# Patient Record
Sex: FEMALE | Race: BLACK OR AFRICAN AMERICAN | NOT HISPANIC OR LATINO | Employment: UNEMPLOYED | URBAN - METROPOLITAN AREA
[De-identification: names, ages, dates, MRNs, and addresses within clinical notes are randomized per-mention and may not be internally consistent; named-entity substitution may affect disease eponyms.]

---

## 2017-09-24 ENCOUNTER — HOSPITAL ENCOUNTER (INPATIENT)
Facility: HOSPITAL | Age: 57
LOS: 7 days | Discharge: HOME | DRG: 871 | End: 2017-10-02
Attending: EMERGENCY MEDICINE | Admitting: FAMILY MEDICINE
Payer: MEDICAID

## 2017-09-24 DIAGNOSIS — R07.9 CHEST PAIN: ICD-10-CM

## 2017-09-24 DIAGNOSIS — J18.9 PNEUMONIA OF BOTH LUNGS DUE TO INFECTIOUS ORGANISM, UNSPECIFIED PART OF LUNG: Primary | ICD-10-CM

## 2017-09-24 DIAGNOSIS — R09.02 HYPOXIA: ICD-10-CM

## 2017-09-24 DIAGNOSIS — A41.9 SEPSIS: ICD-10-CM

## 2017-09-24 DIAGNOSIS — R00.0 TACHYCARDIA: ICD-10-CM

## 2017-09-24 LAB
ALBUMIN SERPL BCP-MCNC: 3.2 G/DL
ALLENS TEST: ABNORMAL
ALP SERPL-CCNC: 134 U/L
ALT SERPL W/O P-5'-P-CCNC: 8 U/L
ANION GAP SERPL CALC-SCNC: 16 MMOL/L
APTT BLDCRRT: 38.7 SEC
AST SERPL-CCNC: 11 U/L
BASOPHILS # BLD AUTO: 0.03 K/UL
BASOPHILS NFR BLD: 0.2 %
BILIRUB SERPL-MCNC: 1.1 MG/DL
BNP SERPL-MCNC: 24 PG/ML
BUN SERPL-MCNC: 11 MG/DL
CALCIUM SERPL-MCNC: 10.1 MG/DL
CHLORIDE SERPL-SCNC: 96 MMOL/L
CO2 SERPL-SCNC: 25 MMOL/L
CREAT SERPL-MCNC: 1 MG/DL
DELSYS: ABNORMAL
DIFFERENTIAL METHOD: ABNORMAL
EOSINOPHIL # BLD AUTO: 0 K/UL
EOSINOPHIL NFR BLD: 0.1 %
ERYTHROCYTE [DISTWIDTH] IN BLOOD BY AUTOMATED COUNT: 14.5 %
EST. GFR  (AFRICAN AMERICAN): >60 ML/MIN/1.73 M^2
EST. GFR  (NON AFRICAN AMERICAN): >60 ML/MIN/1.73 M^2
FIO2: 21
GLUCOSE SERPL-MCNC: 154 MG/DL
HCO3 UR-SCNC: 23.8 MMOL/L (ref 24–28)
HCT VFR BLD AUTO: 39.2 %
HGB BLD-MCNC: 12.6 G/DL
INR PPP: 1.1
LACTATE SERPL-SCNC: 1.5 MMOL/L
LIPASE SERPL-CCNC: 35 U/L
LYMPHOCYTES # BLD AUTO: 2.5 K/UL
LYMPHOCYTES NFR BLD: 14.2 %
MAGNESIUM SERPL-MCNC: 1.9 MG/DL
MCH RBC QN AUTO: 27.6 PG
MCHC RBC AUTO-ENTMCNC: 32.1 G/DL
MCV RBC AUTO: 86 FL
MODE: ABNORMAL
MONOCYTES # BLD AUTO: 1.1 K/UL
MONOCYTES NFR BLD: 6.1 %
NEUTROPHILS # BLD AUTO: 14.1 K/UL
NEUTROPHILS NFR BLD: 79.4 %
PCO2 BLDA: 36.1 MMHG (ref 35–45)
PH SMN: 7.43 [PH] (ref 7.35–7.45)
PHOSPHATE SERPL-MCNC: 2.2 MG/DL
PLATELET # BLD AUTO: 465 K/UL
PMV BLD AUTO: 9.9 FL
PO2 BLDA: 51 MMHG (ref 80–100)
POC BE: -1 MMOL/L
POC SATURATED O2: 87 % (ref 95–100)
POTASSIUM SERPL-SCNC: 4 MMOL/L
PROT SERPL-MCNC: 8.2 G/DL
PROTHROMBIN TIME: 11.4 SEC
RBC # BLD AUTO: 4.56 M/UL
SAMPLE: ABNORMAL
SITE: ABNORMAL
SODIUM SERPL-SCNC: 137 MMOL/L
TROPONIN I SERPL DL<=0.01 NG/ML-MCNC: 0.01 NG/ML
TSH SERPL DL<=0.005 MIU/L-ACNC: 1.14 UIU/ML
WBC # BLD AUTO: 17.76 K/UL

## 2017-09-24 PROCEDURE — 83605 ASSAY OF LACTIC ACID: CPT

## 2017-09-24 PROCEDURE — 93005 ELECTROCARDIOGRAM TRACING: CPT

## 2017-09-24 PROCEDURE — 25000003 PHARM REV CODE 250: Performed by: EMERGENCY MEDICINE

## 2017-09-24 PROCEDURE — 87040 BLOOD CULTURE FOR BACTERIA: CPT

## 2017-09-24 PROCEDURE — 84484 ASSAY OF TROPONIN QUANT: CPT

## 2017-09-24 PROCEDURE — 85730 THROMBOPLASTIN TIME PARTIAL: CPT

## 2017-09-24 PROCEDURE — 82803 BLOOD GASES ANY COMBINATION: CPT

## 2017-09-24 PROCEDURE — 85025 COMPLETE CBC W/AUTO DIFF WBC: CPT

## 2017-09-24 PROCEDURE — 83735 ASSAY OF MAGNESIUM: CPT

## 2017-09-24 PROCEDURE — 93010 ELECTROCARDIOGRAM REPORT: CPT | Mod: ,,, | Performed by: INTERNAL MEDICINE

## 2017-09-24 PROCEDURE — 85610 PROTHROMBIN TIME: CPT

## 2017-09-24 PROCEDURE — 94640 AIRWAY INHALATION TREATMENT: CPT

## 2017-09-24 PROCEDURE — 84100 ASSAY OF PHOSPHORUS: CPT

## 2017-09-24 PROCEDURE — 25000242 PHARM REV CODE 250 ALT 637 W/ HCPCS: Performed by: EMERGENCY MEDICINE

## 2017-09-24 PROCEDURE — 83690 ASSAY OF LIPASE: CPT

## 2017-09-24 PROCEDURE — 96361 HYDRATE IV INFUSION ADD-ON: CPT

## 2017-09-24 PROCEDURE — 99285 EMERGENCY DEPT VISIT HI MDM: CPT | Mod: 25

## 2017-09-24 PROCEDURE — 36600 WITHDRAWAL OF ARTERIAL BLOOD: CPT

## 2017-09-24 PROCEDURE — 82533 TOTAL CORTISOL: CPT

## 2017-09-24 PROCEDURE — 84443 ASSAY THYROID STIM HORMONE: CPT

## 2017-09-24 PROCEDURE — 80053 COMPREHEN METABOLIC PANEL: CPT

## 2017-09-24 PROCEDURE — 83880 ASSAY OF NATRIURETIC PEPTIDE: CPT

## 2017-09-24 PROCEDURE — 84145 PROCALCITONIN (PCT): CPT

## 2017-09-24 PROCEDURE — 21400001 HC TELEMETRY ROOM

## 2017-09-24 PROCEDURE — 99900035 HC TECH TIME PER 15 MIN (STAT)

## 2017-09-24 PROCEDURE — 96374 THER/PROPH/DIAG INJ IV PUSH: CPT

## 2017-09-24 RX ORDER — IPRATROPIUM BROMIDE AND ALBUTEROL SULFATE 2.5; .5 MG/3ML; MG/3ML
3 SOLUTION RESPIRATORY (INHALATION)
Status: COMPLETED | OUTPATIENT
Start: 2017-09-24 | End: 2017-09-24

## 2017-09-24 RX ORDER — MORPHINE SULFATE 4 MG/ML
4 INJECTION, SOLUTION INTRAMUSCULAR; INTRAVENOUS
Status: DISCONTINUED | OUTPATIENT
Start: 2017-09-24 | End: 2017-09-25

## 2017-09-24 RX ORDER — ACETAMINOPHEN 500 MG
1000 TABLET ORAL
Status: COMPLETED | OUTPATIENT
Start: 2017-09-24 | End: 2017-09-24

## 2017-09-24 RX ADMIN — SODIUM CHLORIDE 2040 ML: 0.9 INJECTION, SOLUTION INTRAVENOUS at 10:09

## 2017-09-24 RX ADMIN — ACETAMINOPHEN 1000 MG: 500 TABLET ORAL at 10:09

## 2017-09-24 RX ADMIN — IPRATROPIUM BROMIDE AND ALBUTEROL SULFATE 3 ML: .5; 3 SOLUTION RESPIRATORY (INHALATION) at 10:09

## 2017-09-25 PROBLEM — I10 HTN (HYPERTENSION): Status: ACTIVE | Noted: 2017-09-25

## 2017-09-25 PROBLEM — E83.39 HYPOPHOSPHATEMIA: Status: ACTIVE | Noted: 2017-09-25

## 2017-09-25 PROBLEM — R07.9 CHEST PAIN: Status: ACTIVE | Noted: 2017-09-25

## 2017-09-25 PROBLEM — R09.02 HYPOXIA: Status: ACTIVE | Noted: 2017-09-25

## 2017-09-25 PROBLEM — J18.9 PNEUMONIA OF BOTH LUNGS DUE TO INFECTIOUS ORGANISM: Status: ACTIVE | Noted: 2017-09-25

## 2017-09-25 PROBLEM — R00.0 TACHYCARDIA: Status: ACTIVE | Noted: 2017-09-25

## 2017-09-25 PROBLEM — Z72.0 TOBACCO ABUSE: Status: ACTIVE | Noted: 2017-09-25

## 2017-09-25 PROBLEM — A41.9 SEPSIS: Status: ACTIVE | Noted: 2017-09-25

## 2017-09-25 LAB
ANION GAP SERPL CALC-SCNC: 11 MMOL/L
BASOPHILS # BLD AUTO: 0.02 K/UL
BASOPHILS NFR BLD: 0.1 %
BILIRUB UR QL STRIP: NEGATIVE
BUN SERPL-MCNC: 9 MG/DL
CALCIUM SERPL-MCNC: 8.2 MG/DL
CHLORIDE SERPL-SCNC: 105 MMOL/L
CLARITY UR: CLEAR
CO2 SERPL-SCNC: 22 MMOL/L
COLOR UR: YELLOW
CORTIS SERPL-MCNC: 30 UG/DL
CREAT SERPL-MCNC: 0.8 MG/DL
DIFFERENTIAL METHOD: ABNORMAL
EOSINOPHIL # BLD AUTO: 0 K/UL
EOSINOPHIL NFR BLD: 0 %
ERYTHROCYTE [DISTWIDTH] IN BLOOD BY AUTOMATED COUNT: 14.4 %
EST. GFR  (AFRICAN AMERICAN): >60 ML/MIN/1.73 M^2
EST. GFR  (NON AFRICAN AMERICAN): >60 ML/MIN/1.73 M^2
GLUCOSE SERPL-MCNC: 129 MG/DL
GLUCOSE UR QL STRIP: NEGATIVE
HCT VFR BLD AUTO: 31.3 %
HGB BLD-MCNC: 10.1 G/DL
HGB UR QL STRIP: NEGATIVE
KETONES UR QL STRIP: NEGATIVE
LEUKOCYTE ESTERASE UR QL STRIP: NEGATIVE
LYMPHOCYTES # BLD AUTO: 3.3 K/UL
LYMPHOCYTES NFR BLD: 20.7 %
MAGNESIUM SERPL-MCNC: 1.6 MG/DL
MCH RBC QN AUTO: 27.8 PG
MCHC RBC AUTO-ENTMCNC: 32.3 G/DL
MCV RBC AUTO: 86 FL
MONOCYTES # BLD AUTO: 1.1 K/UL
MONOCYTES NFR BLD: 7.1 %
NEUTROPHILS # BLD AUTO: 11.5 K/UL
NEUTROPHILS NFR BLD: 72.1 %
NITRITE UR QL STRIP: NEGATIVE
PH UR STRIP: 6 [PH] (ref 5–8)
PHOSPHATE SERPL-MCNC: 2.6 MG/DL
PLATELET # BLD AUTO: 313 K/UL
PMV BLD AUTO: 9.2 FL
POTASSIUM SERPL-SCNC: 3.7 MMOL/L
PROCALCITONIN SERPL IA-MCNC: 0.55 NG/ML
PROT UR QL STRIP: NEGATIVE
RBC # BLD AUTO: 3.63 M/UL
SODIUM SERPL-SCNC: 138 MMOL/L
SP GR UR STRIP: <=1.005 (ref 1–1.03)
TROPONIN I SERPL DL<=0.01 NG/ML-MCNC: <0.006 NG/ML
TROPONIN I SERPL DL<=0.01 NG/ML-MCNC: <0.006 NG/ML
URN SPEC COLLECT METH UR: ABNORMAL
UROBILINOGEN UR STRIP-ACNC: ABNORMAL EU/DL
WBC # BLD AUTO: 15.97 K/UL

## 2017-09-25 PROCEDURE — 25000003 PHARM REV CODE 250: Performed by: NURSE PRACTITIONER

## 2017-09-25 PROCEDURE — 36415 COLL VENOUS BLD VENIPUNCTURE: CPT

## 2017-09-25 PROCEDURE — 93005 ELECTROCARDIOGRAM TRACING: CPT

## 2017-09-25 PROCEDURE — 81003 URINALYSIS AUTO W/O SCOPE: CPT

## 2017-09-25 PROCEDURE — 25000242 PHARM REV CODE 250 ALT 637 W/ HCPCS: Performed by: NURSE PRACTITIONER

## 2017-09-25 PROCEDURE — 93010 ELECTROCARDIOGRAM REPORT: CPT | Mod: ,,, | Performed by: INTERNAL MEDICINE

## 2017-09-25 PROCEDURE — 94640 AIRWAY INHALATION TREATMENT: CPT

## 2017-09-25 PROCEDURE — 21400001 HC TELEMETRY ROOM

## 2017-09-25 PROCEDURE — 84100 ASSAY OF PHOSPHORUS: CPT

## 2017-09-25 PROCEDURE — 63600175 PHARM REV CODE 636 W HCPCS: Performed by: NURSE PRACTITIONER

## 2017-09-25 PROCEDURE — 63600175 PHARM REV CODE 636 W HCPCS: Performed by: EMERGENCY MEDICINE

## 2017-09-25 PROCEDURE — 85025 COMPLETE CBC W/AUTO DIFF WBC: CPT

## 2017-09-25 PROCEDURE — 83735 ASSAY OF MAGNESIUM: CPT

## 2017-09-25 PROCEDURE — 27000221 HC OXYGEN, UP TO 24 HOURS

## 2017-09-25 PROCEDURE — 87086 URINE CULTURE/COLONY COUNT: CPT

## 2017-09-25 PROCEDURE — 25000003 PHARM REV CODE 250: Performed by: EMERGENCY MEDICINE

## 2017-09-25 PROCEDURE — 80048 BASIC METABOLIC PNL TOTAL CA: CPT

## 2017-09-25 PROCEDURE — 25500020 PHARM REV CODE 255: Performed by: EMERGENCY MEDICINE

## 2017-09-25 PROCEDURE — 84484 ASSAY OF TROPONIN QUANT: CPT

## 2017-09-25 RX ORDER — MONTELUKAST SODIUM 10 MG/1
10 TABLET ORAL NIGHTLY
Status: DISCONTINUED | OUTPATIENT
Start: 2017-09-25 | End: 2017-10-02 | Stop reason: HOSPADM

## 2017-09-25 RX ORDER — IPRATROPIUM BROMIDE 0.5 MG/2.5ML
0.5 SOLUTION RESPIRATORY (INHALATION) EVERY 8 HOURS
Status: DISCONTINUED | OUTPATIENT
Start: 2017-09-25 | End: 2017-09-26

## 2017-09-25 RX ORDER — MORPHINE SULFATE 4 MG/ML
2 INJECTION, SOLUTION INTRAMUSCULAR; INTRAVENOUS
Status: COMPLETED | OUTPATIENT
Start: 2017-09-25 | End: 2017-09-25

## 2017-09-25 RX ORDER — POLYETHYLENE GLYCOL 3350 17 G/17G
17 POWDER, FOR SOLUTION ORAL DAILY PRN
Status: DISCONTINUED | OUTPATIENT
Start: 2017-09-25 | End: 2017-10-02 | Stop reason: HOSPADM

## 2017-09-25 RX ORDER — MONTELUKAST SODIUM 10 MG/1
10 TABLET ORAL NIGHTLY
COMMUNITY

## 2017-09-25 RX ORDER — METOPROLOL SUCCINATE 50 MG/1
50 TABLET, EXTENDED RELEASE ORAL DAILY
Status: ON HOLD | COMMUNITY
End: 2022-03-03 | Stop reason: HOSPADM

## 2017-09-25 RX ORDER — GUAIFENESIN 600 MG/1
600 TABLET, EXTENDED RELEASE ORAL 2 TIMES DAILY
Status: DISCONTINUED | OUTPATIENT
Start: 2017-09-25 | End: 2017-09-30

## 2017-09-25 RX ORDER — ACETAMINOPHEN 325 MG/1
650 TABLET ORAL EVERY 6 HOURS PRN
Status: DISCONTINUED | OUTPATIENT
Start: 2017-09-25 | End: 2017-09-25

## 2017-09-25 RX ORDER — ENOXAPARIN SODIUM 100 MG/ML
40 INJECTION SUBCUTANEOUS EVERY 24 HOURS
Status: DISCONTINUED | OUTPATIENT
Start: 2017-09-25 | End: 2017-09-25

## 2017-09-25 RX ORDER — LEVALBUTEROL INHALATION SOLUTION 0.63 MG/3ML
0.63 SOLUTION RESPIRATORY (INHALATION) EVERY 8 HOURS
Status: DISCONTINUED | OUTPATIENT
Start: 2017-09-25 | End: 2017-09-30

## 2017-09-25 RX ORDER — MORPHINE SULFATE 2 MG/ML
2 INJECTION, SOLUTION INTRAMUSCULAR; INTRAVENOUS
Status: DISCONTINUED | OUTPATIENT
Start: 2017-09-25 | End: 2017-09-25

## 2017-09-25 RX ORDER — MOXIFLOXACIN HYDROCHLORIDE 400 MG/1
400 TABLET ORAL DAILY
Status: DISCONTINUED | OUTPATIENT
Start: 2017-09-25 | End: 2017-09-26

## 2017-09-25 RX ORDER — ONDANSETRON 2 MG/ML
4 INJECTION INTRAMUSCULAR; INTRAVENOUS EVERY 8 HOURS PRN
Status: DISCONTINUED | OUTPATIENT
Start: 2017-09-25 | End: 2017-10-02 | Stop reason: HOSPADM

## 2017-09-25 RX ORDER — MORPHINE SULFATE 2 MG/ML
2 INJECTION, SOLUTION INTRAMUSCULAR; INTRAVENOUS ONCE
Status: COMPLETED | OUTPATIENT
Start: 2017-09-25 | End: 2017-09-25

## 2017-09-25 RX ORDER — METOPROLOL TARTRATE 1 MG/ML
5 INJECTION, SOLUTION INTRAVENOUS ONCE
Status: COMPLETED | OUTPATIENT
Start: 2017-09-25 | End: 2017-09-25

## 2017-09-25 RX ORDER — PANTOPRAZOLE SODIUM 40 MG/1
40 TABLET, DELAYED RELEASE ORAL DAILY
Status: DISCONTINUED | OUTPATIENT
Start: 2017-09-25 | End: 2017-10-02 | Stop reason: HOSPADM

## 2017-09-25 RX ORDER — ALBUTEROL SULFATE 0.63 MG/3ML
0.63 SOLUTION RESPIRATORY (INHALATION) EVERY 6 HOURS PRN
Status: ON HOLD | COMMUNITY
End: 2017-10-02 | Stop reason: HOSPADM

## 2017-09-25 RX ORDER — DILTIAZEM HYDROCHLORIDE 90 MG/1
90 TABLET, FILM COATED ORAL 4 TIMES DAILY
COMMUNITY
End: 2022-02-28

## 2017-09-25 RX ORDER — ACETAMINOPHEN 325 MG/1
650 TABLET ORAL EVERY 6 HOURS PRN
Status: DISCONTINUED | OUTPATIENT
Start: 2017-09-25 | End: 2017-09-26

## 2017-09-25 RX ORDER — BUDESONIDE AND FORMOTEROL FUMARATE DIHYDRATE 160; 4.5 UG/1; UG/1
2 AEROSOL RESPIRATORY (INHALATION) EVERY 12 HOURS
COMMUNITY

## 2017-09-25 RX ORDER — METFORMIN HYDROCHLORIDE 500 MG/1
500 TABLET ORAL 2 TIMES DAILY WITH MEALS
COMMUNITY
End: 2022-02-28

## 2017-09-25 RX ORDER — KETOROLAC TROMETHAMINE 30 MG/ML
15 INJECTION, SOLUTION INTRAMUSCULAR; INTRAVENOUS EVERY 6 HOURS PRN
Status: ACTIVE | OUTPATIENT
Start: 2017-09-25 | End: 2017-09-28

## 2017-09-25 RX ORDER — ACETAMINOPHEN 325 MG/1
650 TABLET ORAL ONCE
Status: COMPLETED | OUTPATIENT
Start: 2017-09-25 | End: 2017-09-25

## 2017-09-25 RX ORDER — SODIUM CHLORIDE 9 MG/ML
INJECTION, SOLUTION INTRAVENOUS CONTINUOUS
Status: DISCONTINUED | OUTPATIENT
Start: 2017-09-25 | End: 2017-09-26

## 2017-09-25 RX ORDER — IBUPROFEN 600 MG/1
600 TABLET ORAL ONCE
Status: COMPLETED | OUTPATIENT
Start: 2017-09-25 | End: 2017-09-25

## 2017-09-25 RX ORDER — SODIUM CHLORIDE 9 MG/ML
1000 INJECTION, SOLUTION INTRAVENOUS
Status: COMPLETED | OUTPATIENT
Start: 2017-09-25 | End: 2017-09-25

## 2017-09-25 RX ADMIN — METOPROLOL TARTRATE 5 MG: 5 INJECTION INTRAVENOUS at 09:09

## 2017-09-25 RX ADMIN — MOXIFLOXACIN HYDROCHLORIDE 400 MG: 400 TABLET, FILM COATED ORAL at 02:09

## 2017-09-25 RX ADMIN — IOHEXOL 100 ML: 350 INJECTION, SOLUTION INTRAVENOUS at 12:09

## 2017-09-25 RX ADMIN — SODIUM CHLORIDE: 0.9 INJECTION, SOLUTION INTRAVENOUS at 03:09

## 2017-09-25 RX ADMIN — MORPHINE SULFATE 2 MG: 2 INJECTION, SOLUTION INTRAMUSCULAR; INTRAVENOUS at 09:09

## 2017-09-25 RX ADMIN — PANTOPRAZOLE SODIUM 40 MG: 40 TABLET, DELAYED RELEASE ORAL at 08:09

## 2017-09-25 RX ADMIN — MONTELUKAST SODIUM 10 MG: 10 TABLET, COATED ORAL at 03:09

## 2017-09-25 RX ADMIN — LEVALBUTEROL 0.63 MG: 0.63 SOLUTION RESPIRATORY (INHALATION) at 03:09

## 2017-09-25 RX ADMIN — MONTELUKAST SODIUM 10 MG: 10 TABLET, COATED ORAL at 08:09

## 2017-09-25 RX ADMIN — ONDANSETRON 4 MG: 2 INJECTION INTRAMUSCULAR; INTRAVENOUS at 09:09

## 2017-09-25 RX ADMIN — ACETAMINOPHEN 650 MG: 325 TABLET ORAL at 08:09

## 2017-09-25 RX ADMIN — GUAIFENESIN 600 MG: 600 TABLET, EXTENDED RELEASE ORAL at 08:09

## 2017-09-25 RX ADMIN — ACETAMINOPHEN 650 MG: 325 TABLET ORAL at 02:09

## 2017-09-25 RX ADMIN — IPRATROPIUM BROMIDE 0.5 MG: 0.5 SOLUTION RESPIRATORY (INHALATION) at 11:09

## 2017-09-25 RX ADMIN — LEVALBUTEROL 0.63 MG: 0.63 SOLUTION RESPIRATORY (INHALATION) at 07:09

## 2017-09-25 RX ADMIN — KETOROLAC TROMETHAMINE: 30 INJECTION, SOLUTION INTRAMUSCULAR at 06:09

## 2017-09-25 RX ADMIN — SODIUM PHOSPHATE, MONOBASIC, MONOHYDRATE 20.01 MMOL: 276; 142 INJECTION, SOLUTION INTRAVENOUS at 04:09

## 2017-09-25 RX ADMIN — MORPHINE SULFATE 2 MG: 4 INJECTION, SOLUTION INTRAMUSCULAR; INTRAVENOUS at 02:09

## 2017-09-25 RX ADMIN — IPRATROPIUM BROMIDE 0.5 MG: 0.5 SOLUTION RESPIRATORY (INHALATION) at 07:09

## 2017-09-25 RX ADMIN — SODIUM CHLORIDE 1000 ML: 0.9 INJECTION, SOLUTION INTRAVENOUS at 02:09

## 2017-09-25 RX ADMIN — GUAIFENESIN 600 MG: 600 TABLET, EXTENDED RELEASE ORAL at 09:09

## 2017-09-25 RX ADMIN — IPRATROPIUM BROMIDE 0.5 MG: 0.5 SOLUTION RESPIRATORY (INHALATION) at 03:09

## 2017-09-25 RX ADMIN — IBUPROFEN 600 MG: 600 TABLET, FILM COATED ORAL at 02:09

## 2017-09-25 RX ADMIN — RIVAROXABAN 20 MG: 20 TABLET, FILM COATED ORAL at 05:09

## 2017-09-25 NOTE — HOSPITAL COURSE
Patient was placed on OBS for sepsis d/t bilat lower lobe pneumonia. She was started on IV abx, breathing treatments, and mucinex. Patient improving slowly - moved to Inpatient since will need extended IV abx.     9/26;  Some better;  Has faint posterior wheezing today;Continued cough; upper back pain on left, with the cough  On for repeat CXR today; also incentive spirometry added; also tylenol with codeine for coughtand pain complaint.    9/27:  Looks and feels better;  Still with back pain though less;  Had restful night overnight; working with incentive device once hourly while awake.    9/28:  Still with cough and bilateral chest/flank discomfort;  Tender to palpation moreso on the left;  Likely muculoskeletal; no fever or chills reported; last CXR was 9/26 9/29:  Looks and feels some better; still with cough, but less; repeat cxr unchanged.  Will continue present regimen;  Adding spiriva to regiemn    9/30:  Some better; still with complaints of weakness; mira wet sounding cough; chest pain less; CXR no appreciable change; patient afebrile with stable vital signs.she's tolerating her diet;  Has been out of bed to the toilet.    10/1:  Looks and feels some better;;continues with wet sounding cough;  Chest xray shows clearing on the right with persistent infiltrate left upper lung.  Faint wheezing on posterior exam with clearing after cough and frappage.      10/2:  Looks and feels some better; still complains of weakness; up to the bathroom without difficulty;less cough and back pain reported;  CXR showing signs of clearing  Will evaluate for home oxygen today; also placing on oral antibiotic with augmentin.    10/2 Patient was changed over to oral antibiotics;  She was treated for pneumonia and remained hypoxic due to chronic  COPD, and evaluation by exercise study was done and she was shown to desat to   to 87% on room air; thus She will be discharged home on nasal canula O-2 at 2l/min and will obtain a  follow up CXR upon completion of antibiotic therapy    She ruled in for iron deficiency as well as vitamin D deficiency and will continue on suplementation;  A home nebulizer is being provided for her in addition to renewed prescriptions for her nebulizer solution ;  Her chest x-ray was monitored and was showing signs of improvement by discharge;  A Glucometer is being provided her and she will monitor her blood sugars in routine fashion.She was seen and examined at discharge and was felt stable for home.  She will follow with her PCP and is advised to undergo repeat CXR to document complete resolution.

## 2017-09-25 NOTE — ASSESSMENT & PLAN NOTE
Obs  Xray and Chest CT reviewed, see above.  -Avelox   -WBCs in ER 17.76  -Lactic 1.5  -Resp therapy consult with sputum culture   -Blood Cultures pending  -Breathing Treatments q8h, Xopenex with Atrovent  -Hold ICS for now   -Mucinex 600mg q12  -CBC in AM  -Monitor

## 2017-09-25 NOTE — ASSESSMENT & PLAN NOTE
--WBC 17K, Pulse 136, RR 22, evelin source: bilat pneumonia  --PO Avelox and mucinex  --sputum culture pending  -Blood Cultures pending  --duonebs   --supplemental oxygen to maintain sats

## 2017-09-25 NOTE — ASSESSMENT & PLAN NOTE
-Improving with O2  -Repeat ABGs if need  -Patient someday smoker and asthmatic, Possibly with undiagnosed COPD and now with PNE acutely worsening  counseled on smoking cessation.   -OP follow up with pulmonary

## 2017-09-25 NOTE — ASSESSMENT & PLAN NOTE
Hold BP meds at this time, due to presentation with hypotension  monitor and start once improving

## 2017-09-25 NOTE — PROGRESS NOTES
[facility] Hospital Medicine  Progress Note    Patient Name: Genie Balderas  MRN: [id number]  Patient Class: IP- Inpatient   Admission Date: 9/24/2017  Length of Stay: 0 days  Attending Physician: Pinky Stanton MD  Primary Care Provider: Ryan Sims MD        Subjective:     Principal Problem:Sepsis    HPI:  Genie Balderas is a 57 y.o. female patient who presented to the Emergency Department for complaint of Cough that started 2 days ago. The cough is non productive. Associated symptoms include Sharp Like CP to Left chest radiates to back, that stated today due to coughing, and, fatigue, fever, n/v/d. Symptoms are intermittent and moderate in severity. OTC meds and nebs not helping at home.  No mitigating or exacerbating factors reported.  Patient denies any SOB, leg swelling, palpitations, HA, lightheadedness, dizziness, numbness, weakness, and all other sxs at this time. No further complaints or concerns at this time.        Hospital Course:  Patient was placed on OBS for sepsis d/t bilat lower lobe pneumonia. She was started on IV abx, breathing treatments, and mucinex. Patient improving slowly - moved to Inpatient since will need extended IV abx.     Interval History: patient receiving IV abx, IV steroids, breathing tx, and mucinex - slowly improving. Continue current therapy    Review of Systems   Constitutional: Positive for activity change and fatigue. Negative for appetite change, chills and fever.   HENT: Negative for congestion, dental problem, ear pain, hearing loss, mouth sores, sinus pressure, sore throat, tinnitus and trouble swallowing.    Eyes: Negative for pain, discharge, redness and visual disturbance.   Respiratory: Positive for cough, shortness of breath and wheezing. Negative for apnea, choking and chest tightness.    Cardiovascular: Negative for chest pain, palpitations and leg swelling.   Gastrointestinal: Negative for abdominal distention, abdominal pain, anal bleeding,  blood in stool, constipation, diarrhea, nausea, rectal pain and vomiting.   Endocrine: Negative for cold intolerance, heat intolerance, polydipsia and polyuria.   Genitourinary: Negative for difficulty urinating, dysuria, flank pain, frequency, hematuria and urgency.   Musculoskeletal: Negative for arthralgias, back pain, gait problem, joint swelling, myalgias, neck pain and neck stiffness.   Skin: Negative for color change, rash and wound.   Allergic/Immunologic: Negative for food allergies and immunocompromised state.   Neurological: Negative for dizziness, tremors, seizures, syncope, speech difficulty, light-headedness and headaches.   Hematological: Negative for adenopathy. Does not bruise/bleed easily.   Psychiatric/Behavioral: Negative for agitation, confusion and sleep disturbance. The patient is not nervous/anxious.    All other systems reviewed and are negative.    Objective:     Vital Signs (Most Recent):  Temp: 97.7 °F (36.5 °C) (09/25/17 0721)  Pulse: 87 (09/25/17 1100)  Resp: 18 (09/25/17 0730)  BP: (!) 105/55 (09/25/17 0721)  SpO2: 98 % (09/25/17 0730) Vital Signs (24h Range):  Temp:  [97.7 °F (36.5 °C)-101.7 °F (38.7 °C)] 97.7 °F (36.5 °C)  Pulse:  [] 87  Resp:  [16-25] 18  SpO2:  [89 %-100 %] 98 %  BP: ()/(55-81) 105/55     Weight: 68 kg (150 lb)  Body mass index is 25.75 kg/m².    Intake/Output Summary (Last 24 hours) at 09/25/17 1141  Last data filed at 09/25/17 1000   Gross per 24 hour   Intake              130 ml   Output                0 ml   Net              130 ml      Physical Exam   Constitutional: She is oriented to person, place, and time. She appears well-developed. She appears ill. No distress.   HENT:   Head: Normocephalic and atraumatic.   Nose: Nose normal.   Eyes: Conjunctivae and EOM are normal. Pupils are equal, round, and reactive to light. No scleral icterus.   Neck: Normal range of motion. Neck supple. No tracheal deviation present.   Cardiovascular: Regular rhythm,  normal heart sounds and intact distal pulses.  Exam reveals no gallop and no friction rub.    No murmur heard.  Tachycardia     Pulmonary/Chest: Effort normal. No stridor. No respiratory distress. She has wheezes ( occasional throughotu). She has no rales.   rhonchi bilateral lower lobes   Abdominal: Soft. Bowel sounds are normal. She exhibits no distension. There is no tenderness. There is no guarding.   Obese     Genitourinary:   Genitourinary Comments: deferred   Musculoskeletal: Normal range of motion. She exhibits no edema or deformity.   Neurological: She is alert and oriented to person, place, and time. No cranial nerve deficit.   Skin: Skin is warm and dry. Capillary refill takes less than 2 seconds. No rash noted. She is not diaphoretic.   Psychiatric: She has a normal mood and affect. Her behavior is normal. Judgment and thought content normal.   Nursing note and vitals reviewed.      Significant Labs:   BMP:   Recent Labs  Lab 09/25/17  0412   *      K 3.7      CO2 22*   BUN 9   CREATININE 0.8   CALCIUM 8.2*   MG 1.6     CBC:   Recent Labs  Lab 09/24/17  2224 09/25/17  0412   WBC 17.76* 15.97*   HGB 12.6 10.1*   HCT 39.2 31.3*   * 313     All pertinent labs within the past 24 hours have been reviewed.    Significant Imaging: I have reviewed all pertinent imaging results/findings within the past 24 hours.  Imaging Results          CT Abdomen Pelvis  Without Contrast (Final result)  Result time 09/25/17 07:34:22    Final result by Deuce Olvera MD (09/25/17 07:34:22)                 Impression:       Bilateral parenchymal infiltrates are seen within the lower lobes.    Mild prominence of the right renal collecting system however no obstructing stone is seen. This can be seen in a setting of recently passed stone.     Left inguinal adenopathy measuring up to 1.4 cm.    Subcentimeter hypodensity is seen within the mid body of the pancreas which is indeterminate. Consider interval  followup    All CT scans at this facility use dose modulation, iterative reconstruction and/or weight based dosing when appropriate to reduce radiation dose to as low as reasonably achievable.      Electronically signed by: DEUCE SHERMAN MD  Date:     09/25/17  Time:    07:34              Narrative:    Indication: Abdominal pain.    Routine noncontrast CT images of the abdomen and pelvis were obtained.    Findings:    Bilateral parenchymal infiltrates are seen within the lower lobes.  Heart size is normal.  No pericardial or pleural effusion.      The liver is normal in size and attenuation on this noncontrast exam.  Status post cholecystectomy The  stomach, spleen,  adrenal glands are normal.  Subcentimeter hypodensity is seen within the mid body of the pancreas which is indeterminate. Consider interval followup. Aorta demonstrates moderate atherosclerotic disease.      The kidneys are normal in size shape and position without radiopaque calculus or signs of hydronephrosis. Mild prominence of the right renal collecting system however no obstructing stone is seen. This can be seen in a setting of recently passed stone. The bladder is incompletely distended.     The visualized loops of small bowel are unremarkable.The appendix is visualized in the right lower quadrant.  There is no inflammation. Colon demonstrates diverticulosis without evidence of diverticulitis. Mild constipation    Left inguinal adenopathy..      Bones appear intact with moderate degenerative changes                             CTA Chest Non-Coronary (PE Study) (Final result)     Abnormal  Result time 09/25/17 07:39:18    Final result by Deuce Sherman MD (09/25/17 07:39:18)                 Impression:         Bilateral pulmonary infiltrates are seen some with a micronodular pattern which can be seen in infectious (typical or atypical) and or inflammatory process. Followup to resolution is recommended.    Mediastinal and bilateral hilar  adenopathy which can be seen in the setting of infectious/reactive process versus neoplastic versus lymphoproliferative disorder.    No evidence of PE.    Fatty liver.    All CT scans at this facility use dose modulation, iterative reconstruction, and/or weight base dosing when appropriate to reduce radiation dose to as low as reasonably achievable.      Electronically signed by: AURA SHERMAN MD  Date:     09/25/17  Time:    07:39              Narrative:    Clinical data: Chest pain.    Axial CT images through the chest after administration of contrast was performed according to PE study protocol using 100 cc Omni 350.    Findings:    The pulmonary arterial system is well opacified with no evidence of filling defect to suggest pulmonary embolus or thrombus.    Structures of the base of the neck are normal.    The heart and pericardium are unremarkable.  Trace pericardial effusion.  Mediastinal structures including great vessels, trachea, esophagus are intact.    Mediastinal and bilateral hilar adenopathy which may be reactive.      Bilateral pulmonary infiltrates are seen some with a micronodular pattern which can be seen in infectious or inflammatory process. Mild emphysematous changes are seen within the lungs bilaterally.    Surrounding chest wall structures, visualized abdominal organs, bones are unremarkable. Cholecystectomy and mild fatty infiltration of liver suspected.                             X-Ray Chest AP Portable (Final result)  Result time 09/24/17 22:22:56    Final result by Celia Billings MD (09/24/17 22:22:56)                 Impression:         Negative portable chest x-ray.      Electronically signed by: CELIA BILLINGS MD  Date:     09/24/17  Time:    22:22              Narrative:    Portable chest x-ray. 09/24/17 22:13:49    Clinical indication:  sepsis    Heart size is normal. The lung fields are clear. No acute cardiopulmonary infiltrate.                                Assessment/Plan:       * Sepsis due to bilateral lower lobe pneumonia     --WBC 17K, Pulse 136, RR 22, evelin source: bilat pneumonia  --PO Avelox and mucinex  --sputum culture pending  -Blood Cultures pending  --duonebs   --supplemental oxygen to maintain sats             Chest pain    --likely secondary to pneumonia  --CT showed No central pulmonary embolus, Bilateral pulmonary infiltrates and nodules   -EKG reviewed  --Serial Troponins negative          Hypoxia    -Improving with O2  -Repeat ABGs if need  -Patient someday smoker and asthmatic, Possibly with undiagnosed COPD and now with PNE acutely worsening  counseled on smoking cessation.   -OP follow up with pulmonary         Hypophosphatemia    --monitor and replete as indicated          HTN (hypertension)    Hold BP meds at this time, due to presentation with hypotension  monitor and start once improving           Tachycardia    -Likely in response to infection, monitor   -IVF            Tobacco abuse    --counseled on smoking cessation  --refused nicotine patch            VTE Risk Mitigation         Ordered     rivaroxaban tablet 20 mg  With dinner     Route:  Oral        09/25/17 0240     Medium Risk of VTE  Once      09/25/17 0208     Place sequential compression device  Until discontinued      09/25/17 0208              CASSIDY Cano-C  Department of Hospital Medicine   [facility]

## 2017-09-25 NOTE — SUBJECTIVE & OBJECTIVE
Interval History: patient receiving IV abx, IV steroids, breathing tx, and mucinex - slowly improving. Continue current therapy    Review of Systems   Constitutional: Positive for activity change and fatigue. Negative for appetite change, chills and fever.   HENT: Negative for congestion, dental problem, ear pain, hearing loss, mouth sores, sinus pressure, sore throat, tinnitus and trouble swallowing.    Eyes: Negative for pain, discharge, redness and visual disturbance.   Respiratory: Positive for cough, shortness of breath and wheezing. Negative for apnea, choking and chest tightness.    Cardiovascular: Negative for chest pain, palpitations and leg swelling.   Gastrointestinal: Negative for abdominal distention, abdominal pain, anal bleeding, blood in stool, constipation, diarrhea, nausea, rectal pain and vomiting.   Endocrine: Negative for cold intolerance, heat intolerance, polydipsia and polyuria.   Genitourinary: Negative for difficulty urinating, dysuria, flank pain, frequency, hematuria and urgency.   Musculoskeletal: Negative for arthralgias, back pain, gait problem, joint swelling, myalgias, neck pain and neck stiffness.   Skin: Negative for color change, rash and wound.   Allergic/Immunologic: Negative for food allergies and immunocompromised state.   Neurological: Negative for dizziness, tremors, seizures, syncope, speech difficulty, light-headedness and headaches.   Hematological: Negative for adenopathy. Does not bruise/bleed easily.   Psychiatric/Behavioral: Negative for agitation, confusion and sleep disturbance. The patient is not nervous/anxious.    All other systems reviewed and are negative.    Objective:     Vital Signs (Most Recent):  Temp: 97.7 °F (36.5 °C) (09/25/17 0721)  Pulse: 87 (09/25/17 1100)  Resp: 18 (09/25/17 0730)  BP: (!) 105/55 (09/25/17 0721)  SpO2: 98 % (09/25/17 0730) Vital Signs (24h Range):  Temp:  [97.7 °F (36.5 °C)-101.7 °F (38.7 °C)] 97.7 °F (36.5 °C)  Pulse:  []  87  Resp:  [16-25] 18  SpO2:  [89 %-100 %] 98 %  BP: ()/(55-81) 105/55     Weight: 68 kg (150 lb)  Body mass index is 25.75 kg/m².    Intake/Output Summary (Last 24 hours) at 09/25/17 1141  Last data filed at 09/25/17 1000   Gross per 24 hour   Intake              130 ml   Output                0 ml   Net              130 ml      Physical Exam   Constitutional: She is oriented to person, place, and time. She appears well-developed. She appears ill. No distress.   HENT:   Head: Normocephalic and atraumatic.   Nose: Nose normal.   Eyes: Conjunctivae and EOM are normal. Pupils are equal, round, and reactive to light. No scleral icterus.   Neck: Normal range of motion. Neck supple. No tracheal deviation present.   Cardiovascular: Regular rhythm, normal heart sounds and intact distal pulses.  Exam reveals no gallop and no friction rub.    No murmur heard.  Tachycardia     Pulmonary/Chest: Effort normal. No stridor. No respiratory distress. She has wheezes ( occasional throughotu). She has no rales.   rhonchi bilateral lower lobes   Abdominal: Soft. Bowel sounds are normal. She exhibits no distension. There is no tenderness. There is no guarding.   Obese     Genitourinary:   Genitourinary Comments: deferred   Musculoskeletal: Normal range of motion. She exhibits no edema or deformity.   Neurological: She is alert and oriented to person, place, and time. No cranial nerve deficit.   Skin: Skin is warm and dry. Capillary refill takes less than 2 seconds. No rash noted. She is not diaphoretic.   Psychiatric: She has a normal mood and affect. Her behavior is normal. Judgment and thought content normal.   Nursing note and vitals reviewed.      Significant Labs:   BMP:   Recent Labs  Lab 09/25/17  0412   *      K 3.7      CO2 22*   BUN 9   CREATININE 0.8   CALCIUM 8.2*   MG 1.6     CBC:   Recent Labs  Lab 09/24/17  2224 09/25/17  0412   WBC 17.76* 15.97*   HGB 12.6 10.1*   HCT 39.2 31.3*   * 313      All pertinent labs within the past 24 hours have been reviewed.    Significant Imaging: I have reviewed all pertinent imaging results/findings within the past 24 hours.  Imaging Results          CT Abdomen Pelvis  Without Contrast (Final result)  Result time 09/25/17 07:34:22    Final result by Deuce Sherman MD (09/25/17 07:34:22)                 Impression:       Bilateral parenchymal infiltrates are seen within the lower lobes.    Mild prominence of the right renal collecting system however no obstructing stone is seen. This can be seen in a setting of recently passed stone.     Left inguinal adenopathy measuring up to 1.4 cm.    Subcentimeter hypodensity is seen within the mid body of the pancreas which is indeterminate. Consider interval followup    All CT scans at this facility use dose modulation, iterative reconstruction and/or weight based dosing when appropriate to reduce radiation dose to as low as reasonably achievable.      Electronically signed by: DEUCE SHERMAN MD  Date:     09/25/17  Time:    07:34              Narrative:    Indication: Abdominal pain.    Routine noncontrast CT images of the abdomen and pelvis were obtained.    Findings:    Bilateral parenchymal infiltrates are seen within the lower lobes.  Heart size is normal.  No pericardial or pleural effusion.      The liver is normal in size and attenuation on this noncontrast exam.  Status post cholecystectomy The  stomach, spleen,  adrenal glands are normal.  Subcentimeter hypodensity is seen within the mid body of the pancreas which is indeterminate. Consider interval followup. Aorta demonstrates moderate atherosclerotic disease.      The kidneys are normal in size shape and position without radiopaque calculus or signs of hydronephrosis. Mild prominence of the right renal collecting system however no obstructing stone is seen. This can be seen in a setting of recently passed stone. The bladder is incompletely distended.     The  visualized loops of small bowel are unremarkable.The appendix is visualized in the right lower quadrant.  There is no inflammation. Colon demonstrates diverticulosis without evidence of diverticulitis. Mild constipation    Left inguinal adenopathy..      Bones appear intact with moderate degenerative changes                             CTA Chest Non-Coronary (PE Study) (Final result)     Abnormal  Result time 09/25/17 07:39:18    Final result by Deuce Sherman MD (09/25/17 07:39:18)                 Impression:         Bilateral pulmonary infiltrates are seen some with a micronodular pattern which can be seen in infectious (typical or atypical) and or inflammatory process. Followup to resolution is recommended.    Mediastinal and bilateral hilar adenopathy which can be seen in the setting of infectious/reactive process versus neoplastic versus lymphoproliferative disorder.    No evidence of PE.    Fatty liver.    All CT scans at this facility use dose modulation, iterative reconstruction, and/or weight base dosing when appropriate to reduce radiation dose to as low as reasonably achievable.      Electronically signed by: DEUCE SHERMAN MD  Date:     09/25/17  Time:    07:39              Narrative:    Clinical data: Chest pain.    Axial CT images through the chest after administration of contrast was performed according to PE study protocol using 100 cc Omni 350.    Findings:    The pulmonary arterial system is well opacified with no evidence of filling defect to suggest pulmonary embolus or thrombus.    Structures of the base of the neck are normal.    The heart and pericardium are unremarkable.  Trace pericardial effusion.  Mediastinal structures including great vessels, trachea, esophagus are intact.    Mediastinal and bilateral hilar adenopathy which may be reactive.      Bilateral pulmonary infiltrates are seen some with a micronodular pattern which can be seen in infectious or inflammatory process. Mild  emphysematous changes are seen within the lungs bilaterally.    Surrounding chest wall structures, visualized abdominal organs, bones are unremarkable. Cholecystectomy and mild fatty infiltration of liver suspected.                             X-Ray Chest AP Portable (Final result)  Result time 09/24/17 22:22:56    Final result by Celia Billings MD (09/24/17 22:22:56)                 Impression:         Negative portable chest x-ray.      Electronically signed by: CELIA BILLINGS MD  Date:     09/24/17  Time:    22:22              Narrative:    Portable chest x-ray. 09/24/17 22:13:49    Clinical indication:  sepsis    Heart size is normal. The lung fields are clear. No acute cardiopulmonary infiltrate.

## 2017-09-25 NOTE — ASSESSMENT & PLAN NOTE
-Likely in response to infection, monitor   -IVF  -Use Xopenex vs Albuterol to help mitigate worsening

## 2017-09-25 NOTE — PLAN OF CARE
Problem: Patient Care Overview  Goal: Plan of Care Review  Outcome: Outcome(s) achieved Date Met: 09/25/17  POC discussed w/patient, verbalized understanding. NSR/ST on monitor. VSS. Voids per B[facility]. Pt c/o  pain some relief with tylenol. Toradol ordered for more severe pain. Frequent weight change encouraged. IV fluids infusing. Patient turns independently in bed. Fall precautions in place, bed alarm on. Patient denies needs at this time.

## 2017-09-25 NOTE — PLAN OF CARE
Problem: Patient Care Overview  Goal: Plan of Care Review  Outcome: Ongoing (interventions implemented as appropriate)  Patient doing well since arriving to unit. Vital signs stable. Sinus rhythm to sinus tach on the monitor. Pain controlled with PRN pain medication administered in ED. Fluids infusing. No s/s of distress. Will continue to monitor.

## 2017-09-25 NOTE — ASSESSMENT & PLAN NOTE
-First Troponin negative, CP likely related to PNE  -CT for PE ruled out  -EKG reviewed.   -Serial Troponin

## 2017-09-25 NOTE — ASSESSMENT & PLAN NOTE
--likely secondary to pneumonia  --CT showed No central pulmonary embolus, Bilateral pulmonary infiltrates and nodules   -EKG reviewed  --Serial Troponins negative

## 2017-09-25 NOTE — ED NOTES
In bed resting, VSS,[id number],skin warm dry to touch,equal bilateral clear lung sounds,side rails up x 2,bed locked and in low position, plan of care discussed, oriented to surroundings, instructed to call with any needs.

## 2017-09-25 NOTE — ED PROVIDER NOTES
SCRIBE #1 NOTE: I, Deuce Delgadillo, am scribing for, and in the presence of, Donny Orozco Jr., MD. I have scribed the entire note.      History      Chief Complaint   Patient presents with    Chest Pain     L cp worsening since this morning; intermittent stabbing 10/10       Review of patient's allergies indicates:  No Known Allergies     HPI   HPI    9/24/2017, 10:05 PM   History obtained from the patient      History of Present Illness: Genie Balderas is a 57 y.o. female patient who presents to the Emergency Department for chest pain which onset 12 hours ago after coughing. Symptoms are intermittent and moderate in severity.Pt describes the pain as a sharp pain. Pt's chest pain is exacerbated when she coughs.  Pt states she has had a nonproductive cough for 2 days. Pt states she has vomited twice today and had 2 episodes of diarrhea yesterday. No mitigating or exacerbating factors reported. Associated sxs include nausea and fever. Patient denies any SOB, leg swelling, palpitations, HA, lightheadedness, dizziness, numbness, weakness, and all other sxs at this time. No further complaints or concerns at this time.             Arrival mode: Personal vehicle     PCP: Ryan Sims MD       Past Medical History:  Past medical history reviewed not relevant      Past Surgical History:  Past surgical history reviewed not relevant      Family History:  Family history reviewed not relevant      Social History:  Social History    Social History Main Topics    Social History Main Topics    Smoking status: Unknown if ever smoked    Smokeless tobacco: Unknown if ever used    Alcohol Use: Unknown drinking history    Drug Use: Unknown if ever used    Sexual Activity: Unknown         ROS   Review of Systems   Constitutional: Positive for fever. Negative for chills.   HENT: Negative for sore throat.    Respiratory: Positive for cough (nonproductive). Negative for shortness of breath.    Cardiovascular: Positive for chest pain.  Negative for palpitations and leg swelling.   Gastrointestinal: Positive for diarrhea, nausea and vomiting.   Genitourinary: Negative for dysuria.   Musculoskeletal: Negative for back pain.   Skin: Negative for rash.   Neurological: Negative for dizziness, weakness, light-headedness, numbness and headaches.   Hematological: Does not bruise/bleed easily.       Physical Exam      Initial Vitals [09/24/17 2140]   BP Pulse Resp Temp SpO2   91/77 (!) 147 (!) 24 (!) 101.7 °F (38.7 °C) (!) 91 %      MAP       81.67          Physical Exam  Nursing Notes and Vital Signs Reviewed.  Constitutional: Patient is in no acute distress. Well-developed and well-nourished.  Head: Atraumatic. Normocephalic.  Eyes: PERRL. EOM intact. Conjunctivae are not pale. No scleral icterus.  ENT: Mucous membranes are moist. Oropharynx is clear and symmetric.    Neck: Supple. Full ROM. No lymphadenopathy.  Cardiovascular: Tachycardic. Regular rhythm. No murmurs, rubs, or gallops. Distal pulses are 2+ and symmetric.  Pulmonary/Chest: No respiratory distress. Clear to auscultation bilaterally. No wheezing, rales, or rhonchi.  Abdominal: Soft and non-distended.  There is generalized tenderness.  No rebound, guarding, or rigidity.   Genitourinary: No CVA tenderness  Musculoskeletal: Moves all extremities. No obvious deformities. No edema. No calf tenderness.  Skin: Warm and dry.  Neurological:  Alert, awake, and appropriate.  Normal speech.  No acute focal neurological deficits are appreciated.  Psychiatric: Normal affect. Good eye contact. Appropriate in content.    ED Course    Procedures  ED Vital Signs:  Vitals:    09/27/17 1948 09/27/17 2342 09/27/17 2358 09/28/17 0337   BP: (!) 145/83  (!) 149/72 138/70   Pulse: (!) 117 83 85 84   Resp: 18 18 20 20   Temp: 98.2 °F (36.8 °C)  98.3 °F (36.8 °C) 98.1 °F (36.7 °C)   TempSrc:       SpO2: (!) 94% (!) 93% (!) 91% (!) 92%   Weight:    72.6 kg (160 lb)   Height:        09/28/17 0725 09/28/17 0743 09/28/17  0757 09/28/17 0925   BP:  (!) 120/59     Pulse: 107 92 (!) 118 98   Resp:  17 20    Temp:  98.4 °F (36.9 °C)     TempSrc:  Oral     SpO2:  (!) 93% 95%    Weight:       Height:        09/28/17 1125 09/28/17 1221 09/28/17 1325 09/28/17 1520   BP:  123/64     Pulse: (!) 117 (!) 117 (!) 113 105   Resp:  18     Temp:  96.4 °F (35.8 °C)     TempSrc:  Oral     SpO2:  (!) 93%     Weight:       Height:        09/28/17 1716 09/28/17 1720 09/28/17 1937   BP: 133/74  (!) 165/78   Pulse: (!) 117 106 89   Resp: 18  16   Temp: 96.9 °F (36.1 °C)  98.9 °F (37.2 °C)   TempSrc: Oral  Oral   SpO2: 96%  95%   Weight:      Height:          Abnormal Lab Results:  Labs Reviewed   APTT - Abnormal; Notable for the following:        Result Value    aPTT 38.7 (*)     All other components within normal limits   CBC W/ AUTO DIFFERENTIAL - Abnormal; Notable for the following:     WBC 17.76 (*)     Platelets 465 (*)     Gran # 14.1 (*)     Mono # 1.1 (*)     Gran% 79.4 (*)     Lymph% 14.2 (*)     All other components within normal limits   COMPREHENSIVE METABOLIC PANEL - Abnormal; Notable for the following:     Glucose 154 (*)     Albumin 3.2 (*)     Total Bilirubin 1.1 (*)     ALT 8 (*)     All other components within normal limits   PHOSPHORUS - Abnormal; Notable for the following:     Phosphorus 2.2 (*)     All other components within normal limits   ISTAT PROCEDURE - Abnormal; Notable for the following:     POC PO2 51 (*)     POC HCO3 23.8 (*)     POC SATURATED O2 87 (*)     All other components within normal limits   CULTURE, RESPIRATORY   B-TYPE NATRIURETIC PEPTIDE   LACTIC ACID, PLASMA   LIPASE   MAGNESIUM   PROTIME-INR   TROPONIN I   TSH   MAGNESIUM   PHOSPHORUS   PROCALCITONIN        All Lab Results:  Results for orders placed or performed during the hospital encounter of 09/24/17   Blood culture x two cultures. Draw prior to antibiotics.   Result Value Ref Range    Blood Culture, Routine No Growth to date     Blood Culture, Routine No  Growth to date     Blood Culture, Routine No Growth to date     Blood Culture, Routine No Growth to date    Urine culture   Result Value Ref Range    Urine Culture, Routine No growth    Blood culture x two cultures. Draw prior to antibiotics.   Result Value Ref Range    Blood Culture, Routine No Growth to date     Blood Culture, Routine No Growth to date     Blood Culture, Routine No Growth to date     Blood Culture, Routine No Growth to date    APTT   Result Value Ref Range    aPTT 38.7 (H) 21.0 - 32.0 sec   Brain natriuretic peptide   Result Value Ref Range    BNP 24 0 - 99 pg/mL   CBC auto differential   Result Value Ref Range    WBC 17.76 (H) 3.90 - 12.70 K/uL    RBC 4.56 4.00 - 5.40 M/uL    Hemoglobin 12.6 12.0 - 16.0 g/dL    Hematocrit 39.2 37.0 - 48.5 %    MCV 86 82 - 98 fL    MCH 27.6 27.0 - 31.0 pg    MCHC 32.1 32.0 - 36.0 g/dL    RDW 14.5 11.5 - 14.5 %    Platelets 465 (H) 150 - 350 K/uL    MPV 9.9 9.2 - 12.9 fL    Gran # 14.1 (H) 1.8 - 7.7 K/uL    Lymph # 2.5 1.0 - 4.8 K/uL    Mono # 1.1 (H) 0.3 - 1.0 K/uL    Eos # 0.0 0.0 - 0.5 K/uL    Baso # 0.03 0.00 - 0.20 K/uL    Gran% 79.4 (H) 38.0 - 73.0 %    Lymph% 14.2 (L) 18.0 - 48.0 %    Mono% 6.1 4.0 - 15.0 %    Eosinophil% 0.1 0.0 - 8.0 %    Basophil% 0.2 0.0 - 1.9 %    Differential Method Automated    Comprehensive metabolic panel   Result Value Ref Range    Sodium 137 136 - 145 mmol/L    Potassium 4.0 3.5 - 5.1 mmol/L    Chloride 96 95 - 110 mmol/L    CO2 25 23 - 29 mmol/L    Glucose 154 (H) 70 - 110 mg/dL    BUN, Bld 11 6 - 20 mg/dL    Creatinine 1.0 0.5 - 1.4 mg/dL    Calcium 10.1 8.7 - 10.5 mg/dL    Total Protein 8.2 6.0 - 8.4 g/dL    Albumin 3.2 (L) 3.5 - 5.2 g/dL    Total Bilirubin 1.1 (H) 0.1 - 1.0 mg/dL    Alkaline Phosphatase 134 55 - 135 U/L    AST 11 10 - 40 U/L    ALT 8 (L) 10 - 44 U/L    Anion Gap 16 8 - 16 mmol/L    eGFR if African American >60 >60 mL/min/1.73 m^2    eGFR if non African American >60 >60 mL/min/1.73 m^2   Cortisol   Result Value  Ref Range    Cortisol 30.0 ug/dL   Lactic acid, plasma #1   Result Value Ref Range    Lactate (Lactic Acid) 1.5 0.5 - 2.2 mmol/L   Lipase   Result Value Ref Range    Lipase 35 4 - 60 U/L   Magnesium   Result Value Ref Range    Magnesium 1.9 1.6 - 2.6 mg/dL   Phosphorus   Result Value Ref Range    Phosphorus 2.2 (L) 2.7 - 4.5 mg/dL   Protime-INR   Result Value Ref Range    Prothrombin Time 11.4 9.0 - 12.5 sec    INR 1.1 0.8 - 1.2   Procalcitonin   Result Value Ref Range    Procalcitonin 0.55 (H) <0.25 ng/mL   Troponin I   Result Value Ref Range    Troponin I 0.008 0.000 - 0.026 ng/mL   TSH   Result Value Ref Range    TSH 1.142 0.400 - 4.000 uIU/mL   Urinalysis   Result Value Ref Range    Specimen UA Urine, Clean Catch     Color, UA Yellow Yellow, Straw, Suzanne    Appearance, UA Clear Clear    pH, UA 6.0 5.0 - 8.0    Specific Gravity, UA <=1.005 (A) 1.005 - 1.030    Protein, UA Negative Negative    Glucose, UA Negative Negative    Ketones, UA Negative Negative    Bilirubin (UA) Negative Negative    Occult Blood UA Negative Negative    Nitrite, UA Negative Negative    Urobilinogen, UA 2.0-3.0 (A) <2.0 EU/dL    Leukocytes, UA Negative Negative   Basic metabolic panel   Result Value Ref Range    Sodium 138 136 - 145 mmol/L    Potassium 3.7 3.5 - 5.1 mmol/L    Chloride 105 95 - 110 mmol/L    CO2 22 (L) 23 - 29 mmol/L    Glucose 129 (H) 70 - 110 mg/dL    BUN, Bld 9 6 - 20 mg/dL    Creatinine 0.8 0.5 - 1.4 mg/dL    Calcium 8.2 (L) 8.7 - 10.5 mg/dL    Anion Gap 11 8 - 16 mmol/L    eGFR if African American >60 >60 mL/min/1.73 m^2    eGFR if non African American >60 >60 mL/min/1.73 m^2   CBC auto differential   Result Value Ref Range    WBC 15.97 (H) 3.90 - 12.70 K/uL    RBC 3.63 (L) 4.00 - 5.40 M/uL    Hemoglobin 10.1 (L) 12.0 - 16.0 g/dL    Hematocrit 31.3 (L) 37.0 - 48.5 %    MCV 86 82 - 98 fL    MCH 27.8 27.0 - 31.0 pg    MCHC 32.3 32.0 - 36.0 g/dL    RDW 14.4 11.5 - 14.5 %    Platelets 313 150 - 350 K/uL    MPV 9.2 9.2 -  12.9 fL    Gran # 11.5 (H) 1.8 - 7.7 K/uL    Lymph # 3.3 1.0 - 4.8 K/uL    Mono # 1.1 (H) 0.3 - 1.0 K/uL    Eos # 0.0 0.0 - 0.5 K/uL    Baso # 0.02 0.00 - 0.20 K/uL    Gran% 72.1 38.0 - 73.0 %    Lymph% 20.7 18.0 - 48.0 %    Mono% 7.1 4.0 - 15.0 %    Eosinophil% 0.0 0.0 - 8.0 %    Basophil% 0.1 0.0 - 1.9 %    Differential Method Automated    Troponin I   Result Value Ref Range    Troponin I <0.006 0.000 - 0.026 ng/mL   Phosphorus   Result Value Ref Range    Phosphorus 2.6 (L) 2.7 - 4.5 mg/dL   Magnesium   Result Value Ref Range    Magnesium 1.6 1.6 - 2.6 mg/dL   Troponin I   Result Value Ref Range    Troponin I <0.006 0.000 - 0.026 ng/mL   Magnesium   Result Value Ref Range    Magnesium 1.4 (L) 1.6 - 2.6 mg/dL   Phosphorus   Result Value Ref Range    Phosphorus 2.2 (L) 2.7 - 4.5 mg/dL   Basic metabolic panel   Result Value Ref Range    Sodium 140 136 - 145 mmol/L    Potassium 3.4 (L) 3.5 - 5.1 mmol/L    Chloride 107 95 - 110 mmol/L    CO2 24 23 - 29 mmol/L    Glucose 85 70 - 110 mg/dL    BUN, Bld 4 (L) 6 - 20 mg/dL    Creatinine 0.7 0.5 - 1.4 mg/dL    Calcium 8.3 (L) 8.7 - 10.5 mg/dL    Anion Gap 9 8 - 16 mmol/L    eGFR if African American >60 >60 mL/min/1.73 m^2    eGFR if non African American >60 >60 mL/min/1.73 m^2   CBC auto differential   Result Value Ref Range    WBC 14.03 (H) 3.90 - 12.70 K/uL    RBC 3.45 (L) 4.00 - 5.40 M/uL    Hemoglobin 9.5 (L) 12.0 - 16.0 g/dL    Hematocrit 29.6 (L) 37.0 - 48.5 %    MCV 86 82 - 98 fL    MCH 27.5 27.0 - 31.0 pg    MCHC 32.1 32.0 - 36.0 g/dL    RDW 14.6 (H) 11.5 - 14.5 %    Platelets 373 (H) 150 - 350 K/uL    MPV 9.5 9.2 - 12.9 fL    Gran # 9.8 (H) 1.8 - 7.7 K/uL    Lymph # 3.2 1.0 - 4.8 K/uL    Mono # 1.0 0.3 - 1.0 K/uL    Eos # 0.0 0.0 - 0.5 K/uL    Baso # 0.02 0.00 - 0.20 K/uL    Gran% 69.5 38.0 - 73.0 %    Lymph% 23.0 18.0 - 48.0 %    Mono% 7.1 4.0 - 15.0 %    Eosinophil% 0.3 0.0 - 8.0 %    Basophil% 0.1 0.0 - 1.9 %    Differential Method Automated    Vitamin D   Result  Value Ref Range    Vit D, 25-Hydroxy 6 (L) 30 - 96 ng/mL   Magnesium   Result Value Ref Range    Magnesium 2.0 1.6 - 2.6 mg/dL   Phosphorus   Result Value Ref Range    Phosphorus 3.4 2.7 - 4.5 mg/dL   Basic metabolic panel   Result Value Ref Range    Sodium 142 136 - 145 mmol/L    Potassium 3.1 (L) 3.5 - 5.1 mmol/L    Chloride 103 95 - 110 mmol/L    CO2 32 (H) 23 - 29 mmol/L    Glucose 108 70 - 110 mg/dL    BUN, Bld 3 (L) 6 - 20 mg/dL    Creatinine 0.8 0.5 - 1.4 mg/dL    Calcium 8.7 8.7 - 10.5 mg/dL    Anion Gap 7 (L) 8 - 16 mmol/L    eGFR if African American >60 >60 mL/min/1.73 m^2    eGFR if non African American >60 >60 mL/min/1.73 m^2   CBC auto differential   Result Value Ref Range    WBC 11.08 3.90 - 12.70 K/uL    RBC 3.56 (L) 4.00 - 5.40 M/uL    Hemoglobin 9.7 (L) 12.0 - 16.0 g/dL    Hematocrit 30.6 (L) 37.0 - 48.5 %    MCV 86 82 - 98 fL    MCH 27.2 27.0 - 31.0 pg    MCHC 31.7 (L) 32.0 - 36.0 g/dL    RDW 14.7 (H) 11.5 - 14.5 %    Platelets 433 (H) 150 - 350 K/uL    MPV 9.3 9.2 - 12.9 fL    Gran # 6.6 1.8 - 7.7 K/uL    Lymph # 3.6 1.0 - 4.8 K/uL    Mono # 0.8 0.3 - 1.0 K/uL    Eos # 0.1 0.0 - 0.5 K/uL    Baso # 0.02 0.00 - 0.20 K/uL    Gran% 59.3 38.0 - 73.0 %    Lymph% 32.6 18.0 - 48.0 %    Mono% 7.1 4.0 - 15.0 %    Eosinophil% 0.8 0.0 - 8.0 %    Basophil% 0.2 0.0 - 1.9 %    Differential Method Automated    Magnesium   Result Value Ref Range    Magnesium 2.1 1.6 - 2.6 mg/dL   Phosphorus   Result Value Ref Range    Phosphorus 3.5 2.7 - 4.5 mg/dL   Folate   Result Value Ref Range    Folate 6.2 4.0 - 24.0 ng/mL   Vitamin B12   Result Value Ref Range    Vitamin B-12 1289 (H) 210 - 950 pg/mL   Iron and TIBC   Result Value Ref Range    Iron 12 (L) 30 - 160 ug/dL    Transferrin 126 (L) 200 - 375 mg/dL    TIBC 186 (L) 250 - 450 ug/dL    Saturated Iron 6 (L) 20 - 50 %   Magnesium   Result Value Ref Range    Magnesium 1.9 1.6 - 2.6 mg/dL   Phosphorus   Result Value Ref Range    Phosphorus 3.1 2.7 - 4.5 mg/dL   Basic  metabolic panel   Result Value Ref Range    Sodium 143 136 - 145 mmol/L    Potassium 3.1 (L) 3.5 - 5.1 mmol/L    Chloride 102 95 - 110 mmol/L    CO2 34 (H) 23 - 29 mmol/L    Glucose 91 70 - 110 mg/dL    BUN, Bld 3 (L) 6 - 20 mg/dL    Creatinine 0.7 0.5 - 1.4 mg/dL    Calcium 9.1 8.7 - 10.5 mg/dL    Anion Gap 7 (L) 8 - 16 mmol/L    eGFR if African American >60 >60 mL/min/1.73 m^2    eGFR if non African American >60 >60 mL/min/1.73 m^2   CBC auto differential   Result Value Ref Range    WBC 9.27 3.90 - 12.70 K/uL    RBC 3.67 (L) 4.00 - 5.40 M/uL    Hemoglobin 10.1 (L) 12.0 - 16.0 g/dL    Hematocrit 31.7 (L) 37.0 - 48.5 %    MCV 86 82 - 98 fL    MCH 27.5 27.0 - 31.0 pg    MCHC 31.9 (L) 32.0 - 36.0 g/dL    RDW 14.7 (H) 11.5 - 14.5 %    Platelets 475 (H) 150 - 350 K/uL    MPV 9.2 9.2 - 12.9 fL    Gran # 5.0 1.8 - 7.7 K/uL    Lymph # 3.4 1.0 - 4.8 K/uL    Mono # 0.7 0.3 - 1.0 K/uL    Eos # 0.2 0.0 - 0.5 K/uL    Baso # 0.03 0.00 - 0.20 K/uL    Gran% 54.3 38.0 - 73.0 %    Lymph% 36.1 18.0 - 48.0 %    Mono% 7.6 4.0 - 15.0 %    Eosinophil% 1.7 0.0 - 8.0 %    Basophil% 0.3 0.0 - 1.9 %    Differential Method Automated    ISTAT PROCEDURE   Result Value Ref Range    POC PH 7.428 7.35 - 7.45    POC PCO2 36.1 35 - 45 mmHg    POC PO2 51 (LL) 80 - 100 mmHg    POC HCO3 23.8 (L) 24 - 28 mmol/L    POC BE -1 -2 to 2 mmol/L    POC SATURATED O2 87 (L) 95 - 100 %    Sample ARTERIAL     Site LR     Allens Test Pass     DelSys Room Air     Mode SPONT     FiO2 21    POCT glucose   Result Value Ref Range    POCT Glucose 147 (H) 70 - 110 mg/dL   POCT glucose   Result Value Ref Range    POCT Glucose 112 (H) 70 - 110 mg/dL   POCT glucose   Result Value Ref Range    POCT Glucose 190 (H) 70 - 110 mg/dL         Imaging Results:  Imaging Results          X-Ray Chest PA And Lateral (Final result)  Result time 09/28/17 11:11:02    Final result by Abelino Jean Baptiste MD (09/28/17 11:11:02)                 Impression:      Stable chest x-ray with patchy  multifocal infiltrate most pronounced in the left suprahilar and right infrahilar lung zones.  No change.      Electronically signed by: TRISTON WARNER MD  Date:     09/28/17  Time:    11:11              Narrative:    Exam: XR CHEST PA AND LATERAL,    Date:  09/28/17 10:45:05    History: Bilateral pneumonia    Comparison:  09/24/2017 and 09/26/2017.    Findings: The heart size is normal.  Aortic arch is minimally calcified.    There are increased perihilar markings present.  Left suprahilar infiltrate in right infrahilar infiltrates are again noted.  Allowing for the difference in technique no definite interval change.    No evidence of pleural effusion.  Right upper quadrant surgical clips noted.                             X-Ray Chest 1 View (Final result)  Result time 09/26/17 10:45:23   Procedure changed from X-Ray Chest PA And Lateral     Final result by JEFFREY Britt Sr., MD (09/26/17 10:45:23)                 Impression:      There has been interval development of patchy areas of increased density scattered throughout the lungs. This is consistent with the patient's history and characteristic of pneumonia.      Electronically signed by: JEFFREY BRITT MD  Date:     09/26/17  Time:    10:45              Narrative:    One-view chest x-ray    Clinical History: Pneumonia    Finding: Comparison was made to a prior examination performed on 9/24/2017. The size of the heart is normal. There has been interval development of patchy areas of increased density scattered throughout the lungs. There is no pneumothorax.  The costophrenic angles are sharp.                             CT Abdomen Pelvis  Without Contrast (Final result)  Result time 09/25/17 07:34:22    Final result by Deuce Olvera MD (09/25/17 07:34:22)                 Impression:       Bilateral parenchymal infiltrates are seen within the lower lobes.    Mild prominence of the right renal collecting system however no obstructing stone is seen. This can  be seen in a setting of recently passed stone.     Left inguinal adenopathy measuring up to 1.4 cm.    Subcentimeter hypodensity is seen within the mid body of the pancreas which is indeterminate. Consider interval followup    All CT scans at this facility use dose modulation, iterative reconstruction and/or weight based dosing when appropriate to reduce radiation dose to as low as reasonably achievable.      Electronically signed by: DEUCE SHERMAN MD  Date:     09/25/17  Time:    07:34              Narrative:    Indication: Abdominal pain.    Routine noncontrast CT images of the abdomen and pelvis were obtained.    Findings:    Bilateral parenchymal infiltrates are seen within the lower lobes.  Heart size is normal.  No pericardial or pleural effusion.      The liver is normal in size and attenuation on this noncontrast exam.  Status post cholecystectomy The  stomach, spleen,  adrenal glands are normal.  Subcentimeter hypodensity is seen within the mid body of the pancreas which is indeterminate. Consider interval followup. Aorta demonstrates moderate atherosclerotic disease.      The kidneys are normal in size shape and position without radiopaque calculus or signs of hydronephrosis. Mild prominence of the right renal collecting system however no obstructing stone is seen. This can be seen in a setting of recently passed stone. The bladder is incompletely distended.     The visualized loops of small bowel are unremarkable.The appendix is visualized in the right lower quadrant.  There is no inflammation. Colon demonstrates diverticulosis without evidence of diverticulitis. Mild constipation    Left inguinal adenopathy..      Bones appear intact with moderate degenerative changes                             CTA Chest Non-Coronary (PE Study) (Final result)     Abnormal  Result time 09/25/17 07:39:18    Final result by Deuce Sherman MD (09/25/17 07:39:18)                 Impression:         Bilateral pulmonary  infiltrates are seen some with a micronodular pattern which can be seen in infectious (typical or atypical) and or inflammatory process. Followup to resolution is recommended.    Mediastinal and bilateral hilar adenopathy which can be seen in the setting of infectious/reactive process versus neoplastic versus lymphoproliferative disorder.    No evidence of PE.    Fatty liver.    All CT scans at this facility use dose modulation, iterative reconstruction, and/or weight base dosing when appropriate to reduce radiation dose to as low as reasonably achievable.      Electronically signed by: AURA SHERMAN MD  Date:     09/25/17  Time:    07:39              Narrative:    Clinical data: Chest pain.    Axial CT images through the chest after administration of contrast was performed according to PE study protocol using 100 cc Omni 350.    Findings:    The pulmonary arterial system is well opacified with no evidence of filling defect to suggest pulmonary embolus or thrombus.    Structures of the base of the neck are normal.    The heart and pericardium are unremarkable.  Trace pericardial effusion.  Mediastinal structures including great vessels, trachea, esophagus are intact.    Mediastinal and bilateral hilar adenopathy which may be reactive.      Bilateral pulmonary infiltrates are seen some with a micronodular pattern which can be seen in infectious or inflammatory process. Mild emphysematous changes are seen within the lungs bilaterally.    Surrounding chest wall structures, visualized abdominal organs, bones are unremarkable. Cholecystectomy and mild fatty infiltration of liver suspected.                             X-Ray Chest AP Portable (Final result)  Result time 09/24/17 22:22:56    Final result by Celia Billings MD (09/24/17 22:22:56)                 Impression:         Negative portable chest x-ray.      Electronically signed by: CELIA BILLINGS MD  Date:     09/24/17  Time:    22:22              Narrative:     Portable chest x-ray. 09/24/17 22:13:49    Clinical indication:  sepsis    Heart size is normal. The lung fields are clear. No acute cardiopulmonary infiltrate.                             RADIOLOGY REPORT (Final result)  Result time 09/26/17 15:17:37               Ordered, reviewed, and independently interpreted by the ED provider.  Study: CT Abdomen and Pelvis without contrast  Findings: Colonic diverticula without diverticulitis. No evidence of appendicitis. Bilateral pulmonary infiltrates.    Ordered, reviewed, and independently interpreted by the ED provider.  Study: CT chest with IV contrast  Findings: No central pulmonary embolus. Bilateral pulmonary infiltrates and nodules.      The EKG was ordered, reviewed, and independently interpreted by the ED provider.  Interpretation time: 21:52  Rate: 144 BPM  Rhythm: sinus tachycardia with occasional premature ventricular complexes  Interpretation: left axis deviation. Nonspecific ST and T wave abnormality. No STEMI.             The Emergency Provider reviewed the vital signs and test results, which are outlined above.    ED Discussion     11:31 PM: Re-evaluated pt. Pt is resting comfortably and is in no acute distress.  Pt states she is still in pain.  D/w pt all pertinent results. D/w pt any concerns expressed at this time. Answered all questions. Pt expresses understanding at this time.    1:25 AM: Re-evaluated pt. I have discussed test results, shared treatment plan, and the need for admission with patient and family at bedside. Pt and family express understanding at this time and agree with all information. All questions answered. Pt and family have no further questions or concerns at this time. Pt is ready for admit.    2:15 AM: Discussed case with Deuce Chapman NP ([facility]). Dr. Barkley agrees with current care and management of pt and accepts admission.   Admitting Service: Hospital medicine   Admitting Physician: Dr. Barkley  Admit to:  Telemetry        ED Medication(s):  Medications   polyethylene glycol packet 17 g (not administered)   pantoprazole EC tablet 40 mg (40 mg Oral Given 9/28/17 0925)   levalbuterol nebulizer solution 0.63 mg (0.63 mg Nebulization Given 9/28/17 1716)   guaifenesin 12 hr tablet 600 mg (600 mg Oral Given 9/28/17 2122)   rivaroxaban tablet 20 mg (20 mg Oral Given 9/28/17 1802)   montelukast tablet 10 mg (10 mg Oral Given 9/28/17 2122)   influenza (FLUZONE,FLUARIX QUADRIVALENT) vaccine 0.5 mL (not administered)   ketorolac injection 15 mg ( Intravenous Given 9/25/17 1858)   ondansetron injection 4 mg (4 mg Intravenous Given 9/25/17 2139)   metoprolol succinate (TOPROL-XL) 24 hr tablet 50 mg (50 mg Oral Given 9/28/17 0925)   cefTRIAXone (ROCEPHIN) 1 g in dextrose 5 % 50 mL IVPB (1 g Intravenous New Bag 9/28/17 1115)   doxycycline (VIBRAMYCIN) 100mg in dextrose 5% 250 mL IVPB (ready to mix) (100 mg Intravenous Given 9/28/17 1232)   acetaminophen-codeine 300-30mg per tablet 1 tablet (1 tablet Oral Given 9/28/17 1937)   ipratropium 0.02 % nebulizer solution 0.5 mg (0.5 mg Nebulization Given 9/28/17 1716)   benzonatate capsule 100 mg (100 mg Oral Given 9/28/17 0406)   ergocalciferol capsule 50,000 Units (50,000 Units Oral Given 9/27/17 1508)   ferrous sulfate EC tablet 325 mg (325 mg Oral Given 9/28/17 1201)   docusate sodium capsule 100 mg (not administered)   insulin aspart pen 0-5 Units (not administered)   glucose chewable tablet 16 g (not administered)   glucose chewable tablet 24 g (not administered)   dextrose 50% injection 12.5 g (not administered)   dextrose 50% injection 25 g (not administered)   glucagon (human recombinant) injection 1 mg (not administered)   sodium chloride 0.9% bolus 2,040 mL (0 mL/kg × 68 kg Intravenous Stopped 9/25/17 0207)   acetaminophen tablet 1,000 mg (1,000 mg Oral Given 9/24/17 2205)   albuterol-ipratropium 2.5mg-0.5mg/3mL nebulizer solution 3 mL (3 mLs Nebulization Given 9/24/17 2226)    omnipaque 350 iohexol 100 mL (100 mLs Intravenous Given 9/25/17 0036)   0.9%  NaCl infusion (1,000 mLs Intravenous New Bag 9/25/17 0208)   morphine injection 2 mg (2 mg Intravenous Given 9/25/17 0212)   sodium phosphate 20.01 mmol in dextrose 5 % 250 mL IVPB (20.01 mmol Intravenous New Bag 9/25/17 0442)   ibuprofen tablet 600 mg (600 mg Oral Given 9/25/17 0234)   acetaminophen tablet 650 mg (650 mg Oral Given 9/25/17 0234)   metoprolol injection 5 mg (5 mg Intravenous Given 9/25/17 2125)   morphine injection 2 mg (2 mg Intravenous Given 9/25/17 2124)   potassium phosphate 20 mmol in dextrose 5 % 500 mL infusion (20 mmol Intravenous New Bag 9/26/17 1337)   magnesium sulfate in dextrose IVPB (premix) 1 g (1 g Intravenous New Bag 9/26/17 1456)       Current Discharge Medication List                Medical Decision Making    Medical Decision Making:   Clinical Tests:   Lab Tests: Ordered and Reviewed  Radiological Study: Ordered and Reviewed  Medical Tests: Ordered and Reviewed           Scribe Attestation:   Scribe #1: I performed the above scribed service and the documentation accurately describes the services I performed. I attest to the accuracy of the note.    Attending:   Physician Attestation Statement for Scribe #1: I, Donny Orozco Jr., MD, personally performed the services described in this documentation, as scribed by Deuce Delgadillo, in my presence, and it is both accurate and complete.          Clinical Impression       ICD-10-CM ICD-9-CM   1. Pneumonia of both lungs due to infectious organism, unspecified part of lung J18.9 483.8   2. Chest pain R07.9 786.50   3. Hypoxia R09.02 799.02   4. Tachycardia R00.0 785.0   5. Sepsis A41.9 038.9     995.91       Disposition:   Disposition: Placed in Observation  Condition: Ricardo Orozco Jr., MD  09/28/17 1492

## 2017-09-25 NOTE — PLAN OF CARE
CM met with patient regarding discharge planning.  The patient plans to return home to the address listed on the facesheet upon discharge.  The patient reports she lives with her daughter.  The patient reports she found out on Friday she no longer has Medicaid.  CM advised CM would contact [facility] and have a Medicaid application completed.  Member reports compliance with medication.  No post hospital needs noted at this time.      09/25/17 1018   Discharge Assessment   Assessment Type Discharge Planning Assessment   Confirmed/corrected address and phone number on facesheet? Yes   Assessment information obtained from? Patient   Expected Length of Stay (days) 3   Communicated expected length of stay with patient/caregiver yes   Prior to hospitilization cognitive status: Alert/Oriented   Prior to hospitalization functional status: Independent   Current cognitive status: Alert/Oriented   Current Functional Status: Independent   Lives With child(brittany), adult   Able to Return to Prior Arrangements yes   Is patient able to care for self after discharge? Yes   Patient's perception of discharge disposition admitted as an inpatient   Readmission Within The Last 30 Days no previous admission in last 30 days   Patient currently being followed by outpatient case management? No   Patient currently receives any other outside agency services? No   Equipment Currently Used at Home none   Do you have any problems affording any of your prescribed medications? No   Is the patient taking medications as prescribed? yes   Does the patient have transportation home? Yes   Transportation Available family or friend will provide   Does the patient receive services at the [facility]? No   Discharge Plan A Home   Discharge Plan B Home   Patient/Family In Agreement With Plan yes

## 2017-09-25 NOTE — H&P
[facility] Hospital Medicine  History & Physical    Patient Name: Genie Balderas  MRN: [id number]  Admission Date: 2017  Attending Physician: Luciano Barkley MD  Primary Care Provider: Ryan Sims MD         Patient information was obtained from patient and ER records.     Subjective:     Principal Problem:Sepsis    Chief Complaint:   Chief Complaint   Patient presents with    Chest Pain     L cp worsening since this morning; intermittent stabbing 10/10        HPI: Genie Balderas is a 57 y.o. female patient who presented to the Emergency Department for complaint of Cough that started 2 days ago. The cough is non productive. Associated symptoms include Sharp Like CP to Left chest radiates to back, that stated today due to coughing, and, fatigue, fever, n/v/d. Symptoms are intermittent and moderate in severity. OTC meds and nebs not helping at home.  No mitigating or exacerbating factors reported.  Patient denies any SOB, leg swelling, palpitations, HA, lightheadedness, dizziness, numbness, weakness, and all other sxs at this time. No further complaints or concerns at this time.        Past Medical History:   Diagnosis Date    Anticoagulant long-term use     Asthma     Diabetes mellitus     Hypertension        Past Surgical History:   Procedure Laterality Date     SECTION         Review of patient's allergies indicates:  No Known Allergies    No current facility-administered medications on file prior to encounter.      No current outpatient prescriptions on file prior to encounter.     Family History     Mom: Heart Disease          Social History Main Topics    Smoking status: Current Some Day Smoker    Smokeless tobacco: Never Used    Alcohol use No    Drug use: No    Sexual activity: Not Currently     Review of Systems   Constitutional: Positive for fatigue and fever. Negative for chills and diaphoresis.   HENT: Negative for congestion, sore throat and voice change.     Eyes: Negative for photophobia and visual disturbance.   Respiratory: Positive for cough. Negative for shortness of breath, wheezing and stridor.    Cardiovascular: Positive for chest pain. Negative for leg swelling.   Gastrointestinal: Negative for abdominal distention, abdominal pain, constipation, diarrhea, nausea and vomiting.   Endocrine: Negative for polydipsia, polyphagia and polyuria.   Genitourinary: Negative for decreased urine volume, difficulty urinating, dysuria, flank pain, pelvic pain, urgency and vaginal discharge.   Musculoskeletal: Negative for back pain, joint swelling, neck pain and neck stiffness.   Skin: Negative for color change and rash.   Allergic/Immunologic: Negative for immunocompromised state.   Neurological: Negative for dizziness, syncope, weakness, numbness and headaches.   Hematological: Does not bruise/bleed easily.   Psychiatric/Behavioral: Negative for agitation, behavioral problems and confusion.     Objective:     Vital Signs (Most Recent):  Temp: 98.5 °F (36.9 °C) (09/25/17 0239)  Pulse: (!) 136 (09/25/17 0211)  Resp: (!) 22 (09/25/17 0211)  BP: 108/77 (09/25/17 0211)  SpO2: 99 % (09/25/17 0211) Vital Signs (24h Range):  Temp:  [98.5 °F (36.9 °C)-101.7 °F (38.7 °C)] 98.5 °F (36.9 °C)  Pulse:  [130-147] 136  Resp:  [18-25] 22  SpO2:  [89 %-100 %] 99 %  BP: ()/(59-77) 108/77     Weight: 68 kg (150 lb)  Body mass index is 25.75 kg/m².    Physical Exam   Constitutional: She is oriented to person, place, and time. She appears well-developed. She appears ill. No distress.   HENT:   Head: Normocephalic and atraumatic.   Nose: Nose normal.   Eyes: Conjunctivae and EOM are normal. Pupils are equal, round, and reactive to light. No scleral icterus.   Neck: Normal range of motion. Neck supple. No tracheal deviation present.   Cardiovascular: Regular rhythm, normal heart sounds and intact distal pulses.    No murmur heard.  Tachycardia  ER EKG reviewed   Pulmonary/Chest: Effort  normal. No stridor. No respiratory distress. She has wheezes ( occasional throughotu). She has no rales.   rhonchi bilateral lower lobes   Abdominal: Soft. Bowel sounds are normal. She exhibits no distension. There is no tenderness. There is no guarding.   Obese     Genitourinary:   Genitourinary Comments: deferred   Musculoskeletal: Normal range of motion. She exhibits no edema or deformity.   Neurological: She is alert and oriented to person, place, and time. No cranial nerve deficit.   Skin: Skin is warm and dry. Capillary refill takes 2 to 3 seconds. No rash noted. She is not diaphoretic.   Psychiatric: She has a normal mood and affect. Her behavior is normal. Judgment and thought content normal.   Nursing note and vitals reviewed.     Tissue Perfusion Assessment    Vital signs reviewed. A focused perfusion assessment was completed.    Significant Labs: All pertinent labs within the past 24 hours have been reviewed.   Results for orders placed or performed during the hospital encounter of 09/24/17   APTT   Result Value Ref Range    aPTT 38.7 (H) 21.0 - 32.0 sec   Brain natriuretic peptide   Result Value Ref Range    BNP 24 0 - 99 pg/mL   CBC auto differential   Result Value Ref Range    WBC 17.76 (H) 3.90 - 12.70 K/uL    RBC 4.56 4.00 - 5.40 M/uL    Hemoglobin 12.6 12.0 - 16.0 g/dL    Hematocrit 39.2 37.0 - 48.5 %    MCV 86 82 - 98 fL    MCH 27.6 27.0 - 31.0 pg    MCHC 32.1 32.0 - 36.0 g/dL    RDW 14.5 11.5 - 14.5 %    Platelets 465 (H) 150 - 350 K/uL    MPV 9.9 9.2 - 12.9 fL    Gran # 14.1 (H) 1.8 - 7.7 K/uL    Lymph # 2.5 1.0 - 4.8 K/uL    Mono # 1.1 (H) 0.3 - 1.0 K/uL    Eos # 0.0 0.0 - 0.5 K/uL    Baso # 0.03 0.00 - 0.20 K/uL    Gran% 79.4 (H) 38.0 - 73.0 %    Lymph% 14.2 (L) 18.0 - 48.0 %    Mono% 6.1 4.0 - 15.0 %    Eosinophil% 0.1 0.0 - 8.0 %    Basophil% 0.2 0.0 - 1.9 %    Differential Method Automated    Comprehensive metabolic panel   Result Value Ref Range    Sodium 137 136 - 145 mmol/L    Potassium  4.0 3.5 - 5.1 mmol/L    Chloride 96 95 - 110 mmol/L    CO2 25 23 - 29 mmol/L    Glucose 154 (H) 70 - 110 mg/dL    BUN, Bld 11 6 - 20 mg/dL    Creatinine 1.0 0.5 - 1.4 mg/dL    Calcium 10.1 8.7 - 10.5 mg/dL    Total Protein 8.2 6.0 - 8.4 g/dL    Albumin 3.2 (L) 3.5 - 5.2 g/dL    Total Bilirubin 1.1 (H) 0.1 - 1.0 mg/dL    Alkaline Phosphatase 134 55 - 135 U/L    AST 11 10 - 40 U/L    ALT 8 (L) 10 - 44 U/L    Anion Gap 16 8 - 16 mmol/L    eGFR if African American >60 >60 mL/min/1.73 m^2    eGFR if non African American >60 >60 mL/min/1.73 m^2   Lactic acid, plasma #1   Result Value Ref Range    Lactate (Lactic Acid) 1.5 0.5 - 2.2 mmol/L   Lipase   Result Value Ref Range    Lipase 35 4 - 60 U/L   Magnesium   Result Value Ref Range    Magnesium 1.9 1.6 - 2.6 mg/dL   Phosphorus   Result Value Ref Range    Phosphorus 2.2 (L) 2.7 - 4.5 mg/dL   Protime-INR   Result Value Ref Range    Prothrombin Time 11.4 9.0 - 12.5 sec    INR 1.1 0.8 - 1.2   Troponin I   Result Value Ref Range    Troponin I 0.008 0.000 - 0.026 ng/mL   TSH   Result Value Ref Range    TSH 1.142 0.400 - 4.000 uIU/mL   ISTAT PROCEDURE   Result Value Ref Range    POC PH 7.428 7.35 - 7.45    POC PCO2 36.1 35 - 45 mmHg    POC PO2 51 (LL) 80 - 100 mmHg    POC HCO3 23.8 (L) 24 - 28 mmol/L    POC BE -1 -2 to 2 mmol/L    POC SATURATED O2 87 (L) 95 - 100 %    Sample ARTERIAL     Site LR     Allens Test Pass     DelSys Room Air     Mode SPONT     FiO2 21          Significant Imaging: I have reviewed all pertinent imaging results/findings within the past 24 hours.   Imaging Results          CT Abdomen Pelvis  Without Contrast (In process)    Stat Rad Impression: Colonic diverticula without diverticulitis. No evidence of appendicitis. Bilateral pulmonary infiltrates             CTA Chest Non-Coronary (PE Study)     Stat Rad Impression: No central pulmonary embolus, Bilateral pulmonary infiltrates and nodules             X-Ray Chest AP Portable (Final result)  Result time  09/24/17 22:22:56    Final result by Celia Billings MD (09/24/17 22:22:56)                 Impression:         Negative portable chest x-ray.      Electronically signed by: CELIA BILLINGS MD  Date:     09/24/17  Time:    22:22              Narrative:    Portable chest x-ray. 09/24/17 22:13:49    Clinical indication:  sepsis    Heart size is normal. The lung fields are clear. No acute cardiopulmonary infiltrate.                            I have personally reviewed the patients labs, imaging, ekg and discussed the patient case in detail with the Er provider      Assessment/Plan:     * Sepsis due to bilateral lower lobe pneumonia       Obs  Xray and Chest CT reviewed, see above.  -Avelox   -WBCs in ER 17.76  -Lactic 1.5  -Resp therapy consult with sputum culture   -Blood Cultures pending  -Breathing Treatments q8h, Xopenex with Atrovent  -Hold ICS for now   -Mucinex 600mg q12  -CBC in AM  -Monitor         HTN (hypertension)    Hold BP meds at this time, due to presentation with hypotension  monitor and start once improving           Hypophosphatemia    -Replenish.  -monitor           Tachycardia    -Likely in response to infection, monitor   -IVF  -Use Xopenex vs Albuterol to help mitigate worsening          Hypoxia    -Improving with O2  -Repeat ABGs if need  -Patient someday smoker and asthmatic, Possibly with undiagnosed COPD and now with PNE acutely worsening  counseled on smoking cessation.   -OP follow up with pulmonary         Chest pain    -First Troponin negative, CP likely related to PNE  -CT for PE ruled out  -EKG reviewed.   -Serial Troponin             VTE Risk Mitigation         Ordered     rivaroxaban tablet 20 mg  With dinner     Route:  Oral        09/25/17 0240     Medium Risk of VTE  Once      09/25/17 0208     Place sequential compression device  Until discontinued      09/25/17 0208             Deuce Chapman NP  Department of Hospital Medicine   [facility]

## 2017-09-25 NOTE — HPI
Genie Balderas is a 57 y.o. female patient who presented to the Emergency Department for complaint of Cough that started 2 days ago. The cough is non productive. Associated symptoms include Sharp Like CP to Left chest radiates to back, that stated today due to coughing, and, fatigue, fever, n/v/d. Symptoms are intermittent and moderate in severity. OTC meds and nebs not helping at home.  No mitigating or exacerbating factors reported.  Patient denies any SOB, leg swelling, palpitations, HA, lightheadedness, dizziness, numbness, weakness, and all other sxs at this time. No further complaints or concerns at this time.

## 2017-09-25 NOTE — SUBJECTIVE & OBJECTIVE
Past Medical History:   Diagnosis Date    Anticoagulant long-term use     Asthma     Diabetes mellitus     Hypertension        Past Surgical History:   Procedure Laterality Date     SECTION         Review of patient's allergies indicates:  No Known Allergies    No current facility-administered medications on file prior to encounter.      No current outpatient prescriptions on file prior to encounter.     Family History     Reviewed and not Pertinent           Social History Main Topics    Smoking status: Current Some Day Smoker    Smokeless tobacco: Never Used    Alcohol use No    Drug use: No    Sexual activity: Not Currently     Review of Systems   Constitutional: Positive for fatigue and fever. Negative for chills and diaphoresis.   HENT: Negative for congestion, sore throat and voice change.    Eyes: Negative for photophobia and visual disturbance.   Respiratory: Positive for cough. Negative for shortness of breath, wheezing and stridor.    Cardiovascular: Positive for chest pain. Negative for leg swelling.   Gastrointestinal: Negative for abdominal distention, abdominal pain, constipation, diarrhea, nausea and vomiting.   Endocrine: Negative for polydipsia, polyphagia and polyuria.   Genitourinary: Negative for decreased urine volume, difficulty urinating, dysuria, flank pain, pelvic pain, urgency and vaginal discharge.   Musculoskeletal: Negative for back pain, joint swelling, neck pain and neck stiffness.   Skin: Negative for color change and rash.   Allergic/Immunologic: Negative for immunocompromised state.   Neurological: Negative for dizziness, syncope, weakness, numbness and headaches.   Hematological: Does not bruise/bleed easily.   Psychiatric/Behavioral: Negative for agitation, behavioral problems and confusion.     Objective:     Vital Signs (Most Recent):  Temp: 98.5 °F (36.9 °C) (17)  Pulse: (!) 136 (17)  Resp: (!) 22 (17)  BP: 108/77 (17  0211)  SpO2: 99 % (09/25/17 0211) Vital Signs (24h Range):  Temp:  [98.5 °F (36.9 °C)-101.7 °F (38.7 °C)] 98.5 °F (36.9 °C)  Pulse:  [130-147] 136  Resp:  [18-25] 22  SpO2:  [89 %-100 %] 99 %  BP: ()/(59-77) 108/77     Weight: 68 kg (150 lb)  Body mass index is 25.75 kg/m².    Physical Exam   Constitutional: She is oriented to person, place, and time. She appears well-developed. She appears ill. No distress.   HENT:   Head: Normocephalic and atraumatic.   Nose: Nose normal.   Eyes: Conjunctivae and EOM are normal. Pupils are equal, round, and reactive to light. No scleral icterus.   Neck: Normal range of motion. Neck supple. No tracheal deviation present.   Cardiovascular: Regular rhythm, normal heart sounds and intact distal pulses.    No murmur heard.  Tachycardia     Pulmonary/Chest: Effort normal. No stridor. No respiratory distress. She has wheezes ( occasional throughotu). She has no rales.   rhonchi bilateral lower lobes   Abdominal: Soft. Bowel sounds are normal. She exhibits no distension. There is no tenderness. There is no guarding.   Obese     Genitourinary:   Genitourinary Comments: deferred   Musculoskeletal: Normal range of motion. She exhibits no edema or deformity.   Neurological: She is alert and oriented to person, place, and time. No cranial nerve deficit.   Skin: Skin is warm and dry. Capillary refill takes 2 to 3 seconds. No rash noted. She is not diaphoretic.   Psychiatric: She has a normal mood and affect. Her behavior is normal. Judgment and thought content normal.   Nursing note and vitals reviewed.       Significant Labs: All pertinent labs within the past 24 hours have been reviewed.   Results for orders placed or performed during the hospital encounter of 09/24/17   APTT   Result Value Ref Range    aPTT 38.7 (H) 21.0 - 32.0 sec   Brain natriuretic peptide   Result Value Ref Range    BNP 24 0 - 99 pg/mL   CBC auto differential   Result Value Ref Range    WBC 17.76 (H) 3.90 - 12.70  K/uL    RBC 4.56 4.00 - 5.40 M/uL    Hemoglobin 12.6 12.0 - 16.0 g/dL    Hematocrit 39.2 37.0 - 48.5 %    MCV 86 82 - 98 fL    MCH 27.6 27.0 - 31.0 pg    MCHC 32.1 32.0 - 36.0 g/dL    RDW 14.5 11.5 - 14.5 %    Platelets 465 (H) 150 - 350 K/uL    MPV 9.9 9.2 - 12.9 fL    Gran # 14.1 (H) 1.8 - 7.7 K/uL    Lymph # 2.5 1.0 - 4.8 K/uL    Mono # 1.1 (H) 0.3 - 1.0 K/uL    Eos # 0.0 0.0 - 0.5 K/uL    Baso # 0.03 0.00 - 0.20 K/uL    Gran% 79.4 (H) 38.0 - 73.0 %    Lymph% 14.2 (L) 18.0 - 48.0 %    Mono% 6.1 4.0 - 15.0 %    Eosinophil% 0.1 0.0 - 8.0 %    Basophil% 0.2 0.0 - 1.9 %    Differential Method Automated    Comprehensive metabolic panel   Result Value Ref Range    Sodium 137 136 - 145 mmol/L    Potassium 4.0 3.5 - 5.1 mmol/L    Chloride 96 95 - 110 mmol/L    CO2 25 23 - 29 mmol/L    Glucose 154 (H) 70 - 110 mg/dL    BUN, Bld 11 6 - 20 mg/dL    Creatinine 1.0 0.5 - 1.4 mg/dL    Calcium 10.1 8.7 - 10.5 mg/dL    Total Protein 8.2 6.0 - 8.4 g/dL    Albumin 3.2 (L) 3.5 - 5.2 g/dL    Total Bilirubin 1.1 (H) 0.1 - 1.0 mg/dL    Alkaline Phosphatase 134 55 - 135 U/L    AST 11 10 - 40 U/L    ALT 8 (L) 10 - 44 U/L    Anion Gap 16 8 - 16 mmol/L    eGFR if African American >60 >60 mL/min/1.73 m^2    eGFR if non African American >60 >60 mL/min/1.73 m^2   Lactic acid, plasma #1   Result Value Ref Range    Lactate (Lactic Acid) 1.5 0.5 - 2.2 mmol/L   Lipase   Result Value Ref Range    Lipase 35 4 - 60 U/L   Magnesium   Result Value Ref Range    Magnesium 1.9 1.6 - 2.6 mg/dL   Phosphorus   Result Value Ref Range    Phosphorus 2.2 (L) 2.7 - 4.5 mg/dL   Protime-INR   Result Value Ref Range    Prothrombin Time 11.4 9.0 - 12.5 sec    INR 1.1 0.8 - 1.2   Troponin I   Result Value Ref Range    Troponin I 0.008 0.000 - 0.026 ng/mL   TSH   Result Value Ref Range    TSH 1.142 0.400 - 4.000 uIU/mL   ISTAT PROCEDURE   Result Value Ref Range    POC PH 7.428 7.35 - 7.45    POC PCO2 36.1 35 - 45 mmHg    POC PO2 51 (LL) 80 - 100 mmHg    POC HCO3 23.8  (L) 24 - 28 mmol/L    POC BE -1 -2 to 2 mmol/L    POC SATURATED O2 87 (L) 95 - 100 %    Sample ARTERIAL     Site LR     Allens Test Pass     DelSys Room Air     Mode SPONT     FiO2 21          Significant Imaging: I have reviewed all pertinent imaging results/findings within the past 24 hours.   Imaging Results          CT Abdomen Pelvis  Without Contrast (In process)    Stat Rad Impression: Colonic diverticula without diverticulitis. No evidence of appendicitis. Bilateral pulmonary infiltrates             CTA Chest Non-Coronary (PE Study)     Stat Rad Impression: No central pulmonary embolus, Bilateral pulmonary infiltrates and nodules             X-Ray Chest AP Portable (Final result)  Result time 09/24/17 22:22:56    Final result by Celia Billings MD (09/24/17 22:22:56)                 Impression:         Negative portable chest x-ray.      Electronically signed by: CELIA BILLINGS MD  Date:     09/24/17  Time:    22:22              Narrative:    Portable chest x-ray. 09/24/17 22:13:49    Clinical indication:  sepsis    Heart size is normal. The lung fields are clear. No acute cardiopulmonary infiltrate.

## 2017-09-26 PROBLEM — E83.42 HYPOMAGNESEMIA: Status: ACTIVE | Noted: 2017-09-26

## 2017-09-26 PROBLEM — E87.6 HYPOKALEMIA: Status: ACTIVE | Noted: 2017-09-26

## 2017-09-26 PROBLEM — D72.829 LEUKOCYTOSIS: Status: ACTIVE | Noted: 2017-09-26

## 2017-09-26 LAB
ANION GAP SERPL CALC-SCNC: 9 MMOL/L
BASOPHILS # BLD AUTO: 0.02 K/UL
BASOPHILS NFR BLD: 0.1 %
BUN SERPL-MCNC: 4 MG/DL
CALCIUM SERPL-MCNC: 8.3 MG/DL
CHLORIDE SERPL-SCNC: 107 MMOL/L
CO2 SERPL-SCNC: 24 MMOL/L
CREAT SERPL-MCNC: 0.7 MG/DL
DIFFERENTIAL METHOD: ABNORMAL
EOSINOPHIL # BLD AUTO: 0 K/UL
EOSINOPHIL NFR BLD: 0.3 %
ERYTHROCYTE [DISTWIDTH] IN BLOOD BY AUTOMATED COUNT: 14.6 %
EST. GFR  (AFRICAN AMERICAN): >60 ML/MIN/1.73 M^2
EST. GFR  (NON AFRICAN AMERICAN): >60 ML/MIN/1.73 M^2
GLUCOSE SERPL-MCNC: 85 MG/DL
HCT VFR BLD AUTO: 29.6 %
HGB BLD-MCNC: 9.5 G/DL
LYMPHOCYTES # BLD AUTO: 3.2 K/UL
LYMPHOCYTES NFR BLD: 23 %
MAGNESIUM SERPL-MCNC: 1.4 MG/DL
MCH RBC QN AUTO: 27.5 PG
MCHC RBC AUTO-ENTMCNC: 32.1 G/DL
MCV RBC AUTO: 86 FL
MONOCYTES # BLD AUTO: 1 K/UL
MONOCYTES NFR BLD: 7.1 %
NEUTROPHILS # BLD AUTO: 9.8 K/UL
NEUTROPHILS NFR BLD: 69.5 %
PHOSPHATE SERPL-MCNC: 2.2 MG/DL
PLATELET # BLD AUTO: 373 K/UL
PMV BLD AUTO: 9.5 FL
POTASSIUM SERPL-SCNC: 3.4 MMOL/L
RBC # BLD AUTO: 3.45 M/UL
SODIUM SERPL-SCNC: 140 MMOL/L
WBC # BLD AUTO: 14.03 K/UL

## 2017-09-26 PROCEDURE — 83735 ASSAY OF MAGNESIUM: CPT

## 2017-09-26 PROCEDURE — 63600175 PHARM REV CODE 636 W HCPCS: Performed by: INTERNAL MEDICINE

## 2017-09-26 PROCEDURE — 25000003 PHARM REV CODE 250: Performed by: INTERNAL MEDICINE

## 2017-09-26 PROCEDURE — 82306 VITAMIN D 25 HYDROXY: CPT

## 2017-09-26 PROCEDURE — 21400001 HC TELEMETRY ROOM

## 2017-09-26 PROCEDURE — 25000003 PHARM REV CODE 250: Performed by: FAMILY MEDICINE

## 2017-09-26 PROCEDURE — 25000003 PHARM REV CODE 250: Performed by: NURSE PRACTITIONER

## 2017-09-26 PROCEDURE — 25000242 PHARM REV CODE 250 ALT 637 W/ HCPCS: Performed by: NURSE PRACTITIONER

## 2017-09-26 PROCEDURE — 84100 ASSAY OF PHOSPHORUS: CPT

## 2017-09-26 PROCEDURE — 94799 UNLISTED PULMONARY SVC/PX: CPT

## 2017-09-26 PROCEDURE — 36415 COLL VENOUS BLD VENIPUNCTURE: CPT

## 2017-09-26 PROCEDURE — 85025 COMPLETE CBC W/AUTO DIFF WBC: CPT

## 2017-09-26 PROCEDURE — 63600175 PHARM REV CODE 636 W HCPCS: Performed by: FAMILY MEDICINE

## 2017-09-26 PROCEDURE — 94640 AIRWAY INHALATION TREATMENT: CPT

## 2017-09-26 PROCEDURE — 80048 BASIC METABOLIC PNL TOTAL CA: CPT

## 2017-09-26 PROCEDURE — 27000221 HC OXYGEN, UP TO 24 HOURS

## 2017-09-26 PROCEDURE — 25000242 PHARM REV CODE 250 ALT 637 W/ HCPCS: Performed by: INTERNAL MEDICINE

## 2017-09-26 RX ORDER — IPRATROPIUM BROMIDE 0.5 MG/2.5ML
0.5 SOLUTION RESPIRATORY (INHALATION) EVERY 8 HOURS
Status: DISCONTINUED | OUTPATIENT
Start: 2017-09-26 | End: 2017-09-30

## 2017-09-26 RX ORDER — MAGNESIUM SULFATE 1 G/100ML
1 INJECTION INTRAVENOUS
Status: COMPLETED | OUTPATIENT
Start: 2017-09-26 | End: 2017-09-26

## 2017-09-26 RX ORDER — IPRATROPIUM BROMIDE 0.5 MG/2.5ML
0.5 SOLUTION RESPIRATORY (INHALATION) EVERY 6 HOURS
Status: DISCONTINUED | OUTPATIENT
Start: 2017-09-26 | End: 2017-09-26

## 2017-09-26 RX ORDER — ACETAMINOPHEN AND CODEINE PHOSPHATE 300; 30 MG/1; MG/1
1 TABLET ORAL EVERY 4 HOURS PRN
Status: DISCONTINUED | OUTPATIENT
Start: 2017-09-26 | End: 2017-10-02 | Stop reason: HOSPADM

## 2017-09-26 RX ORDER — METOPROLOL SUCCINATE 50 MG/1
50 TABLET, EXTENDED RELEASE ORAL DAILY
Status: DISCONTINUED | OUTPATIENT
Start: 2017-09-26 | End: 2017-10-02 | Stop reason: HOSPADM

## 2017-09-26 RX ORDER — BENZONATATE 100 MG/1
100 CAPSULE ORAL 3 TIMES DAILY PRN
Status: DISCONTINUED | OUTPATIENT
Start: 2017-09-26 | End: 2017-10-02 | Stop reason: HOSPADM

## 2017-09-26 RX ADMIN — DOXYCYCLINE 100 MG: 100 INJECTION, POWDER, LYOPHILIZED, FOR SOLUTION INTRAVENOUS at 08:09

## 2017-09-26 RX ADMIN — RIVAROXABAN 20 MG: 20 TABLET, FILM COATED ORAL at 05:09

## 2017-09-26 RX ADMIN — CEFTRIAXONE 1 G: 1 INJECTION, SOLUTION INTRAVENOUS at 08:09

## 2017-09-26 RX ADMIN — MONTELUKAST SODIUM 10 MG: 10 TABLET, COATED ORAL at 08:09

## 2017-09-26 RX ADMIN — POTASSIUM PHOSPHATE, MONOBASIC AND POTASSIUM PHOSPHATE, DIBASIC 20 MMOL: 224; 236 INJECTION, SOLUTION INTRAVENOUS at 01:09

## 2017-09-26 RX ADMIN — ACETAMINOPHEN AND CODEINE PHOSPHATE 1 TABLET: 30; 300 TABLET ORAL at 12:09

## 2017-09-26 RX ADMIN — SODIUM CHLORIDE: 0.9 INJECTION, SOLUTION INTRAVENOUS at 12:09

## 2017-09-26 RX ADMIN — IPRATROPIUM BROMIDE 0.5 MG: 0.5 SOLUTION RESPIRATORY (INHALATION) at 07:09

## 2017-09-26 RX ADMIN — PANTOPRAZOLE SODIUM 40 MG: 40 TABLET, DELAYED RELEASE ORAL at 08:09

## 2017-09-26 RX ADMIN — LEVALBUTEROL 0.63 MG: 0.63 SOLUTION RESPIRATORY (INHALATION) at 12:09

## 2017-09-26 RX ADMIN — LEVALBUTEROL 0.63 MG: 0.63 SOLUTION RESPIRATORY (INHALATION) at 03:09

## 2017-09-26 RX ADMIN — MAGNESIUM SULFATE IN DEXTROSE 1 G: 10 INJECTION, SOLUTION INTRAVENOUS at 01:09

## 2017-09-26 RX ADMIN — LEVALBUTEROL 0.63 MG: 0.63 SOLUTION RESPIRATORY (INHALATION) at 07:09

## 2017-09-26 RX ADMIN — METOPROLOL SUCCINATE 50 MG: 50 TABLET, EXTENDED RELEASE ORAL at 01:09

## 2017-09-26 RX ADMIN — MAGNESIUM SULFATE IN DEXTROSE 1 G: 10 INJECTION, SOLUTION INTRAVENOUS at 02:09

## 2017-09-26 RX ADMIN — GUAIFENESIN 600 MG: 600 TABLET, EXTENDED RELEASE ORAL at 08:09

## 2017-09-26 RX ADMIN — ACETAMINOPHEN AND CODEINE PHOSPHATE 1 TABLET: 30; 300 TABLET ORAL at 07:09

## 2017-09-26 RX ADMIN — ACETAMINOPHEN 650 MG: 325 TABLET ORAL at 03:09

## 2017-09-26 RX ADMIN — IPRATROPIUM BROMIDE 0.5 MG: 0.5 SOLUTION RESPIRATORY (INHALATION) at 03:09

## 2017-09-26 NOTE — PROGRESS NOTES
Pt AOx4 . VSS. No c/o pain or discomfort . Transferred to Telemetry unit. Oriented to room and call bell.

## 2017-09-26 NOTE — ASSESSMENT & PLAN NOTE
--monitor and replete as indicated      6/26;  PO4 2.2 with K of 3.4;  Will replete with potassium phosphate and monitor response to therapy.

## 2017-09-26 NOTE — PLAN OF CARE
Problem: Patient Care Overview  Goal: Plan of Care Review  ekg done. HR high. Will re-evaluate for neb tx at midnight.

## 2017-09-26 NOTE — ASSESSMENT & PLAN NOTE
--WBC 17K, Pulse 136, RR 22, evelin source: bilat pneumonia  --PO Avelox and mucinex  --sputum culture pending  -Blood Cultures pending  --duonebs   --supplemental oxygen to maintain sats    6/26:  Continue present antibiotics with monitoring of the CXR  On IV rocephin and Doxycycline at present.  Monitor cultures (blood and sputum)  Continue duonebs; pulmonary incentives.

## 2017-09-26 NOTE — ASSESSMENT & PLAN NOTE
--counseled on smoking cessation  --refused nicotine patch    6/26: continue to stress smoking cessation.

## 2017-09-26 NOTE — PROGRESS NOTES
Patient tranfered to 214  from observation unit accompanied by Laura RIOS.  Patient is aaox4 with no distress noted. Patient has been orientated to room, call light, fall precautions, rounding sheet, menu, and board. Heart monitor in place, no questions or concerns at this time.

## 2017-09-26 NOTE — PLAN OF CARE
Problem: Patient Care Overview  Goal: Plan of Care Review  Outcome: Ongoing (interventions implemented as appropriate)  POC discussed w/patient and daughter, verbalized understanding. ST on monitor. VSS. Voids per BSC. IV atx administered. Electrolytes administered. Pt c/o of pain. Relief obtain. Frequent weight change encouraged.  Patient turns independently in bed. Fall precautions in place, bed alarm on. Patient denies needs at this time.

## 2017-09-26 NOTE — ASSESSMENT & PLAN NOTE
Magnesium replacement ordered;  Will monitor response.  Vitamin D level ordered with next lab draw.

## 2017-09-26 NOTE — ASSESSMENT & PLAN NOTE
-Likely in response to infection, monitor   -IVF    6/26: improved clinically; continue to monitor response;  Monitor renal function and electrolytes.

## 2017-09-26 NOTE — ASSESSMENT & PLAN NOTE
-Improving with O2  -Repeat ABGs if need  -Patient someday smoker and asthmatic, Possibly with undiagnosed COPD and now with PNE acutely worsening  counseled on smoking cessation.   -OP follow up with pulmonary     6/26: continue present regimen with monitoring

## 2017-09-26 NOTE — PROGRESS NOTES
"Pt w/ c/o pain chest pain 10/10 described as "sharp"; SOB reported; 2L/NC in place; HR noted in 130s and maintaining; notified JOSE Toscano; new orders on chart  "

## 2017-09-26 NOTE — ASSESSMENT & PLAN NOTE
--likely secondary to pneumonia  --CT showed No central pulmonary embolus, Bilateral pulmonary infiltrates and nodules   -EKG reviewed  --Serial Troponins negative  9/26: Continue present regimen; with monitoring of the cXR  Continued antibiotics (now on Rocephin and Doxy)  Pulmonary incentives started; along with continued nebs

## 2017-09-26 NOTE — SUBJECTIVE & OBJECTIVE
Interval History: Continues with cough;  Complains of upper back pain on left    Review of Systems   Constitutional: Positive for fatigue.   Eyes: Negative.    Respiratory: Positive for cough and shortness of breath.    Cardiovascular: Negative.         Upper back pain on eft associated with cough.   Gastrointestinal: Negative.    Endocrine: Negative.    Genitourinary: Negative.    Musculoskeletal: Positive for back pain.   Neurological: Negative.    Hematological: Negative.    Psychiatric/Behavioral: Negative.      Objective:     Vital Signs (Most Recent):  Temp: 98.8 °F (37.1 °C) (09/26/17 1125)  Pulse: (!) 118 (09/26/17 1125)  Resp: 18 (09/26/17 1125)  BP: 136/75 (09/26/17 1125)  SpO2: 97 % (09/26/17 1125) Vital Signs (24h Range):  Temp:  [97.6 °F (36.4 °C)-101 °F (38.3 °C)] 98.8 °F (37.1 °C)  Pulse:  [] 118  Resp:  [16-20] 18  SpO2:  [94 %-99 %] 97 %  BP: (121-179)/(58-79) 136/75     Weight: 68 kg (150 lb)  Body mass index is 25.75 kg/m².    Intake/Output Summary (Last 24 hours) at 09/26/17 1132  Last data filed at 09/26/17 0600   Gross per 24 hour   Intake           3902.5 ml   Output              900 ml   Net           3002.5 ml      Physical Exam   Constitutional: She is oriented to person, place, and time. She appears well-developed and well-nourished.   HENT:   Head: Normocephalic and atraumatic.   Eyes: EOM are normal. Pupils are equal, round, and reactive to light.   Neck: Normal range of motion. Neck supple.   Cardiovascular: Normal rate and regular rhythm.    Pulmonary/Chest: Effort normal. She has wheezes.   Abdominal: Soft. Bowel sounds are normal.   Neurological: She is alert and oriented to person, place, and time.   Psychiatric: She has a normal mood and affect.   Vitals reviewed.      Significant Labs:   BMP:   Recent Labs  Lab 09/26/17  0540   GLU 85      K 3.4*      CO2 24   BUN 4*   CREATININE 0.7   CALCIUM 8.3*   MG 1.4*     CBC:   Recent Labs  Lab 09/24/17  2803  09/25/17  0412 09/26/17  0540   WBC 17.76* 15.97* 14.03*   HGB 12.6 10.1* 9.5*   HCT 39.2 31.3* 29.6*   * 313 373*     Respiratory Culture: No results for input(s): GSRESP, RESPIRATORYC in the last 48 hours.    Significant Imaging:   Imaging Results          X-Ray Chest 1 View (Final result)  Result time 09/26/17 10:45:23   Procedure changed from X-Ray Chest PA And Lateral     Final result by JEFFREY Britt Sr., MD (09/26/17 10:45:23)                 Impression:      There has been interval development of patchy areas of increased density scattered throughout the lungs. This is consistent with the patient's history and characteristic of pneumonia.      Electronically signed by: JEFFREY BRITT MD  Date:     09/26/17  Time:    10:45              Narrative:    One-view chest x-ray    Clinical History: Pneumonia    Finding: Comparison was made to a prior examination performed on 9/24/2017. The size of the heart is normal. There has been interval development of patchy areas of increased density scattered throughout the lungs. There is no pneumothorax.  The costophrenic angles are sharp.                             CT Abdomen Pelvis  Without Contrast (Final result)  Result time 09/25/17 07:34:22    Final result by Deuce Sherman MD (09/25/17 07:34:22)                 Impression:       Bilateral parenchymal infiltrates are seen within the lower lobes.    Mild prominence of the right renal collecting system however no obstructing stone is seen. This can be seen in a setting of recently passed stone.     Left inguinal adenopathy measuring up to 1.4 cm.    Subcentimeter hypodensity is seen within the mid body of the pancreas which is indeterminate. Consider interval followup    All CT scans at this facility use dose modulation, iterative reconstruction and/or weight based dosing when appropriate to reduce radiation dose to as low as reasonably achievable.      Electronically signed by: DEUCE SHERMAN MD  Date:      09/25/17  Time:    07:34              Narrative:    Indication: Abdominal pain.    Routine noncontrast CT images of the abdomen and pelvis were obtained.    Findings:    Bilateral parenchymal infiltrates are seen within the lower lobes.  Heart size is normal.  No pericardial or pleural effusion.      The liver is normal in size and attenuation on this noncontrast exam.  Status post cholecystectomy The  stomach, spleen,  adrenal glands are normal.  Subcentimeter hypodensity is seen within the mid body of the pancreas which is indeterminate. Consider interval followup. Aorta demonstrates moderate atherosclerotic disease.      The kidneys are normal in size shape and position without radiopaque calculus or signs of hydronephrosis. Mild prominence of the right renal collecting system however no obstructing stone is seen. This can be seen in a setting of recently passed stone. The bladder is incompletely distended.     The visualized loops of small bowel are unremarkable.The appendix is visualized in the right lower quadrant.  There is no inflammation. Colon demonstrates diverticulosis without evidence of diverticulitis. Mild constipation    Left inguinal adenopathy..      Bones appear intact with moderate degenerative changes                             CTA Chest Non-Coronary (PE Study) (Final result)     Abnormal  Result time 09/25/17 07:39:18    Final result by Deuce Olvera MD (09/25/17 07:39:18)                 Impression:         Bilateral pulmonary infiltrates are seen some with a micronodular pattern which can be seen in infectious (typical or atypical) and or inflammatory process. Followup to resolution is recommended.    Mediastinal and bilateral hilar adenopathy which can be seen in the setting of infectious/reactive process versus neoplastic versus lymphoproliferative disorder.    No evidence of PE.    Fatty liver.    All CT scans at this facility use dose modulation, iterative reconstruction, and/or  weight base dosing when appropriate to reduce radiation dose to as low as reasonably achievable.      Electronically signed by: AURA SHERMAN MD  Date:     09/25/17  Time:    07:39              Narrative:    Clinical data: Chest pain.    Axial CT images through the chest after administration of contrast was performed according to PE study protocol using 100 cc Omni 350.    Findings:    The pulmonary arterial system is well opacified with no evidence of filling defect to suggest pulmonary embolus or thrombus.    Structures of the base of the neck are normal.    The heart and pericardium are unremarkable.  Trace pericardial effusion.  Mediastinal structures including great vessels, trachea, esophagus are intact.    Mediastinal and bilateral hilar adenopathy which may be reactive.      Bilateral pulmonary infiltrates are seen some with a micronodular pattern which can be seen in infectious or inflammatory process. Mild emphysematous changes are seen within the lungs bilaterally.    Surrounding chest wall structures, visualized abdominal organs, bones are unremarkable. Cholecystectomy and mild fatty infiltration of liver suspected.                             X-Ray Chest AP Portable (Final result)  Result time 09/24/17 22:22:56    Final result by Celia Billings MD (09/24/17 22:22:56)                 Impression:         Negative portable chest x-ray.      Electronically signed by: CELIA BILLINGS MD  Date:     09/24/17  Time:    22:22              Narrative:    Portable chest x-ray. 09/24/17 22:13:49    Clinical indication:  sepsis    Heart size is normal. The lung fields are clear. No acute cardiopulmonary infiltrate.

## 2017-09-26 NOTE — ASSESSMENT & PLAN NOTE
Continue to monitor.  K-phosphate ordered today;  Will check a magnesium level in the Am as well.

## 2017-09-26 NOTE — ASSESSMENT & PLAN NOTE
Hold BP meds at this time, due to presentation with hypotension  monitor and start once improving       6/26: BP trending upward;  Resumed Toprol-XL today;  Will monitor.response to therapy

## 2017-09-26 NOTE — PLAN OF CARE
Problem: Patient Care Overview  Goal: Plan of Care Review  Outcome: Ongoing (interventions implemented as appropriate)  Pt alert and oriented. POC reviewed. NS @ 125 ml/hr.1.5L NC. Nebulizer's administered per orders.Pt remained free of falls during shift. Bed alarm set , call light in reach, room free of clutter, side rails  up x2, pt on telemetry monitor ST, will continue to monitor.

## 2017-09-26 NOTE — PLAN OF CARE
"Followed up with [facility] rep, Lizzette BONE (Ext. [phone]).  Lizzette states that a Medicaid rebecca for patient was taken and submitted on patient yesterday (09/25/17).  Read in documentation in Epic that patient "lost her Medicaid Friday."  Notified Lizzette of this and requested that she research as to reasons this may have occurred.  Lizzette to research and will notify me of what she finds.     1345:  Received call from Lizzette with [facility] stating that patient's Medicaid was due for renewal/re-enrollment and it lapsed prior to patient taking needed steps.  Therefore, patient now has to await time it will take for patient's application to be processed as a new Medicaid.    1355:  Met with patient and family member in hospital room.  Patient confirmed same reason as above regarding reason for lapse of Medicaid.  Discussed with patient how she plans on obtaining her medications and following up outpatient with MD/PCP post-hospitalization. Patient indicates that she is uncertain how she will be able to afford her medications and what PCP she can see without insurance.  Patient also stating that she needs a nebulizer and a glucometer, as both of these are not working.      Provided patient with information about [facility] for medications.  Also, contacted [facility] and determined that they also have a nebulizer and glucometer and test strips for patient.  Requested Rxs/order for Nebulizer, Glucometer and Test Strips from NP for hospitalist.  Patient made aware of this.  Also, provided patient with information about [facility] in [place], with patient stating that she will contact this clinic upon discharge and schedule an appointment with PCP there.      Also, provided patient with information on [facility]. Patient reports having no other post-hospitalization needs or concerns from a case management perspective at this time.           "

## 2017-09-27 PROBLEM — R00.0 TACHYCARDIA: Status: RESOLVED | Noted: 2017-09-25 | Resolved: 2017-09-27

## 2017-09-27 PROBLEM — R09.02 HYPOXIA: Status: RESOLVED | Noted: 2017-09-25 | Resolved: 2017-09-27

## 2017-09-27 PROBLEM — D64.9 ANEMIA: Status: ACTIVE | Noted: 2017-09-27

## 2017-09-27 LAB
25(OH)D3+25(OH)D2 SERPL-MCNC: 6 NG/ML
ANION GAP SERPL CALC-SCNC: 7 MMOL/L
BACTERIA UR CULT: NO GROWTH
BASOPHILS # BLD AUTO: 0.02 K/UL
BASOPHILS NFR BLD: 0.2 %
BUN SERPL-MCNC: 3 MG/DL
CALCIUM SERPL-MCNC: 8.7 MG/DL
CHLORIDE SERPL-SCNC: 103 MMOL/L
CO2 SERPL-SCNC: 32 MMOL/L
CREAT SERPL-MCNC: 0.8 MG/DL
DIFFERENTIAL METHOD: ABNORMAL
EOSINOPHIL # BLD AUTO: 0.1 K/UL
EOSINOPHIL NFR BLD: 0.8 %
ERYTHROCYTE [DISTWIDTH] IN BLOOD BY AUTOMATED COUNT: 14.7 %
EST. GFR  (AFRICAN AMERICAN): >60 ML/MIN/1.73 M^2
EST. GFR  (NON AFRICAN AMERICAN): >60 ML/MIN/1.73 M^2
FOLATE SERPL-MCNC: 6.2 NG/ML
GLUCOSE SERPL-MCNC: 108 MG/DL
HCT VFR BLD AUTO: 30.6 %
HGB BLD-MCNC: 9.7 G/DL
IRON SERPL-MCNC: 12 UG/DL
LYMPHOCYTES # BLD AUTO: 3.6 K/UL
LYMPHOCYTES NFR BLD: 32.6 %
MAGNESIUM SERPL-MCNC: 2 MG/DL
MAGNESIUM SERPL-MCNC: 2.1 MG/DL
MCH RBC QN AUTO: 27.2 PG
MCHC RBC AUTO-ENTMCNC: 31.7 G/DL
MCV RBC AUTO: 86 FL
MONOCYTES # BLD AUTO: 0.8 K/UL
MONOCYTES NFR BLD: 7.1 %
NEUTROPHILS # BLD AUTO: 6.6 K/UL
NEUTROPHILS NFR BLD: 59.3 %
PHOSPHATE SERPL-MCNC: 3.4 MG/DL
PHOSPHATE SERPL-MCNC: 3.5 MG/DL
PLATELET # BLD AUTO: 433 K/UL
PMV BLD AUTO: 9.3 FL
POTASSIUM SERPL-SCNC: 3.1 MMOL/L
RBC # BLD AUTO: 3.56 M/UL
SATURATED IRON: 6 %
SODIUM SERPL-SCNC: 142 MMOL/L
TOTAL IRON BINDING CAPACITY: 186 UG/DL
TRANSFERRIN SERPL-MCNC: 126 MG/DL
VIT B12 SERPL-MCNC: 1289 PG/ML
WBC # BLD AUTO: 11.08 K/UL

## 2017-09-27 PROCEDURE — 82746 ASSAY OF FOLIC ACID SERUM: CPT

## 2017-09-27 PROCEDURE — 63600175 PHARM REV CODE 636 W HCPCS: Performed by: FAMILY MEDICINE

## 2017-09-27 PROCEDURE — 94640 AIRWAY INHALATION TREATMENT: CPT

## 2017-09-27 PROCEDURE — 83540 ASSAY OF IRON: CPT

## 2017-09-27 PROCEDURE — 84100 ASSAY OF PHOSPHORUS: CPT | Mod: 91

## 2017-09-27 PROCEDURE — 25000242 PHARM REV CODE 250 ALT 637 W/ HCPCS: Performed by: INTERNAL MEDICINE

## 2017-09-27 PROCEDURE — 25000003 PHARM REV CODE 250: Performed by: INTERNAL MEDICINE

## 2017-09-27 PROCEDURE — 21400001 HC TELEMETRY ROOM

## 2017-09-27 PROCEDURE — 25000003 PHARM REV CODE 250: Performed by: NURSE PRACTITIONER

## 2017-09-27 PROCEDURE — 83735 ASSAY OF MAGNESIUM: CPT

## 2017-09-27 PROCEDURE — 94761 N-INVAS EAR/PLS OXIMETRY MLT: CPT

## 2017-09-27 PROCEDURE — 83735 ASSAY OF MAGNESIUM: CPT | Mod: 91

## 2017-09-27 PROCEDURE — 84100 ASSAY OF PHOSPHORUS: CPT

## 2017-09-27 PROCEDURE — 27000221 HC OXYGEN, UP TO 24 HOURS

## 2017-09-27 PROCEDURE — 36415 COLL VENOUS BLD VENIPUNCTURE: CPT

## 2017-09-27 PROCEDURE — 94799 UNLISTED PULMONARY SVC/PX: CPT

## 2017-09-27 PROCEDURE — 82607 VITAMIN B-12: CPT

## 2017-09-27 PROCEDURE — 99900035 HC TECH TIME PER 15 MIN (STAT)

## 2017-09-27 PROCEDURE — 80048 BASIC METABOLIC PNL TOTAL CA: CPT

## 2017-09-27 PROCEDURE — 25000003 PHARM REV CODE 250: Performed by: FAMILY MEDICINE

## 2017-09-27 PROCEDURE — 85025 COMPLETE CBC W/AUTO DIFF WBC: CPT

## 2017-09-27 RX ORDER — ERGOCALCIFEROL 1.25 MG/1
50000 CAPSULE ORAL
Status: DISCONTINUED | OUTPATIENT
Start: 2017-09-27 | End: 2017-10-02 | Stop reason: HOSPADM

## 2017-09-27 RX ADMIN — IPRATROPIUM BROMIDE 0.5 MG: 0.5 SOLUTION RESPIRATORY (INHALATION) at 08:09

## 2017-09-27 RX ADMIN — METOPROLOL SUCCINATE 50 MG: 50 TABLET, EXTENDED RELEASE ORAL at 08:09

## 2017-09-27 RX ADMIN — MONTELUKAST SODIUM 10 MG: 10 TABLET, COATED ORAL at 08:09

## 2017-09-27 RX ADMIN — LEVALBUTEROL 0.63 MG: 0.63 SOLUTION RESPIRATORY (INHALATION) at 08:09

## 2017-09-27 RX ADMIN — LEVALBUTEROL 0.63 MG: 0.63 SOLUTION RESPIRATORY (INHALATION) at 11:09

## 2017-09-27 RX ADMIN — BENZONATATE 100 MG: 100 CAPSULE ORAL at 05:09

## 2017-09-27 RX ADMIN — DOXYCYCLINE 100 MG: 100 INJECTION, POWDER, LYOPHILIZED, FOR SOLUTION INTRAVENOUS at 07:09

## 2017-09-27 RX ADMIN — IPRATROPIUM BROMIDE 0.5 MG: 0.5 SOLUTION RESPIRATORY (INHALATION) at 11:09

## 2017-09-27 RX ADMIN — PANTOPRAZOLE SODIUM 40 MG: 40 TABLET, DELAYED RELEASE ORAL at 08:09

## 2017-09-27 RX ADMIN — IPRATROPIUM BROMIDE 0.5 MG: 0.5 SOLUTION RESPIRATORY (INHALATION) at 03:09

## 2017-09-27 RX ADMIN — LEVALBUTEROL 0.63 MG: 0.63 SOLUTION RESPIRATORY (INHALATION) at 12:09

## 2017-09-27 RX ADMIN — DOXYCYCLINE 100 MG: 100 INJECTION, POWDER, LYOPHILIZED, FOR SOLUTION INTRAVENOUS at 08:09

## 2017-09-27 RX ADMIN — GUAIFENESIN 600 MG: 600 TABLET, EXTENDED RELEASE ORAL at 08:09

## 2017-09-27 RX ADMIN — LEVALBUTEROL 0.63 MG: 0.63 SOLUTION RESPIRATORY (INHALATION) at 03:09

## 2017-09-27 RX ADMIN — ACETAMINOPHEN AND CODEINE PHOSPHATE 1 TABLET: 30; 300 TABLET ORAL at 06:09

## 2017-09-27 RX ADMIN — IPRATROPIUM BROMIDE 0.5 MG: 0.5 SOLUTION RESPIRATORY (INHALATION) at 12:09

## 2017-09-27 RX ADMIN — RIVAROXABAN 20 MG: 20 TABLET, FILM COATED ORAL at 05:09

## 2017-09-27 RX ADMIN — ACETAMINOPHEN AND CODEINE PHOSPHATE 1 TABLET: 30; 300 TABLET ORAL at 11:09

## 2017-09-27 RX ADMIN — ERGOCALCIFEROL 50000 UNITS: 1.25 CAPSULE ORAL at 03:09

## 2017-09-27 RX ADMIN — CEFTRIAXONE 1 G: 1 INJECTION, SOLUTION INTRAVENOUS at 08:09

## 2017-09-27 NOTE — PLAN OF CARE
Problem: Patient Care Overview  Goal: Plan of Care Review  Outcome: Ongoing (interventions implemented as appropriate)  Injury free this shift. Dry, nonproductive cough noted. IV abx infused as ordered. Pain adequately controlled with po prn medication. Afebrile this shift. Sats kept >92% on 2L NC. Chart reviewed. Will monitor.

## 2017-09-27 NOTE — PROGRESS NOTES
[facility] Hospital Medicine  Progress Note    Patient Name: Genie Balderas  MRN: [id number]  Patient Class: IP- Inpatient   Admission Date: 9/24/2017  Length of Stay: 2 days  Attending Physician: Rebel Cardenas MD  Primary Care Provider: Ryan Sims MD        Subjective:     Principal Problem:Sepsis    HPI:  Genie Balderas is a 57 y.o. female patient who presented to the Emergency Department for complaint of Cough that started 2 days ago. The cough is non productive. Associated symptoms include Sharp Like CP to Left chest radiates to back, that stated today due to coughing, and, fatigue, fever, n/v/d. Symptoms are intermittent and moderate in severity. OTC meds and nebs not helping at home.  No mitigating or exacerbating factors reported.  Patient denies any SOB, leg swelling, palpitations, HA, lightheadedness, dizziness, numbness, weakness, and all other sxs at this time. No further complaints or concerns at this time.        Hospital Course:  Patient was placed on OBS for sepsis d/t bilat lower lobe pneumonia. She was started on IV abx, breathing treatments, and mucinex. Patient improving slowly - moved to Inpatient since will need extended IV abx.     6/26;  Some better;  Has faint posterior wheezing today;Continued cough; upper back pain on left, with the cough  On for repeat CXR today; also incentive spirometry added; also tylenol with codeine for coughtand pain complaint.    Interval History: Continues with cough;  Complains of upper back pain on left    Review of Systems   Constitutional: Positive for fatigue.   Eyes: Negative.    Respiratory: Positive for cough and shortness of breath.    Cardiovascular: Negative.         Upper back pain on eft associated with cough.   Gastrointestinal: Negative.    Endocrine: Negative.    Genitourinary: Negative.    Musculoskeletal: Positive for back pain.   Neurological: Negative.    Hematological: Negative.    Psychiatric/Behavioral: Negative.       Objective:     Vital Signs (Most Recent):  Temp: 98.8 °F (37.1 °C) (09/26/17 1125)  Pulse: (!) 118 (09/26/17 1125)  Resp: 18 (09/26/17 1125)  BP: 136/75 (09/26/17 1125)  SpO2: 97 % (09/26/17 1125) Vital Signs (24h Range):  Temp:  [97.6 °F (36.4 °C)-101 °F (38.3 °C)] 98.8 °F (37.1 °C)  Pulse:  [] 118  Resp:  [16-20] 18  SpO2:  [94 %-99 %] 97 %  BP: (121-179)/(58-79) 136/75     Weight: 68 kg (150 lb)  Body mass index is 25.75 kg/m².    Intake/Output Summary (Last 24 hours) at 09/26/17 1132  Last data filed at 09/26/17 0600   Gross per 24 hour   Intake           3902.5 ml   Output              900 ml   Net           3002.5 ml      Physical Exam   Constitutional: She is oriented to person, place, and time. She appears well-developed and well-nourished.   HENT:   Head: Normocephalic and atraumatic.   Eyes: EOM are normal. Pupils are equal, round, and reactive to light.   Neck: Normal range of motion. Neck supple.   Cardiovascular: Normal rate and regular rhythm.    Pulmonary/Chest: Effort normal. She has wheezes.   Abdominal: Soft. Bowel sounds are normal.   Neurological: She is alert and oriented to person, place, and time.   Psychiatric: She has a normal mood and affect.   Vitals reviewed.      Significant Labs:   BMP:   Recent Labs  Lab 09/26/17  0540   GLU 85      K 3.4*      CO2 24   BUN 4*   CREATININE 0.7   CALCIUM 8.3*   MG 1.4*     CBC:   Recent Labs  Lab 09/24/17  2224 09/25/17  0412 09/26/17  0540   WBC 17.76* 15.97* 14.03*   HGB 12.6 10.1* 9.5*   HCT 39.2 31.3* 29.6*   * 313 373*     Respiratory Culture: No results for input(s): GSRESP, RESPIRATORYC in the last 48 hours.    Significant Imaging:   Imaging Results          X-Ray Chest 1 View (Final result)  Result time 09/26/17 10:45:23   Procedure changed from X-Ray Chest PA And Lateral     Final result by JEFFREY Britt Sr., MD (09/26/17 10:45:23)                 Impression:      There has been interval development of patchy  areas of increased density scattered throughout the lungs. This is consistent with the patient's history and characteristic of pneumonia.      Electronically signed by: JEFFREY RUFFIN MD  Date:     09/26/17  Time:    10:45              Narrative:    One-view chest x-ray    Clinical History: Pneumonia    Finding: Comparison was made to a prior examination performed on 9/24/2017. The size of the heart is normal. There has been interval development of patchy areas of increased density scattered throughout the lungs. There is no pneumothorax.  The costophrenic angles are sharp.                             CT Abdomen Pelvis  Without Contrast (Final result)  Result time 09/25/17 07:34:22    Final result by Deuce Sherman MD (09/25/17 07:34:22)                 Impression:       Bilateral parenchymal infiltrates are seen within the lower lobes.    Mild prominence of the right renal collecting system however no obstructing stone is seen. This can be seen in a setting of recently passed stone.     Left inguinal adenopathy measuring up to 1.4 cm.    Subcentimeter hypodensity is seen within the mid body of the pancreas which is indeterminate. Consider interval followup    All CT scans at this facility use dose modulation, iterative reconstruction and/or weight based dosing when appropriate to reduce radiation dose to as low as reasonably achievable.      Electronically signed by: DEUCE SHERMAN MD  Date:     09/25/17  Time:    07:34              Narrative:    Indication: Abdominal pain.    Routine noncontrast CT images of the abdomen and pelvis were obtained.    Findings:    Bilateral parenchymal infiltrates are seen within the lower lobes.  Heart size is normal.  No pericardial or pleural effusion.      The liver is normal in size and attenuation on this noncontrast exam.  Status post cholecystectomy The  stomach, spleen,  adrenal glands are normal.  Subcentimeter hypodensity is seen within the mid body of the pancreas which is  indeterminate. Consider interval followup. Aorta demonstrates moderate atherosclerotic disease.      The kidneys are normal in size shape and position without radiopaque calculus or signs of hydronephrosis. Mild prominence of the right renal collecting system however no obstructing stone is seen. This can be seen in a setting of recently passed stone. The bladder is incompletely distended.     The visualized loops of small bowel are unremarkable.The appendix is visualized in the right lower quadrant.  There is no inflammation. Colon demonstrates diverticulosis without evidence of diverticulitis. Mild constipation    Left inguinal adenopathy..      Bones appear intact with moderate degenerative changes                             CTA Chest Non-Coronary (PE Study) (Final result)     Abnormal  Result time 09/25/17 07:39:18    Final result by Deuce Sherman MD (09/25/17 07:39:18)                 Impression:         Bilateral pulmonary infiltrates are seen some with a micronodular pattern which can be seen in infectious (typical or atypical) and or inflammatory process. Followup to resolution is recommended.    Mediastinal and bilateral hilar adenopathy which can be seen in the setting of infectious/reactive process versus neoplastic versus lymphoproliferative disorder.    No evidence of PE.    Fatty liver.    All CT scans at this facility use dose modulation, iterative reconstruction, and/or weight base dosing when appropriate to reduce radiation dose to as low as reasonably achievable.      Electronically signed by: DEUCE SHERMAN MD  Date:     09/25/17  Time:    07:39              Narrative:    Clinical data: Chest pain.    Axial CT images through the chest after administration of contrast was performed according to PE study protocol using 100 cc Omni 350.    Findings:    The pulmonary arterial system is well opacified with no evidence of filling defect to suggest pulmonary embolus or thrombus.    Structures of the  base of the neck are normal.    The heart and pericardium are unremarkable.  Trace pericardial effusion.  Mediastinal structures including great vessels, trachea, esophagus are intact.    Mediastinal and bilateral hilar adenopathy which may be reactive.      Bilateral pulmonary infiltrates are seen some with a micronodular pattern which can be seen in infectious or inflammatory process. Mild emphysematous changes are seen within the lungs bilaterally.    Surrounding chest wall structures, visualized abdominal organs, bones are unremarkable. Cholecystectomy and mild fatty infiltration of liver suspected.                             X-Ray Chest AP Portable (Final result)  Result time 09/24/17 22:22:56    Final result by Celia Billings MD (09/24/17 22:22:56)                 Impression:         Negative portable chest x-ray.      Electronically signed by: CELIA BILLINGS MD  Date:     09/24/17  Time:    22:22              Narrative:    Portable chest x-ray. 09/24/17 22:13:49    Clinical indication:  sepsis    Heart size is normal. The lung fields are clear. No acute cardiopulmonary infiltrate.                            Assessment/Plan:      * Sepsis due to bilateral lower lobe pneumonia     --WBC 17K, Pulse 136, RR 22, evelin source: bilat pneumonia  --PO Avelox and mucinex  --sputum culture pending  -Blood Cultures pending  --duonebs   --supplemental oxygen to maintain sats    6/26:  Continue present antibiotics with monitoring of the CXR  On IV rocephin and Doxycycline at present.  Monitor cultures (blood and sputum)  Continue duonebs; pulmonary incentives.           Hypomagnesemia    Magnesium replacement ordered;  Will monitor response.  Vitamin D level ordered with next lab draw.          Hypokalemia    Continue to monitor.  K-phosphate ordered today;  Will check a magnesium level in the Am as well.          Tobacco abuse    --counseled on smoking cessation  --refused nicotine patch    6/26: continue to stress  smoking cessation.        HTN (hypertension)    Hold BP meds at this time, due to presentation with hypotension  monitor and start once improving       6/26: BP trending upward;  Resumed Toprol-XL today;  Will monitor.response to therapy        Hypophosphatemia    --monitor and replete as indicated      6/26;  PO4 2.2 with K of 3.4;  Will replete with potassium phosphate and monitor response to therapy.        Tachycardia    -Likely in response to infection, monitor   -IVF    6/26: improved clinically; continue to monitor response;  Monitor renal function and electrolytes.        Hypoxia    -Improving with O2  -Repeat ABGs if need  -Patient someday smoker and asthmatic, Possibly with undiagnosed COPD and now with PNE acutely worsening  counseled on smoking cessation.   -OP follow up with pulmonary     6/26: continue present regimen with monitoring        Chest pain    --likely secondary to pneumonia  --CT showed No central pulmonary embolus, Bilateral pulmonary infiltrates and nodules   -EKG reviewed  --Serial Troponins negative  9/26: Continue present regimen; with monitoring of the cXR  Continued antibiotics (now on Rocephin and Doxy)  Pulmonary incentives started; along with continued nebs          VTE Risk Mitigation         Ordered     rivaroxaban tablet 20 mg  With dinner     Route:  Oral        09/25/17 0240     Medium Risk of VTE  Once      09/25/17 0208     Place sequential compression device  Until discontinued      09/25/17 0208              Rebel Montes MD  Department of Hospital Medicine   [facility]

## 2017-09-27 NOTE — ASSESSMENT & PLAN NOTE
Magnesium replacement ordered;  Will monitor response.  Vitamin D level ordered with next lab draw.      9/27:  Severe vitamin D deficiency; will replete vitamin D.  don.

## 2017-09-27 NOTE — SUBJECTIVE & OBJECTIVE
Interval History: reports some improvement with less cough; rested better overnight.    Review of Systems   Constitutional: Positive for fatigue.   HENT: Negative.    Eyes: Negative.    Respiratory: Positive for cough and shortness of breath.    Cardiovascular: Positive for chest pain.   Gastrointestinal: Negative.    Endocrine: Negative.    Genitourinary: Negative.    Musculoskeletal: Positive for back pain.   Neurological: Negative.    Hematological: Negative.    Psychiatric/Behavioral: Negative.      Objective:     Vital Signs (Most Recent):  Temp: 99.2 °F (37.3 °C) (09/27/17 0742)  Pulse: 95 (09/27/17 0845)  Resp: (!) 22 (09/27/17 0845)  BP: 132/67 (09/27/17 0742)  SpO2: 98 % (09/27/17 0845) Vital Signs (24h Range):  Temp:  [97.4 °F (36.3 °C)-100.3 °F (37.9 °C)] 99.2 °F (37.3 °C)  Pulse:  [] 95  Resp:  [16-22] 22  SpO2:  [94 %-98 %] 98 %  BP: (120-143)/(59-75) 132/67     Weight: 72.9 kg (160 lb 11.2 oz)  Body mass index is 27.58 kg/m².    Intake/Output Summary (Last 24 hours) at 09/27/17 1057  Last data filed at 09/27/17 0400   Gross per 24 hour   Intake              220 ml   Output             1700 ml   Net            -1480 ml      Physical Exam   Constitutional: She is oriented to person, place, and time. She appears well-developed and well-nourished.   HENT:   Head: Normocephalic and atraumatic.   Eyes: EOM are normal. Pupils are equal, round, and reactive to light.   Neck: Normal range of motion. Neck supple.   Cardiovascular: Normal rate and regular rhythm.    Pulmonary/Chest: Effort normal.   Diminished at bases;    Genitourinary:   Genitourinary Comments: deferred   Musculoskeletal: Normal range of motion.   Left flank pain to palpation.   Neurological: She is alert and oriented to person, place, and time.   Skin: Skin is warm and dry.       Significant Labs:   Blood Culture: No results for input(s): LABBLOO in the last 48 hours.  BMP:   Recent Labs  Lab 09/27/17  0415         K 3.1*       CO2 32*   BUN 3*   CREATININE 0.8   CALCIUM 8.7   MG 2.0  2.1     CBC:   Recent Labs  Lab 09/26/17  0540 09/27/17  0415   WBC 14.03* 11.08   HGB 9.5* 9.7*   HCT 29.6* 30.6*   * 433*     Respiratory Culture: No results for input(s): GSRESP, RESPIRATORYC in the last 48 hours.    Significant Imaging:   Imaging Results          X-Ray Chest 1 View (Final result)  Result time 09/26/17 10:45:23   Procedure changed from X-Ray Chest PA And Lateral     Final result by JEFFREY Britt Sr., MD (09/26/17 10:45:23)                 Impression:      There has been interval development of patchy areas of increased density scattered throughout the lungs. This is consistent with the patient's history and characteristic of pneumonia.      Electronically signed by: JEFFREY BRITT MD  Date:     09/26/17  Time:    10:45              Narrative:    One-view chest x-ray    Clinical History: Pneumonia    Finding: Comparison was made to a prior examination performed on 9/24/2017. The size of the heart is normal. There has been interval development of patchy areas of increased density scattered throughout the lungs. There is no pneumothorax.  The costophrenic angles are sharp.                             CT Abdomen Pelvis  Without Contrast (Final result)  Result time 09/25/17 07:34:22    Final result by Deuce Olvera MD (09/25/17 07:34:22)                 Impression:       Bilateral parenchymal infiltrates are seen within the lower lobes.    Mild prominence of the right renal collecting system however no obstructing stone is seen. This can be seen in a setting of recently passed stone.     Left inguinal adenopathy measuring up to 1.4 cm.    Subcentimeter hypodensity is seen within the mid body of the pancreas which is indeterminate. Consider interval followup    All CT scans at this facility use dose modulation, iterative reconstruction and/or weight based dosing when appropriate to reduce radiation dose to as low as  reasonably achievable.      Electronically signed by: DEUCE SHERMAN MD  Date:     09/25/17  Time:    07:34              Narrative:    Indication: Abdominal pain.    Routine noncontrast CT images of the abdomen and pelvis were obtained.    Findings:    Bilateral parenchymal infiltrates are seen within the lower lobes.  Heart size is normal.  No pericardial or pleural effusion.      The liver is normal in size and attenuation on this noncontrast exam.  Status post cholecystectomy The  stomach, spleen,  adrenal glands are normal.  Subcentimeter hypodensity is seen within the mid body of the pancreas which is indeterminate. Consider interval followup. Aorta demonstrates moderate atherosclerotic disease.      The kidneys are normal in size shape and position without radiopaque calculus or signs of hydronephrosis. Mild prominence of the right renal collecting system however no obstructing stone is seen. This can be seen in a setting of recently passed stone. The bladder is incompletely distended.     The visualized loops of small bowel are unremarkable.The appendix is visualized in the right lower quadrant.  There is no inflammation. Colon demonstrates diverticulosis without evidence of diverticulitis. Mild constipation    Left inguinal adenopathy..      Bones appear intact with moderate degenerative changes                             CTA Chest Non-Coronary (PE Study) (Final result)     Abnormal  Result time 09/25/17 07:39:18    Final result by Deuce Sherman MD (09/25/17 07:39:18)                 Impression:         Bilateral pulmonary infiltrates are seen some with a micronodular pattern which can be seen in infectious (typical or atypical) and or inflammatory process. Followup to resolution is recommended.    Mediastinal and bilateral hilar adenopathy which can be seen in the setting of infectious/reactive process versus neoplastic versus lymphoproliferative disorder.    No evidence of PE.    Fatty liver.    All CT  scans at this facility use dose modulation, iterative reconstruction, and/or weight base dosing when appropriate to reduce radiation dose to as low as reasonably achievable.      Electronically signed by: AURA SHREMAN MD  Date:     09/25/17  Time:    07:39              Narrative:    Clinical data: Chest pain.    Axial CT images through the chest after administration of contrast was performed according to PE study protocol using 100 cc Omni 350.    Findings:    The pulmonary arterial system is well opacified with no evidence of filling defect to suggest pulmonary embolus or thrombus.    Structures of the base of the neck are normal.    The heart and pericardium are unremarkable.  Trace pericardial effusion.  Mediastinal structures including great vessels, trachea, esophagus are intact.    Mediastinal and bilateral hilar adenopathy which may be reactive.      Bilateral pulmonary infiltrates are seen some with a micronodular pattern which can be seen in infectious or inflammatory process. Mild emphysematous changes are seen within the lungs bilaterally.    Surrounding chest wall structures, visualized abdominal organs, bones are unremarkable. Cholecystectomy and mild fatty infiltration of liver suspected.                             X-Ray Chest AP Portable (Final result)  Result time 09/24/17 22:22:56    Final result by Celia Billings MD (09/24/17 22:22:56)                 Impression:         Negative portable chest x-ray.      Electronically signed by: CELIA BILLINGS MD  Date:     09/24/17  Time:    22:22              Narrative:    Portable chest x-ray. 09/24/17 22:13:49    Clinical indication:  sepsis    Heart size is normal. The lung fields are clear. No acute cardiopulmonary infiltrate.                             RADIOLOGY REPORT (Final result)  Result time 09/26/17 15:17:37

## 2017-09-27 NOTE — ASSESSMENT & PLAN NOTE
Continue to monitor.  K-phosphate ordered today;  Will check a magnesium level in the Am as well.    9/27:  Likely related to beta-agonist therapy;  Will supplement and monitor

## 2017-09-27 NOTE — ASSESSMENT & PLAN NOTE
--monitor and replete as indicated      6/26;  PO4 2.2 with K of 3.4;  Will replete with potassium phosphate and monitor response to therapy.    6/27:  3.5 on todays labs.

## 2017-09-27 NOTE — ASSESSMENT & PLAN NOTE
--WBC 17K, Pulse 136, RR 22, evelin source: bilat pneumonia  --PO Avelox and mucinex  --sputum culture pending  -Blood Cultures pending  --duonebs   --supplemental oxygen to maintain sats    6/26:  Continue present antibiotics with monitoring of the CXR  On IV rocephin and Doxycycline at present.  Monitor cultures (blood and sputum)  Continue duonebs; pulmonary incentives.    6/27: patchy diffuse disease both lungs;  Will continue present regimen;  monitor CXR response.  Culture of blood negative.  WBC on the decline.

## 2017-09-27 NOTE — ASSESSMENT & PLAN NOTE
Hold BP meds at this time, due to presentation with hypotension  monitor and start once improving       6/26: BP trending upward;  Resumed Toprol-XL today;  Will monitor.response to therapy    9/27: improved.; continue present regimen

## 2017-09-27 NOTE — ASSESSMENT & PLAN NOTE
--likely secondary to pneumonia  --CT showed No central pulmonary embolus, Bilateral pulmonary infiltrates and nodules   -EKG reviewed  --Serial Troponins negative  9/26: Continue present regimen; with monitoring of the cXR  Continued antibiotics (now on Rocephin and Doxy)  Pulmonary incentives started; along with continued nebs    9/27: pain of musculoskeletal origin; tylenol with codeine ordered;

## 2017-09-27 NOTE — PLAN OF CARE
Problem: Patient Care Overview  Goal: Plan of Care Review  Outcome: Ongoing (interventions implemented as appropriate)  Tmax 100.3, pt reports temp increase at night. All meds administered as ordered. Congested non productive cough noted but no acute distress. NSR/ST noted on monitor. POC discussed with pt with all questions addressed at this time. Pt assisted with repositioning, no falls or injury. Fly present at bedside.

## 2017-09-28 PROBLEM — D72.829 LEUKOCYTOSIS: Status: RESOLVED | Noted: 2017-09-26 | Resolved: 2017-09-28

## 2017-09-28 PROBLEM — E83.39 HYPOPHOSPHATEMIA: Status: RESOLVED | Noted: 2017-09-25 | Resolved: 2017-09-28

## 2017-09-28 PROBLEM — E87.6 HYPOKALEMIA: Status: RESOLVED | Noted: 2017-09-26 | Resolved: 2017-09-28

## 2017-09-28 PROBLEM — E55.9 VITAMIN D DEFICIENCY: Status: ACTIVE | Noted: 2017-09-28

## 2017-09-28 LAB
ANION GAP SERPL CALC-SCNC: 7 MMOL/L
BASOPHILS # BLD AUTO: 0.03 K/UL
BASOPHILS NFR BLD: 0.3 %
BUN SERPL-MCNC: 3 MG/DL
CALCIUM SERPL-MCNC: 9.1 MG/DL
CHLORIDE SERPL-SCNC: 102 MMOL/L
CO2 SERPL-SCNC: 34 MMOL/L
CREAT SERPL-MCNC: 0.7 MG/DL
DIFFERENTIAL METHOD: ABNORMAL
EOSINOPHIL # BLD AUTO: 0.2 K/UL
EOSINOPHIL NFR BLD: 1.7 %
ERYTHROCYTE [DISTWIDTH] IN BLOOD BY AUTOMATED COUNT: 14.7 %
EST. GFR  (AFRICAN AMERICAN): >60 ML/MIN/1.73 M^2
EST. GFR  (NON AFRICAN AMERICAN): >60 ML/MIN/1.73 M^2
GLUCOSE SERPL-MCNC: 91 MG/DL
HCT VFR BLD AUTO: 31.7 %
HGB BLD-MCNC: 10.1 G/DL
LYMPHOCYTES # BLD AUTO: 3.4 K/UL
LYMPHOCYTES NFR BLD: 36.1 %
MAGNESIUM SERPL-MCNC: 1.9 MG/DL
MCH RBC QN AUTO: 27.5 PG
MCHC RBC AUTO-ENTMCNC: 31.9 G/DL
MCV RBC AUTO: 86 FL
MONOCYTES # BLD AUTO: 0.7 K/UL
MONOCYTES NFR BLD: 7.6 %
NEUTROPHILS # BLD AUTO: 5 K/UL
NEUTROPHILS NFR BLD: 54.3 %
PHOSPHATE SERPL-MCNC: 3.1 MG/DL
PLATELET # BLD AUTO: 475 K/UL
PMV BLD AUTO: 9.2 FL
POCT GLUCOSE: 112 MG/DL (ref 70–110)
POCT GLUCOSE: 147 MG/DL (ref 70–110)
POCT GLUCOSE: 190 MG/DL (ref 70–110)
POTASSIUM SERPL-SCNC: 3.1 MMOL/L
RBC # BLD AUTO: 3.67 M/UL
SODIUM SERPL-SCNC: 143 MMOL/L
WBC # BLD AUTO: 9.27 K/UL

## 2017-09-28 PROCEDURE — 25000003 PHARM REV CODE 250: Performed by: NURSE PRACTITIONER

## 2017-09-28 PROCEDURE — 83735 ASSAY OF MAGNESIUM: CPT

## 2017-09-28 PROCEDURE — 21400001 HC TELEMETRY ROOM

## 2017-09-28 PROCEDURE — 25000003 PHARM REV CODE 250: Performed by: INTERNAL MEDICINE

## 2017-09-28 PROCEDURE — 85025 COMPLETE CBC W/AUTO DIFF WBC: CPT

## 2017-09-28 PROCEDURE — 84100 ASSAY OF PHOSPHORUS: CPT

## 2017-09-28 PROCEDURE — 25000242 PHARM REV CODE 250 ALT 637 W/ HCPCS: Performed by: INTERNAL MEDICINE

## 2017-09-28 PROCEDURE — 27000221 HC OXYGEN, UP TO 24 HOURS

## 2017-09-28 PROCEDURE — 94799 UNLISTED PULMONARY SVC/PX: CPT

## 2017-09-28 PROCEDURE — 36415 COLL VENOUS BLD VENIPUNCTURE: CPT

## 2017-09-28 PROCEDURE — 63600175 PHARM REV CODE 636 W HCPCS: Performed by: FAMILY MEDICINE

## 2017-09-28 PROCEDURE — 94640 AIRWAY INHALATION TREATMENT: CPT

## 2017-09-28 PROCEDURE — 80048 BASIC METABOLIC PNL TOTAL CA: CPT

## 2017-09-28 PROCEDURE — 25000003 PHARM REV CODE 250: Performed by: FAMILY MEDICINE

## 2017-09-28 RX ORDER — FERROUS SULFATE 325(65) MG
325 TABLET, DELAYED RELEASE (ENTERIC COATED) ORAL DAILY
Status: DISCONTINUED | OUTPATIENT
Start: 2017-09-28 | End: 2017-10-02 | Stop reason: HOSPADM

## 2017-09-28 RX ORDER — GLUCAGON 1 MG
1 KIT INJECTION
Status: DISCONTINUED | OUTPATIENT
Start: 2017-09-28 | End: 2017-10-02 | Stop reason: HOSPADM

## 2017-09-28 RX ORDER — INSULIN ASPART 100 [IU]/ML
0-5 INJECTION, SOLUTION INTRAVENOUS; SUBCUTANEOUS
Status: DISCONTINUED | OUTPATIENT
Start: 2017-09-28 | End: 2017-10-02 | Stop reason: HOSPADM

## 2017-09-28 RX ORDER — IBUPROFEN 200 MG
16 TABLET ORAL
Status: DISCONTINUED | OUTPATIENT
Start: 2017-09-28 | End: 2017-10-02 | Stop reason: HOSPADM

## 2017-09-28 RX ORDER — DOCUSATE SODIUM 100 MG/1
100 CAPSULE, LIQUID FILLED ORAL 2 TIMES DAILY PRN
Status: DISCONTINUED | OUTPATIENT
Start: 2017-09-28 | End: 2017-10-02 | Stop reason: HOSPADM

## 2017-09-28 RX ORDER — IBUPROFEN 200 MG
24 TABLET ORAL
Status: DISCONTINUED | OUTPATIENT
Start: 2017-09-28 | End: 2017-10-02 | Stop reason: HOSPADM

## 2017-09-28 RX ADMIN — DOXYCYCLINE 100 MG: 100 INJECTION, POWDER, LYOPHILIZED, FOR SOLUTION INTRAVENOUS at 12:09

## 2017-09-28 RX ADMIN — BENZONATATE 100 MG: 100 CAPSULE ORAL at 04:09

## 2017-09-28 RX ADMIN — LEVALBUTEROL 0.63 MG: 0.63 SOLUTION RESPIRATORY (INHALATION) at 05:09

## 2017-09-28 RX ADMIN — GUAIFENESIN 600 MG: 600 TABLET, EXTENDED RELEASE ORAL at 09:09

## 2017-09-28 RX ADMIN — PANTOPRAZOLE SODIUM 40 MG: 40 TABLET, DELAYED RELEASE ORAL at 09:09

## 2017-09-28 RX ADMIN — FERROUS SULFATE TAB EC 325 MG (65 MG FE EQUIVALENT) 325 MG: 325 (65 FE) TABLET DELAYED RESPONSE at 12:09

## 2017-09-28 RX ADMIN — MONTELUKAST SODIUM 10 MG: 10 TABLET, COATED ORAL at 09:09

## 2017-09-28 RX ADMIN — METOPROLOL SUCCINATE 50 MG: 50 TABLET, EXTENDED RELEASE ORAL at 09:09

## 2017-09-28 RX ADMIN — ACETAMINOPHEN AND CODEINE PHOSPHATE 1 TABLET: 30; 300 TABLET ORAL at 10:09

## 2017-09-28 RX ADMIN — RIVAROXABAN 20 MG: 20 TABLET, FILM COATED ORAL at 06:09

## 2017-09-28 RX ADMIN — CEFTRIAXONE 1 G: 1 INJECTION, SOLUTION INTRAVENOUS at 11:09

## 2017-09-28 RX ADMIN — IPRATROPIUM BROMIDE 0.5 MG: 0.5 SOLUTION RESPIRATORY (INHALATION) at 07:09

## 2017-09-28 RX ADMIN — ACETAMINOPHEN AND CODEINE PHOSPHATE 1 TABLET: 30; 300 TABLET ORAL at 07:09

## 2017-09-28 RX ADMIN — ACETAMINOPHEN AND CODEINE PHOSPHATE 1 TABLET: 30; 300 TABLET ORAL at 12:09

## 2017-09-28 RX ADMIN — IPRATROPIUM BROMIDE 0.5 MG: 0.5 SOLUTION RESPIRATORY (INHALATION) at 05:09

## 2017-09-28 RX ADMIN — LEVALBUTEROL 0.63 MG: 0.63 SOLUTION RESPIRATORY (INHALATION) at 07:09

## 2017-09-28 NOTE — PLAN OF CARE
Problem: Pneumonia (Adult)  Goal: Signs and Symptoms of Listed Potential Problems Will be Absent, Minimized or Managed (Pneumonia)  Signs and symptoms of listed potential problems will be absent, minimized or managed by discharge/transition of care (reference Pneumonia (Adult) C[facility]).   Outcome: Ongoing (interventions implemented as appropriate)  Tolerates txs well; pt encouraged to work with incentive spirometry.

## 2017-09-28 NOTE — SUBJECTIVE & OBJECTIVE
Interval History: continues with cough and bilateral flank pain    Review of Systems   Constitutional: Positive for fatigue.   HENT: Negative.    Eyes: Negative.    Respiratory: Positive for cough and shortness of breath. Negative for wheezing.    Cardiovascular: Negative.    Gastrointestinal: Negative.    Endocrine: Negative.    Genitourinary: Negative.    Musculoskeletal: Positive for myalgias.        Bilateral flank/chest pain   Skin: Negative.    Allergic/Immunologic: Negative.    Neurological: Positive for weakness.   Hematological: Negative.      Objective:     Vital Signs (Most Recent):  Temp: 98.4 °F (36.9 °C) (09/28/17 0743)  Pulse: (!) 118 (09/28/17 0757)  Resp: 20 (09/28/17 0757)  BP: (!) 120/59 (09/28/17 0743)  SpO2: 95 % (09/28/17 0757) Vital Signs (24h Range):  Temp:  [98 °F (36.7 °C)-98.4 °F (36.9 °C)] 98.4 °F (36.9 °C)  Pulse:  [] 118  Resp:  [17-20] 20  SpO2:  [91 %-99 %] 95 %  BP: (120-149)/(59-83) 120/59     Weight: 72.6 kg (160 lb)  Body mass index is 27.46 kg/m².    Intake/Output Summary (Last 24 hours) at 09/28/17 1019  Last data filed at 09/28/17 0931   Gross per 24 hour   Intake             1030 ml   Output             2600 ml   Net            -1570 ml      Physical Exam   Constitutional: She is oriented to person, place, and time. She appears well-developed and well-nourished.   Eyes: EOM are normal. Pupils are equal, round, and reactive to light.   Neck: Normal range of motion. Neck supple.   Cardiovascular: Normal rate, regular rhythm and normal heart sounds.    Pulmonary/Chest: Effort normal.   Diminished at lung bases   Abdominal: Soft. Bowel sounds are normal.   Genitourinary:   Genitourinary Comments: deferred   Musculoskeletal: She exhibits tenderness.   Right and left flank to deep alpation   Neurological: She is alert and oriented to person, place, and time.   Skin: Skin is warm and dry.   Psychiatric: She has a normal mood and affect.       Significant Labs:   Blood Culture:  No results for input(s): LABBLOO in the last 48 hours.  BMP:   Recent Labs  Lab 09/28/17  0408   GLU 91      K 3.1*      CO2 34*   BUN 3*   CREATININE 0.7   CALCIUM 9.1   MG 1.9     CBC:   Recent Labs  Lab 09/27/17  0415 09/28/17  0408   WBC 11.08 9.27   HGB 9.7* 10.1*   HCT 30.6* 31.7*   * 475*     Respiratory Culture: No results for input(s): GSRESP, RESPIRATORYC in the last 48 hours.    Significant Imaging:   Imaging Results          X-Ray Chest 1 View (Final result)  Result time 09/26/17 10:45:23   Procedure changed from X-Ray Chest PA And Lateral     Final result by JEFFREY Britt Sr., MD (09/26/17 10:45:23)                 Impression:      There has been interval development of patchy areas of increased density scattered throughout the lungs. This is consistent with the patient's history and characteristic of pneumonia.      Electronically signed by: JEFFREY BRITT MD  Date:     09/26/17  Time:    10:45              Narrative:    One-view chest x-ray    Clinical History: Pneumonia    Finding: Comparison was made to a prior examination performed on 9/24/2017. The size of the heart is normal. There has been interval development of patchy areas of increased density scattered throughout the lungs. There is no pneumothorax.  The costophrenic angles are sharp.                             CT Abdomen Pelvis  Without Contrast (Final result)  Result time 09/25/17 07:34:22    Final result by Deuce Olvera MD (09/25/17 07:34:22)                 Impression:       Bilateral parenchymal infiltrates are seen within the lower lobes.    Mild prominence of the right renal collecting system however no obstructing stone is seen. This can be seen in a setting of recently passed stone.     Left inguinal adenopathy measuring up to 1.4 cm.    Subcentimeter hypodensity is seen within the mid body of the pancreas which is indeterminate. Consider interval followup    All CT scans at this facility use dose modulation,  iterative reconstruction and/or weight based dosing when appropriate to reduce radiation dose to as low as reasonably achievable.      Electronically signed by: DEUCE SHERMAN MD  Date:     09/25/17  Time:    07:34              Narrative:    Indication: Abdominal pain.    Routine noncontrast CT images of the abdomen and pelvis were obtained.    Findings:    Bilateral parenchymal infiltrates are seen within the lower lobes.  Heart size is normal.  No pericardial or pleural effusion.      The liver is normal in size and attenuation on this noncontrast exam.  Status post cholecystectomy The  stomach, spleen,  adrenal glands are normal.  Subcentimeter hypodensity is seen within the mid body of the pancreas which is indeterminate. Consider interval followup. Aorta demonstrates moderate atherosclerotic disease.      The kidneys are normal in size shape and position without radiopaque calculus or signs of hydronephrosis. Mild prominence of the right renal collecting system however no obstructing stone is seen. This can be seen in a setting of recently passed stone. The bladder is incompletely distended.     The visualized loops of small bowel are unremarkable.The appendix is visualized in the right lower quadrant.  There is no inflammation. Colon demonstrates diverticulosis without evidence of diverticulitis. Mild constipation    Left inguinal adenopathy..      Bones appear intact with moderate degenerative changes                             CTA Chest Non-Coronary (PE Study) (Final result)     Abnormal  Result time 09/25/17 07:39:18    Final result by Deuce Sherman MD (09/25/17 07:39:18)                 Impression:         Bilateral pulmonary infiltrates are seen some with a micronodular pattern which can be seen in infectious (typical or atypical) and or inflammatory process. Followup to resolution is recommended.    Mediastinal and bilateral hilar adenopathy which can be seen in the setting of infectious/reactive  process versus neoplastic versus lymphoproliferative disorder.    No evidence of PE.    Fatty liver.    All CT scans at this facility use dose modulation, iterative reconstruction, and/or weight base dosing when appropriate to reduce radiation dose to as low as reasonably achievable.      Electronically signed by: AURA SHERMAN MD  Date:     09/25/17  Time:    07:39              Narrative:    Clinical data: Chest pain.    Axial CT images through the chest after administration of contrast was performed according to PE study protocol using 100 cc Omni 350.    Findings:    The pulmonary arterial system is well opacified with no evidence of filling defect to suggest pulmonary embolus or thrombus.    Structures of the base of the neck are normal.    The heart and pericardium are unremarkable.  Trace pericardial effusion.  Mediastinal structures including great vessels, trachea, esophagus are intact.    Mediastinal and bilateral hilar adenopathy which may be reactive.      Bilateral pulmonary infiltrates are seen some with a micronodular pattern which can be seen in infectious or inflammatory process. Mild emphysematous changes are seen within the lungs bilaterally.    Surrounding chest wall structures, visualized abdominal organs, bones are unremarkable. Cholecystectomy and mild fatty infiltration of liver suspected.                             X-Ray Chest AP Portable (Final result)  Result time 09/24/17 22:22:56    Final result by Celia Billings MD (09/24/17 22:22:56)                 Impression:         Negative portable chest x-ray.      Electronically signed by: CELIA BILLINGS MD  Date:     09/24/17  Time:    22:22              Narrative:    Portable chest x-ray. 09/24/17 22:13:49    Clinical indication:  sepsis    Heart size is normal. The lung fields are clear. No acute cardiopulmonary infiltrate.                             RADIOLOGY REPORT (Final result)  Result time 09/26/17 15:17:37

## 2017-09-28 NOTE — ASSESSMENT & PLAN NOTE
9/27: question dilutional component;  Will check b-12, folate, iron stores and monitor.    9/28:  Iron deficiency by labs;  Will check stools for heme and start on supplementation;  May need stool softener as well

## 2017-09-28 NOTE — ASSESSMENT & PLAN NOTE
Vitamin D reported low at 9; started on vitamin D 50,000 units weekly for 4-6 weeks; will need outpatient monitoring

## 2017-09-28 NOTE — PROGRESS NOTES
[facility] Hospital Medicine  Progress Note    Patient Name: Genie Balderas  MRN: [id number]  Patient Class: IP- Inpatient   Admission Date: 9/24/2017  Length of Stay: 3 days  Attending Physician: Rebel Cardenas MD  Primary Care Provider: Ryan Sims MD        Subjective:     Principal Problem:Sepsis    HPI:  Genie Balderas is a 57 y.o. female patient who presented to the Emergency Department for complaint of Cough that started 2 days ago. The cough is non productive. Associated symptoms include Sharp Like CP to Left chest radiates to back, that stated today due to coughing, and, fatigue, fever, n/v/d. Symptoms are intermittent and moderate in severity. OTC meds and nebs not helping at home.  No mitigating or exacerbating factors reported.  Patient denies any SOB, leg swelling, palpitations, HA, lightheadedness, dizziness, numbness, weakness, and all other sxs at this time. No further complaints or concerns at this time.        Hospital Course:  Patient was placed on OBS for sepsis d/t bilat lower lobe pneumonia. She was started on IV abx, breathing treatments, and mucinex. Patient improving slowly - moved to Inpatient since will need extended IV abx.     6/26;  Some better;  Has faint posterior wheezing today;Continued cough; upper back pain on left, with the cough  On for repeat CXR today; also incentive spirometry added; also tylenol with codeine for coughtand pain complaint.    6/27:  Looks and feels better;  Still with back pain though less;  Had restful night overnight; working with incentive device once hourly while awake.    6/28:  Still with cough and bilateral chest/flank discomfort;  Tender to palpation moreso on the left;  Likely muculoskeletal; no fever or chills reported; last CXR was 9/26    Interval History: continues with cough and bilateral flank pain    Review of Systems   Constitutional: Positive for fatigue.   HENT: Negative.    Eyes: Negative.    Respiratory: Positive for  cough and shortness of breath. Negative for wheezing.    Cardiovascular: Negative.    Gastrointestinal: Negative.    Endocrine: Negative.    Genitourinary: Negative.    Musculoskeletal: Positive for myalgias.        Bilateral flank/chest pain   Skin: Negative.    Allergic/Immunologic: Negative.    Neurological: Positive for weakness.   Hematological: Negative.      Objective:     Vital Signs (Most Recent):  Temp: 98.4 °F (36.9 °C) (09/28/17 0743)  Pulse: (!) 118 (09/28/17 0757)  Resp: 20 (09/28/17 0757)  BP: (!) 120/59 (09/28/17 0743)  SpO2: 95 % (09/28/17 0757) Vital Signs (24h Range):  Temp:  [98 °F (36.7 °C)-98.4 °F (36.9 °C)] 98.4 °F (36.9 °C)  Pulse:  [] 118  Resp:  [17-20] 20  SpO2:  [91 %-99 %] 95 %  BP: (120-149)/(59-83) 120/59     Weight: 72.6 kg (160 lb)  Body mass index is 27.46 kg/m².    Intake/Output Summary (Last 24 hours) at 09/28/17 1019  Last data filed at 09/28/17 0931   Gross per 24 hour   Intake             1030 ml   Output             2600 ml   Net            -1570 ml      Physical Exam   Constitutional: She is oriented to person, place, and time. She appears well-developed and well-nourished.   Eyes: EOM are normal. Pupils are equal, round, and reactive to light.   Neck: Normal range of motion. Neck supple.   Cardiovascular: Normal rate, regular rhythm and normal heart sounds.    Pulmonary/Chest: Effort normal.   Diminished at lung bases   Abdominal: Soft. Bowel sounds are normal.   Genitourinary:   Genitourinary Comments: deferred   Musculoskeletal: She exhibits tenderness.   Right and left flank to deep alpation   Neurological: She is alert and oriented to person, place, and time.   Skin: Skin is warm and dry.   Psychiatric: She has a normal mood and affect.       Significant Labs:   Blood Culture: No results for input(s): LABBLOO in the last 48 hours.  BMP:   Recent Labs  Lab 09/28/17  0408   GLU 91      K 3.1*      CO2 34*   BUN 3*   CREATININE 0.7   CALCIUM 9.1   MG 1.9      CBC:   Recent Labs  Lab 09/27/17  0415 09/28/17  0408   WBC 11.08 9.27   HGB 9.7* 10.1*   HCT 30.6* 31.7*   * 475*     Respiratory Culture: No results for input(s): GSRESP, RESPIRATORYC in the last 48 hours.    Significant Imaging:   Imaging Results          X-Ray Chest 1 View (Final result)  Result time 09/26/17 10:45:23   Procedure changed from X-Ray Chest PA And Lateral     Final result by JEFFREY Britt Sr., MD (09/26/17 10:45:23)                 Impression:      There has been interval development of patchy areas of increased density scattered throughout the lungs. This is consistent with the patient's history and characteristic of pneumonia.      Electronically signed by: JEFFREY BRITT MD  Date:     09/26/17  Time:    10:45              Narrative:    One-view chest x-ray    Clinical History: Pneumonia    Finding: Comparison was made to a prior examination performed on 9/24/2017. The size of the heart is normal. There has been interval development of patchy areas of increased density scattered throughout the lungs. There is no pneumothorax.  The costophrenic angles are sharp.                             CT Abdomen Pelvis  Without Contrast (Final result)  Result time 09/25/17 07:34:22    Final result by Deuce Sherman MD (09/25/17 07:34:22)                 Impression:       Bilateral parenchymal infiltrates are seen within the lower lobes.    Mild prominence of the right renal collecting system however no obstructing stone is seen. This can be seen in a setting of recently passed stone.     Left inguinal adenopathy measuring up to 1.4 cm.    Subcentimeter hypodensity is seen within the mid body of the pancreas which is indeterminate. Consider interval followup    All CT scans at this facility use dose modulation, iterative reconstruction and/or weight based dosing when appropriate to reduce radiation dose to as low as reasonably achievable.      Electronically signed by: DEUCE SHERMAN MD  Date:      09/25/17  Time:    07:34              Narrative:    Indication: Abdominal pain.    Routine noncontrast CT images of the abdomen and pelvis were obtained.    Findings:    Bilateral parenchymal infiltrates are seen within the lower lobes.  Heart size is normal.  No pericardial or pleural effusion.      The liver is normal in size and attenuation on this noncontrast exam.  Status post cholecystectomy The  stomach, spleen,  adrenal glands are normal.  Subcentimeter hypodensity is seen within the mid body of the pancreas which is indeterminate. Consider interval followup. Aorta demonstrates moderate atherosclerotic disease.      The kidneys are normal in size shape and position without radiopaque calculus or signs of hydronephrosis. Mild prominence of the right renal collecting system however no obstructing stone is seen. This can be seen in a setting of recently passed stone. The bladder is incompletely distended.     The visualized loops of small bowel are unremarkable.The appendix is visualized in the right lower quadrant.  There is no inflammation. Colon demonstrates diverticulosis without evidence of diverticulitis. Mild constipation    Left inguinal adenopathy..      Bones appear intact with moderate degenerative changes                             CTA Chest Non-Coronary (PE Study) (Final result)     Abnormal  Result time 09/25/17 07:39:18    Final result by Deuce Olvera MD (09/25/17 07:39:18)                 Impression:         Bilateral pulmonary infiltrates are seen some with a micronodular pattern which can be seen in infectious (typical or atypical) and or inflammatory process. Followup to resolution is recommended.    Mediastinal and bilateral hilar adenopathy which can be seen in the setting of infectious/reactive process versus neoplastic versus lymphoproliferative disorder.    No evidence of PE.    Fatty liver.    All CT scans at this facility use dose modulation, iterative reconstruction, and/or  weight base dosing when appropriate to reduce radiation dose to as low as reasonably achievable.      Electronically signed by: AURA SHERMAN MD  Date:     09/25/17  Time:    07:39              Narrative:    Clinical data: Chest pain.    Axial CT images through the chest after administration of contrast was performed according to PE study protocol using 100 cc Omni 350.    Findings:    The pulmonary arterial system is well opacified with no evidence of filling defect to suggest pulmonary embolus or thrombus.    Structures of the base of the neck are normal.    The heart and pericardium are unremarkable.  Trace pericardial effusion.  Mediastinal structures including great vessels, trachea, esophagus are intact.    Mediastinal and bilateral hilar adenopathy which may be reactive.      Bilateral pulmonary infiltrates are seen some with a micronodular pattern which can be seen in infectious or inflammatory process. Mild emphysematous changes are seen within the lungs bilaterally.    Surrounding chest wall structures, visualized abdominal organs, bones are unremarkable. Cholecystectomy and mild fatty infiltration of liver suspected.                             X-Ray Chest AP Portable (Final result)  Result time 09/24/17 22:22:56    Final result by Celia Billings MD (09/24/17 22:22:56)                 Impression:         Negative portable chest x-ray.      Electronically signed by: CELIA BILLINGS MD  Date:     09/24/17  Time:    22:22              Narrative:    Portable chest x-ray. 09/24/17 22:13:49    Clinical indication:  sepsis    Heart size is normal. The lung fields are clear. No acute cardiopulmonary infiltrate.                             RADIOLOGY REPORT (Final result)  Result time 09/26/17 15:17:37              Assessment/Plan:      * Sepsis due to bilateral lower lobe pneumonia     --WBC 17K, Pulse 136, RR 22, evelin source: bilat pneumonia  --PO Avelox and mucinex  --sputum culture pending  -Blood Cultures  pending  --duonebs   --supplemental oxygen to maintain sats    6/26:  Continue present antibiotics with monitoring of the CXR  On IV rocephin and Doxycycline at present.  Monitor cultures (blood and sputum)  Continue duonebs; pulmonary incentives.    6/27: patchy diffuse disease both lungs;  Will continue present regimen;  monitor CXR response.  Culture of blood negative.  WBC on the decline.    6/28:  To repeat CXR today; Continue antibiotics;  WBC counts are trending down.           Vitamin D deficiency    Vitamin D reported low at 9; started on vitamin D 50,000 units weekly for 4-6 weeks; will need outpatient monitoring          Anemia, unspecified    9/27: question dilutional component;  Will check b-12, folate, iron stores and monitor.    9/28:  Iron deficiency by labs;  Will check stools for heme and start on supplementation;  May need stool softener as well          Hypomagnesemia    Magnesium replacement ordered;  Will monitor response.  Vitamin D level ordered with next lab draw.      9/27:  Severe vitamin D deficiency; will replete vitamin D.  don.        Tobacco abuse    --counseled on smoking cessation  --refused nicotine patch    6/26: continue to stress smoking cessation.        HTN (hypertension)    Hold BP meds at this time, due to presentation with hypotension  monitor and start once improving       6/26: BP trending upward;  Resumed Toprol-XL today;  Will monitor.response to therapy    9/27: improved.; continue present regimen        Chest pain    --likely secondary to pneumonia  --CT showed No central pulmonary embolus, Bilateral pulmonary infiltrates and nodules   -EKG reviewed  --Serial Troponins negative  9/26: Continue present regimen; with monitoring of the cXR  Continued antibiotics (now on Rocephin and Doxy)  Pulmonary incentives started; along with continued nebs    9/27: pain of musculoskeletal origin; tylenol with codeine ordered;     9/28:  No appreciable change; on for repeat cxr  today;  Will give her a trial of flexeril and consider a heating pad          VTE Risk Mitigation         Ordered     rivaroxaban tablet 20 mg  With dinner     Route:  Oral        09/25/17 0240     Medium Risk of VTE  Once      09/25/17 0208     Place sequential compression device  Until discontinued      09/25/17 0208              Rebel Montse MD  Department of Hospital Medicine   [facility]

## 2017-09-28 NOTE — PLAN OF CARE
Confirmed with PORTER Rosen rep (Ext. [phone]) here at [facility] that patient has been reestablished with Medicaid ([place] [employer]), Effective 09/01/17, ID#:  [id number].      Met with patient at bedside and provided her with above information verbally and in writing.  Instructed patient that when she goes to the pharmacy upon discharge, she will need to provide them with the above insurance information so that insurance will cover cost of her medications.

## 2017-09-28 NOTE — PLAN OF CARE
Problem: Patient Care Overview  Goal: Plan of Care Review  Outcome: Ongoing (interventions implemented as appropriate)  Pt alert and oriented. POC reviewed.2L NC. Nebulizer's and IV antibiotics administered per orders.Pt remained free of falls during shift. Bed alarm set , call light in reach, room free of clutter, side rails  up x2, pt on telemetry monitor SR, will continue to monitor.

## 2017-09-28 NOTE — PLAN OF CARE
Problem: Patient Care Overview  Goal: Plan of Care Review  Outcome: Ongoing (interventions implemented as appropriate)  Pt has been free of falls. PIV intact. IVPB infusing. ST on the monitor. Pt is on O2 NC at 2L. Pt has c/o of pain. PRN pain meds given. Call light in reach and hourly rounding made.

## 2017-09-28 NOTE — ASSESSMENT & PLAN NOTE
--likely secondary to pneumonia  --CT showed No central pulmonary embolus, Bilateral pulmonary infiltrates and nodules   -EKG reviewed  --Serial Troponins negative  9/26: Continue present regimen; with monitoring of the cXR  Continued antibiotics (now on Rocephin and Doxy)  Pulmonary incentives started; along with continued nebs    9/27: pain of musculoskeletal origin; tylenol with codeine ordered;     9/28:  No appreciable change; on for repeat cxr today;  Will give her a trial of flexeril and consider a heating pad

## 2017-09-28 NOTE — ASSESSMENT & PLAN NOTE
--WBC 17K, Pulse 136, RR 22, evelin source: bilat pneumonia  --PO Avelox and mucinex  --sputum culture pending  -Blood Cultures pending  --duonebs   --supplemental oxygen to maintain sats    6/26:  Continue present antibiotics with monitoring of the CXR  On IV rocephin and Doxycycline at present.  Monitor cultures (blood and sputum)  Continue duonebs; pulmonary incentives.    6/27: patchy diffuse disease both lungs;  Will continue present regimen;  monitor CXR response.  Culture of blood negative.  WBC on the decline.    6/28:  To repeat CXR today; Continue antibiotics;  WBC counts are trending down.

## 2017-09-29 LAB
ANION GAP SERPL CALC-SCNC: 11 MMOL/L
BASOPHILS # BLD AUTO: 0.02 K/UL
BASOPHILS NFR BLD: 0.2 %
BUN SERPL-MCNC: 3 MG/DL
CALCIUM SERPL-MCNC: 9 MG/DL
CHLORIDE SERPL-SCNC: 101 MMOL/L
CO2 SERPL-SCNC: 31 MMOL/L
CREAT SERPL-MCNC: 0.7 MG/DL
DIFFERENTIAL METHOD: ABNORMAL
EOSINOPHIL # BLD AUTO: 0.2 K/UL
EOSINOPHIL NFR BLD: 2.1 %
ERYTHROCYTE [DISTWIDTH] IN BLOOD BY AUTOMATED COUNT: 14.8 %
EST. GFR  (AFRICAN AMERICAN): >60 ML/MIN/1.73 M^2
EST. GFR  (NON AFRICAN AMERICAN): >60 ML/MIN/1.73 M^2
GLUCOSE SERPL-MCNC: 127 MG/DL
HCT VFR BLD AUTO: 30.8 %
HGB BLD-MCNC: 9.5 G/DL
LYMPHOCYTES # BLD AUTO: 3.5 K/UL
LYMPHOCYTES NFR BLD: 40.7 %
MAGNESIUM SERPL-MCNC: 1.8 MG/DL
MCH RBC QN AUTO: 26.8 PG
MCHC RBC AUTO-ENTMCNC: 30.8 G/DL
MCV RBC AUTO: 87 FL
MONOCYTES # BLD AUTO: 0.7 K/UL
MONOCYTES NFR BLD: 7.9 %
NEUTROPHILS # BLD AUTO: 4.2 K/UL
NEUTROPHILS NFR BLD: 49.1 %
PHOSPHATE SERPL-MCNC: 3.2 MG/DL
PLATELET # BLD AUTO: 457 K/UL
PMV BLD AUTO: 9 FL
POCT GLUCOSE: 139 MG/DL (ref 70–110)
POCT GLUCOSE: 156 MG/DL (ref 70–110)
POTASSIUM SERPL-SCNC: 2.9 MMOL/L
RBC # BLD AUTO: 3.54 M/UL
SODIUM SERPL-SCNC: 143 MMOL/L
WBC # BLD AUTO: 8.51 K/UL

## 2017-09-29 PROCEDURE — 25000003 PHARM REV CODE 250: Performed by: NURSE PRACTITIONER

## 2017-09-29 PROCEDURE — 94799 UNLISTED PULMONARY SVC/PX: CPT

## 2017-09-29 PROCEDURE — 85025 COMPLETE CBC W/AUTO DIFF WBC: CPT

## 2017-09-29 PROCEDURE — 63600175 PHARM REV CODE 636 W HCPCS: Performed by: FAMILY MEDICINE

## 2017-09-29 PROCEDURE — 25000242 PHARM REV CODE 250 ALT 637 W/ HCPCS: Performed by: INTERNAL MEDICINE

## 2017-09-29 PROCEDURE — 84100 ASSAY OF PHOSPHORUS: CPT

## 2017-09-29 PROCEDURE — 36415 COLL VENOUS BLD VENIPUNCTURE: CPT

## 2017-09-29 PROCEDURE — 27000221 HC OXYGEN, UP TO 24 HOURS

## 2017-09-29 PROCEDURE — 25000003 PHARM REV CODE 250: Performed by: FAMILY MEDICINE

## 2017-09-29 PROCEDURE — 21400001 HC TELEMETRY ROOM

## 2017-09-29 PROCEDURE — 94640 AIRWAY INHALATION TREATMENT: CPT

## 2017-09-29 PROCEDURE — 25000003 PHARM REV CODE 250: Performed by: INTERNAL MEDICINE

## 2017-09-29 PROCEDURE — 80048 BASIC METABOLIC PNL TOTAL CA: CPT

## 2017-09-29 PROCEDURE — 83735 ASSAY OF MAGNESIUM: CPT

## 2017-09-29 RX ORDER — POTASSIUM CHLORIDE 20 MEQ/1
20 TABLET, EXTENDED RELEASE ORAL 3 TIMES DAILY
Status: COMPLETED | OUTPATIENT
Start: 2017-09-29 | End: 2017-10-01

## 2017-09-29 RX ADMIN — METOPROLOL SUCCINATE 50 MG: 50 TABLET, EXTENDED RELEASE ORAL at 10:09

## 2017-09-29 RX ADMIN — LEVALBUTEROL 0.63 MG: 0.63 SOLUTION RESPIRATORY (INHALATION) at 12:09

## 2017-09-29 RX ADMIN — POTASSIUM CHLORIDE 20 MEQ: 1500 TABLET, EXTENDED RELEASE ORAL at 12:09

## 2017-09-29 RX ADMIN — ACETAMINOPHEN AND CODEINE PHOSPHATE 1 TABLET: 30; 300 TABLET ORAL at 06:09

## 2017-09-29 RX ADMIN — IPRATROPIUM BROMIDE 0.5 MG: 0.5 SOLUTION RESPIRATORY (INHALATION) at 12:09

## 2017-09-29 RX ADMIN — DOXYCYCLINE 100 MG: 100 INJECTION, POWDER, LYOPHILIZED, FOR SOLUTION INTRAVENOUS at 01:09

## 2017-09-29 RX ADMIN — LEVALBUTEROL 0.63 MG: 0.63 SOLUTION RESPIRATORY (INHALATION) at 07:09

## 2017-09-29 RX ADMIN — RIVAROXABAN 20 MG: 20 TABLET, FILM COATED ORAL at 05:09

## 2017-09-29 RX ADMIN — IPRATROPIUM BROMIDE 0.5 MG: 0.5 SOLUTION RESPIRATORY (INHALATION) at 07:09

## 2017-09-29 RX ADMIN — POTASSIUM CHLORIDE 20 MEQ: 1500 TABLET, EXTENDED RELEASE ORAL at 05:09

## 2017-09-29 RX ADMIN — GUAIFENESIN 600 MG: 600 TABLET, EXTENDED RELEASE ORAL at 09:09

## 2017-09-29 RX ADMIN — LEVALBUTEROL 0.63 MG: 0.63 SOLUTION RESPIRATORY (INHALATION) at 04:09

## 2017-09-29 RX ADMIN — GUAIFENESIN 600 MG: 600 TABLET, EXTENDED RELEASE ORAL at 10:09

## 2017-09-29 RX ADMIN — ACETAMINOPHEN AND CODEINE PHOSPHATE 1 TABLET: 30; 300 TABLET ORAL at 10:09

## 2017-09-29 RX ADMIN — IPRATROPIUM BROMIDE 0.5 MG: 0.5 SOLUTION RESPIRATORY (INHALATION) at 03:09

## 2017-09-29 RX ADMIN — MONTELUKAST SODIUM 10 MG: 10 TABLET, COATED ORAL at 09:09

## 2017-09-29 RX ADMIN — FERROUS SULFATE TAB EC 325 MG (65 MG FE EQUIVALENT) 325 MG: 325 (65 FE) TABLET DELAYED RESPONSE at 10:09

## 2017-09-29 RX ADMIN — BENZONATATE 100 MG: 100 CAPSULE ORAL at 08:09

## 2017-09-29 RX ADMIN — POTASSIUM CHLORIDE 20 MEQ: 1500 TABLET, EXTENDED RELEASE ORAL at 09:09

## 2017-09-29 RX ADMIN — CEFTRIAXONE 1 G: 1 INJECTION, SOLUTION INTRAVENOUS at 12:09

## 2017-09-29 RX ADMIN — DOXYCYCLINE 100 MG: 100 INJECTION, POWDER, LYOPHILIZED, FOR SOLUTION INTRAVENOUS at 12:09

## 2017-09-29 RX ADMIN — PANTOPRAZOLE SODIUM 40 MG: 40 TABLET, DELAYED RELEASE ORAL at 10:09

## 2017-09-29 RX ADMIN — DOXYCYCLINE 100 MG: 100 INJECTION, POWDER, LYOPHILIZED, FOR SOLUTION INTRAVENOUS at 11:09

## 2017-09-29 NOTE — ASSESSMENT & PLAN NOTE
--likely secondary to pneumonia  --CT showed No central pulmonary embolus, Bilateral pulmonary infiltrates and nodules   -EKG reviewed  --Serial Troponins negative  9/26: Continue present regimen; with monitoring of the cXR  Continued antibiotics (now on Rocephin and Doxy)  Pulmonary incentives started; along with continued nebs    9/27: pain of musculoskeletal origin; tylenol with codeine ordered;     9/29: less pain and cough reported.    9/29:    9/29:  Slightly improved; continue present regimen  9/28:  No appreciable change; on for repeat cxr today;  Will give her a trial of flexeril and consider a heating pad

## 2017-09-29 NOTE — SUBJECTIVE & OBJECTIVE
Interval History: less cough and chest pain reported(musculo-skeletal)    Review of Systems   Constitutional: Positive for fatigue.   HENT: Negative.    Eyes: Negative.    Respiratory: Positive for cough and shortness of breath.    Cardiovascular: Negative.    Gastrointestinal: Negative.    Endocrine: Negative.    Genitourinary: Negative.    Musculoskeletal: Positive for back pain.   Skin: Negative.    Allergic/Immunologic: Negative.    Neurological: Negative.    Hematological: Negative.    Psychiatric/Behavioral: Negative.      Objective:     Vital Signs (Most Recent):  Temp: 96.7 °F (35.9 °C) (09/29/17 0758)  Pulse: 83 (09/29/17 0758)  Resp: 18 (09/29/17 0758)  BP: 134/68 (09/29/17 0758)  SpO2: 96 % (09/29/17 0758) Vital Signs (24h Range):  Temp:  [96.4 °F (35.8 °C)-98.9 °F (37.2 °C)] 96.7 °F (35.9 °C)  Pulse:  [] 83  Resp:  [16-20] 18  SpO2:  [93 %-97 %] 96 %  BP: (123-165)/(61-78) 134/68     Weight: 73.5 kg (162 lb)  Body mass index is 27.81 kg/m².    Intake/Output Summary (Last 24 hours) at 09/29/17 1158  Last data filed at 09/29/17 0408   Gross per 24 hour   Intake              740 ml   Output             2400 ml   Net            -1660 ml      Physical Exam   Constitutional: She is oriented to person, place, and time. She appears well-developed and well-nourished.   HENT:   Head: Normocephalic and atraumatic.   Eyes: EOM are normal. Pupils are equal, round, and reactive to light.   Neck: Normal range of motion. Neck supple.   Cardiovascular: Normal rate, regular rhythm and normal heart sounds.    Pulmonary/Chest: Effort normal and breath sounds normal.   Abdominal: Soft. Bowel sounds are normal.   Musculoskeletal: Normal range of motion.   Neurological: She is alert and oriented to person, place, and time.   Skin: Skin is warm and dry.   Psychiatric: She has a normal mood and affect. Her behavior is normal.       Significant Labs:   BMP:   Recent Labs  Lab 09/29/17  0413   *      K 2.9*   CL  101   CO2 31*   BUN 3*   CREATININE 0.7   CALCIUM 9.0   MG 1.8     CBC:   Recent Labs  Lab 09/28/17  0408 09/29/17  0413   WBC 9.27 8.51   HGB 10.1* 9.5*   HCT 31.7* 30.8*   * 457*       Significant Imaging: I have reviewed all pertinent imaging results/findings within the past 24 hours.

## 2017-09-29 NOTE — PROGRESS NOTES
[facility] Hospital Medicine  Progress Note    Patient Name: Genie Balderas  MRN: [id number]  Patient Class: IP- Inpatient   Admission Date: 9/24/2017  Length of Stay: 4 days  Attending Physician: Rebel Cardenas MD  Primary Care Provider: Ryan Sims MD        Subjective:     Principal Problem:Sepsis    HPI:  Genie Balderas is a 57 y.o. female patient who presented to the Emergency Department for complaint of Cough that started 2 days ago. The cough is non productive. Associated symptoms include Sharp Like CP to Left chest radiates to back, that stated today due to coughing, and, fatigue, fever, n/v/d. Symptoms are intermittent and moderate in severity. OTC meds and nebs not helping at home.  No mitigating or exacerbating factors reported.  Patient denies any SOB, leg swelling, palpitations, HA, lightheadedness, dizziness, numbness, weakness, and all other sxs at this time. No further complaints or concerns at this time.        Hospital Course:  Patient was placed on OBS for sepsis d/t bilat lower lobe pneumonia. She was started on IV abx, breathing treatments, and mucinex. Patient improving slowly - moved to Inpatient since will need extended IV abx.     9/26;  Some better;  Has faint posterior wheezing today;Continued cough; upper back pain on left, with the cough  On for repeat CXR today; also incentive spirometry added; also tylenol with codeine for coughtand pain complaint.    9/27:  Looks and feels better;  Still with back pain though less;  Had restful night overnight; working with incentive device once hourly while awake.    9/28:  Still with cough and bilateral chest/flank discomfort;  Tender to palpation moreso on the left;  Likely muculoskeletal; no fever or chills reported; last CXR was 9/26 9/29:  Looks and feels some better; still with cough, but less; repeat cxr unchanged.  Will continue present regimen;  Adding spiriva to regiemn        Interval History: less cough and chest  pain reported(musculo-skeletal)    Review of Systems   Constitutional: Positive for fatigue.   HENT: Negative.    Eyes: Negative.    Respiratory: Positive for cough and shortness of breath.    Cardiovascular: Negative.    Gastrointestinal: Negative.    Endocrine: Negative.    Genitourinary: Negative.    Musculoskeletal: Positive for back pain.   Skin: Negative.    Allergic/Immunologic: Negative.    Neurological: Negative.    Hematological: Negative.    Psychiatric/Behavioral: Negative.      Objective:     Vital Signs (Most Recent):  Temp: 96.7 °F (35.9 °C) (09/29/17 0758)  Pulse: 83 (09/29/17 0758)  Resp: 18 (09/29/17 0758)  BP: 134/68 (09/29/17 0758)  SpO2: 96 % (09/29/17 0758) Vital Signs (24h Range):  Temp:  [96.4 °F (35.8 °C)-98.9 °F (37.2 °C)] 96.7 °F (35.9 °C)  Pulse:  [] 83  Resp:  [16-20] 18  SpO2:  [93 %-97 %] 96 %  BP: (123-165)/(61-78) 134/68     Weight: 73.5 kg (162 lb)  Body mass index is 27.81 kg/m².    Intake/Output Summary (Last 24 hours) at 09/29/17 1158  Last data filed at 09/29/17 0408   Gross per 24 hour   Intake              740 ml   Output             2400 ml   Net            -1660 ml      Physical Exam   Constitutional: She is oriented to person, place, and time. She appears well-developed and well-nourished.   HENT:   Head: Normocephalic and atraumatic.   Eyes: EOM are normal. Pupils are equal, round, and reactive to light.   Neck: Normal range of motion. Neck supple.   Cardiovascular: Normal rate, regular rhythm and normal heart sounds.    Pulmonary/Chest: Effort normal and breath sounds normal.   Abdominal: Soft. Bowel sounds are normal.   Musculoskeletal: Normal range of motion.   Neurological: She is alert and oriented to person, place, and time.   Skin: Skin is warm and dry.   Psychiatric: She has a normal mood and affect. Her behavior is normal.       Significant Labs:   BMP:   Recent Labs  Lab 09/29/17  0413   *      K 2.9*      CO2 31*   BUN 3*   CREATININE  0.7   CALCIUM 9.0   MG 1.8     CBC:   Recent Labs  Lab 09/28/17  0408 09/29/17  0413   WBC 9.27 8.51   HGB 10.1* 9.5*   HCT 31.7* 30.8*   * 457*       Significant Imaging: I have reviewed all pertinent imaging results/findings within the past 24 hours.    Assessment/Plan:      * Sepsis due to bilateral lower lobe pneumonia     --WBC 17K, Pulse 136, RR 22, evelin source: bilat pneumonia  --PO Avelox and mucinex  --sputum culture pending  -Blood Cultures pending  --duonebs   --supplemental oxygen to maintain sats    9/26:  Continue present antibiotics with monitoring of the CXR  On IV rocephin and Doxycycline at present.  Monitor cultures (blood and sputum)  Continue duonebs; pulmonary incentives.    9/27: patchy diffuse disease both lungs;  Will continue present regimen;  monitor CXR response.  Culture of blood negative.  WBC on the decline.    9/28:  To repeat CXR today; Continue antibiotics;  WBC counts are trending down.    9/29: CXR no appreciable change; WBC now normal.               Vitamin D deficiency    Vitamin D reported low at 9; started on vitamin D 50,000 units weekly for 4-6 weeks; will need outpatient monitoring          Anemia, unspecified    9/27: question dilutional component;  Will check b-12, folate, iron stores and monitor.    9/28:  Iron deficiency by labs;  Will check stools for heme and start on supplementation;  May need stool softener as well          Hypomagnesemia    Magnesium replacement ordered;  Will monitor response.  Vitamin D level ordered with next lab draw.      9/27:  Severe vitamin D deficiency; will replete vitamin D.  Haseebium.    9/29: treated, resolved;  On vitamin D supplementation; was on this as outpatient.        Tobacco abuse    --counseled on smoking cessation  --refused nicotine patch    6/26: continue to stress smoking cessation.        HTN (hypertension)    Hold BP meds at this time, due to presentation with hypotension  monitor and start once improving        6/26: BP trending upward;  Resumed Toprol-XL today;  Will monitor.response to therapy    9/27: improved.; continue present regimen    9/29: BP satisfactory on present regimen.        Chest pain    --likely secondary to pneumonia  --CT showed No central pulmonary embolus, Bilateral pulmonary infiltrates and nodules   -EKG reviewed  --Serial Troponins negative  9/26: Continue present regimen; with monitoring of the cXR  Continued antibiotics (now on Rocephin and Doxy)  Pulmonary incentives started; along with continued nebs    9/27: pain of musculoskeletal origin; tylenol with codeine ordered;     9/29: less pain and cough reported.    9/29:    9/29:  Slightly improved; continue present regimen  9/28:  No appreciable change; on for repeat cxr today;  Will give her a trial of flexeril and consider a heating pad          VTE Risk Mitigation         Ordered     rivaroxaban tablet 20 mg  With dinner     Route:  Oral        09/25/17 0240     Medium Risk of VTE  Once      09/25/17 0208     Place sequential compression device  Until discontinued      09/25/17 0208              Rebel Montes MD  Department of Hospital Medicine   [facility]

## 2017-09-29 NOTE — ASSESSMENT & PLAN NOTE
Hold BP meds at this time, due to presentation with hypotension  monitor and start once improving       6/26: BP trending upward;  Resumed Toprol-XL today;  Will monitor.response to therapy    9/27: improved.; continue present regimen    9/29: BP satisfactory on present regimen.

## 2017-09-29 NOTE — ASSESSMENT & PLAN NOTE
Magnesium replacement ordered;  Will monitor response.  Vitamin D level ordered with next lab draw.      9/27:  Severe vitamin D deficiency; will replete vitamin D.  Magensium.    9/29: treated, resolved;  On vitamin D supplementation; was on this as outpatient.

## 2017-09-29 NOTE — PLAN OF CARE
Problem: Patient Care Overview  Goal: Plan of Care Review  Outcome: Ongoing (interventions implemented as appropriate)  IV and PO antibiotics  Pain medication  Duonebs  Chest xray done

## 2017-09-29 NOTE — PLAN OF CARE
Problem: Patient Care Overview  Goal: Plan of Care Review  Outcome: Ongoing (interventions implemented as appropriate)  POC reviewed with patient, verbalized understanding. Patient remains free from falls. Fall precautions in place. NSR on cardiac monitor, rate 70-80s. IV abx given per orders. PRN pain medication given for back pain. VSS. No other c/o at this time. Call bell within reach. Reminded to call for assistance.

## 2017-09-29 NOTE — ASSESSMENT & PLAN NOTE
--WBC 17K, Pulse 136, RR 22, evelin source: bilat pneumonia  --PO Avelox and mucinex  --sputum culture pending  -Blood Cultures pending  --duonebs   --supplemental oxygen to maintain sats    9/26:  Continue present antibiotics with monitoring of the CXR  On IV rocephin and Doxycycline at present.  Monitor cultures (blood and sputum)  Continue duonebs; pulmonary incentives.    9/27: patchy diffuse disease both lungs;  Will continue present regimen;  monitor CXR response.  Culture of blood negative.  WBC on the decline.    9/28:  To repeat CXR today; Continue antibiotics;  WBC counts are trending down.    9/29: CXR no appreciable change; WBC now normal.

## 2017-09-30 LAB
ANION GAP SERPL CALC-SCNC: 7 MMOL/L
BACTERIA BLD CULT: NORMAL
BACTERIA BLD CULT: NORMAL
BASOPHILS # BLD AUTO: 0.03 K/UL
BASOPHILS NFR BLD: 0.4 %
BUN SERPL-MCNC: 4 MG/DL
CALCIUM SERPL-MCNC: 9.1 MG/DL
CHLORIDE SERPL-SCNC: 103 MMOL/L
CO2 SERPL-SCNC: 33 MMOL/L
CREAT SERPL-MCNC: 0.7 MG/DL
DIFFERENTIAL METHOD: ABNORMAL
EOSINOPHIL # BLD AUTO: 0.2 K/UL
EOSINOPHIL NFR BLD: 2.3 %
ERYTHROCYTE [DISTWIDTH] IN BLOOD BY AUTOMATED COUNT: 15 %
EST. GFR  (AFRICAN AMERICAN): >60 ML/MIN/1.73 M^2
EST. GFR  (NON AFRICAN AMERICAN): >60 ML/MIN/1.73 M^2
GLUCOSE SERPL-MCNC: 102 MG/DL
HCT VFR BLD AUTO: 35.5 %
HGB BLD-MCNC: 11.1 G/DL
LYMPHOCYTES # BLD AUTO: 3.7 K/UL
LYMPHOCYTES NFR BLD: 44.3 %
MAGNESIUM SERPL-MCNC: 1.8 MG/DL
MCH RBC QN AUTO: 27.4 PG
MCHC RBC AUTO-ENTMCNC: 31.3 G/DL
MCV RBC AUTO: 88 FL
MONOCYTES # BLD AUTO: 0.5 K/UL
MONOCYTES NFR BLD: 6.3 %
NEUTROPHILS # BLD AUTO: 3.8 K/UL
NEUTROPHILS NFR BLD: 46.7 %
PHOSPHATE SERPL-MCNC: 2.7 MG/DL
PLATELET # BLD AUTO: 471 K/UL
PMV BLD AUTO: 9.1 FL
POCT GLUCOSE: 148 MG/DL (ref 70–110)
POCT GLUCOSE: 152 MG/DL (ref 70–110)
POCT GLUCOSE: 155 MG/DL (ref 70–110)
POCT GLUCOSE: 155 MG/DL (ref 70–110)
POCT GLUCOSE: 99 MG/DL (ref 70–110)
POTASSIUM SERPL-SCNC: 3.3 MMOL/L
RBC # BLD AUTO: 4.05 M/UL
SODIUM SERPL-SCNC: 143 MMOL/L
WBC # BLD AUTO: 8.23 K/UL

## 2017-09-30 PROCEDURE — 21400001 HC TELEMETRY ROOM

## 2017-09-30 PROCEDURE — 36415 COLL VENOUS BLD VENIPUNCTURE: CPT

## 2017-09-30 PROCEDURE — 25000003 PHARM REV CODE 250: Performed by: NURSE PRACTITIONER

## 2017-09-30 PROCEDURE — 94761 N-INVAS EAR/PLS OXIMETRY MLT: CPT

## 2017-09-30 PROCEDURE — 85025 COMPLETE CBC W/AUTO DIFF WBC: CPT

## 2017-09-30 PROCEDURE — 25000003 PHARM REV CODE 250: Performed by: FAMILY MEDICINE

## 2017-09-30 PROCEDURE — 80048 BASIC METABOLIC PNL TOTAL CA: CPT

## 2017-09-30 PROCEDURE — 83735 ASSAY OF MAGNESIUM: CPT

## 2017-09-30 PROCEDURE — 94640 AIRWAY INHALATION TREATMENT: CPT

## 2017-09-30 PROCEDURE — 25000242 PHARM REV CODE 250 ALT 637 W/ HCPCS: Performed by: INTERNAL MEDICINE

## 2017-09-30 PROCEDURE — 63600175 PHARM REV CODE 636 W HCPCS: Performed by: FAMILY MEDICINE

## 2017-09-30 PROCEDURE — 25000003 PHARM REV CODE 250: Performed by: INTERNAL MEDICINE

## 2017-09-30 PROCEDURE — 27000221 HC OXYGEN, UP TO 24 HOURS

## 2017-09-30 PROCEDURE — 84100 ASSAY OF PHOSPHORUS: CPT

## 2017-09-30 RX ORDER — LEVALBUTEROL INHALATION SOLUTION 0.63 MG/3ML
0.63 SOLUTION RESPIRATORY (INHALATION) EVERY 6 HOURS
Status: DISCONTINUED | OUTPATIENT
Start: 2017-09-30 | End: 2017-10-02 | Stop reason: HOSPADM

## 2017-09-30 RX ORDER — GUAIFENESIN 100 MG/5ML
200 SOLUTION ORAL EVERY 4 HOURS
Status: DISCONTINUED | OUTPATIENT
Start: 2017-09-30 | End: 2017-10-02 | Stop reason: HOSPADM

## 2017-09-30 RX ORDER — TIOTROPIUM BROMIDE 18 UG/1
1 CAPSULE ORAL; RESPIRATORY (INHALATION) DAILY
Status: DISCONTINUED | OUTPATIENT
Start: 2017-09-30 | End: 2017-09-30 | Stop reason: SDUPTHER

## 2017-09-30 RX ORDER — IPRATROPIUM BROMIDE 0.5 MG/2.5ML
0.5 SOLUTION RESPIRATORY (INHALATION) EVERY 6 HOURS
Status: DISCONTINUED | OUTPATIENT
Start: 2017-09-30 | End: 2017-10-02 | Stop reason: HOSPADM

## 2017-09-30 RX ADMIN — FERROUS SULFATE TAB EC 325 MG (65 MG FE EQUIVALENT) 325 MG: 325 (65 FE) TABLET DELAYED RESPONSE at 08:09

## 2017-09-30 RX ADMIN — IPRATROPIUM BROMIDE 0.5 MG: 0.5 SOLUTION RESPIRATORY (INHALATION) at 12:09

## 2017-09-30 RX ADMIN — DOXYCYCLINE 100 MG: 100 INJECTION, POWDER, LYOPHILIZED, FOR SOLUTION INTRAVENOUS at 11:09

## 2017-09-30 RX ADMIN — RIVAROXABAN 20 MG: 20 TABLET, FILM COATED ORAL at 05:09

## 2017-09-30 RX ADMIN — BENZONATATE 100 MG: 100 CAPSULE ORAL at 08:09

## 2017-09-30 RX ADMIN — GUAIFENESIN 600 MG: 600 TABLET, EXTENDED RELEASE ORAL at 08:09

## 2017-09-30 RX ADMIN — GUAIFENESIN 200 MG: 100 SOLUTION ORAL at 05:09

## 2017-09-30 RX ADMIN — ACETAMINOPHEN AND CODEINE PHOSPHATE 1 TABLET: 30; 300 TABLET ORAL at 08:09

## 2017-09-30 RX ADMIN — GUAIFENESIN 200 MG: 100 SOLUTION ORAL at 09:09

## 2017-09-30 RX ADMIN — PANTOPRAZOLE SODIUM 40 MG: 40 TABLET, DELAYED RELEASE ORAL at 08:09

## 2017-09-30 RX ADMIN — MONTELUKAST SODIUM 10 MG: 10 TABLET, COATED ORAL at 09:09

## 2017-09-30 RX ADMIN — LEVALBUTEROL 0.63 MG: 0.63 SOLUTION RESPIRATORY (INHALATION) at 12:09

## 2017-09-30 RX ADMIN — POTASSIUM CHLORIDE 20 MEQ: 1500 TABLET, EXTENDED RELEASE ORAL at 06:09

## 2017-09-30 RX ADMIN — IPRATROPIUM BROMIDE 0.5 MG: 0.5 SOLUTION RESPIRATORY (INHALATION) at 07:09

## 2017-09-30 RX ADMIN — ACETAMINOPHEN AND CODEINE PHOSPHATE 1 TABLET: 30; 300 TABLET ORAL at 10:09

## 2017-09-30 RX ADMIN — LEVALBUTEROL 0.63 MG: 0.63 SOLUTION RESPIRATORY (INHALATION) at 07:09

## 2017-09-30 RX ADMIN — POTASSIUM CHLORIDE 20 MEQ: 1500 TABLET, EXTENDED RELEASE ORAL at 09:09

## 2017-09-30 RX ADMIN — CEFTRIAXONE 1 G: 1 INJECTION, SOLUTION INTRAVENOUS at 11:09

## 2017-09-30 RX ADMIN — METOPROLOL SUCCINATE 50 MG: 50 TABLET, EXTENDED RELEASE ORAL at 08:09

## 2017-09-30 RX ADMIN — POTASSIUM CHLORIDE 20 MEQ: 1500 TABLET, EXTENDED RELEASE ORAL at 03:09

## 2017-09-30 RX ADMIN — DOXYCYCLINE 100 MG: 100 INJECTION, POWDER, LYOPHILIZED, FOR SOLUTION INTRAVENOUS at 12:09

## 2017-09-30 RX ADMIN — ACETAMINOPHEN AND CODEINE PHOSPHATE 1 TABLET: 30; 300 TABLET ORAL at 12:09

## 2017-09-30 RX ADMIN — GUAIFENESIN 200 MG: 100 SOLUTION ORAL at 03:09

## 2017-09-30 NOTE — PROGRESS NOTES
[facility] Hospital Medicine  Progress Note    Patient Name: Genie Balderas  MRN: [id number]  Patient Class: IP- Inpatient   Admission Date: 9/24/2017  Length of Stay: 5 days  Attending Physician: Rebel Cardenas MD  Primary Care Provider: Ryan Sims MD        Subjective:     Principal Problem:Sepsis    HPI:  Genie Balderas is a 57 y.o. female patient who presented to the Emergency Department for complaint of Cough that started 2 days ago. The cough is non productive. Associated symptoms include Sharp Like CP to Left chest radiates to back, that stated today due to coughing, and, fatigue, fever, n/v/d. Symptoms are intermittent and moderate in severity. OTC meds and nebs not helping at home.  No mitigating or exacerbating factors reported.  Patient denies any SOB, leg swelling, palpitations, HA, lightheadedness, dizziness, numbness, weakness, and all other sxs at this time. No further complaints or concerns at this time.        Hospital Course:  Patient was placed on OBS for sepsis d/t bilat lower lobe pneumonia. She was started on IV abx, breathing treatments, and mucinex. Patient improving slowly - moved to Inpatient since will need extended IV abx.     9/26;  Some better;  Has faint posterior wheezing today;Continued cough; upper back pain on left, with the cough  On for repeat CXR today; also incentive spirometry added; also tylenol with codeine for coughtand pain complaint.    9/27:  Looks and feels better;  Still with back pain though less;  Had restful night overnight; working with incentive device once hourly while awake.    9/28:  Still with cough and bilateral chest/flank discomfort;  Tender to palpation moreso on the left;  Likely muculoskeletal; no fever or chills reported; last CXR was 9/26 9/29:  Looks and feels some better; still with cough, but less; repeat cxr unchanged.  Will continue present regimen;  Adding spiriva to regiemn    9/30:  Some better; still with complaints of  weakness; mira wet sounding cough; chest pain less; CXR no appreciable change; patient afebrile with stable vital signs.she's tolerating her diet;  Has been out of bed to the toilet.    Interval History: continues with cough; still complains of weakness.has been up to the toilet; family in.    Review of Systems   Constitutional: Positive for fatigue.   HENT: Negative.    Eyes: Negative.    Respiratory: Positive for cough.    Cardiovascular: Negative.    Gastrointestinal: Negative.    Endocrine: Negative.    Musculoskeletal: Negative.    Skin: Negative.    Allergic/Immunologic: Negative.    Neurological: Positive for weakness.   Hematological: Negative.    Psychiatric/Behavioral: Negative.      Objective:     Vital Signs (Most Recent):  Temp: 97.1 °F (36.2 °C) (09/30/17 1131)  Pulse: 80 (09/30/17 1253)  Resp: 18 (09/30/17 1253)  BP: 134/62 (09/30/17 1131)  SpO2: 96 % (09/30/17 1253) Vital Signs (24h Range):  Temp:  [96.9 °F (36.1 °C)-99.2 °F (37.3 °C)] 97.1 °F (36.2 °C)  Pulse:  [] 80  Resp:  [16-20] 18  SpO2:  [92 %-99 %] 96 %  BP: (131-180)/(62-82) 134/62     Weight: 73.5 kg (162 lb)  Body mass index is 27.81 kg/m².    Intake/Output Summary (Last 24 hours) at 09/30/17 1449  Last data filed at 09/30/17 1258   Gross per 24 hour   Intake             1040 ml   Output             1000 ml   Net               40 ml      Physical Exam   Constitutional: She is oriented to person, place, and time. She appears well-developed and well-nourished.   Eyes: EOM are normal. Pupils are equal, round, and reactive to light.   Neck: Normal range of motion. Neck supple.   Cardiovascular: Normal rate and regular rhythm.    Pulmonary/Chest: Effort normal and breath sounds normal.   Abdominal: Soft. Bowel sounds are normal.   Genitourinary:   Genitourinary Comments: deffered   Musculoskeletal: Normal range of motion.   Neurological: She is alert and oriented to person, place, and time.   Skin: Skin is warm and dry.   Psychiatric: She  has a normal mood and affect. Her behavior is normal. Thought content normal.       Significant Labs:   Blood Culture: No results for input(s): LABBLOO in the last 48 hours.  BMP:   Recent Labs  Lab 09/30/17  0403         K 3.3*      CO2 33*   BUN 4*   CREATININE 0.7   CALCIUM 9.1   MG 1.8     CBC:   Recent Labs  Lab 09/29/17  0413 09/30/17  0403   WBC 8.51 8.23   HGB 9.5* 11.1*   HCT 30.8* 35.5*   * 471*       Significant Imaging:   Imaging Results          X-Ray Chest PA And Lateral (Final result)  Result time 09/29/17 10:17:58    Final result by Herb Rausch MD (09/29/17 10:17:58)                 Impression:      No significant improvement in bilateral pulmonary infiltrates when compared to 09/20/2017.  Please see above.      Electronically signed by: HERB RAUSCH MD  Date:     09/29/17  Time:    10:17              Narrative:    EXAM: Chest X-ray PA and Lateral    CLINICAL HISTORY:     Pneumonia, subsequent encountered.    COMPARISON STUDIES:     Chest x-ray 09/28/2017 chest CT 09/25/2017 10 chest x-ray 09/24/2017.    FINDINGS:    Normal heart size.  Atherosclerotic aortic arch.  There are patchy opacities within the right pulmonary base, left upper lung and left lower lung which appear stable compared to 09/28/2017.  Surgical clips within the right upper quadrant.. Ribs appear intact.                             X-Ray Chest PA And Lateral (Final result)  Result time 09/28/17 11:11:02    Final result by Triston Warner MD (09/28/17 11:11:02)                 Impression:      Stable chest x-ray with patchy multifocal infiltrate most pronounced in the left suprahilar and right infrahilar lung zones.  No change.      Electronically signed by: TRISTON WARNER MD  Date:     09/28/17  Time:    11:11              Narrative:    Exam: XR CHEST PA AND LATERAL,    Date:  09/28/17 10:45:05    History: Bilateral pneumonia    Comparison:  09/24/2017 and 09/26/2017.    Findings: The heart  size is normal.  Aortic arch is minimally calcified.    There are increased perihilar markings present.  Left suprahilar infiltrate in right infrahilar infiltrates are again noted.  Allowing for the difference in technique no definite interval change.    No evidence of pleural effusion.  Right upper quadrant surgical clips noted.                             X-Ray Chest 1 View (Final result)  Result time 09/26/17 10:45:23   Procedure changed from X-Ray Chest PA And Lateral     Final result by JEFFREY Britt Sr., MD (09/26/17 10:45:23)                 Impression:      There has been interval development of patchy areas of increased density scattered throughout the lungs. This is consistent with the patient's history and characteristic of pneumonia.      Electronically signed by: JEFFREY BRITT MD  Date:     09/26/17  Time:    10:45              Narrative:    One-view chest x-ray    Clinical History: Pneumonia    Finding: Comparison was made to a prior examination performed on 9/24/2017. The size of the heart is normal. There has been interval development of patchy areas of increased density scattered throughout the lungs. There is no pneumothorax.  The costophrenic angles are sharp.                             CT Abdomen Pelvis  Without Contrast (Final result)  Result time 09/25/17 07:34:22    Final result by Deuce Olvera MD (09/25/17 07:34:22)                 Impression:       Bilateral parenchymal infiltrates are seen within the lower lobes.    Mild prominence of the right renal collecting system however no obstructing stone is seen. This can be seen in a setting of recently passed stone.     Left inguinal adenopathy measuring up to 1.4 cm.    Subcentimeter hypodensity is seen within the mid body of the pancreas which is indeterminate. Consider interval followup    All CT scans at this facility use dose modulation, iterative reconstruction and/or weight based dosing when appropriate to reduce radiation dose to as  low as reasonably achievable.      Electronically signed by: DEUCE SHERMAN MD  Date:     09/25/17  Time:    07:34              Narrative:    Indication: Abdominal pain.    Routine noncontrast CT images of the abdomen and pelvis were obtained.    Findings:    Bilateral parenchymal infiltrates are seen within the lower lobes.  Heart size is normal.  No pericardial or pleural effusion.      The liver is normal in size and attenuation on this noncontrast exam.  Status post cholecystectomy The  stomach, spleen,  adrenal glands are normal.  Subcentimeter hypodensity is seen within the mid body of the pancreas which is indeterminate. Consider interval followup. Aorta demonstrates moderate atherosclerotic disease.      The kidneys are normal in size shape and position without radiopaque calculus or signs of hydronephrosis. Mild prominence of the right renal collecting system however no obstructing stone is seen. This can be seen in a setting of recently passed stone. The bladder is incompletely distended.     The visualized loops of small bowel are unremarkable.The appendix is visualized in the right lower quadrant.  There is no inflammation. Colon demonstrates diverticulosis without evidence of diverticulitis. Mild constipation    Left inguinal adenopathy..      Bones appear intact with moderate degenerative changes                             CTA Chest Non-Coronary (PE Study) (Final result)     Abnormal  Result time 09/25/17 07:39:18    Final result by Deuce Sherman MD (09/25/17 07:39:18)                 Impression:         Bilateral pulmonary infiltrates are seen some with a micronodular pattern which can be seen in infectious (typical or atypical) and or inflammatory process. Followup to resolution is recommended.    Mediastinal and bilateral hilar adenopathy which can be seen in the setting of infectious/reactive process versus neoplastic versus lymphoproliferative disorder.    No evidence of PE.    Fatty  liver.    All CT scans at this facility use dose modulation, iterative reconstruction, and/or weight base dosing when appropriate to reduce radiation dose to as low as reasonably achievable.      Electronically signed by: AURA SHERMAN MD  Date:     09/25/17  Time:    07:39              Narrative:    Clinical data: Chest pain.    Axial CT images through the chest after administration of contrast was performed according to PE study protocol using 100 cc Omni 350.    Findings:    The pulmonary arterial system is well opacified with no evidence of filling defect to suggest pulmonary embolus or thrombus.    Structures of the base of the neck are normal.    The heart and pericardium are unremarkable.  Trace pericardial effusion.  Mediastinal structures including great vessels, trachea, esophagus are intact.    Mediastinal and bilateral hilar adenopathy which may be reactive.      Bilateral pulmonary infiltrates are seen some with a micronodular pattern which can be seen in infectious or inflammatory process. Mild emphysematous changes are seen within the lungs bilaterally.    Surrounding chest wall structures, visualized abdominal organs, bones are unremarkable. Cholecystectomy and mild fatty infiltration of liver suspected.                             X-Ray Chest AP Portable (Final result)  Result time 09/24/17 22:22:56    Final result by Celia Billings MD (09/24/17 22:22:56)                 Impression:         Negative portable chest x-ray.      Electronically signed by: CELIA BILLINGS MD  Date:     09/24/17  Time:    22:22              Narrative:    Portable chest x-ray. 09/24/17 22:13:49    Clinical indication:  sepsis    Heart size is normal. The lung fields are clear. No acute cardiopulmonary infiltrate.                             RADIOLOGY REPORT (Final result)  Result time 09/26/17 15:17:37              Assessment/Plan:      * Sepsis due to bilateral lower lobe pneumonia     --WBC 17K, Pulse 136, RR 22, evelin  source: bilat pneumonia  --PO Avelox and mucinex  --sputum culture pending  -Blood Cultures pending  --duonebs   --supplemental oxygen to maintain sats    9/26:  Continue present antibiotics with monitoring of the CXR  On IV rocephin and Doxycycline at present.  Monitor cultures (blood and sputum)  Continue duonebs; pulmonary incentives.    9/27: patchy diffuse disease both lungs;  Will continue present regimen;  monitor CXR response.  Culture of blood negative.  WBC on the decline.    9/28:  To repeat CXR today; Continue antibiotics;  WBC counts are trending down.    9/29: CXR no appreciable change; WBC now normal.    9/30:  CXR shows stability; no worsening; patient afebrile; WBC count has normalized;Will increase frequency of nebs and attention to pulmonary toilet. Robitussin elixir added to regimen.             Vitamin D deficiency    Vitamin D reported low at 9; started on vitamin D 50,000 units weekly for 4-6 weeks; will need outpatient monitoring          Anemia, unspecified    9/27: question dilutional component;  Will check b-12, folate, iron stores and monitor.    9/28:  Iron deficiency by labs;  Will check stools for heme and start on supplementation;  May need stool softener as well    9/30: stable; continue to monitor'; on iron supplementation.        Hypomagnesemia    Magnesium replacement ordered;  Will monitor response.  Vitamin D level ordered with next lab draw.      9/27:  Severe vitamin D deficiency; will replete vitamin D.  Magensium.    9/29: treated, resolved;  On vitamin D supplementation; was on this as outpatient.        Tobacco abuse    --counseled on smoking cessation  --refused nicotine patch    6/26: continue to stress smoking cessation.        HTN (hypertension)    Hold BP meds at this time, due to presentation with hypotension  monitor and start once improving       6/26: BP trending upward;  Resumed Toprol-XL today;  Will monitor.response to therapy    9/27: improved.; continue  present regimen    9/29: BP satisfactory on present regimen.    9/30: stable and improved;  Continue present regimen.        Chest pain    --likely secondary to pneumonia  --CT showed No central pulmonary embolus, Bilateral pulmonary infiltrates and nodules   -EKG reviewed  --Serial Troponins negative  9/26: Continue present regimen; with monitoring of the cXR  Continued antibiotics (now on Rocephin and Doxy)  Pulmonary incentives started; along with continued nebs    9/27: pain of musculoskeletal origin; tylenol with codeine ordered;     9/29: less pain and cough reported.    9/29:    9/29:  Slightly improved; continue present regimen  9/28:  No appreciable change; on for repeat cxr today;  Will give her a trial of flexeril and consider a heating pad    9/30:  continues with wet sounding cough; difficult to get anything up. Less chest discomfort reported.          VTE Risk Mitigation         Ordered     rivaroxaban tablet 20 mg  With dinner     Route:  Oral        09/25/17 0240     Medium Risk of VTE  Once      09/25/17 0208     Place sequential compression device  Until discontinued      09/25/17 0208              Rebel Montes MD  Department of Hospital Medicine   [facility]

## 2017-09-30 NOTE — ASSESSMENT & PLAN NOTE
--likely secondary to pneumonia  --CT showed No central pulmonary embolus, Bilateral pulmonary infiltrates and nodules   -EKG reviewed  --Serial Troponins negative  9/26: Continue present regimen; with monitoring of the cXR  Continued antibiotics (now on Rocephin and Doxy)  Pulmonary incentives started; along with continued nebs    9/27: pain of musculoskeletal origin; tylenol with codeine ordered;     9/29: less pain and cough reported.    9/29:    9/29:  Slightly improved; continue present regimen  9/28:  No appreciable change; on for repeat cxr today;  Will give her a trial of flexeril and consider a heating pad    9/30:  continues with wet sounding cough; difficult to get anything up. Less chest discomfort reported.

## 2017-09-30 NOTE — PLAN OF CARE
Problem: Patient Care Overview  Goal: Plan of Care Review  Outcome: Ongoing (interventions implemented as appropriate)  Pt AAOx4. POC discussed w/patient and daughter, verbalized understanding. ST to NSR on monitor. VSS. Voids per independent ambulation, steady gait with standby assist from family and/or staff . Patient turns independently in bed. Fall precautions in place, bed alarm on, bed in lowest, call light and personal items within reach.

## 2017-09-30 NOTE — ASSESSMENT & PLAN NOTE
9/27: question dilutional component;  Will check b-12, folate, iron stores and monitor.    9/28:  Iron deficiency by labs;  Will check stools for heme and start on supplementation;  May need stool softener as well    9/30: stable; continue to monitor'; on iron supplementation.

## 2017-09-30 NOTE — ASSESSMENT & PLAN NOTE
--WBC 17K, Pulse 136, RR 22, evelin source: bilat pneumonia  --PO Avelox and mucinex  --sputum culture pending  -Blood Cultures pending  --duonebs   --supplemental oxygen to maintain sats    9/26:  Continue present antibiotics with monitoring of the CXR  On IV rocephin and Doxycycline at present.  Monitor cultures (blood and sputum)  Continue duonebs; pulmonary incentives.    9/27: patchy diffuse disease both lungs;  Will continue present regimen;  monitor CXR response.  Culture of blood negative.  WBC on the decline.    9/28:  To repeat CXR today; Continue antibiotics;  WBC counts are trending down.    9/29: CXR no appreciable change; WBC now normal.    9/30:  CXR shows stability; no worsening; patient afebrile; WBC count has normalized;Will increase frequency of nebs and attention to pulmonary toilet. Robitussin elixir added to regimen.

## 2017-09-30 NOTE — SUBJECTIVE & OBJECTIVE
Interval History: continues with cough; still complains of weakness.has been up to the toilet; family in.    Review of Systems   Constitutional: Positive for fatigue.   HENT: Negative.    Eyes: Negative.    Respiratory: Positive for cough.    Cardiovascular: Negative.    Gastrointestinal: Negative.    Endocrine: Negative.    Musculoskeletal: Negative.    Skin: Negative.    Allergic/Immunologic: Negative.    Neurological: Positive for weakness.   Hematological: Negative.    Psychiatric/Behavioral: Negative.      Objective:     Vital Signs (Most Recent):  Temp: 97.1 °F (36.2 °C) (09/30/17 1131)  Pulse: 80 (09/30/17 1253)  Resp: 18 (09/30/17 1253)  BP: 134/62 (09/30/17 1131)  SpO2: 96 % (09/30/17 1253) Vital Signs (24h Range):  Temp:  [96.9 °F (36.1 °C)-99.2 °F (37.3 °C)] 97.1 °F (36.2 °C)  Pulse:  [] 80  Resp:  [16-20] 18  SpO2:  [92 %-99 %] 96 %  BP: (131-180)/(62-82) 134/62     Weight: 73.5 kg (162 lb)  Body mass index is 27.81 kg/m².    Intake/Output Summary (Last 24 hours) at 09/30/17 1449  Last data filed at 09/30/17 1258   Gross per 24 hour   Intake             1040 ml   Output             1000 ml   Net               40 ml      Physical Exam   Constitutional: She is oriented to person, place, and time. She appears well-developed and well-nourished.   Eyes: EOM are normal. Pupils are equal, round, and reactive to light.   Neck: Normal range of motion. Neck supple.   Cardiovascular: Normal rate and regular rhythm.    Pulmonary/Chest: Effort normal and breath sounds normal.   Abdominal: Soft. Bowel sounds are normal.   Genitourinary:   Genitourinary Comments: deffered   Musculoskeletal: Normal range of motion.   Neurological: She is alert and oriented to person, place, and time.   Skin: Skin is warm and dry.   Psychiatric: She has a normal mood and affect. Her behavior is normal. Thought content normal.       Significant Labs:   Blood Culture: No results for input(s): LABBLOO in the last 48 hours.  BMP:    Recent Labs  Lab 09/30/17  0403         K 3.3*      CO2 33*   BUN 4*   CREATININE 0.7   CALCIUM 9.1   MG 1.8     CBC:   Recent Labs  Lab 09/29/17  0413 09/30/17  0403   WBC 8.51 8.23   HGB 9.5* 11.1*   HCT 30.8* 35.5*   * 471*       Significant Imaging:   Imaging Results          X-Ray Chest PA And Lateral (Final result)  Result time 09/29/17 10:17:58    Final result by Herb Rausch MD (09/29/17 10:17:58)                 Impression:      No significant improvement in bilateral pulmonary infiltrates when compared to 09/20/2017.  Please see above.      Electronically signed by: HERB RAUSCH MD  Date:     09/29/17  Time:    10:17              Narrative:    EXAM: Chest X-ray PA and Lateral    CLINICAL HISTORY:     Pneumonia, subsequent encountered.    COMPARISON STUDIES:     Chest x-ray 09/28/2017 chest CT 09/25/2017 10 chest x-ray 09/24/2017.    FINDINGS:    Normal heart size.  Atherosclerotic aortic arch.  There are patchy opacities within the right pulmonary base, left upper lung and left lower lung which appear stable compared to 09/28/2017.  Surgical clips within the right upper quadrant.. Ribs appear intact.                             X-Ray Chest PA And Lateral (Final result)  Result time 09/28/17 11:11:02    Final result by Triston Warner MD (09/28/17 11:11:02)                 Impression:      Stable chest x-ray with patchy multifocal infiltrate most pronounced in the left suprahilar and right infrahilar lung zones.  No change.      Electronically signed by: TRISTON WARNER MD  Date:     09/28/17  Time:    11:11              Narrative:    Exam: XR CHEST PA AND LATERAL,    Date:  09/28/17 10:45:05    History: Bilateral pneumonia    Comparison:  09/24/2017 and 09/26/2017.    Findings: The heart size is normal.  Aortic arch is minimally calcified.    There are increased perihilar markings present.  Left suprahilar infiltrate in right infrahilar infiltrates are again noted.   Allowing for the difference in technique no definite interval change.    No evidence of pleural effusion.  Right upper quadrant surgical clips noted.                             X-Ray Chest 1 View (Final result)  Result time 09/26/17 10:45:23   Procedure changed from X-Ray Chest PA And Lateral     Final result by JEFFREY Britt Sr., MD (09/26/17 10:45:23)                 Impression:      There has been interval development of patchy areas of increased density scattered throughout the lungs. This is consistent with the patient's history and characteristic of pneumonia.      Electronically signed by: JEFFREY BRITT MD  Date:     09/26/17  Time:    10:45              Narrative:    One-view chest x-ray    Clinical History: Pneumonia    Finding: Comparison was made to a prior examination performed on 9/24/2017. The size of the heart is normal. There has been interval development of patchy areas of increased density scattered throughout the lungs. There is no pneumothorax.  The costophrenic angles are sharp.                             CT Abdomen Pelvis  Without Contrast (Final result)  Result time 09/25/17 07:34:22    Final result by Deuce Sherman MD (09/25/17 07:34:22)                 Impression:       Bilateral parenchymal infiltrates are seen within the lower lobes.    Mild prominence of the right renal collecting system however no obstructing stone is seen. This can be seen in a setting of recently passed stone.     Left inguinal adenopathy measuring up to 1.4 cm.    Subcentimeter hypodensity is seen within the mid body of the pancreas which is indeterminate. Consider interval followup    All CT scans at this facility use dose modulation, iterative reconstruction and/or weight based dosing when appropriate to reduce radiation dose to as low as reasonably achievable.      Electronically signed by: DEUCE SHERMAN MD  Date:     09/25/17  Time:    07:34              Narrative:    Indication: Abdominal pain.    Routine  noncontrast CT images of the abdomen and pelvis were obtained.    Findings:    Bilateral parenchymal infiltrates are seen within the lower lobes.  Heart size is normal.  No pericardial or pleural effusion.      The liver is normal in size and attenuation on this noncontrast exam.  Status post cholecystectomy The  stomach, spleen,  adrenal glands are normal.  Subcentimeter hypodensity is seen within the mid body of the pancreas which is indeterminate. Consider interval followup. Aorta demonstrates moderate atherosclerotic disease.      The kidneys are normal in size shape and position without radiopaque calculus or signs of hydronephrosis. Mild prominence of the right renal collecting system however no obstructing stone is seen. This can be seen in a setting of recently passed stone. The bladder is incompletely distended.     The visualized loops of small bowel are unremarkable.The appendix is visualized in the right lower quadrant.  There is no inflammation. Colon demonstrates diverticulosis without evidence of diverticulitis. Mild constipation    Left inguinal adenopathy..      Bones appear intact with moderate degenerative changes                             CTA Chest Non-Coronary (PE Study) (Final result)     Abnormal  Result time 09/25/17 07:39:18    Final result by Deuce Olvera MD (09/25/17 07:39:18)                 Impression:         Bilateral pulmonary infiltrates are seen some with a micronodular pattern which can be seen in infectious (typical or atypical) and or inflammatory process. Followup to resolution is recommended.    Mediastinal and bilateral hilar adenopathy which can be seen in the setting of infectious/reactive process versus neoplastic versus lymphoproliferative disorder.    No evidence of PE.    Fatty liver.    All CT scans at this facility use dose modulation, iterative reconstruction, and/or weight base dosing when appropriate to reduce radiation dose to as low as reasonably  achievable.      Electronically signed by: AURA SHERMAN MD  Date:     09/25/17  Time:    07:39              Narrative:    Clinical data: Chest pain.    Axial CT images through the chest after administration of contrast was performed according to PE study protocol using 100 cc Omni 350.    Findings:    The pulmonary arterial system is well opacified with no evidence of filling defect to suggest pulmonary embolus or thrombus.    Structures of the base of the neck are normal.    The heart and pericardium are unremarkable.  Trace pericardial effusion.  Mediastinal structures including great vessels, trachea, esophagus are intact.    Mediastinal and bilateral hilar adenopathy which may be reactive.      Bilateral pulmonary infiltrates are seen some with a micronodular pattern which can be seen in infectious or inflammatory process. Mild emphysematous changes are seen within the lungs bilaterally.    Surrounding chest wall structures, visualized abdominal organs, bones are unremarkable. Cholecystectomy and mild fatty infiltration of liver suspected.                             X-Ray Chest AP Portable (Final result)  Result time 09/24/17 22:22:56    Final result by Celia Billings MD (09/24/17 22:22:56)                 Impression:         Negative portable chest x-ray.      Electronically signed by: CELIA BILLINGS MD  Date:     09/24/17  Time:    22:22              Narrative:    Portable chest x-ray. 09/24/17 22:13:49    Clinical indication:  sepsis    Heart size is normal. The lung fields are clear. No acute cardiopulmonary infiltrate.                             RADIOLOGY REPORT (Final result)  Result time 09/26/17 15:17:37

## 2017-09-30 NOTE — PLAN OF CARE
Problem: Patient Care Overview  Goal: Plan of Care Review  Outcome: Ongoing (interventions implemented as appropriate)  Pt stable. Pt is NSR on the heart monitor.  Pt c/o mild back pain, prn pain med given.  Pt received scheduled IV ABX.  Plan of care reviewed.  Pt verbalizes understanding.  Pt was free from falls or injuries during duration of shift.  Pt turned and repositioned self.  PIV intact with no redness, swelling or drainage.  Bed low, wheels locked, bed alarm on, call light in reach, bedside commode at pt bedside.  Pt instructed to call for assistance.  Will continue to monitor.

## 2017-09-30 NOTE — ASSESSMENT & PLAN NOTE
Hold BP meds at this time, due to presentation with hypotension  monitor and start once improving       6/26: BP trending upward;  Resumed Toprol-XL today;  Will monitor.response to therapy    9/27: improved.; continue present regimen    9/29: BP satisfactory on present regimen.    9/30: stable and improved;  Continue present regimen.

## 2017-10-01 PROBLEM — J98.01 POST-INFECTION BRONCHOSPASM: Status: ACTIVE | Noted: 2017-10-01

## 2017-10-01 LAB
ANION GAP SERPL CALC-SCNC: 8 MMOL/L
BASOPHILS # BLD AUTO: 0.05 K/UL
BASOPHILS NFR BLD: 0.5 %
BUN SERPL-MCNC: 4 MG/DL
CALCIUM SERPL-MCNC: 9.3 MG/DL
CHLORIDE SERPL-SCNC: 104 MMOL/L
CO2 SERPL-SCNC: 32 MMOL/L
CREAT SERPL-MCNC: 0.7 MG/DL
DIFFERENTIAL METHOD: ABNORMAL
EOSINOPHIL # BLD AUTO: 0.2 K/UL
EOSINOPHIL NFR BLD: 2 %
ERYTHROCYTE [DISTWIDTH] IN BLOOD BY AUTOMATED COUNT: 15.2 %
EST. GFR  (AFRICAN AMERICAN): >60 ML/MIN/1.73 M^2
EST. GFR  (NON AFRICAN AMERICAN): >60 ML/MIN/1.73 M^2
GLUCOSE SERPL-MCNC: 132 MG/DL
HCT VFR BLD AUTO: 34.9 %
HGB BLD-MCNC: 10.6 G/DL
LYMPHOCYTES # BLD AUTO: 4.3 K/UL
LYMPHOCYTES NFR BLD: 38.8 %
MAGNESIUM SERPL-MCNC: 1.7 MG/DL
MCH RBC QN AUTO: 26.7 PG
MCHC RBC AUTO-ENTMCNC: 30.4 G/DL
MCV RBC AUTO: 88 FL
MONOCYTES # BLD AUTO: 0.8 K/UL
MONOCYTES NFR BLD: 7 %
NEUTROPHILS # BLD AUTO: 5.7 K/UL
NEUTROPHILS NFR BLD: 51.7 %
PHOSPHATE SERPL-MCNC: 2.8 MG/DL
PLATELET # BLD AUTO: 535 K/UL
PMV BLD AUTO: 9 FL
POCT GLUCOSE: 140 MG/DL (ref 70–110)
POCT GLUCOSE: 161 MG/DL (ref 70–110)
POCT GLUCOSE: 177 MG/DL (ref 70–110)
POCT GLUCOSE: 209 MG/DL (ref 70–110)
POTASSIUM SERPL-SCNC: 4.2 MMOL/L
RBC # BLD AUTO: 3.97 M/UL
SODIUM SERPL-SCNC: 144 MMOL/L
WBC # BLD AUTO: 10.95 K/UL

## 2017-10-01 PROCEDURE — 25000003 PHARM REV CODE 250: Performed by: NURSE PRACTITIONER

## 2017-10-01 PROCEDURE — 94799 UNLISTED PULMONARY SVC/PX: CPT

## 2017-10-01 PROCEDURE — 25000242 PHARM REV CODE 250 ALT 637 W/ HCPCS: Performed by: INTERNAL MEDICINE

## 2017-10-01 PROCEDURE — 27000173 HC ACAPELLA DEVICE DH OR DM

## 2017-10-01 PROCEDURE — 99900035 HC TECH TIME PER 15 MIN (STAT)

## 2017-10-01 PROCEDURE — 94664 DEMO&/EVAL PT USE INHALER: CPT

## 2017-10-01 PROCEDURE — 25000003 PHARM REV CODE 250: Performed by: FAMILY MEDICINE

## 2017-10-01 PROCEDURE — 36415 COLL VENOUS BLD VENIPUNCTURE: CPT

## 2017-10-01 PROCEDURE — 63600175 PHARM REV CODE 636 W HCPCS: Performed by: INTERNAL MEDICINE

## 2017-10-01 PROCEDURE — 25000003 PHARM REV CODE 250: Performed by: INTERNAL MEDICINE

## 2017-10-01 PROCEDURE — 84100 ASSAY OF PHOSPHORUS: CPT

## 2017-10-01 PROCEDURE — 21400001 HC TELEMETRY ROOM

## 2017-10-01 PROCEDURE — 85025 COMPLETE CBC W/AUTO DIFF WBC: CPT

## 2017-10-01 PROCEDURE — 27000221 HC OXYGEN, UP TO 24 HOURS

## 2017-10-01 PROCEDURE — 94761 N-INVAS EAR/PLS OXIMETRY MLT: CPT

## 2017-10-01 PROCEDURE — 80048 BASIC METABOLIC PNL TOTAL CA: CPT

## 2017-10-01 PROCEDURE — 94640 AIRWAY INHALATION TREATMENT: CPT

## 2017-10-01 PROCEDURE — 83735 ASSAY OF MAGNESIUM: CPT

## 2017-10-01 PROCEDURE — 63600175 PHARM REV CODE 636 W HCPCS: Performed by: FAMILY MEDICINE

## 2017-10-01 RX ORDER — BUDESONIDE 0.5 MG/2ML
0.5 INHALANT ORAL EVERY 12 HOURS
Status: DISCONTINUED | OUTPATIENT
Start: 2017-10-01 | End: 2017-10-02 | Stop reason: HOSPADM

## 2017-10-01 RX ADMIN — GUAIFENESIN 200 MG: 100 SOLUTION ORAL at 09:10

## 2017-10-01 RX ADMIN — RIVAROXABAN 20 MG: 20 TABLET, FILM COATED ORAL at 05:10

## 2017-10-01 RX ADMIN — MONTELUKAST SODIUM 10 MG: 10 TABLET, COATED ORAL at 09:10

## 2017-10-01 RX ADMIN — INSULIN ASPART 2 UNITS: 100 INJECTION, SOLUTION INTRAVENOUS; SUBCUTANEOUS at 05:10

## 2017-10-01 RX ADMIN — METOPROLOL SUCCINATE 50 MG: 50 TABLET, EXTENDED RELEASE ORAL at 09:10

## 2017-10-01 RX ADMIN — IPRATROPIUM BROMIDE 0.5 MG: 0.5 SOLUTION RESPIRATORY (INHALATION) at 12:10

## 2017-10-01 RX ADMIN — BUDESONIDE 0.5 MG: 0.5 SUSPENSION RESPIRATORY (INHALATION) at 01:10

## 2017-10-01 RX ADMIN — DOXYCYCLINE 100 MG: 100 INJECTION, POWDER, LYOPHILIZED, FOR SOLUTION INTRAVENOUS at 02:10

## 2017-10-01 RX ADMIN — LEVALBUTEROL 0.63 MG: 0.63 SOLUTION RESPIRATORY (INHALATION) at 08:10

## 2017-10-01 RX ADMIN — GUAIFENESIN 200 MG: 100 SOLUTION ORAL at 05:10

## 2017-10-01 RX ADMIN — LEVALBUTEROL 0.63 MG: 0.63 SOLUTION RESPIRATORY (INHALATION) at 07:10

## 2017-10-01 RX ADMIN — POTASSIUM CHLORIDE 20 MEQ: 1500 TABLET, EXTENDED RELEASE ORAL at 06:10

## 2017-10-01 RX ADMIN — LEVALBUTEROL 0.63 MG: 0.63 SOLUTION RESPIRATORY (INHALATION) at 01:10

## 2017-10-01 RX ADMIN — IPRATROPIUM BROMIDE 0.5 MG: 0.5 SOLUTION RESPIRATORY (INHALATION) at 07:10

## 2017-10-01 RX ADMIN — ACETAMINOPHEN AND CODEINE PHOSPHATE 1 TABLET: 30; 300 TABLET ORAL at 05:10

## 2017-10-01 RX ADMIN — IPRATROPIUM BROMIDE 0.5 MG: 0.5 SOLUTION RESPIRATORY (INHALATION) at 01:10

## 2017-10-01 RX ADMIN — BENZONATATE 100 MG: 100 CAPSULE ORAL at 11:10

## 2017-10-01 RX ADMIN — GUAIFENESIN 200 MG: 100 SOLUTION ORAL at 01:10

## 2017-10-01 RX ADMIN — BUDESONIDE 0.5 MG: 0.5 SUSPENSION RESPIRATORY (INHALATION) at 07:10

## 2017-10-01 RX ADMIN — GUAIFENESIN 200 MG: 100 SOLUTION ORAL at 02:10

## 2017-10-01 RX ADMIN — ACETAMINOPHEN AND CODEINE PHOSPHATE 1 TABLET: 30; 300 TABLET ORAL at 10:10

## 2017-10-01 RX ADMIN — IPRATROPIUM BROMIDE 0.5 MG: 0.5 SOLUTION RESPIRATORY (INHALATION) at 08:10

## 2017-10-01 RX ADMIN — PANTOPRAZOLE SODIUM 40 MG: 40 TABLET, DELAYED RELEASE ORAL at 09:10

## 2017-10-01 RX ADMIN — CEFTRIAXONE 1 G: 1 INJECTION, SOLUTION INTRAVENOUS at 11:10

## 2017-10-01 RX ADMIN — LEVALBUTEROL 0.63 MG: 0.63 SOLUTION RESPIRATORY (INHALATION) at 12:10

## 2017-10-01 RX ADMIN — GUAIFENESIN 200 MG: 100 SOLUTION ORAL at 06:10

## 2017-10-01 RX ADMIN — FERROUS SULFATE TAB EC 325 MG (65 MG FE EQUIVALENT) 325 MG: 325 (65 FE) TABLET DELAYED RESPONSE at 09:10

## 2017-10-01 NOTE — PLAN OF CARE
Problem: Patient Care Overview  Goal: Plan of Care Review  Outcome: Ongoing (interventions implemented as appropriate)  Pt AAOx4. POC discussed w/patient and daughter, verbalized understanding. NSR on monitor. VSS. Blood glucose levels assessed and managed according to sliding scale orders. Voids per BSC . Patient turns independently in bed. New IV started on left a/c during shift. Last IV site present with complication soon after administering doxycycline at midnight. Site became warm, elevated, and with redness. Applied cold compress, patient skin back to normal skin tone. Restarted medication in new IV site without any complication or c/o pain/discomfort.  Patient remained free from falls. Fall precautions in place, bed alarm REFUSED, non-skid socks on, bed in lowest, call light and personal items within reach.

## 2017-10-01 NOTE — PROGRESS NOTES
[facility] Hospital Medicine  Progress Note    Patient Name: Genie Balderas  MRN: [id number]  Patient Class: IP- Inpatient   Admission Date: 9/24/2017  Length of Stay: 6 days  Attending Physician: Rebel Cardenas MD  Primary Care Provider: Ryan Sims MD        Subjective:     Principal Problem:Sepsis    HPI:  Genie Balderas is a 57 y.o. female patient who presented to the Emergency Department for complaint of Cough that started 2 days ago. The cough is non productive. Associated symptoms include Sharp Like CP to Left chest radiates to back, that stated today due to coughing, and, fatigue, fever, n/v/d. Symptoms are intermittent and moderate in severity. OTC meds and nebs not helping at home.  No mitigating or exacerbating factors reported.  Patient denies any SOB, leg swelling, palpitations, HA, lightheadedness, dizziness, numbness, weakness, and all other sxs at this time. No further complaints or concerns at this time.        Hospital Course:  Patient was placed on OBS for sepsis d/t bilat lower lobe pneumonia. She was started on IV abx, breathing treatments, and mucinex. Patient improving slowly - moved to Inpatient since will need extended IV abx.     9/26;  Some better;  Has faint posterior wheezing today;Continued cough; upper back pain on left, with the cough  On for repeat CXR today; also incentive spirometry added; also tylenol with codeine for coughtand pain complaint.    9/27:  Looks and feels better;  Still with back pain though less;  Had restful night overnight; working with incentive device once hourly while awake.    9/28:  Still with cough and bilateral chest/flank discomfort;  Tender to palpation moreso on the left;  Likely muculoskeletal; no fever or chills reported; last CXR was 9/26 9/29:  Looks and feels some better; still with cough, but less; repeat cxr unchanged.  Will continue present regimen;  Adding spiriva to regiemn    9/30:  Some better; still with complaints of  weakness; mira wet sounding cough; chest pain less; CXR no appreciable change; patient afebrile with stable vital signs.she's tolerating her diet;  Has been out of bed to the toilet.    10/1:  Looks and feels some better;;continues with wet sounding cough;  Chest xray shows clearing on the right with persistent infiltrate left upper lung.  Faint wheezing on posterior exam with clearing after cough and frappage.      Interval History: complains of feeling weak;  Cough breaking up some after liquid cough prep.; no fever or chills;  States she's been hospitalized 3 times since last November with pneumonia.      Review of Systems   Constitutional: Positive for fatigue.   HENT: Negative.    Eyes: Negative.    Respiratory: Positive for cough and shortness of breath.    Cardiovascular: Negative.    Gastrointestinal: Negative.    Endocrine: Negative.    Genitourinary: Negative.    Musculoskeletal: Positive for back pain.   Allergic/Immunologic: Negative.    Neurological: Negative.    Hematological: Negative.    Psychiatric/Behavioral: Negative.      Objective:     Vital Signs (Most Recent):  Temp: 98.4 °F (36.9 °C) (10/01/17 0739)  Pulse: 93 (10/01/17 0900)  Resp: 18 (10/01/17 0835)  BP: (!) 144/65 (10/01/17 0739)  SpO2: 97 % (10/01/17 0835) Vital Signs (24h Range):  Temp:  [97.1 °F (36.2 °C)-98.7 °F (37.1 °C)] 98.4 °F (36.9 °C)  Pulse:  [75-97] 93  Resp:  [16-18] 18  SpO2:  [95 %-100 %] 97 %  BP: (134-165)/(62-84) 144/65     Weight: 73.3 kg (161 lb 8 oz)  Body mass index is 27.72 kg/m².    Intake/Output Summary (Last 24 hours) at 10/01/17 1120  Last data filed at 10/01/17 0400   Gross per 24 hour   Intake             1150 ml   Output             2700 ml   Net            -1550 ml      Physical Exam   Constitutional: She is oriented to person, place, and time. She appears well-developed and well-nourished.   HENT:   Head: Normocephalic and atraumatic.   Eyes: EOM are normal. Pupils are equal, round, and reactive to light.    Neck: Normal range of motion. Neck supple.   Cardiovascular: Normal rate and regular rhythm.    Pulmonary/Chest: Effort normal. She has wheezes.   Faint posterior wheeze, clearing after cough.   Abdominal: Soft. Bowel sounds are normal.   Musculoskeletal: Normal range of motion.   Neurological: She is alert and oriented to person, place, and time.   Skin: Skin is warm and dry.   Psychiatric: She has a normal mood and affect. Her behavior is normal. Judgment and thought content normal.       Significant Labs:   BMP:   Recent Labs  Lab 10/01/17  0459   *      K 4.2      CO2 32*   BUN 4*   CREATININE 0.7   CALCIUM 9.3   MG 1.7     CBC:   Recent Labs  Lab 09/30/17  0403 10/01/17  0459   WBC 8.23 10.95   HGB 11.1* 10.6*   HCT 35.5* 34.9*   * 535*       Significant Imaging:   Imaging Results          X-Ray Chest PA And Lateral (Final result)  Result time 10/01/17 10:31:29    Final result by Maura Perkins MD (10/01/17 10:31:29)                 Impression:     See above.      Electronically signed by: MAURA PERKINS MD  Date:     10/01/17  Time:    10:31              Narrative:    Exam: Two-view chest xray    History:    Bilateral pneumonia    Findings:     The heart is normal in size.  Right lung is now clear.  Persistent nodular infiltrate in the left upper lobe slightly improved compared to 09/29/2017.  Continued followup recommended.                             X-Ray Chest PA And Lateral (Final result)  Result time 09/29/17 10:17:58    Final result by Herb Rausch MD (09/29/17 10:17:58)                 Impression:      No significant improvement in bilateral pulmonary infiltrates when compared to 09/20/2017.  Please see above.      Electronically signed by: HERB RAUSCH MD  Date:     09/29/17  Time:    10:17              Narrative:    EXAM: Chest X-ray PA and Lateral    CLINICAL HISTORY:     Pneumonia, subsequent encountered.    COMPARISON STUDIES:     Chest x-ray 09/28/2017 chest CT  09/25/2017 10 chest x-ray 09/24/2017.    FINDINGS:    Normal heart size.  Atherosclerotic aortic arch.  There are patchy opacities within the right pulmonary base, left upper lung and left lower lung which appear stable compared to 09/28/2017.  Surgical clips within the right upper quadrant.. Ribs appear intact.                             X-Ray Chest PA And Lateral (Final result)  Result time 09/28/17 11:11:02    Final result by Triston Warner MD (09/28/17 11:11:02)                 Impression:      Stable chest x-ray with patchy multifocal infiltrate most pronounced in the left suprahilar and right infrahilar lung zones.  No change.      Electronically signed by: TRISTON WARNER MD  Date:     09/28/17  Time:    11:11              Narrative:    Exam: XR CHEST PA AND LATERAL,    Date:  09/28/17 10:45:05    History: Bilateral pneumonia    Comparison:  09/24/2017 and 09/26/2017.    Findings: The heart size is normal.  Aortic arch is minimally calcified.    There are increased perihilar markings present.  Left suprahilar infiltrate in right infrahilar infiltrates are again noted.  Allowing for the difference in technique no definite interval change.    No evidence of pleural effusion.  Right upper quadrant surgical clips noted.                             X-Ray Chest 1 View (Final result)  Result time 09/26/17 10:45:23   Procedure changed from X-Ray Chest PA And Lateral     Final result by JEFFREY Britt Sr., MD (09/26/17 10:45:23)                 Impression:      There has been interval development of patchy areas of increased density scattered throughout the lungs. This is consistent with the patient's history and characteristic of pneumonia.      Electronically signed by: JEFFREY BRITT MD  Date:     09/26/17  Time:    10:45              Narrative:    One-view chest x-ray    Clinical History: Pneumonia    Finding: Comparison was made to a prior examination performed on 9/24/2017. The size of the heart is normal.  There has been interval development of patchy areas of increased density scattered throughout the lungs. There is no pneumothorax.  The costophrenic angles are sharp.                             CT Abdomen Pelvis  Without Contrast (Final result)  Result time 09/25/17 07:34:22    Final result by Deuce Sherman MD (09/25/17 07:34:22)                 Impression:       Bilateral parenchymal infiltrates are seen within the lower lobes.    Mild prominence of the right renal collecting system however no obstructing stone is seen. This can be seen in a setting of recently passed stone.     Left inguinal adenopathy measuring up to 1.4 cm.    Subcentimeter hypodensity is seen within the mid body of the pancreas which is indeterminate. Consider interval followup    All CT scans at this facility use dose modulation, iterative reconstruction and/or weight based dosing when appropriate to reduce radiation dose to as low as reasonably achievable.      Electronically signed by: DEUCE SHERMAN MD  Date:     09/25/17  Time:    07:34              Narrative:    Indication: Abdominal pain.    Routine noncontrast CT images of the abdomen and pelvis were obtained.    Findings:    Bilateral parenchymal infiltrates are seen within the lower lobes.  Heart size is normal.  No pericardial or pleural effusion.      The liver is normal in size and attenuation on this noncontrast exam.  Status post cholecystectomy The  stomach, spleen,  adrenal glands are normal.  Subcentimeter hypodensity is seen within the mid body of the pancreas which is indeterminate. Consider interval followup. Aorta demonstrates moderate atherosclerotic disease.      The kidneys are normal in size shape and position without radiopaque calculus or signs of hydronephrosis. Mild prominence of the right renal collecting system however no obstructing stone is seen. This can be seen in a setting of recently passed stone. The bladder is incompletely distended.     The visualized  loops of small bowel are unremarkable.The appendix is visualized in the right lower quadrant.  There is no inflammation. Colon demonstrates diverticulosis without evidence of diverticulitis. Mild constipation    Left inguinal adenopathy..      Bones appear intact with moderate degenerative changes                             CTA Chest Non-Coronary (PE Study) (Final result)     Abnormal  Result time 09/25/17 07:39:18    Final result by Deuce Sherman MD (09/25/17 07:39:18)                 Impression:         Bilateral pulmonary infiltrates are seen some with a micronodular pattern which can be seen in infectious (typical or atypical) and or inflammatory process. Followup to resolution is recommended.    Mediastinal and bilateral hilar adenopathy which can be seen in the setting of infectious/reactive process versus neoplastic versus lymphoproliferative disorder.    No evidence of PE.    Fatty liver.    All CT scans at this facility use dose modulation, iterative reconstruction, and/or weight base dosing when appropriate to reduce radiation dose to as low as reasonably achievable.      Electronically signed by: DEUCE SHERMAN MD  Date:     09/25/17  Time:    07:39              Narrative:    Clinical data: Chest pain.    Axial CT images through the chest after administration of contrast was performed according to PE study protocol using 100 cc Omni 350.    Findings:    The pulmonary arterial system is well opacified with no evidence of filling defect to suggest pulmonary embolus or thrombus.    Structures of the base of the neck are normal.    The heart and pericardium are unremarkable.  Trace pericardial effusion.  Mediastinal structures including great vessels, trachea, esophagus are intact.    Mediastinal and bilateral hilar adenopathy which may be reactive.      Bilateral pulmonary infiltrates are seen some with a micronodular pattern which can be seen in infectious or inflammatory process. Mild emphysematous  changes are seen within the lungs bilaterally.    Surrounding chest wall structures, visualized abdominal organs, bones are unremarkable. Cholecystectomy and mild fatty infiltration of liver suspected.                             X-Ray Chest AP Portable (Final result)  Result time 09/24/17 22:22:56    Final result by Celia Billings MD (09/24/17 22:22:56)                 Impression:         Negative portable chest x-ray.      Electronically signed by: CELIA BILLINGS MD  Date:     09/24/17  Time:    22:22              Narrative:    Portable chest x-ray. 09/24/17 22:13:49    Clinical indication:  sepsis    Heart size is normal. The lung fields are clear. No acute cardiopulmonary infiltrate.                             RADIOLOGY REPORT (Final result)  Result time 09/26/17 15:17:37              Assessment/Plan:      * Sepsis due to bilateral lower lobe pneumonia     --WBC 17K, Pulse 136, RR 22, evelin source: bilat pneumonia  --PO Avelox and mucinex  --sputum culture pending  -Blood Cultures pending  --duonebs   --supplemental oxygen to maintain sats    9/26:  Continue present antibiotics with monitoring of the CXR  On IV rocephin and Doxycycline at present.  Monitor cultures (blood and sputum)  Continue duonebs; pulmonary incentives.    9/27: patchy diffuse disease both lungs;  Will continue present regimen;  monitor CXR response.  Culture of blood negative.  WBC on the decline.    9/28:  To repeat CXR today; Continue antibiotics;  WBC counts are trending down.    9/29: CXR no appreciable change; WBC now normal.    9/30:  CXR shows stability; no worsening; patient afebrile; WBC count has normalized;Will increase frequency of nebs and attention to pulmonary toilet. Robitussin elixir added to regimen.    10/1:  CXR shows resolution on right, with residual disease on left upper lobe.  Continues on nebs around the clock;  Budesonide added to regimen today;  Have avoided parenteral steroids given labile blood sugars;  Will  observe  Likely home next 24 hours on oral antibiotics and home nebulizer unit                 Post-infection bronchospasm    Wheezing on pulmonary exam today;  Question post infectious versus undiagnosed COPD;  Will continue nebs, antibiotics, inhaled corticosteroids added;  Will go home on nebs;  Also follow with PCP to arrange formal PFT's          Vitamin D deficiency    Vitamin D reported low at 9; started on vitamin D 50,000 units weekly for 4-6 weeks; will need outpatient monitoring          Anemia, unspecified    9/27: question dilutional component;  Will check b-12, folate, iron stores and monitor.    9/28:  Iron deficiency by labs;  Will check stools for heme and start on supplementation;  May need stool softener as well    9/30: stable; continue to monitor'; on iron supplementation.        Hypomagnesemia    Magnesium replacement ordered;  Will monitor response.  Vitamin D level ordered with next lab draw.      9/27:  Severe vitamin D deficiency; will replete vitamin D.  Magensium.    9/29: treated, resolved;  On vitamin D supplementation; was on this as outpatient.        Tobacco abuse    --counseled on smoking cessation  --refused nicotine patch    6/26: continue to stress smoking cessation.        HTN (hypertension)    Hold BP meds at this time, due to presentation with hypotension  monitor and start once improving       6/26: BP trending upward;  Resumed Toprol-XL today;  Will monitor.response to therapy    9/27: improved.; continue present regimen    9/29: BP satisfactory on present regimen.    9/30: stable and improved;  Continue present regimen.        Chest pain    --likely secondary to pneumonia  --CT showed No central pulmonary embolus, Bilateral pulmonary infiltrates and nodules   -EKG reviewed  --Serial Troponins negative  9/26: Continue present regimen; with monitoring of the cXR  Continued antibiotics (now on Rocephin and Doxy)  Pulmonary incentives started; along with continued nebs    9/27:  pain of musculoskeletal origin; tylenol with codeine ordered;     10/1;  Stable clinically; less cough; continue symptomatic management  Complete antibiotic course.    9/29: less pain and cough reported.    9/29:    9/29:  Slightly improved; continue present regimen  9/28:  No appreciable change; on for repeat cxr today;  Will give her a trial of flexeril and consider a heating pad    9/30:  continues with wet sounding cough; difficult to get anything up. Less chest discomfort reported.          VTE Risk Mitigation         Ordered     rivaroxaban tablet 20 mg  With dinner     Route:  Oral        09/25/17 0240     Medium Risk of VTE  Once      09/25/17 0208     Place sequential compression device  Until discontinued      09/25/17 0208              Rebel Montes MD  Department of Hospital Medicine   [facility]

## 2017-10-01 NOTE — ASSESSMENT & PLAN NOTE
Wheezing on pulmonary exam today;  Question post infectious versus undiagnosed COPD;  Will continue nebs, antibiotics, inhaled corticosteroids added;  Will go home on nebs;  Also follow with PCP to arrange formal PFT's

## 2017-10-01 NOTE — ASSESSMENT & PLAN NOTE
--WBC 17K, Pulse 136, RR 22, evelin source: bilat pneumonia  --PO Avelox and mucinex  --sputum culture pending  -Blood Cultures pending  --duonebs   --supplemental oxygen to maintain sats    9/26:  Continue present antibiotics with monitoring of the CXR  On IV rocephin and Doxycycline at present.  Monitor cultures (blood and sputum)  Continue duonebs; pulmonary incentives.    9/27: patchy diffuse disease both lungs;  Will continue present regimen;  monitor CXR response.  Culture of blood negative.  WBC on the decline.    9/28:  To repeat CXR today; Continue antibiotics;  WBC counts are trending down.    9/29: CXR no appreciable change; WBC now normal.    9/30:  CXR shows stability; no worsening; patient afebrile; WBC count has normalized;Will increase frequency of nebs and attention to pulmonary toilet. Robitussin elixir added to regimen.    10/1:  CXR shows resolution on right, with residual disease on left upper lobe.  Continues on nebs around the clock;  Budesonide added to regimen today;  Have avoided parenteral steroids given labile blood sugars;  Will observe  Likely home next 24 hours on oral antibiotics and home nebulizer unit

## 2017-10-01 NOTE — ASSESSMENT & PLAN NOTE
--likely secondary to pneumonia  --CT showed No central pulmonary embolus, Bilateral pulmonary infiltrates and nodules   -EKG reviewed  --Serial Troponins negative  9/26: Continue present regimen; with monitoring of the cXR  Continued antibiotics (now on Rocephin and Doxy)  Pulmonary incentives started; along with continued nebs    9/27: pain of musculoskeletal origin; tylenol with codeine ordered;     10/1;  Stable clinically; less cough; continue symptomatic management  Complete antibiotic course.    9/29: less pain and cough reported.    9/29:    9/29:  Slightly improved; continue present regimen  9/28:  No appreciable change; on for repeat cxr today;  Will give her a trial of flexeril and consider a heating pad    9/30:  continues with wet sounding cough; difficult to get anything up. Less chest discomfort reported.

## 2017-10-01 NOTE — ASSESSMENT & PLAN NOTE
-Improving with O2  -Repeat ABGs if need  -Patient someday smoker and asthmatic, Possibly with undiagnosed COPD and now with PNE acutely worsening  counseled on smoking cessation.   -OP follow up with pulmonary     6/26: continue present regimen with monitoring    10/1: satisfactory sats now on room air.

## 2017-10-01 NOTE — SUBJECTIVE & OBJECTIVE
Interval History: complains of feeling weak;  Cough breaking up some after liquid cough prep.; no fever or chills;  States she's been hospitalized 3 times since last November with pneumonia.      Review of Systems   Constitutional: Positive for fatigue.   HENT: Negative.    Eyes: Negative.    Respiratory: Positive for cough and shortness of breath.    Cardiovascular: Negative.    Gastrointestinal: Negative.    Endocrine: Negative.    Genitourinary: Negative.    Musculoskeletal: Positive for back pain.   Allergic/Immunologic: Negative.    Neurological: Negative.    Hematological: Negative.    Psychiatric/Behavioral: Negative.      Objective:     Vital Signs (Most Recent):  Temp: 98.4 °F (36.9 °C) (10/01/17 0739)  Pulse: 93 (10/01/17 0900)  Resp: 18 (10/01/17 0835)  BP: (!) 144/65 (10/01/17 0739)  SpO2: 97 % (10/01/17 0835) Vital Signs (24h Range):  Temp:  [97.1 °F (36.2 °C)-98.7 °F (37.1 °C)] 98.4 °F (36.9 °C)  Pulse:  [75-97] 93  Resp:  [16-18] 18  SpO2:  [95 %-100 %] 97 %  BP: (134-165)/(62-84) 144/65     Weight: 73.3 kg (161 lb 8 oz)  Body mass index is 27.72 kg/m².    Intake/Output Summary (Last 24 hours) at 10/01/17 1120  Last data filed at 10/01/17 0400   Gross per 24 hour   Intake             1150 ml   Output             2700 ml   Net            -1550 ml      Physical Exam   Constitutional: She is oriented to person, place, and time. She appears well-developed and well-nourished.   HENT:   Head: Normocephalic and atraumatic.   Eyes: EOM are normal. Pupils are equal, round, and reactive to light.   Neck: Normal range of motion. Neck supple.   Cardiovascular: Normal rate and regular rhythm.    Pulmonary/Chest: Effort normal. She has wheezes.   Faint posterior wheeze, clearing after cough.   Abdominal: Soft. Bowel sounds are normal.   Musculoskeletal: Normal range of motion.   Neurological: She is alert and oriented to person, place, and time.   Skin: Skin is warm and dry.   Psychiatric: She has a normal mood and  affect. Her behavior is normal. Judgment and thought content normal.       Significant Labs:   BMP:   Recent Labs  Lab 10/01/17  0459   *      K 4.2      CO2 32*   BUN 4*   CREATININE 0.7   CALCIUM 9.3   MG 1.7     CBC:   Recent Labs  Lab 09/30/17  0403 10/01/17  0459   WBC 8.23 10.95   HGB 11.1* 10.6*   HCT 35.5* 34.9*   * 535*       Significant Imaging:   Imaging Results          X-Ray Chest PA And Lateral (Final result)  Result time 10/01/17 10:31:29    Final result by Maura Perkins MD (10/01/17 10:31:29)                 Impression:     See above.      Electronically signed by: MAURA PERKINS MD  Date:     10/01/17  Time:    10:31              Narrative:    Exam: Two-view chest xray    History:    Bilateral pneumonia    Findings:     The heart is normal in size.  Right lung is now clear.  Persistent nodular infiltrate in the left upper lobe slightly improved compared to 09/29/2017.  Continued followup recommended.                             X-Ray Chest PA And Lateral (Final result)  Result time 09/29/17 10:17:58    Final result by Herb Rausch MD (09/29/17 10:17:58)                 Impression:      No significant improvement in bilateral pulmonary infiltrates when compared to 09/20/2017.  Please see above.      Electronically signed by: HERB RAUSCH MD  Date:     09/29/17  Time:    10:17              Narrative:    EXAM: Chest X-ray PA and Lateral    CLINICAL HISTORY:     Pneumonia, subsequent encountered.    COMPARISON STUDIES:     Chest x-ray 09/28/2017 chest CT 09/25/2017 10 chest x-ray 09/24/2017.    FINDINGS:    Normal heart size.  Atherosclerotic aortic arch.  There are patchy opacities within the right pulmonary base, left upper lung and left lower lung which appear stable compared to 09/28/2017.  Surgical clips within the right upper quadrant.. Ribs appear intact.                             X-Ray Chest PA And Lateral (Final result)  Result time 09/28/17 11:11:02    Final  result by Triston Warner MD (09/28/17 11:11:02)                 Impression:      Stable chest x-ray with patchy multifocal infiltrate most pronounced in the left suprahilar and right infrahilar lung zones.  No change.      Electronically signed by: TRISTON WARNER MD  Date:     09/28/17  Time:    11:11              Narrative:    Exam: XR CHEST PA AND LATERAL,    Date:  09/28/17 10:45:05    History: Bilateral pneumonia    Comparison:  09/24/2017 and 09/26/2017.    Findings: The heart size is normal.  Aortic arch is minimally calcified.    There are increased perihilar markings present.  Left suprahilar infiltrate in right infrahilar infiltrates are again noted.  Allowing for the difference in technique no definite interval change.    No evidence of pleural effusion.  Right upper quadrant surgical clips noted.                             X-Ray Chest 1 View (Final result)  Result time 09/26/17 10:45:23   Procedure changed from X-Ray Chest PA And Lateral     Final result by JEFFREY Britt Sr., MD (09/26/17 10:45:23)                 Impression:      There has been interval development of patchy areas of increased density scattered throughout the lungs. This is consistent with the patient's history and characteristic of pneumonia.      Electronically signed by: JEFFREY BRITT MD  Date:     09/26/17  Time:    10:45              Narrative:    One-view chest x-ray    Clinical History: Pneumonia    Finding: Comparison was made to a prior examination performed on 9/24/2017. The size of the heart is normal. There has been interval development of patchy areas of increased density scattered throughout the lungs. There is no pneumothorax.  The costophrenic angles are sharp.                             CT Abdomen Pelvis  Without Contrast (Final result)  Result time 09/25/17 07:34:22    Final result by Deuce Olvera MD (09/25/17 07:34:22)                 Impression:       Bilateral parenchymal infiltrates are seen within the  lower lobes.    Mild prominence of the right renal collecting system however no obstructing stone is seen. This can be seen in a setting of recently passed stone.     Left inguinal adenopathy measuring up to 1.4 cm.    Subcentimeter hypodensity is seen within the mid body of the pancreas which is indeterminate. Consider interval followup    All CT scans at this facility use dose modulation, iterative reconstruction and/or weight based dosing when appropriate to reduce radiation dose to as low as reasonably achievable.      Electronically signed by: AURA SHERMAN MD  Date:     09/25/17  Time:    07:34              Narrative:    Indication: Abdominal pain.    Routine noncontrast CT images of the abdomen and pelvis were obtained.    Findings:    Bilateral parenchymal infiltrates are seen within the lower lobes.  Heart size is normal.  No pericardial or pleural effusion.      The liver is normal in size and attenuation on this noncontrast exam.  Status post cholecystectomy The  stomach, spleen,  adrenal glands are normal.  Subcentimeter hypodensity is seen within the mid body of the pancreas which is indeterminate. Consider interval followup. Aorta demonstrates moderate atherosclerotic disease.      The kidneys are normal in size shape and position without radiopaque calculus or signs of hydronephrosis. Mild prominence of the right renal collecting system however no obstructing stone is seen. This can be seen in a setting of recently passed stone. The bladder is incompletely distended.     The visualized loops of small bowel are unremarkable.The appendix is visualized in the right lower quadrant.  There is no inflammation. Colon demonstrates diverticulosis without evidence of diverticulitis. Mild constipation    Left inguinal adenopathy..      Bones appear intact with moderate degenerative changes                             CTA Chest Non-Coronary (PE Study) (Final result)     Abnormal  Result time 09/25/17 07:39:18     Final result by Deuce Sherman MD (09/25/17 07:39:18)                 Impression:         Bilateral pulmonary infiltrates are seen some with a micronodular pattern which can be seen in infectious (typical or atypical) and or inflammatory process. Followup to resolution is recommended.    Mediastinal and bilateral hilar adenopathy which can be seen in the setting of infectious/reactive process versus neoplastic versus lymphoproliferative disorder.    No evidence of PE.    Fatty liver.    All CT scans at this facility use dose modulation, iterative reconstruction, and/or weight base dosing when appropriate to reduce radiation dose to as low as reasonably achievable.      Electronically signed by: DEUCE SHERMAN MD  Date:     09/25/17  Time:    07:39              Narrative:    Clinical data: Chest pain.    Axial CT images through the chest after administration of contrast was performed according to PE study protocol using 100 cc Omni 350.    Findings:    The pulmonary arterial system is well opacified with no evidence of filling defect to suggest pulmonary embolus or thrombus.    Structures of the base of the neck are normal.    The heart and pericardium are unremarkable.  Trace pericardial effusion.  Mediastinal structures including great vessels, trachea, esophagus are intact.    Mediastinal and bilateral hilar adenopathy which may be reactive.      Bilateral pulmonary infiltrates are seen some with a micronodular pattern which can be seen in infectious or inflammatory process. Mild emphysematous changes are seen within the lungs bilaterally.    Surrounding chest wall structures, visualized abdominal organs, bones are unremarkable. Cholecystectomy and mild fatty infiltration of liver suspected.                             X-Ray Chest AP Portable (Final result)  Result time 09/24/17 22:22:56    Final result by Kwasi Ramos MD (09/24/17 22:22:56)                 Impression:         Negative portable chest  x-ray.      Electronically signed by: CELIA BILLINGS MD  Date:     09/24/17  Time:    22:22              Narrative:    Portable chest x-ray. 09/24/17 22:13:49    Clinical indication:  sepsis    Heart size is normal. The lung fields are clear. No acute cardiopulmonary infiltrate.                             RADIOLOGY REPORT (Final result)  Result time 09/26/17 15:17:37

## 2017-10-02 VITALS
SYSTOLIC BLOOD PRESSURE: 124 MMHG | RESPIRATION RATE: 17 BRPM | BODY MASS INDEX: 27.51 KG/M2 | HEIGHT: 64 IN | OXYGEN SATURATION: 95 % | WEIGHT: 161.13 LBS | DIASTOLIC BLOOD PRESSURE: 59 MMHG | TEMPERATURE: 99 F | HEART RATE: 118 BPM

## 2017-10-02 PROBLEM — E83.42 HYPOMAGNESEMIA: Status: RESOLVED | Noted: 2017-09-26 | Resolved: 2017-10-02

## 2017-10-02 PROBLEM — A41.9 SEPSIS: Status: RESOLVED | Noted: 2017-09-25 | Resolved: 2017-10-02

## 2017-10-02 PROBLEM — D50.9 IRON DEFICIENCY ANEMIA: Status: ACTIVE | Noted: 2017-09-27

## 2017-10-02 PROBLEM — R07.9 CHEST PAIN: Status: RESOLVED | Noted: 2017-09-25 | Resolved: 2017-10-02

## 2017-10-02 PROBLEM — Z72.0 TOBACCO ABUSE: Status: RESOLVED | Noted: 2017-09-25 | Resolved: 2017-10-02

## 2017-10-02 LAB
ANION GAP SERPL CALC-SCNC: 11 MMOL/L
BASOPHILS # BLD AUTO: 0.04 K/UL
BASOPHILS NFR BLD: 0.4 %
BUN SERPL-MCNC: 7 MG/DL
CALCIUM SERPL-MCNC: 9.5 MG/DL
CHLORIDE SERPL-SCNC: 101 MMOL/L
CO2 SERPL-SCNC: 28 MMOL/L
CREAT SERPL-MCNC: 0.8 MG/DL
DIFFERENTIAL METHOD: ABNORMAL
EOSINOPHIL # BLD AUTO: 0.2 K/UL
EOSINOPHIL NFR BLD: 2.2 %
ERYTHROCYTE [DISTWIDTH] IN BLOOD BY AUTOMATED COUNT: 15.4 %
EST. GFR  (AFRICAN AMERICAN): >60 ML/MIN/1.73 M^2
EST. GFR  (NON AFRICAN AMERICAN): >60 ML/MIN/1.73 M^2
GLUCOSE SERPL-MCNC: 135 MG/DL
HCT VFR BLD AUTO: 34 %
HGB BLD-MCNC: 10.4 G/DL
LYMPHOCYTES # BLD AUTO: 3.6 K/UL
LYMPHOCYTES NFR BLD: 35.3 %
MAGNESIUM SERPL-MCNC: 1.7 MG/DL
MCH RBC QN AUTO: 27 PG
MCHC RBC AUTO-ENTMCNC: 30.6 G/DL
MCV RBC AUTO: 88 FL
MONOCYTES # BLD AUTO: 0.6 K/UL
MONOCYTES NFR BLD: 5.6 %
NEUTROPHILS # BLD AUTO: 5.7 K/UL
NEUTROPHILS NFR BLD: 56.5 %
PHOSPHATE SERPL-MCNC: 3.8 MG/DL
PLATELET # BLD AUTO: 475 K/UL
PMV BLD AUTO: 9.1 FL
POCT GLUCOSE: 131 MG/DL (ref 70–110)
POCT GLUCOSE: 162 MG/DL (ref 70–110)
POTASSIUM SERPL-SCNC: 4.5 MMOL/L
RBC # BLD AUTO: 3.85 M/UL
SODIUM SERPL-SCNC: 140 MMOL/L
WBC # BLD AUTO: 10.14 K/UL

## 2017-10-02 PROCEDURE — 25000003 PHARM REV CODE 250: Performed by: INTERNAL MEDICINE

## 2017-10-02 PROCEDURE — 25000242 PHARM REV CODE 250 ALT 637 W/ HCPCS: Performed by: INTERNAL MEDICINE

## 2017-10-02 PROCEDURE — 36415 COLL VENOUS BLD VENIPUNCTURE: CPT

## 2017-10-02 PROCEDURE — 85025 COMPLETE CBC W/AUTO DIFF WBC: CPT

## 2017-10-02 PROCEDURE — 99900035 HC TECH TIME PER 15 MIN (STAT)

## 2017-10-02 PROCEDURE — 27000221 HC OXYGEN, UP TO 24 HOURS

## 2017-10-02 PROCEDURE — 90686 IIV4 VACC NO PRSV 0.5 ML IM: CPT | Performed by: NURSE PRACTITIONER

## 2017-10-02 PROCEDURE — 90471 IMMUNIZATION ADMIN: CPT | Performed by: NURSE PRACTITIONER

## 2017-10-02 PROCEDURE — 63600175 PHARM REV CODE 636 W HCPCS: Performed by: NURSE PRACTITIONER

## 2017-10-02 PROCEDURE — 25000003 PHARM REV CODE 250: Performed by: NURSE PRACTITIONER

## 2017-10-02 PROCEDURE — 80048 BASIC METABOLIC PNL TOTAL CA: CPT

## 2017-10-02 PROCEDURE — 96374 THER/PROPH/DIAG INJ IV PUSH: CPT

## 2017-10-02 PROCEDURE — 25000003 PHARM REV CODE 250: Performed by: FAMILY MEDICINE

## 2017-10-02 PROCEDURE — 94664 DEMO&/EVAL PT USE INHALER: CPT

## 2017-10-02 PROCEDURE — 84100 ASSAY OF PHOSPHORUS: CPT

## 2017-10-02 PROCEDURE — 94640 AIRWAY INHALATION TREATMENT: CPT

## 2017-10-02 PROCEDURE — 83735 ASSAY OF MAGNESIUM: CPT

## 2017-10-02 PROCEDURE — 3E0234Z INTRODUCTION OF SERUM, TOXOID AND VACCINE INTO MUSCLE, PERCUTANEOUS APPROACH: ICD-10-PCS | Performed by: INTERNAL MEDICINE

## 2017-10-02 PROCEDURE — 27000173 HC ACAPELLA DEVICE DH OR DM

## 2017-10-02 RX ORDER — POLYETHYLENE GLYCOL 3350 17 G/17G
17 POWDER, FOR SOLUTION ORAL DAILY PRN
Qty: 30 PACKET | Refills: 0 | Status: SHIPPED | OUTPATIENT
Start: 2017-10-02 | End: 2017-11-01

## 2017-10-02 RX ORDER — AMOXICILLIN AND CLAVULANATE POTASSIUM 500; 125 MG/1; MG/1
1 TABLET, FILM COATED ORAL 3 TIMES DAILY
Qty: 21 TABLET | Refills: 0 | Status: SHIPPED | OUTPATIENT
Start: 2017-10-02 | End: 2017-10-09

## 2017-10-02 RX ORDER — LEVALBUTEROL INHALATION SOLUTION 0.63 MG/3ML
0.63 SOLUTION RESPIRATORY (INHALATION) EVERY 6 HOURS
Qty: 120 ML | Refills: 0 | Status: SHIPPED | OUTPATIENT
Start: 2017-10-02 | End: 2017-11-01

## 2017-10-02 RX ORDER — BUDESONIDE 0.5 MG/2ML
0.5 INHALANT ORAL EVERY 12 HOURS
Qty: 120 ML | Refills: 0 | Status: SHIPPED | OUTPATIENT
Start: 2017-10-02 | End: 2022-02-28

## 2017-10-02 RX ORDER — FERROUS SULFATE 325(65) MG
325 TABLET, DELAYED RELEASE (ENTERIC COATED) ORAL 2 TIMES DAILY
Qty: 60 TABLET | Refills: 0 | Status: SHIPPED | OUTPATIENT
Start: 2017-10-02 | End: 2017-11-01

## 2017-10-02 RX ORDER — BENZONATATE 100 MG/1
100 CAPSULE ORAL 3 TIMES DAILY PRN
Qty: 30 CAPSULE | Refills: 0 | Status: SHIPPED | OUTPATIENT
Start: 2017-10-02 | End: 2017-10-12

## 2017-10-02 RX ORDER — ERGOCALCIFEROL 1.25 MG/1
50000 CAPSULE ORAL
Qty: 4 CAPSULE | Refills: 0 | Status: SHIPPED | OUTPATIENT
Start: 2017-10-04 | End: 2017-10-26

## 2017-10-02 RX ORDER — IPRATROPIUM BROMIDE 0.5 MG/2.5ML
500 SOLUTION RESPIRATORY (INHALATION) EVERY 6 HOURS
Qty: 300 ML | Refills: 0 | Status: SHIPPED | OUTPATIENT
Start: 2017-10-02 | End: 2017-11-01

## 2017-10-02 RX ORDER — INSULIN PUMP SYRINGE, 3 ML
EACH MISCELLANEOUS
Qty: 1 EACH | Refills: 0 | Status: SHIPPED | OUTPATIENT
Start: 2017-10-02 | End: 2022-02-28 | Stop reason: ALTCHOICE

## 2017-10-02 RX ORDER — ACETAMINOPHEN AND CODEINE PHOSPHATE 300; 30 MG/1; MG/1
1 TABLET ORAL EVERY 4 HOURS PRN
Qty: 20 TABLET | Refills: 0 | Status: SHIPPED | OUTPATIENT
Start: 2017-10-02 | End: 2017-10-09

## 2017-10-02 RX ORDER — AMOXICILLIN AND CLAVULANATE POTASSIUM 500; 125 MG/1; MG/1
1 TABLET, FILM COATED ORAL 3 TIMES DAILY
Status: DISCONTINUED | OUTPATIENT
Start: 2017-10-02 | End: 2017-10-02 | Stop reason: HOSPADM

## 2017-10-02 RX ADMIN — IPRATROPIUM BROMIDE 0.5 MG: 0.5 SOLUTION RESPIRATORY (INHALATION) at 12:10

## 2017-10-02 RX ADMIN — LEVALBUTEROL 0.63 MG: 0.63 SOLUTION RESPIRATORY (INHALATION) at 12:10

## 2017-10-02 RX ADMIN — PANTOPRAZOLE SODIUM 40 MG: 40 TABLET, DELAYED RELEASE ORAL at 09:10

## 2017-10-02 RX ADMIN — DOXYCYCLINE 100 MG: 100 INJECTION, POWDER, LYOPHILIZED, FOR SOLUTION INTRAVENOUS at 01:10

## 2017-10-02 RX ADMIN — GUAIFENESIN 200 MG: 100 SOLUTION ORAL at 09:10

## 2017-10-02 RX ADMIN — AMOXICILLIN AND CLAVULANATE POTASSIUM 500 MG: 500; 125 TABLET, FILM COATED ORAL at 01:10

## 2017-10-02 RX ADMIN — GUAIFENESIN 200 MG: 100 SOLUTION ORAL at 01:10

## 2017-10-02 RX ADMIN — LEVALBUTEROL 0.63 MG: 0.63 SOLUTION RESPIRATORY (INHALATION) at 07:10

## 2017-10-02 RX ADMIN — INFLUENZA A VIRUS A/MICHIGAN/45/2015 X-275 (H1N1) ANTIGEN (FORMALDEHYDE INACTIVATED), INFLUENZA A VIRUS A/HONG KONG/4801/2014 X-263B (H3N2) ANTIGEN (FORMALDEHYDE INACTIVATED), INFLUENZA B VIRUS B/PHUKET/3073/2013 ANTIGEN (FORMALDEHYDE INACTIVATED), AND INFLUENZA B VIRUS B/BRISBANE/60/2008 ANTIGEN (FORMALDEHYDE INACTIVATED) 0.5 ML: 15; 15; 15; 15 INJECTION, SUSPENSION INTRAMUSCULAR at 01:10

## 2017-10-02 RX ADMIN — GUAIFENESIN 200 MG: 100 SOLUTION ORAL at 05:10

## 2017-10-02 RX ADMIN — FERROUS SULFATE TAB EC 325 MG (65 MG FE EQUIVALENT) 325 MG: 325 (65 FE) TABLET DELAYED RESPONSE at 09:10

## 2017-10-02 RX ADMIN — BUDESONIDE 0.5 MG: 0.5 SUSPENSION RESPIRATORY (INHALATION) at 07:10

## 2017-10-02 RX ADMIN — METOPROLOL SUCCINATE 50 MG: 50 TABLET, EXTENDED RELEASE ORAL at 09:10

## 2017-10-02 RX ADMIN — IPRATROPIUM BROMIDE 0.5 MG: 0.5 SOLUTION RESPIRATORY (INHALATION) at 07:10

## 2017-10-02 NOTE — NURSING
avs summary given to pt and reviewed with pt. Pt verbalizes understanding of discharge instructions, prescription medications (handouts given and reviewed with pt), and f/u appts.    Heart monitor returned to monitor tech.    Pt waiting for ride home.

## 2017-10-02 NOTE — DISCHARGE SUMMARY
[facility] Hospital Medicine  Discharge Summary      Patient Name: Genie Balderas  MRN: [id number]  Admission Date: 9/24/2017  Hospital Length of Stay: 7 days  Discharge Date and Time:  10/02/2017 11:49 AM  Attending Physician: Rebel Cardenas MD   Discharging Provider: Rebel Cardenas MD  Primary Care Provider: Ryan Sims MD      HPI:   Genie Balderas is a 57 y.o. female patient who presented to the Emergency Department for complaint of Cough that started 2 days ago. The cough is non productive. Associated symptoms include Sharp Like CP to Left chest radiates to back, that stated today due to coughing, and, fatigue, fever, n/v/d. Symptoms are intermittent and moderate in severity. OTC meds and nebs not helping at home.  No mitigating or exacerbating factors reported.  Patient denies any SOB, leg swelling, palpitations, HA, lightheadedness, dizziness, numbness, weakness, and all other sxs at this time. No further complaints or concerns at this time.        * No surgery found *      Indwelling Lines/Drains at time of discharge:   Lines/Drains/Airways          No matching active lines, drains, or airways        Hospital Course:   Patient was placed on OBS for sepsis d/t bilat lower lobe pneumonia. She was started on IV abx, breathing treatments, and mucinex. Patient improving slowly - moved to Inpatient since will need extended IV abx.     9/26;  Some better;  Has faint posterior wheezing today;Continued cough; upper back pain on left, with the cough  On for repeat CXR today; also incentive spirometry added; also tylenol with codeine for coughtand pain complaint.    9/27:  Looks and feels better;  Still with back pain though less;  Had restful night overnight; working with incentive device once hourly while awake.    9/28:  Still with cough and bilateral chest/flank discomfort;  Tender to palpation moreso on the left;  Likely muculoskeletal; no fever or chills reported; last CXR was 9/26 9/29:   Looks and feels some better; still with cough, but less; repeat cxr unchanged.  Will continue present regimen;  Adding spiriva to regiemn    9/30:  Some better; still with complaints of weakness; mira wet sounding cough; chest pain less; CXR no appreciable change; patient afebrile with stable vital signs.she's tolerating her diet;  Has been out of bed to the toilet.    10/1:  Looks and feels some better;;continues with wet sounding cough;  Chest xray shows clearing on the right with persistent infiltrate left upper lung.  Faint wheezing on posterior exam with clearing after cough and frappage.      10/2:  Looks and feels some better; still complains of weakness; up to the bathroom without difficulty;less cough and back pain reported;  CXR showing signs of clearing  Will evaluate for home oxygen today; also placing on oral antibiotic with augmentin.    10/2 Patient was changed over to oral antibiotics;  She was subjected to an exercise oxymetry study and noted to dip to 87% sat;  She will be discharged home on nasla canula O-2 at 2l/min and will obtain a follow up CXR upon completion of antibiotic therapy    She ruled in for iron deficiency as well as vitamin D deficiency and will continue on suplementation;  A home nebulizer is being provided for her in addition to renewed prescriptions for her nebulizer solution ;  Her chest x-ray was monitored and was showing signs of improvement by discharge;  A Glucometer is being provided her and she will monitor her blood sugars in routine fashion.She was seen and examined at discharge and was felt stable for home.  She will follow with her PCP and is advised to undergo repeat CXR to document complete resolution.     Consults:     Significant Diagnostic Studies: Labs:   BMP:   Recent Labs  Lab 10/01/17  0459 10/02/17  0535   * 135*    140   K 4.2 4.5    101   CO2 32* 28   BUN 4* 7   CREATININE 0.7 0.8   CALCIUM 9.3 9.5   MG 1.7 1.7    and CBC   Recent  Labs  Lab 10/01/17  0459 10/02/17  0535   WBC 10.95 10.14   HGB 10.6* 10.4*   HCT 34.9* 34.0*   * 475*     Radiology: X-Ray: CXR: X-Ray Chest 1 View (CXR):   Results for orders placed or performed during the hospital encounter of 09/24/17   X-Ray Chest 1 View    Narrative    One-view chest x-ray    Clinical History: Pneumonia    Finding: Comparison was made to a prior examination performed on 9/24/2017. The size of the heart is normal. There has been interval development of patchy areas of increased density scattered throughout the lungs. There is no pneumothorax.  The costophrenic angles are sharp.    Impression      There has been interval development of patchy areas of increased density scattered throughout the lungs. This is consistent with the patient's history and characteristic of pneumonia.      Electronically signed by: JEFFREY RUFFIN MD  Date:     09/26/17  Time:    10:45      Imaging Results          X-Ray Chest PA And Lateral (Final result)  Result time 10/01/17 10:31:29    Final result by Maura Grace MD (10/01/17 10:31:29)                 Impression:     See above.      Electronically signed by: MAURA GRACE MD  Date:     10/01/17  Time:    10:31              Narrative:    Exam: Two-view chest xray    History:    Bilateral pneumonia    Findings:     The heart is normal in size.  Right lung is now clear.  Persistent nodular infiltrate in the left upper lobe slightly improved compared to 09/29/2017.  Continued followup recommended.                             X-Ray Chest PA And Lateral (Final result)  Result time 09/29/17 10:17:58    Final result by Herb Rausch MD (09/29/17 10:17:58)                 Impression:      No significant improvement in bilateral pulmonary infiltrates when compared to 09/20/2017.  Please see above.      Electronically signed by: HERB RAUSCH MD  Date:     09/29/17  Time:    10:17              Narrative:    EXAM: Chest X-ray PA and Lateral    CLINICAL HISTORY:      Pneumonia, subsequent encountered.    COMPARISON STUDIES:     Chest x-ray 09/28/2017 chest CT 09/25/2017 10 chest x-ray 09/24/2017.    FINDINGS:    Normal heart size.  Atherosclerotic aortic arch.  There are patchy opacities within the right pulmonary base, left upper lung and left lower lung which appear stable compared to 09/28/2017.  Surgical clips within the right upper quadrant.. Ribs appear intact.                             X-Ray Chest PA And Lateral (Final result)  Result time 09/28/17 11:11:02    Final result by Triston Warner MD (09/28/17 11:11:02)                 Impression:      Stable chest x-ray with patchy multifocal infiltrate most pronounced in the left suprahilar and right infrahilar lung zones.  No change.      Electronically signed by: TRISTON WARNER MD  Date:     09/28/17  Time:    11:11              Narrative:    Exam: XR CHEST PA AND LATERAL,    Date:  09/28/17 10:45:05    History: Bilateral pneumonia    Comparison:  09/24/2017 and 09/26/2017.    Findings: The heart size is normal.  Aortic arch is minimally calcified.    There are increased perihilar markings present.  Left suprahilar infiltrate in right infrahilar infiltrates are again noted.  Allowing for the difference in technique no definite interval change.    No evidence of pleural effusion.  Right upper quadrant surgical clips noted.                             X-Ray Chest 1 View (Final result)  Result time 09/26/17 10:45:23   Procedure changed from X-Ray Chest PA And Lateral     Final result by JEFFREY Birtt Sr., MD (09/26/17 10:45:23)                 Impression:      There has been interval development of patchy areas of increased density scattered throughout the lungs. This is consistent with the patient's history and characteristic of pneumonia.      Electronically signed by: JEFFREY BRITT MD  Date:     09/26/17  Time:    10:45              Narrative:    One-view chest x-ray    Clinical History: Pneumonia    Finding:  Comparison was made to a prior examination performed on 9/24/2017. The size of the heart is normal. There has been interval development of patchy areas of increased density scattered throughout the lungs. There is no pneumothorax.  The costophrenic angles are sharp.                             CT Abdomen Pelvis  Without Contrast (Final result)  Result time 09/25/17 07:34:22    Final result by Deuce Sherman MD (09/25/17 07:34:22)                 Impression:       Bilateral parenchymal infiltrates are seen within the lower lobes.    Mild prominence of the right renal collecting system however no obstructing stone is seen. This can be seen in a setting of recently passed stone.     Left inguinal adenopathy measuring up to 1.4 cm.    Subcentimeter hypodensity is seen within the mid body of the pancreas which is indeterminate. Consider interval followup    All CT scans at this facility use dose modulation, iterative reconstruction and/or weight based dosing when appropriate to reduce radiation dose to as low as reasonably achievable.      Electronically signed by: DEUCE SHERMAN MD  Date:     09/25/17  Time:    07:34              Narrative:    Indication: Abdominal pain.    Routine noncontrast CT images of the abdomen and pelvis were obtained.    Findings:    Bilateral parenchymal infiltrates are seen within the lower lobes.  Heart size is normal.  No pericardial or pleural effusion.      The liver is normal in size and attenuation on this noncontrast exam.  Status post cholecystectomy The  stomach, spleen,  adrenal glands are normal.  Subcentimeter hypodensity is seen within the mid body of the pancreas which is indeterminate. Consider interval followup. Aorta demonstrates moderate atherosclerotic disease.      The kidneys are normal in size shape and position without radiopaque calculus or signs of hydronephrosis. Mild prominence of the right renal collecting system however no obstructing stone is seen. This can be  seen in a setting of recently passed stone. The bladder is incompletely distended.     The visualized loops of small bowel are unremarkable.The appendix is visualized in the right lower quadrant.  There is no inflammation. Colon demonstrates diverticulosis without evidence of diverticulitis. Mild constipation    Left inguinal adenopathy..      Bones appear intact with moderate degenerative changes                             CTA Chest Non-Coronary (PE Study) (Final result)     Abnormal  Result time 09/25/17 07:39:18    Final result by Deuce Sherman MD (09/25/17 07:39:18)                 Impression:         Bilateral pulmonary infiltrates are seen some with a micronodular pattern which can be seen in infectious (typical or atypical) and or inflammatory process. Followup to resolution is recommended.    Mediastinal and bilateral hilar adenopathy which can be seen in the setting of infectious/reactive process versus neoplastic versus lymphoproliferative disorder.    No evidence of PE.    Fatty liver.    All CT scans at this facility use dose modulation, iterative reconstruction, and/or weight base dosing when appropriate to reduce radiation dose to as low as reasonably achievable.      Electronically signed by: DEUCE SHERMAN MD  Date:     09/25/17  Time:    07:39              Narrative:    Clinical data: Chest pain.    Axial CT images through the chest after administration of contrast was performed according to PE study protocol using 100 cc Omni 350.    Findings:    The pulmonary arterial system is well opacified with no evidence of filling defect to suggest pulmonary embolus or thrombus.    Structures of the base of the neck are normal.    The heart and pericardium are unremarkable.  Trace pericardial effusion.  Mediastinal structures including great vessels, trachea, esophagus are intact.    Mediastinal and bilateral hilar adenopathy which may be reactive.      Bilateral pulmonary infiltrates are seen some with a  micronodular pattern which can be seen in infectious or inflammatory process. Mild emphysematous changes are seen within the lungs bilaterally.    Surrounding chest wall structures, visualized abdominal organs, bones are unremarkable. Cholecystectomy and mild fatty infiltration of liver suspected.                             X-Ray Chest AP Portable (Final result)  Result time 09/24/17 22:22:56    Final result by Celia Billings MD (09/24/17 22:22:56)                 Impression:         Negative portable chest x-ray.      Electronically signed by: CELIA BILLINGS MD  Date:     09/24/17  Time:    22:22              Narrative:    Portable chest x-ray. 09/24/17 22:13:49    Clinical indication:  sepsis    Heart size is normal. The lung fields are clear. No acute cardiopulmonary infiltrate.                             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] 10/01/2017 Yes    Vitamin D deficiency [E55.9] 09/28/2017 Yes    Iron deficiency anemia [D50.9] 09/27/2017 Yes    HTN (hypertension) [I10] 09/25/2017 Yes      Problems Resolved During this Admission:    Diagnosis Date Noted Date Resolved POA    PRINCIPAL PROBLEM:  Sepsis due to bilateral lower lobe pneumonia  [A41.9] 09/25/2017 10/02/2017 Yes    Hypokalemia [E87.6] 09/26/2017 09/28/2017 Yes    Hypomagnesemia [E83.42] 09/26/2017 10/02/2017 Yes    Leukocytosis [D72.829] 09/26/2017 09/28/2017 Yes    Chest pain [R07.9] 09/25/2017 10/02/2017 Yes    Hypoxia [R09.02] 09/25/2017 09/27/2017 Yes    Tachycardia [R00.0] 09/25/2017 09/27/2017 Yes    Hypophosphatemia [E83.39] 09/25/2017 09/28/2017 Yes    Tobacco abuse [Z72.0] 09/25/2017 10/02/2017 Yes      No new Assessment & Plan notes have been filed under this hospital service since the last note was generated.  Service: [facility]  Medicine      Discharged Condition: good X-Ray Chest PA And Lateral   Status: Final result   MyChart Results Release     MyChart Status: Pending Results Release   PACS Images     Show images for X-Ray Chest PA And Lateral   External Result Report     External Result Report   Narrative     Exam: Two-view chest xray    History:    Bilateral pneumonia    Findings:     The heart is normal in size.  Right lung is now clear.  Persistent nodular infiltrate in the left upper lobe slightly improved compared to 09/29/2017.  Continued followup recommended.   Impression      See above.      Electronically signed by: MAURA PERKINS MD  Date: 10/01/17  Time: 10:31     Encounter     View Encounter          Signed by     Signed Credentials Date/Time  Phone Pager   MAURA PERKINS MD 10/01/2017 10:31 [phone]    Exam Details     Performed Procedure Technologist Supporting Staff Performing Physician   X-Ray Chest PA And Lateral Rachana Lucio, RT Deuce Delgado, RT       Appointment Date/Status Modality Department    10/1/2017     Arrived [facility] PORTXR3 [facility] XRAY       Begin Exam End Exam Begin Exam Questionnaires End Exam Questionnaires   10/1/2017 10:27 AM 10/1/2017 10:27 AM RIS PREGNANCY TECH NAVIGATOR IMAGING END ALL      X-Ray Chest PA And Lateral [[id number]] (Order [id number])   Imaging   Date and Time: 10/1/2017  9:05 AM Department: [facility] Telemetry Rel By/Authorizing: Rebel Cardenas MD (auto-released)   Parent Order Information     Date and Time Department Ordering/Authorizing   10/1/2017  9:05 AM [facility] Rebel Cardenas MD   Order Information     Order Date/Time Release Date/Time Start Date/Time End Date/Time   10/01/17 09:05 AM 10/01/17 09:05 AM 10/01/17 09:04 AM 10/01/17 09:04 AM   Order Details     Frequency Duration Priority Order Class   1 time imaging 1  occurrence Routine Hospital Performed    Collection Information      Collection Information     Order Provider Info         Office phone Pager E-mail    Ordering User Rebel Cardenas [phone] -- [email or web address]   Authorizing Provider Rebel Cardenas [phone] -- --   Attending Provider Rebel Cardenas [phone] -- --   Billing Provider Rebel Cardenas [phone] -- --   Reprint Order Requisition     X-Ray Chest PA And Lateral (Order #[id number]) on 10/1/17   Exam Details     Performed Procedure Technologist Supporting Staff Performing Physician   X-Ray Chest PA And Lateral Rachana Lucio, RT Deuce Delgado, RT       Appointment Date/Status Modality Department    10/1/2017     Arrived [facility] XRAY       Begin Exam End Exam Begin Exam Questionnaires End Exam Questionnaires   10/1/2017 10:27 AM 10/1/2017 10:27 AM RIS PREGNANCY TECH NAVIGATOR IMAGING END ALL      X-Ray Chest PA And Lateral [[id number]]     Electronically signed by: Rebel Cardenas MD on 10/01/17 0905 Status: Completed   This order may be acted on in another encounter.   Ordering user: Rebel Cardenas MD 10/01/17 0905 Ordering provider: Rebel Cardenas MD   Authorized by: Rebel Cardenas MD Ordering mode: Standard   Questionnaire     Question Answer   Reason for exam: status of bilateral pneumonia   Is the patient pregnant? No   May the Radiologist modify the order per protocol to meet the clinical needs of the patient? Yes         Disposition: Home or Self Care    Follow Up:  Follow-up Information     Ryan Sims MD In 1 week.    Specialty:  Family Medicine  Why:  needs followup chest xray  Contact information:  [id number] [address]  [phone]             Ryan Sims MD In 1 week.    Specialty:  Family Medicine  Why:  needs follow up CXR at completion of therapy  Contact information:  [id number] [address]  [phone]                 Patient Instructions:     OXYGEN FOR HOME USE   Order Specific Question Answer Comments   Liter Flow 2    Duration Continuous    Qualifying SpO2: 87%    Testing  "done at: Exercise/Activity    Route nasal cannula    Device home concentrator with portable unit    Length of need (in months): 99 mos    Patient condition with qualifying saturation other chronic pulmonary condition pnuemonia with bronchospam   Height: 5' 4" (1.626 m)    Weight: 73.1 kg (161 lb 1.6 oz)    Does patient have medical equipment at home? none    Alternative treatment measures have been tried or considered and deemed clinically ineffective. Yes      NEBULIZER FOR HOME USE   Order Specific Question Answer Comments   Height: 5' 4" (1.626 m)    Weight: 73.1 kg (161 lb 1.6 oz)    Does patient have medical equipment at home? none    Length of need (1-99 months): 99      NEBULIZER KIT (SUPPLIES) FOR HOME USE   Order Specific Question Answer Comments   Height: 5' 4" (1.626 m)    Weight: 73.1 kg (161 lb 1.6 oz)    Does patient have medical equipment at home? none    Length of need (1-99 months): 99    Mask or Mouthpiece? Mask      Diet Diabetic 1800 Calories     Diet Low Sodium, 2gm     Call MD for:  persistent nausea and vomiting or diarrhea     Call MD for:  severe uncontrolled pain     Call MD for:  redness, tenderness, or signs of infection (pain, swelling, redness, odor or green/yellow discharge around incision site)     Call MD for:   Order Comments: Voiding difficulty       Medications:  Reconciled Home Medications:   Current Discharge Medication List      START taking these medications    Details   acetaminophen-codeine 300-30mg (TYLENOL #3) 300-30 mg Tab Take 1 tablet by mouth every 4 (four) hours as needed.  Qty: 20 tablet, Refills: 0      amoxicillin-clavulanate 500-125mg (AUGMENTIN) 500-125 mg Tab Take 1 tablet (500 mg total) by mouth 3 (three) times daily.  Qty: 21 tablet, Refills: 0      benzonatate (TESSALON) 100 MG capsule Take 1 capsule (100 mg total) by mouth 3 (three) times daily as needed for Cough.  Qty: 30 capsule, Refills: 0      blood sugar diagnostic Strp QAC and QHS  Qty: 100 strip, " Refills: 0      blood-glucose meter kit Use as instructed  Qty: 1 each, Refills: 0      budesonide (PULMICORT) 0.5 mg/2 mL nebulizer solution Take 2 mLs (0.5 mg total) by nebulization every 12 (twelve) hours. Controller  Qty: 120 mL, Refills: 0      ergocalciferol (ERGOCALCIFEROL) 50,000 unit Cap Take 1 capsule (50,000 Units total) by mouth every 7 days.  Qty: 4 capsule, Refills: 0      ferrous sulfate 325 (65 FE) MG EC tablet Take 1 tablet (325 mg total) by mouth 2 (two) times daily.  Qty: 60 tablet, Refills: 0      ipratropium (ATROVENT) 0.02 % nebulizer solution Take 2.5 mLs (500 mcg total) by nebulization every 6 (six) hours. Rescue  Qty: 300 mL, Refills: 0      levalbuterol (XOPENEX) 0.63 mg/3 mL nebulizer solution Take 3 mLs (0.63 mg total) by nebulization every 6 (six) hours. Rescue  Qty: 120 mL, Refills: 0      polyethylene glycol (GLYCOLAX) 17 gram PwPk Take 17 g by mouth daily as needed.  Qty: 30 packet, Refills: 0         CONTINUE these medications which have NOT CHANGED    Details   budesonide-formoterol 160-4.5 mcg (SYMBICORT) 160-4.5 mcg/actuation HFAA Inhale 2 puffs into the lungs every 12 (twelve) hours. Controller      diltiaZEM (CARDIZEM) 90 MG tablet Take 90 mg by mouth 4 (four) times daily.      metformin (GLUCOPHAGE) 500 MG tablet Take 500 mg by mouth 2 (two) times daily with meals.      metoprolol succinate (TOPROL-XL) 50 MG 24 hr tablet Take 50 mg by mouth once daily.      montelukast (SINGULAIR) 10 mg tablet Take 10 mg by mouth every evening.      rivaroxaban (XARELTO) 20 mg Tab Take 20 mg by mouth daily with dinner or evening meal.         STOP taking these medications       albuterol (ACCUNEB) 0.63 mg/3 mL Nebu Comments:   Reason for Stopping:             Time spent on the discharge of patient: 40 minutes      Rebel Montes MD  Department of Hospital Medicine  [facility]

## 2017-10-02 NOTE — PROGRESS NOTES
Home Oxygen Evaluation    Date Performed: 10/2/2017    1) Patient's Home O2 Sat on room air, while at rest: 94        If O2 sats on room air at rest are 88% or below, patient qualifies. No additional testing needed. Document N/A in steps 2 and 3. If 89% or above, complete steps 2.      2) Patient's O2 Sat on room air while exercisin        If O2 sats on room air while exercising remain 89% or above patient does not qualify, no further testing needed Document N/A in step 3. If O2 sats on room air while exercising are 88% or below, continue to step 3.      3) Patient's O2 Sat while exercising on O2: 94 at 2 LPM         (Must show improvement from #2 for patients to qualify)    If O2 sats improve on oxygen, patient qualifies for portable oxygen. If not, the patient does not qualify.

## 2017-10-02 NOTE — ASSESSMENT & PLAN NOTE
-Improving with O2  -Repeat ABGs if need  -Patient someday smoker and asthmatic, Possibly with undiagnosed COPD and now with PNE acutely worsening  counseled on smoking cessation.   -OP follow up with pulmonary     6/26: continue present regimen with monitoring    10/1: satisfactory sats now on room air.    10/2:  Patient qualifies for home oxygen per respiratory evaluation.

## 2017-10-02 NOTE — PLAN OF CARE
Problem: Patient Care Overview  Goal: Plan of Care Review  Outcome: Ongoing (interventions implemented as appropriate)  Plan of care reviewed with patient. AAO x3. V/S stable.  Patient on telemetry SR/ST rhythm noted. On 3 LNC. Patient ambulating with assistance, fall precautions in place, patient free from fall/injury. Patient has no questions at this time. Will continue to monitor.

## 2017-10-02 NOTE — SUBJECTIVE & OBJECTIVE
Interval History: looks and feels better; less cough; still feels weak; may qualify for home oxygen.    Review of Systems   Constitutional: Negative for chills, diaphoresis and fever.   HENT: Negative.    Eyes: Negative.    Respiratory: Positive for cough and shortness of breath.    Cardiovascular: Negative.    Gastrointestinal: Negative.    Endocrine: Negative.    Genitourinary: Negative.    Musculoskeletal: Negative.    Hematological: Negative.    Psychiatric/Behavioral: Negative.      Objective:     Vital Signs (Most Recent):  Temp: 98.4 °F (36.9 °C) (10/02/17 0729)  Pulse: 99 (10/02/17 0754)  Resp: 16 (10/02/17 0754)  BP: (!) 118/57 (10/02/17 0729)  SpO2: 95 % (10/02/17 0754) Vital Signs (24h Range):  Temp:  [98.2 °F (36.8 °C)-98.9 °F (37.2 °C)] 98.4 °F (36.9 °C)  Pulse:  [] 99  Resp:  [16-20] 16  SpO2:  [93 %-98 %] 95 %  BP: (106-182)/(57-86) 118/57     Weight: 73.1 kg (161 lb 1.6 oz)  Body mass index is 27.65 kg/m².    Intake/Output Summary (Last 24 hours) at 10/02/17 0959  Last data filed at 10/02/17 0400   Gross per 24 hour   Intake              900 ml   Output             2000 ml   Net            -1100 ml      Physical Exam   Constitutional: She is oriented to person, place, and time. She appears well-developed and well-nourished.   HENT:   Head: Normocephalic and atraumatic.   Nose: Nose normal.   Mouth/Throat: Oropharynx is clear and moist.   Eyes: EOM are normal. Pupils are equal, round, and reactive to light.   Neck: Normal range of motion. Neck supple.   Cardiovascular: Normal rate and regular rhythm.    Pulmonary/Chest: Effort normal and breath sounds normal. She has no wheezes. She has no rales.   Abdominal: Soft. Bowel sounds are normal.   Musculoskeletal: Normal range of motion.   Neurological: She is alert and oriented to person, place, and time.   Skin: Skin is warm and dry. Capillary refill takes less than 2 seconds.   Psychiatric: She has a normal mood and affect. Her behavior is normal.  Judgment and thought content normal.       Significant Labs:   BMP:   Recent Labs  Lab 10/02/17  0535   *      K 4.5      CO2 28   BUN 7   CREATININE 0.8   CALCIUM 9.5   MG 1.7     CBC:   Recent Labs  Lab 10/01/17  0459 10/02/17  0535   WBC 10.95 10.14   HGB 10.6* 10.4*   HCT 34.9* 34.0*   * 475*       Significant Imaging:   Imaging Results          X-Ray Chest PA And Lateral (Final result)  Result time 10/01/17 10:31:29    Final result by Maura Grace MD (10/01/17 10:31:29)                 Impression:     See above.      Electronically signed by: MAURA GRACE MD  Date:     10/01/17  Time:    10:31              Narrative:    Exam: Two-view chest xray    History:    Bilateral pneumonia    Findings:     The heart is normal in size.  Right lung is now clear.  Persistent nodular infiltrate in the left upper lobe slightly improved compared to 09/29/2017.  Continued followup recommended.                             X-Ray Chest PA And Lateral (Final result)  Result time 09/29/17 10:17:58    Final result by Herb Rausch MD (09/29/17 10:17:58)                 Impression:      No significant improvement in bilateral pulmonary infiltrates when compared to 09/20/2017.  Please see above.      Electronically signed by: HERB RAUSCH MD  Date:     09/29/17  Time:    10:17              Narrative:    EXAM: Chest X-ray PA and Lateral    CLINICAL HISTORY:     Pneumonia, subsequent encountered.    COMPARISON STUDIES:     Chest x-ray 09/28/2017 chest CT 09/25/2017 10 chest x-ray 09/24/2017.    FINDINGS:    Normal heart size.  Atherosclerotic aortic arch.  There are patchy opacities within the right pulmonary base, left upper lung and left lower lung which appear stable compared to 09/28/2017.  Surgical clips within the right upper quadrant.. Ribs appear intact.                             X-Ray Chest PA And Lateral (Final result)  Result time 09/28/17 11:11:02    Final result by Abelino Jean Baptiste MD  (09/28/17 11:11:02)                 Impression:      Stable chest x-ray with patchy multifocal infiltrate most pronounced in the left suprahilar and right infrahilar lung zones.  No change.      Electronically signed by: TRISTON WARNER MD  Date:     09/28/17  Time:    11:11              Narrative:    Exam: XR CHEST PA AND LATERAL,    Date:  09/28/17 10:45:05    History: Bilateral pneumonia    Comparison:  09/24/2017 and 09/26/2017.    Findings: The heart size is normal.  Aortic arch is minimally calcified.    There are increased perihilar markings present.  Left suprahilar infiltrate in right infrahilar infiltrates are again noted.  Allowing for the difference in technique no definite interval change.    No evidence of pleural effusion.  Right upper quadrant surgical clips noted.                             X-Ray Chest 1 View (Final result)  Result time 09/26/17 10:45:23   Procedure changed from X-Ray Chest PA And Lateral     Final result by JEFFREY Britt Sr., MD (09/26/17 10:45:23)                 Impression:      There has been interval development of patchy areas of increased density scattered throughout the lungs. This is consistent with the patient's history and characteristic of pneumonia.      Electronically signed by: JEFFREY BRITT MD  Date:     09/26/17  Time:    10:45              Narrative:    One-view chest x-ray    Clinical History: Pneumonia    Finding: Comparison was made to a prior examination performed on 9/24/2017. The size of the heart is normal. There has been interval development of patchy areas of increased density scattered throughout the lungs. There is no pneumothorax.  The costophrenic angles are sharp.                             CT Abdomen Pelvis  Without Contrast (Final result)  Result time 09/25/17 07:34:22    Final result by Deuce Olvera MD (09/25/17 07:34:22)                 Impression:       Bilateral parenchymal infiltrates are seen within the lower lobes.    Mild prominence  of the right renal collecting system however no obstructing stone is seen. This can be seen in a setting of recently passed stone.     Left inguinal adenopathy measuring up to 1.4 cm.    Subcentimeter hypodensity is seen within the mid body of the pancreas which is indeterminate. Consider interval followup    All CT scans at this facility use dose modulation, iterative reconstruction and/or weight based dosing when appropriate to reduce radiation dose to as low as reasonably achievable.      Electronically signed by: DEUCE SHERMAN MD  Date:     09/25/17  Time:    07:34              Narrative:    Indication: Abdominal pain.    Routine noncontrast CT images of the abdomen and pelvis were obtained.    Findings:    Bilateral parenchymal infiltrates are seen within the lower lobes.  Heart size is normal.  No pericardial or pleural effusion.      The liver is normal in size and attenuation on this noncontrast exam.  Status post cholecystectomy The  stomach, spleen,  adrenal glands are normal.  Subcentimeter hypodensity is seen within the mid body of the pancreas which is indeterminate. Consider interval followup. Aorta demonstrates moderate atherosclerotic disease.      The kidneys are normal in size shape and position without radiopaque calculus or signs of hydronephrosis. Mild prominence of the right renal collecting system however no obstructing stone is seen. This can be seen in a setting of recently passed stone. The bladder is incompletely distended.     The visualized loops of small bowel are unremarkable.The appendix is visualized in the right lower quadrant.  There is no inflammation. Colon demonstrates diverticulosis without evidence of diverticulitis. Mild constipation    Left inguinal adenopathy..      Bones appear intact with moderate degenerative changes                             CTA Chest Non-Coronary (PE Study) (Final result)     Abnormal  Result time 09/25/17 07:39:18    Final result by Deuce Sherman,  MD (09/25/17 07:39:18)                 Impression:         Bilateral pulmonary infiltrates are seen some with a micronodular pattern which can be seen in infectious (typical or atypical) and or inflammatory process. Followup to resolution is recommended.    Mediastinal and bilateral hilar adenopathy which can be seen in the setting of infectious/reactive process versus neoplastic versus lymphoproliferative disorder.    No evidence of PE.    Fatty liver.    All CT scans at this facility use dose modulation, iterative reconstruction, and/or weight base dosing when appropriate to reduce radiation dose to as low as reasonably achievable.      Electronically signed by: AURA SHERMAN MD  Date:     09/25/17  Time:    07:39              Narrative:    Clinical data: Chest pain.    Axial CT images through the chest after administration of contrast was performed according to PE study protocol using 100 cc Omni 350.    Findings:    The pulmonary arterial system is well opacified with no evidence of filling defect to suggest pulmonary embolus or thrombus.    Structures of the base of the neck are normal.    The heart and pericardium are unremarkable.  Trace pericardial effusion.  Mediastinal structures including great vessels, trachea, esophagus are intact.    Mediastinal and bilateral hilar adenopathy which may be reactive.      Bilateral pulmonary infiltrates are seen some with a micronodular pattern which can be seen in infectious or inflammatory process. Mild emphysematous changes are seen within the lungs bilaterally.    Surrounding chest wall structures, visualized abdominal organs, bones are unremarkable. Cholecystectomy and mild fatty infiltration of liver suspected.                             X-Ray Chest AP Portable (Final result)  Result time 09/24/17 22:22:56    Final result by Kwasi Ramos MD (09/24/17 22:22:56)                 Impression:         Negative portable chest x-ray.      Electronically signed  by: CELIA BILLINGS MD  Date:     09/24/17  Time:    22:22              Narrative:    Portable chest x-ray. 09/24/17 22:13:49    Clinical indication:  sepsis    Heart size is normal. The lung fields are clear. No acute cardiopulmonary infiltrate.                             RADIOLOGY REPORT (Final result)  Result time 09/26/17 15:17:37

## 2017-10-02 NOTE — DISCHARGE SUMMARY
[facility] Hospital Medicine  Discharge Summary      Patient Name: Genie Balderas  MRN: [id number]  Admission Date: 9/24/2017  Hospital Length of Stay: 7 days  Discharge Date and Time:  10/02/2017 2:35 PM  Attending Physician: Rebel Cardenas MD   Discharging Provider: Rebel Cardenas MD  Primary Care Provider: Ryan Sims MD      HPI:   Genie Balderas is a 57 y.o. female patient who presented to the Emergency Department for complaint of Cough that started 2 days ago. The cough is non productive. Associated symptoms include Sharp Like CP to Left chest radiates to back, that stated today due to coughing, and, fatigue, fever, n/v/d. Symptoms are intermittent and moderate in severity. OTC meds and nebs not helping at home.  No mitigating or exacerbating factors reported.  Patient denies any SOB, leg swelling, palpitations, HA, lightheadedness, dizziness, numbness, weakness, and all other sxs at this time. No further complaints or concerns at this time.        * No surgery found *      Indwelling Lines/Drains at time of discharge:   Lines/Drains/Airways          No matching active lines, drains, or airways        Hospital Course:   Patient was placed on OBS for sepsis d/t bilat lower lobe pneumonia. She was started on IV abx, breathing treatments, and mucinex. Patient improving slowly - moved to Inpatient since will need extended IV abx.     9/26;  Some better;  Has faint posterior wheezing today;Continued cough; upper back pain on left, with the cough  On for repeat CXR today; also incentive spirometry added; also tylenol with codeine for coughtand pain complaint.    9/27:  Looks and feels better;  Still with back pain though less;  Had restful night overnight; working with incentive device once hourly while awake.    9/28:  Still with cough and bilateral chest/flank discomfort;  Tender to palpation moreso on the left;  Likely muculoskeletal; no fever or chills reported; last CXR was 9/26 9/29:   Looks and feels some better; still with cough, but less; repeat cxr unchanged.  Will continue present regimen;  Adding spiriva to regiemn    9/30:  Some better; still with complaints of weakness; mira wet sounding cough; chest pain less; CXR no appreciable change; patient afebrile with stable vital signs.she's tolerating her diet;  Has been out of bed to the toilet.    10/1:  Looks and feels some better;;continues with wet sounding cough;  Chest xray shows clearing on the right with persistent infiltrate left upper lung.  Faint wheezing on posterior exam with clearing after cough and frappage.      10/2:  Looks and feels some better; still complains of weakness; up to the bathroom without difficulty;less cough and back pain reported;  CXR showing signs of clearing  Will evaluate for home oxygen today; also placing on oral antibiotic with augmentin.    10/2 Patient was changed over to oral antibiotics;  She was treated for pneumonia and remained hypoxic due to chronic  COPD, and evaluation by exercise study was done and she was shown to desat to   to 87% on room air; thus She will be discharged home on nasal canula O-2 at 2l/min and will obtain a follow up CXR upon completion of antibiotic therapy    She ruled in for iron deficiency as well as vitamin D deficiency and will continue on suplementation;  A home nebulizer is being provided for her in addition to renewed prescriptions for her nebulizer solution ;  Her chest x-ray was monitored and was showing signs of improvement by discharge;  A Glucometer is being provided her and she will monitor her blood sugars in routine fashion.She was seen and examined at discharge and was felt stable for home.  She will follow with her PCP and is advised to undergo repeat CXR to document complete resolution.     Consults:     Significant Diagnostic Studies:  Imaging Results          X-Ray Chest PA And Lateral (Final result)  Result time 10/01/17 10:31:29    Final result by Wayne  MD Lesly (10/01/17 10:31:29)                 Impression:     See above.      Electronically signed by: MAURA PERKINS MD  Date:     10/01/17  Time:    10:31              Narrative:    Exam: Two-view chest xray    History:    Bilateral pneumonia    Findings:     The heart is normal in size.  Right lung is now clear.  Persistent nodular infiltrate in the left upper lobe slightly improved compared to 09/29/2017.  Continued followup recommended.                             X-Ray Chest PA And Lateral (Final result)  Result time 09/29/17 10:17:58    Final result by Herb Rausch MD (09/29/17 10:17:58)                 Impression:      No significant improvement in bilateral pulmonary infiltrates when compared to 09/20/2017.  Please see above.      Electronically signed by: HERB RAUSCH MD  Date:     09/29/17  Time:    10:17              Narrative:    EXAM: Chest X-ray PA and Lateral    CLINICAL HISTORY:     Pneumonia, subsequent encountered.    COMPARISON STUDIES:     Chest x-ray 09/28/2017 chest CT 09/25/2017 10 chest x-ray 09/24/2017.    FINDINGS:    Normal heart size.  Atherosclerotic aortic arch.  There are patchy opacities within the right pulmonary base, left upper lung and left lower lung which appear stable compared to 09/28/2017.  Surgical clips within the right upper quadrant.. Ribs appear intact.                             X-Ray Chest PA And Lateral (Final result)  Result time 09/28/17 11:11:02    Final result by Abelino Warner MD (09/28/17 11:11:02)                 Impression:      Stable chest x-ray with patchy multifocal infiltrate most pronounced in the left suprahilar and right infrahilar lung zones.  No change.      Electronically signed by: ABELINO WARNER MD  Date:     09/28/17  Time:    11:11              Narrative:    Exam: XR CHEST PA AND LATERAL,    Date:  09/28/17 10:45:05    History: Bilateral pneumonia    Comparison:  09/24/2017 and 09/26/2017.    Findings: The heart size is normal.   Aortic arch is minimally calcified.    There are increased perihilar markings present.  Left suprahilar infiltrate in right infrahilar infiltrates are again noted.  Allowing for the difference in technique no definite interval change.    No evidence of pleural effusion.  Right upper quadrant surgical clips noted.                             X-Ray Chest 1 View (Final result)  Result time 09/26/17 10:45:23   Procedure changed from X-Ray Chest PA And Lateral     Final result by JEFFRYE Britt Sr., MD (09/26/17 10:45:23)                 Impression:      There has been interval development of patchy areas of increased density scattered throughout the lungs. This is consistent with the patient's history and characteristic of pneumonia.      Electronically signed by: JEFFREY BRITT MD  Date:     09/26/17  Time:    10:45              Narrative:    One-view chest x-ray    Clinical History: Pneumonia    Finding: Comparison was made to a prior examination performed on 9/24/2017. The size of the heart is normal. There has been interval development of patchy areas of increased density scattered throughout the lungs. There is no pneumothorax.  The costophrenic angles are sharp.                             CT Abdomen Pelvis  Without Contrast (Final result)  Result time 09/25/17 07:34:22    Final result by Deuce Olvera MD (09/25/17 07:34:22)                 Impression:       Bilateral parenchymal infiltrates are seen within the lower lobes.    Mild prominence of the right renal collecting system however no obstructing stone is seen. This can be seen in a setting of recently passed stone.     Left inguinal adenopathy measuring up to 1.4 cm.    Subcentimeter hypodensity is seen within the mid body of the pancreas which is indeterminate. Consider interval followup    All CT scans at this facility use dose modulation, iterative reconstruction and/or weight based dosing when appropriate to reduce radiation dose to as low as reasonably  achievable.      Electronically signed by: DEUCE SHERMAN MD  Date:     09/25/17  Time:    07:34              Narrative:    Indication: Abdominal pain.    Routine noncontrast CT images of the abdomen and pelvis were obtained.    Findings:    Bilateral parenchymal infiltrates are seen within the lower lobes.  Heart size is normal.  No pericardial or pleural effusion.      The liver is normal in size and attenuation on this noncontrast exam.  Status post cholecystectomy The  stomach, spleen,  adrenal glands are normal.  Subcentimeter hypodensity is seen within the mid body of the pancreas which is indeterminate. Consider interval followup. Aorta demonstrates moderate atherosclerotic disease.      The kidneys are normal in size shape and position without radiopaque calculus or signs of hydronephrosis. Mild prominence of the right renal collecting system however no obstructing stone is seen. This can be seen in a setting of recently passed stone. The bladder is incompletely distended.     The visualized loops of small bowel are unremarkable.The appendix is visualized in the right lower quadrant.  There is no inflammation. Colon demonstrates diverticulosis without evidence of diverticulitis. Mild constipation    Left inguinal adenopathy..      Bones appear intact with moderate degenerative changes                             CTA Chest Non-Coronary (PE Study) (Final result)     Abnormal  Result time 09/25/17 07:39:18    Final result by Deuce Sherman MD (09/25/17 07:39:18)                 Impression:         Bilateral pulmonary infiltrates are seen some with a micronodular pattern which can be seen in infectious (typical or atypical) and or inflammatory process. Followup to resolution is recommended.    Mediastinal and bilateral hilar adenopathy which can be seen in the setting of infectious/reactive process versus neoplastic versus lymphoproliferative disorder.    No evidence of PE.    Fatty liver.    All CT scans at  this facility use dose modulation, iterative reconstruction, and/or weight base dosing when appropriate to reduce radiation dose to as low as reasonably achievable.      Electronically signed by: AURA SHERMAN MD  Date:     09/25/17  Time:    07:39              Narrative:    Clinical data: Chest pain.    Axial CT images through the chest after administration of contrast was performed according to PE study protocol using 100 cc Omni 350.    Findings:    The pulmonary arterial system is well opacified with no evidence of filling defect to suggest pulmonary embolus or thrombus.    Structures of the base of the neck are normal.    The heart and pericardium are unremarkable.  Trace pericardial effusion.  Mediastinal structures including great vessels, trachea, esophagus are intact.    Mediastinal and bilateral hilar adenopathy which may be reactive.      Bilateral pulmonary infiltrates are seen some with a micronodular pattern which can be seen in infectious or inflammatory process. Mild emphysematous changes are seen within the lungs bilaterally.    Surrounding chest wall structures, visualized abdominal organs, bones are unremarkable. Cholecystectomy and mild fatty infiltration of liver suspected.                             X-Ray Chest AP Portable (Final result)  Result time 09/24/17 22:22:56    Final result by Celia Billings MD (09/24/17 22:22:56)                 Impression:         Negative portable chest x-ray.      Electronically signed by: CELIA BILLINGS MD  Date:     09/24/17  Time:    22:22              Narrative:    Portable chest x-ray. 09/24/17 22:13:49    Clinical indication:  sepsis    Heart size is normal. The lung fields are clear. No acute cardiopulmonary infiltrate.                             RADIOLOGY REPORT (Final result)  Result time 09/26/17 15:17:37                Pending Diagnostic Studies:     None        Final Active Diagnoses:    Diagnosis Date Noted POA    Post-infection bronchospasm  "[J98.01] 10/01/2017 Yes    Vitamin D deficiency [E55.9] 09/28/2017 Yes    Iron deficiency anemia [D50.9] 09/27/2017 Yes    HTN (hypertension) [I10] 09/25/2017 Yes      Problems Resolved During this Admission:    Diagnosis Date Noted Date Resolved POA    PRINCIPAL PROBLEM:  Sepsis due to bilateral lower lobe pneumonia  [A41.9] 09/25/2017 10/02/2017 Yes    Hypokalemia [E87.6] 09/26/2017 09/28/2017 Yes    Hypomagnesemia [E83.42] 09/26/2017 10/02/2017 Yes    Leukocytosis [D72.829] 09/26/2017 09/28/2017 Yes    Chest pain [R07.9] 09/25/2017 10/02/2017 Yes    Hypoxia [R09.02] 09/25/2017 09/27/2017 Yes    Tachycardia [R00.0] 09/25/2017 09/27/2017 Yes    Hypophosphatemia [E83.39] 09/25/2017 09/28/2017 Yes    Tobacco abuse [Z72.0] 09/25/2017 10/02/2017 Yes        Discharged Condition: stable    Disposition: Home or Self Care    Follow Up:  Follow-up Information     Ryan Sims MD. Go on 10/6/2017.    Specialty:  Family Medicine  Why:  needs follow up CXR at completion of therapy:  YOUR APPOINTMENT WITH DR. SIMS HAS BEEN SCHEDULED FOR FRIDAY (10/06/17) AT 9:30 AM  Contact information:  [id number]  [facility] [address]  [phone]             [facility].    Specialty:  DME Provider  Why:  DME: Home 02 (Concentrator and Portable) and Nebulizer   Contact information:  [address] [facility] [address]  [phone]                 Patient Instructions:     NEBULIZER KIT (SUPPLIES) FOR HOME USE   Order Specific Question Answer Comments   Height: 5' 4" (1.626 m)    Weight: 73.1 kg (161 lb 1.6 oz)    Does patient have medical equipment at home? none    Length of need (1-99 months): 99    Mask or Mouthpiece? Mask      NEBULIZER FOR HOME USE   Order Comments: Diagnosis: Asthma exacerbation   Order Specific Question Answer Comments   Height: 5' 4" (1.626 m)    Weight: 73.1 kg (161 lb 1.6 oz)    Does patient have medical equipment at home? none    Length of " "need (1-99 months): 99      OXYGEN FOR HOME USE   Order Specific Question Answer Comments   Liter Flow 2    Duration Continuous    Qualifying SpO2: 87%    Testing done at: Exercise/Activity    Route nasal cannula    Device home concentrator with portable unit    Length of need (in months): 99 mos    Patient condition with qualifying saturation COPD with hypoxia   Height: 5' 4" (1.626 m)    Weight: 73.1 kg (161 lb 1.6 oz)    Does patient have medical equipment at home? none    Alternative treatment measures have been tried or considered and deemed clinically ineffective. Yes      Diet Diabetic 1800 Calories     Diet Low Sodium, 2gm     Call MD for:  persistent nausea and vomiting or diarrhea     Call MD for:  severe uncontrolled pain     Call MD for:  redness, tenderness, or signs of infection (pain, swelling, redness, odor or green/yellow discharge around incision site)     Call MD for:   Order Comments: Voiding difficulty       Medications:  Reconciled Home Medications:   Current Discharge Medication List      START taking these medications    Details   acetaminophen-codeine 300-30mg (TYLENOL #3) 300-30 mg Tab Take 1 tablet by mouth every 4 (four) hours as needed.  Qty: 20 tablet, Refills: 0      amoxicillin-clavulanate 500-125mg (AUGMENTIN) 500-125 mg Tab Take 1 tablet (500 mg total) by mouth 3 (three) times daily.  Qty: 21 tablet, Refills: 0      benzonatate (TESSALON) 100 MG capsule Take 1 capsule (100 mg total) by mouth 3 (three) times daily as needed for Cough.  Qty: 30 capsule, Refills: 0      blood sugar diagnostic Strp QAC and QHS  Qty: 100 strip, Refills: 0      blood-glucose meter kit Use as instructed  Qty: 1 each, Refills: 0      budesonide (PULMICORT) 0.5 mg/2 mL nebulizer solution Take 2 mLs (0.5 mg total) by nebulization every 12 (twelve) hours. Controller  Qty: 120 mL, Refills: 0      ergocalciferol (ERGOCALCIFEROL) 50,000 unit Cap Take 1 capsule (50,000 Units total) by mouth every 7 days.  Qty: 4 " capsule, Refills: 0      ferrous sulfate 325 (65 FE) MG EC tablet Take 1 tablet (325 mg total) by mouth 2 (two) times daily.  Qty: 60 tablet, Refills: 0      ipratropium (ATROVENT) 0.02 % nebulizer solution Take 2.5 mLs (500 mcg total) by nebulization every 6 (six) hours. Rescue  Qty: 300 mL, Refills: 0      levalbuterol (XOPENEX) 0.63 mg/3 mL nebulizer solution Take 3 mLs (0.63 mg total) by nebulization every 6 (six) hours. Rescue  Qty: 120 mL, Refills: 0      polyethylene glycol (GLYCOLAX) 17 gram PwPk Take 17 g by mouth daily as needed.  Qty: 30 packet, Refills: 0         CONTINUE these medications which have NOT CHANGED    Details   budesonide-formoterol 160-4.5 mcg (SYMBICORT) 160-4.5 mcg/actuation HFAA Inhale 2 puffs into the lungs every 12 (twelve) hours. Controller      diltiaZEM (CARDIZEM) 90 MG tablet Take 90 mg by mouth 4 (four) times daily.      metformin (GLUCOPHAGE) 500 MG tablet Take 500 mg by mouth 2 (two) times daily with meals.      metoprolol succinate (TOPROL-XL) 50 MG 24 hr tablet Take 50 mg by mouth once daily.      montelukast (SINGULAIR) 10 mg tablet Take 10 mg by mouth every evening.      rivaroxaban (XARELTO) 20 mg Tab Take 20 mg by mouth daily with dinner or evening meal.         STOP taking these medications       albuterol (ACCUNEB) 0.63 mg/3 mL Nebu Comments:   Reason for Stopping:             Time spent on the discharge of patient: 45 minutes      Rebel Montes MD  Department of Hospital Medicine  [facility]

## 2017-10-02 NOTE — ASSESSMENT & PLAN NOTE
--likely secondary to pneumonia  --CT showed No central pulmonary embolus, Bilateral pulmonary infiltrates and nodules   -EKG reviewed  --Serial Troponins negative  9/26: Continue present regimen; with monitoring of the cXR  Continued antibiotics (now on Rocephin and Doxy)  Pulmonary incentives started; along with continued nebs    9/27: pain of musculoskeletal origin; tylenol with codeine ordered;         10/1;  Stable clinically; less cough; continue symptomatic management  Complete antibiotic course.    9/29: less pain and cough reported.    9/29:    9/29:  Slightly improved; continue present regimen  9/28:  No appreciable change; on for repeat cxr today;  Will give her a trial of flexeril and consider a heating pad    9/30:  continues with wet sounding cough; difficult to get anything up. Less chest discomfort reported.    10/2:  Much improved; less cough reported.; possible discharge to home today

## 2017-10-02 NOTE — ASSESSMENT & PLAN NOTE
9/27: question dilutional component;  Will check b-12, folate, iron stores and monitor.    9/28:  Iron deficiency by labs;  Will check stools for heme and start on supplementation;  May need stool softener as well    9/30: stable; continue to monitor'; on iron supplementation.    10/2 Home on iron, with outpatient monitoring.

## 2017-10-02 NOTE — PLAN OF CARE
Patient discharging home today.  Orders obtained for Home 02; nebulizer; and glucometer with test strips.  Met with patient at bedside and informed her that Home 02 (Concentrator and Portable) and Nebulizer have been ordered via [facility] and that portable 02 will be delivered to hospital room prior to her discharge and nebulizer and Concentrator will be delivered to her home.  Also, informed patient that she can take Rx for glucometer and test strips to pharmacy and obtain it there.      Contacted Josette with [facility] who [facility], gathered needed orders for Home 02 and Nebulizer and delivered patient's portable 02 tank to hospital room.  [facility] bedside nurse aware that upon delivery of portable 02 to patient's hospital room, patient is ready to discharge from a case management perspective.      Outpatient follow-up appointments also scheduled for patient and noted on AVS.       10/02/17 1522   Final Note   Assessment Type Final Discharge Note   Discharge Disposition (Home 02 (Concentrator and portable) and Nebulizer)   What phone number can be called within the next 1-3 days to see how you are doing after discharge? [phone]   Hospital Follow Up  Appt(s) scheduled? Yes   Discharge plans and expectations educations in teach back method with documentation complete? Yes   Right Care Referral Info   Post Acute Recommendation Other   Referral Type HME Home 02 and Nebulizer   Facility Name [facility]   [place] [address] [place]  [address]

## 2017-10-02 NOTE — ASSESSMENT & PLAN NOTE
--WBC 17K, Pulse 136, RR 22, evelin source: bilat pneumonia  --PO Avelox and mucinex  --sputum culture pending  -Blood Cultures pending  --duonebs   --supplemental oxygen to maintain sats    9/26:  Continue present antibiotics with monitoring of the CXR  On IV rocephin and Doxycycline at present.  Monitor cultures (blood and sputum)  Continue duonebs; pulmonary incentives.    9/27: patchy diffuse disease both lungs;  Will continue present regimen;  monitor CXR response.  Culture of blood negative.  WBC on the decline.    9/28:  To repeat CXR today; Continue antibiotics;  WBC counts are trending down.    9/29: CXR no appreciable change; WBC now normal.    9/30:  CXR shows stability; no worsening; patient afebrile; WBC count has normalized;Will increase frequency of nebs and attention to pulmonary toilet. Robitussin elixir added to regimen.    10/1:  CXR shows resolution on right, with residual disease on left upper lobe.  Continues on nebs around the clock;  Budesonide added to regimen today;  Have avoided parenteral steroids given labile blood sugars;  Will observe  Likely home next 24 hours on oral antibiotics and home nebulizer unit    10/2:  Steady improvement; less cough; will change over to oral antibiotics; possible discharge home later today  Will assess for home oxygen

## 2017-10-02 NOTE — PROGRESS NOTES
[facility] Hospital Medicine  Progress Note    Patient Name: Genie Balderas  MRN: [id number]  Patient Class: IP- Inpatient   Admission Date: 9/24/2017  Length of Stay: 7 days  Attending Physician: Rebel Cardenas MD  Primary Care Provider: Ryan Sims MD        Subjective:     Principal Problem:Sepsis    HPI:  Genie Balderas is a 57 y.o. female patient who presented to the Emergency Department for complaint of Cough that started 2 days ago. The cough is non productive. Associated symptoms include Sharp Like CP to Left chest radiates to back, that stated today due to coughing, and, fatigue, fever, n/v/d. Symptoms are intermittent and moderate in severity. OTC meds and nebs not helping at home.  No mitigating or exacerbating factors reported.  Patient denies any SOB, leg swelling, palpitations, HA, lightheadedness, dizziness, numbness, weakness, and all other sxs at this time. No further complaints or concerns at this time.        Hospital Course:  Patient was placed on OBS for sepsis d/t bilat lower lobe pneumonia. She was started on IV abx, breathing treatments, and mucinex. Patient improving slowly - moved to Inpatient since will need extended IV abx.     9/26;  Some better;  Has faint posterior wheezing today;Continued cough; upper back pain on left, with the cough  On for repeat CXR today; also incentive spirometry added; also tylenol with codeine for coughtand pain complaint.    9/27:  Looks and feels better;  Still with back pain though less;  Had restful night overnight; working with incentive device once hourly while awake.    9/28:  Still with cough and bilateral chest/flank discomfort;  Tender to palpation moreso on the left;  Likely muculoskeletal; no fever or chills reported; last CXR was 9/26 9/29:  Looks and feels some better; still with cough, but less; repeat cxr unchanged.  Will continue present regimen;  Adding spiriva to regiemn    9/30:  Some better; still with complaints of  weakness; mira wet sounding cough; chest pain less; CXR no appreciable change; patient afebrile with stable vital signs.she's tolerating her diet;  Has been out of bed to the toilet.    10/1:  Looks and feels some better;;continues with wet sounding cough;  Chest xray shows clearing on the right with persistent infiltrate left upper lung.  Faint wheezing on posterior exam with clearing after cough and frappage.      10/2:  Looks and feels some better; still complains of weakness; up to the bathroom without difficulty;less cough and back pain reported;  CXR showing signs of clearing  Will evaluate for home oxygen today; also placing on oral antibiotic with augmentin.    Interval History: looks and feels better; less cough; still feels weak; may qualify for home oxygen.    Review of Systems   Constitutional: Negative for chills, diaphoresis and fever.   HENT: Negative.    Eyes: Negative.    Respiratory: Positive for cough and shortness of breath.    Cardiovascular: Negative.    Gastrointestinal: Negative.    Endocrine: Negative.    Genitourinary: Negative.    Musculoskeletal: Negative.    Hematological: Negative.    Psychiatric/Behavioral: Negative.      Objective:     Vital Signs (Most Recent):  Temp: 98.4 °F (36.9 °C) (10/02/17 0729)  Pulse: 99 (10/02/17 0754)  Resp: 16 (10/02/17 0754)  BP: (!) 118/57 (10/02/17 0729)  SpO2: 95 % (10/02/17 0754) Vital Signs (24h Range):  Temp:  [98.2 °F (36.8 °C)-98.9 °F (37.2 °C)] 98.4 °F (36.9 °C)  Pulse:  [] 99  Resp:  [16-20] 16  SpO2:  [93 %-98 %] 95 %  BP: (106-182)/(57-86) 118/57     Weight: 73.1 kg (161 lb 1.6 oz)  Body mass index is 27.65 kg/m².    Intake/Output Summary (Last 24 hours) at 10/02/17 0959  Last data filed at 10/02/17 0400   Gross per 24 hour   Intake              900 ml   Output             2000 ml   Net            -1100 ml      Physical Exam   Constitutional: She is oriented to person, place, and time. She appears well-developed and well-nourished.    HENT:   Head: Normocephalic and atraumatic.   Nose: Nose normal.   Mouth/Throat: Oropharynx is clear and moist.   Eyes: EOM are normal. Pupils are equal, round, and reactive to light.   Neck: Normal range of motion. Neck supple.   Cardiovascular: Normal rate and regular rhythm.    Pulmonary/Chest: Effort normal and breath sounds normal. She has no wheezes. She has no rales.   Abdominal: Soft. Bowel sounds are normal.   Musculoskeletal: Normal range of motion.   Neurological: She is alert and oriented to person, place, and time.   Skin: Skin is warm and dry. Capillary refill takes less than 2 seconds.   Psychiatric: She has a normal mood and affect. Her behavior is normal. Judgment and thought content normal.       Significant Labs:   BMP:   Recent Labs  Lab 10/02/17  0535   *      K 4.5      CO2 28   BUN 7   CREATININE 0.8   CALCIUM 9.5   MG 1.7     CBC:   Recent Labs  Lab 10/01/17  0459 10/02/17  0535   WBC 10.95 10.14   HGB 10.6* 10.4*   HCT 34.9* 34.0*   * 475*       Significant Imaging:   Imaging Results          X-Ray Chest PA And Lateral (Final result)  Result time 10/01/17 10:31:29    Final result by Maura Perkins MD (10/01/17 10:31:29)                 Impression:     See above.      Electronically signed by: MAURA PERKINS MD  Date:     10/01/17  Time:    10:31              Narrative:    Exam: Two-view chest xray    History:    Bilateral pneumonia    Findings:     The heart is normal in size.  Right lung is now clear.  Persistent nodular infiltrate in the left upper lobe slightly improved compared to 09/29/2017.  Continued followup recommended.                             X-Ray Chest PA And Lateral (Final result)  Result time 09/29/17 10:17:58    Final result by Herb Rausch MD (09/29/17 10:17:58)                 Impression:      No significant improvement in bilateral pulmonary infiltrates when compared to 09/20/2017.  Please see above.      Electronically signed by: HERB  GIL CLAY  Date:     09/29/17  Time:    10:17              Narrative:    EXAM: Chest X-ray PA and Lateral    CLINICAL HISTORY:     Pneumonia, subsequent encountered.    COMPARISON STUDIES:     Chest x-ray 09/28/2017 chest CT 09/25/2017 10 chest x-ray 09/24/2017.    FINDINGS:    Normal heart size.  Atherosclerotic aortic arch.  There are patchy opacities within the right pulmonary base, left upper lung and left lower lung which appear stable compared to 09/28/2017.  Surgical clips within the right upper quadrant.. Ribs appear intact.                             X-Ray Chest PA And Lateral (Final result)  Result time 09/28/17 11:11:02    Final result by Abelino Warner MD (09/28/17 11:11:02)                 Impression:      Stable chest x-ray with patchy multifocal infiltrate most pronounced in the left suprahilar and right infrahilar lung zones.  No change.      Electronically signed by: ABELINO WARNER MD  Date:     09/28/17  Time:    11:11              Narrative:    Exam: XR CHEST PA AND LATERAL,    Date:  09/28/17 10:45:05    History: Bilateral pneumonia    Comparison:  09/24/2017 and 09/26/2017.    Findings: The heart size is normal.  Aortic arch is minimally calcified.    There are increased perihilar markings present.  Left suprahilar infiltrate in right infrahilar infiltrates are again noted.  Allowing for the difference in technique no definite interval change.    No evidence of pleural effusion.  Right upper quadrant surgical clips noted.                             X-Ray Chest 1 View (Final result)  Result time 09/26/17 10:45:23   Procedure changed from X-Ray Chest PA And Lateral     Final result by JEFFREY Britt Sr., MD (09/26/17 10:45:23)                 Impression:      There has been interval development of patchy areas of increased density scattered throughout the lungs. This is consistent with the patient's history and characteristic of pneumonia.      Electronically signed by: JEFFREY BRITT  MD  Date:     09/26/17  Time:    10:45              Narrative:    One-view chest x-ray    Clinical History: Pneumonia    Finding: Comparison was made to a prior examination performed on 9/24/2017. The size of the heart is normal. There has been interval development of patchy areas of increased density scattered throughout the lungs. There is no pneumothorax.  The costophrenic angles are sharp.                             CT Abdomen Pelvis  Without Contrast (Final result)  Result time 09/25/17 07:34:22    Final result by Deuce Sherman MD (09/25/17 07:34:22)                 Impression:       Bilateral parenchymal infiltrates are seen within the lower lobes.    Mild prominence of the right renal collecting system however no obstructing stone is seen. This can be seen in a setting of recently passed stone.     Left inguinal adenopathy measuring up to 1.4 cm.    Subcentimeter hypodensity is seen within the mid body of the pancreas which is indeterminate. Consider interval followup    All CT scans at this facility use dose modulation, iterative reconstruction and/or weight based dosing when appropriate to reduce radiation dose to as low as reasonably achievable.      Electronically signed by: DEUCE SHERMAN MD  Date:     09/25/17  Time:    07:34              Narrative:    Indication: Abdominal pain.    Routine noncontrast CT images of the abdomen and pelvis were obtained.    Findings:    Bilateral parenchymal infiltrates are seen within the lower lobes.  Heart size is normal.  No pericardial or pleural effusion.      The liver is normal in size and attenuation on this noncontrast exam.  Status post cholecystectomy The  stomach, spleen,  adrenal glands are normal.  Subcentimeter hypodensity is seen within the mid body of the pancreas which is indeterminate. Consider interval followup. Aorta demonstrates moderate atherosclerotic disease.      The kidneys are normal in size shape and position without radiopaque calculus or  signs of hydronephrosis. Mild prominence of the right renal collecting system however no obstructing stone is seen. This can be seen in a setting of recently passed stone. The bladder is incompletely distended.     The visualized loops of small bowel are unremarkable.The appendix is visualized in the right lower quadrant.  There is no inflammation. Colon demonstrates diverticulosis without evidence of diverticulitis. Mild constipation    Left inguinal adenopathy..      Bones appear intact with moderate degenerative changes                             CTA Chest Non-Coronary (PE Study) (Final result)     Abnormal  Result time 09/25/17 07:39:18    Final result by Deuce Sherman MD (09/25/17 07:39:18)                 Impression:         Bilateral pulmonary infiltrates are seen some with a micronodular pattern which can be seen in infectious (typical or atypical) and or inflammatory process. Followup to resolution is recommended.    Mediastinal and bilateral hilar adenopathy which can be seen in the setting of infectious/reactive process versus neoplastic versus lymphoproliferative disorder.    No evidence of PE.    Fatty liver.    All CT scans at this facility use dose modulation, iterative reconstruction, and/or weight base dosing when appropriate to reduce radiation dose to as low as reasonably achievable.      Electronically signed by: DEUCE SHERMAN MD  Date:     09/25/17  Time:    07:39              Narrative:    Clinical data: Chest pain.    Axial CT images through the chest after administration of contrast was performed according to PE study protocol using 100 cc Omni 350.    Findings:    The pulmonary arterial system is well opacified with no evidence of filling defect to suggest pulmonary embolus or thrombus.    Structures of the base of the neck are normal.    The heart and pericardium are unremarkable.  Trace pericardial effusion.  Mediastinal structures including great vessels, trachea, esophagus are intact.     Mediastinal and bilateral hilar adenopathy which may be reactive.      Bilateral pulmonary infiltrates are seen some with a micronodular pattern which can be seen in infectious or inflammatory process. Mild emphysematous changes are seen within the lungs bilaterally.    Surrounding chest wall structures, visualized abdominal organs, bones are unremarkable. Cholecystectomy and mild fatty infiltration of liver suspected.                             X-Ray Chest AP Portable (Final result)  Result time 09/24/17 22:22:56    Final result by Celia Billings MD (09/24/17 22:22:56)                 Impression:         Negative portable chest x-ray.      Electronically signed by: CELIA BILLINGS MD  Date:     09/24/17  Time:    22:22              Narrative:    Portable chest x-ray. 09/24/17 22:13:49    Clinical indication:  sepsis    Heart size is normal. The lung fields are clear. No acute cardiopulmonary infiltrate.                             RADIOLOGY REPORT (Final result)  Result time 09/26/17 15:17:37              Assessment/Plan:      * Sepsis due to bilateral lower lobe pneumonia     --WBC 17K, Pulse 136, RR 22, evelin source: bilat pneumonia  --PO Avelox and mucinex  --sputum culture pending  -Blood Cultures pending  --duonebs   --supplemental oxygen to maintain sats    9/26:  Continue present antibiotics with monitoring of the CXR  On IV rocephin and Doxycycline at present.  Monitor cultures (blood and sputum)  Continue duonebs; pulmonary incentives.    9/27: patchy diffuse disease both lungs;  Will continue present regimen;  monitor CXR response.  Culture of blood negative.  WBC on the decline.    9/28:  To repeat CXR today; Continue antibiotics;  WBC counts are trending down.    9/29: CXR no appreciable change; WBC now normal.    9/30:  CXR shows stability; no worsening; patient afebrile; WBC count has normalized;Will increase frequency of nebs and attention to pulmonary toilet. Robitussin elixir added to  regimen.    10/1:  CXR shows resolution on right, with residual disease on left upper lobe.  Continues on nebs around the clock;  Budesonide added to regimen today;  Have avoided parenteral steroids given labile blood sugars;  Will observe  Likely home next 24 hours on oral antibiotics and home nebulizer unit    10/2:  Steady improvement; less cough; will change over to oral antibiotics; possible discharge home later today  Will assess for home oxygen               Post-infection bronchospasm    Wheezing on pulmonary exam today;  Question post infectious versus undiagnosed COPD;  Will continue nebs, antibiotics, inhaled corticosteroids added;  Will go home on nebs;  Also follow with PCP to arrange formal PFT's          Vitamin D deficiency    Vitamin D reported low at 9; started on vitamin D 50,000 units weekly for 4-6 weeks; will need outpatient monitoring          Iron deficiency anemia    9/27: question dilutional component;  Will check b-12, folate, iron stores and monitor.    9/28:  Iron deficiency by labs;  Will check stools for heme and start on supplementation;  May need stool softener as well    9/30: stable; continue to monitor'; on iron supplementation.    10/2 Home on iron, with outpatient monitoring.        Hypomagnesemia    Magnesium replacement ordered;  Will monitor response.  Vitamin D level ordered with next lab draw.      9/27:  Severe vitamin D deficiency; will replete vitamin D.  Magensium.    9/29: treated, resolved;  On vitamin D supplementation; was on this as outpatient.        Tobacco abuse    --counseled on smoking cessation  --refused nicotine patch    6/26: continue to stress smoking cessation.        HTN (hypertension)    Hold BP meds at this time, due to presentation with hypotension  monitor and start once improving       6/26: BP trending upward;  Resumed Toprol-XL today;  Will monitor.response to therapy    9/27: improved.; continue present regimen    9/29: BP satisfactory on  present regimen.    9/30: stable and improved;  Continue present regimen.    10/2:  Stable and improved;        Chest pain    --likely secondary to pneumonia  --CT showed No central pulmonary embolus, Bilateral pulmonary infiltrates and nodules   -EKG reviewed  --Serial Troponins negative  9/26: Continue present regimen; with monitoring of the cXR  Continued antibiotics (now on Rocephin and Doxy)  Pulmonary incentives started; along with continued nebs    9/27: pain of musculoskeletal origin; tylenol with codeine ordered;         10/1;  Stable clinically; less cough; continue symptomatic management  Complete antibiotic course.    9/29: less pain and cough reported.    9/29:    9/29:  Slightly improved; continue present regimen  9/28:  No appreciable change; on for repeat cxr today;  Will give her a trial of flexeril and consider a heating pad    9/30:  continues with wet sounding cough; difficult to get anything up. Less chest discomfort reported.    10/2:  Much improved; less cough reported.; possible discharge to home today          VTE Risk Mitigation         Ordered     rivaroxaban tablet 20 mg  With dinner     Route:  Oral        09/25/17 0240     Medium Risk of VTE  Once      09/25/17 0208     Place sequential compression device  Until discontinued      09/25/17 0208              Rebel Montes MD  Department of Hospital Medicine   [facility]

## 2017-10-02 NOTE — ASSESSMENT & PLAN NOTE
Hold BP meds at this time, due to presentation with hypotension  monitor and start once improving       6/26: BP trending upward;  Resumed Toprol-XL today;  Will monitor.response to therapy    9/27: improved.; continue present regimen    9/29: BP satisfactory on present regimen.    9/30: stable and improved;  Continue present regimen.    10/2:  Stable and improved;

## 2019-10-07 ENCOUNTER — HOSPITAL ENCOUNTER (INPATIENT)
Facility: HOSPITAL | Age: 59
LOS: 3 days | Discharge: HOME | DRG: 871 | End: 2019-10-10
Attending: EMERGENCY MEDICINE | Admitting: INTERNAL MEDICINE
Payer: MEDICAID

## 2019-10-07 DIAGNOSIS — J18.9 PNEUMONIA OF BOTH LOWER LOBES DUE TO INFECTIOUS ORGANISM: ICD-10-CM

## 2019-10-07 DIAGNOSIS — J18.9 PNEUMONIA OF LEFT LOWER LOBE DUE TO INFECTIOUS ORGANISM: Primary | ICD-10-CM

## 2019-10-07 DIAGNOSIS — I50.43 CHF (CONGESTIVE HEART FAILURE), NYHA CLASS III, ACUTE ON CHRONIC, COMBINED: ICD-10-CM

## 2019-10-07 DIAGNOSIS — R06.02 SOB (SHORTNESS OF BREATH): ICD-10-CM

## 2019-10-07 DIAGNOSIS — E86.1 INTRAVASCULAR VOLUME DEPLETION: ICD-10-CM

## 2019-10-07 DIAGNOSIS — R07.9 CHEST PAIN: ICD-10-CM

## 2019-10-07 DIAGNOSIS — A41.9 SEVERE SEPSIS: ICD-10-CM

## 2019-10-07 DIAGNOSIS — R65.20 SEVERE SEPSIS: ICD-10-CM

## 2019-10-07 DIAGNOSIS — I10 ESSENTIAL HYPERTENSION: ICD-10-CM

## 2019-10-07 DIAGNOSIS — C83.30 DIFFUSE LARGE B-CELL LYMPHOMA, UNSPECIFIED BODY REGION: ICD-10-CM

## 2019-10-07 DIAGNOSIS — R00.0 TACHYCARDIA: ICD-10-CM

## 2019-10-07 PROBLEM — N17.9 AKI (ACUTE KIDNEY INJURY): Status: ACTIVE | Noted: 2019-10-07

## 2019-10-07 PROBLEM — E11.59 TYPE 2 DIABETES MELLITUS WITH CIRCULATORY DISORDER, WITHOUT LONG-TERM CURRENT USE OF INSULIN: Status: ACTIVE | Noted: 2019-10-07

## 2019-10-07 LAB
ALBUMIN SERPL BCP-MCNC: 2.4 G/DL (ref 3.5–5.2)
ALP SERPL-CCNC: 141 U/L (ref 55–135)
ALT SERPL W/O P-5'-P-CCNC: 13 U/L (ref 10–44)
ANION GAP SERPL CALC-SCNC: 19 MMOL/L (ref 8–16)
ANISOCYTOSIS BLD QL SMEAR: SLIGHT
APTT BLDCRRT: 32 SEC (ref 21–32)
APTT BLDCRRT: 34.5 SEC (ref 21–32)
AST SERPL-CCNC: 28 U/L (ref 10–40)
BASOPHILS NFR BLD: 0 % (ref 0–1.9)
BILIRUB SERPL-MCNC: 2.2 MG/DL (ref 0.1–1)
BILIRUB UR QL STRIP: ABNORMAL
BNP SERPL-MCNC: 196 PG/ML (ref 0–99)
BUN SERPL-MCNC: 51 MG/DL (ref 6–20)
CALCIUM SERPL-MCNC: 10.7 MG/DL (ref 8.7–10.5)
CHLORIDE SERPL-SCNC: 93 MMOL/L (ref 95–110)
CK SERPL-CCNC: 126 U/L (ref 20–180)
CLARITY UR: CLEAR
CO2 SERPL-SCNC: 22 MMOL/L (ref 23–29)
COLOR UR: YELLOW
CREAT SERPL-MCNC: 1.3 MG/DL (ref 0.5–1.4)
DIFFERENTIAL METHOD: ABNORMAL
EOSINOPHIL NFR BLD: 0 % (ref 0–8)
ERYTHROCYTE [DISTWIDTH] IN BLOOD BY AUTOMATED COUNT: 14.5 % (ref 11.5–14.5)
EST. GFR  (AFRICAN AMERICAN): 52 ML/MIN/1.73 M^2
EST. GFR  (NON AFRICAN AMERICAN): 45 ML/MIN/1.73 M^2
GLUCOSE SERPL-MCNC: 154 MG/DL (ref 70–110)
GLUCOSE UR QL STRIP: NEGATIVE
HCT VFR BLD AUTO: 39.4 % (ref 37–48.5)
HGB BLD-MCNC: 12.3 G/DL (ref 12–16)
HGB UR QL STRIP: NEGATIVE
HIV1+2 IGG SERPL QL IA.RAPID: NEGATIVE
IMM GRANULOCYTES # BLD AUTO: ABNORMAL K/UL (ref 0–0.04)
IMM GRANULOCYTES NFR BLD AUTO: ABNORMAL % (ref 0–0.5)
INFLUENZA A, MOLECULAR: NEGATIVE
INFLUENZA B, MOLECULAR: NEGATIVE
INR PPP: 1.1 (ref 0.8–1.2)
INR PPP: 1.1 (ref 0.8–1.2)
KETONES UR QL STRIP: ABNORMAL
LACTATE SERPL-SCNC: 0.8 MMOL/L (ref 0.5–2.2)
LACTATE SERPL-SCNC: 2 MMOL/L (ref 0.5–2.2)
LEUKOCYTE ESTERASE UR QL STRIP: NEGATIVE
LYMPHOCYTES NFR BLD: 7 % (ref 18–48)
MAGNESIUM SERPL-MCNC: 2.1 MG/DL (ref 1.6–2.6)
MAGNESIUM SERPL-MCNC: 2.2 MG/DL (ref 1.6–2.6)
MCH RBC QN AUTO: 28.9 PG (ref 27–31)
MCHC RBC AUTO-ENTMCNC: 31.2 G/DL (ref 32–36)
MCV RBC AUTO: 93 FL (ref 82–98)
MONOCYTES NFR BLD: 7 % (ref 4–15)
NEUTROPHILS NFR BLD: 84 % (ref 38–73)
NEUTS BAND NFR BLD MANUAL: 2 %
NITRITE UR QL STRIP: NEGATIVE
NRBC BLD-RTO: 0 /100 WBC
PH UR STRIP: 6 [PH] (ref 5–8)
PLATELET # BLD AUTO: 221 K/UL (ref 150–350)
PLATELET BLD QL SMEAR: ABNORMAL
PMV BLD AUTO: 10.9 FL (ref 9.2–12.9)
POCT GLUCOSE: 147 MG/DL (ref 70–110)
POTASSIUM SERPL-SCNC: 5.2 MMOL/L (ref 3.5–5.1)
PROCALCITONIN SERPL IA-MCNC: 22.65 NG/ML
PROT SERPL-MCNC: 7.2 G/DL (ref 6–8.4)
PROT UR QL STRIP: NEGATIVE
PROTHROMBIN TIME: 11.4 SEC (ref 9–12.5)
PROTHROMBIN TIME: 11.6 SEC (ref 9–12.5)
RBC # BLD AUTO: 4.26 M/UL (ref 4–5.4)
SODIUM SERPL-SCNC: 134 MMOL/L (ref 136–145)
SP GR UR STRIP: 1.01 (ref 1–1.03)
SPECIMEN SOURCE: NORMAL
TROPONIN I SERPL DL<=0.01 NG/ML-MCNC: 0.03 NG/ML (ref 0–0.03)
TROPONIN I SERPL DL<=0.01 NG/ML-MCNC: 0.03 NG/ML (ref 0–0.03)
TROPONIN I SERPL DL<=0.01 NG/ML-MCNC: 0.04 NG/ML (ref 0–0.03)
TROPONIN I SERPL DL<=0.01 NG/ML-MCNC: 0.08 NG/ML (ref 0–0.03)
TSH SERPL DL<=0.005 MIU/L-ACNC: 0.95 UIU/ML (ref 0.4–4)
URN SPEC COLLECT METH UR: ABNORMAL
UROBILINOGEN UR STRIP-ACNC: ABNORMAL EU/DL
WBC # BLD AUTO: 19.46 K/UL (ref 3.9–12.7)

## 2019-10-07 PROCEDURE — 96365 THER/PROPH/DIAG IV INF INIT: CPT

## 2019-10-07 PROCEDURE — 87040 BLOOD CULTURE FOR BACTERIA: CPT | Mod: 59

## 2019-10-07 PROCEDURE — 83735 ASSAY OF MAGNESIUM: CPT

## 2019-10-07 PROCEDURE — 84484 ASSAY OF TROPONIN QUANT: CPT

## 2019-10-07 PROCEDURE — 84484 ASSAY OF TROPONIN QUANT: CPT | Mod: 91

## 2019-10-07 PROCEDURE — 93005 ELECTROCARDIOGRAM TRACING: CPT

## 2019-10-07 PROCEDURE — 85007 BL SMEAR W/DIFF WBC COUNT: CPT

## 2019-10-07 PROCEDURE — 63600175 PHARM REV CODE 636 W HCPCS: Performed by: NURSE PRACTITIONER

## 2019-10-07 PROCEDURE — 87502 INFLUENZA DNA AMP PROBE: CPT

## 2019-10-07 PROCEDURE — 93306 TTE W/DOPPLER COMPLETE: CPT

## 2019-10-07 PROCEDURE — 87205 SMEAR GRAM STAIN: CPT

## 2019-10-07 PROCEDURE — 84443 ASSAY THYROID STIM HORMONE: CPT

## 2019-10-07 PROCEDURE — 93306 TTE W/DOPPLER COMPLETE: CPT | Mod: 26,,, | Performed by: INTERNAL MEDICINE

## 2019-10-07 PROCEDURE — 63600175 PHARM REV CODE 636 W HCPCS: Performed by: PHYSICIAN ASSISTANT

## 2019-10-07 PROCEDURE — 93010 EKG 12-LEAD: ICD-10-PCS | Mod: ,,, | Performed by: INTERNAL MEDICINE

## 2019-10-07 PROCEDURE — 85730 THROMBOPLASTIN TIME PARTIAL: CPT | Mod: 91

## 2019-10-07 PROCEDURE — 99285 EMERGENCY DEPT VISIT HI MDM: CPT | Mod: 25

## 2019-10-07 PROCEDURE — 94640 AIRWAY INHALATION TREATMENT: CPT

## 2019-10-07 PROCEDURE — 21400001 HC TELEMETRY ROOM

## 2019-10-07 PROCEDURE — 25000003 PHARM REV CODE 250: Performed by: NURSE PRACTITIONER

## 2019-10-07 PROCEDURE — 87070 CULTURE OTHR SPECIMN AEROBIC: CPT

## 2019-10-07 PROCEDURE — 25000003 PHARM REV CODE 250: Performed by: INTERNAL MEDICINE

## 2019-10-07 PROCEDURE — 63600175 PHARM REV CODE 636 W HCPCS: Performed by: EMERGENCY MEDICINE

## 2019-10-07 PROCEDURE — 83880 ASSAY OF NATRIURETIC PEPTIDE: CPT

## 2019-10-07 PROCEDURE — 86703 HIV-1/HIV-2 1 RESULT ANTBDY: CPT

## 2019-10-07 PROCEDURE — 25000242 PHARM REV CODE 250 ALT 637 W/ HCPCS: Performed by: PHYSICIAN ASSISTANT

## 2019-10-07 PROCEDURE — 83735 ASSAY OF MAGNESIUM: CPT | Mod: 91

## 2019-10-07 PROCEDURE — 36415 COLL VENOUS BLD VENIPUNCTURE: CPT

## 2019-10-07 PROCEDURE — 27000221 HC OXYGEN, UP TO 24 HOURS

## 2019-10-07 PROCEDURE — 93306 2D ECHO WITH COLOR FLOW DOPPLER: ICD-10-PCS | Mod: 26,,, | Performed by: INTERNAL MEDICINE

## 2019-10-07 PROCEDURE — 85027 COMPLETE CBC AUTOMATED: CPT

## 2019-10-07 PROCEDURE — 96366 THER/PROPH/DIAG IV INF ADDON: CPT

## 2019-10-07 PROCEDURE — 25000242 PHARM REV CODE 250 ALT 637 W/ HCPCS: Performed by: NURSE PRACTITIONER

## 2019-10-07 PROCEDURE — 25000003 PHARM REV CODE 250: Performed by: PHYSICIAN ASSISTANT

## 2019-10-07 PROCEDURE — 81003 URINALYSIS AUTO W/O SCOPE: CPT

## 2019-10-07 PROCEDURE — 85730 THROMBOPLASTIN TIME PARTIAL: CPT

## 2019-10-07 PROCEDURE — 94799 UNLISTED PULMONARY SVC/PX: CPT

## 2019-10-07 PROCEDURE — 93010 ELECTROCARDIOGRAM REPORT: CPT | Mod: ,,, | Performed by: INTERNAL MEDICINE

## 2019-10-07 PROCEDURE — 82550 ASSAY OF CK (CPK): CPT

## 2019-10-07 PROCEDURE — 96367 TX/PROPH/DG ADDL SEQ IV INF: CPT

## 2019-10-07 PROCEDURE — 85610 PROTHROMBIN TIME: CPT

## 2019-10-07 PROCEDURE — 85610 PROTHROMBIN TIME: CPT | Mod: 91

## 2019-10-07 PROCEDURE — 96361 HYDRATE IV INFUSION ADD-ON: CPT

## 2019-10-07 PROCEDURE — 80053 COMPREHEN METABOLIC PANEL: CPT

## 2019-10-07 PROCEDURE — 83605 ASSAY OF LACTIC ACID: CPT

## 2019-10-07 PROCEDURE — 25500020 PHARM REV CODE 255: Performed by: EMERGENCY MEDICINE

## 2019-10-07 PROCEDURE — 84145 PROCALCITONIN (PCT): CPT

## 2019-10-07 RX ORDER — IPRATROPIUM BROMIDE AND ALBUTEROL SULFATE 2.5; .5 MG/3ML; MG/3ML
3 SOLUTION RESPIRATORY (INHALATION) EVERY 6 HOURS
Status: DISCONTINUED | OUTPATIENT
Start: 2019-10-07 | End: 2019-10-10 | Stop reason: HOSPADM

## 2019-10-07 RX ORDER — PROMETHAZINE HYDROCHLORIDE AND CODEINE PHOSPHATE 6.25; 1 MG/5ML; MG/5ML
5 SOLUTION ORAL EVERY 6 HOURS PRN
Status: DISCONTINUED | OUTPATIENT
Start: 2019-10-07 | End: 2019-10-10 | Stop reason: HOSPADM

## 2019-10-07 RX ORDER — LORAZEPAM 0.5 MG/1
0.5 TABLET ORAL NIGHTLY
COMMUNITY

## 2019-10-07 RX ORDER — PROMETHAZINE HYDROCHLORIDE, PHENYLEPHRINE HYDROCHLORIDE AND CODEINE PHOSPHATE 6.25; 5; 1 MG/5ML; MG/5ML; MG/5ML
5 SOLUTION ORAL EVERY 6 HOURS PRN
Status: DISCONTINUED | OUTPATIENT
Start: 2019-10-07 | End: 2019-10-07

## 2019-10-07 RX ORDER — IBUPROFEN 200 MG
24 TABLET ORAL
Status: DISCONTINUED | OUTPATIENT
Start: 2019-10-07 | End: 2019-10-10 | Stop reason: HOSPADM

## 2019-10-07 RX ORDER — HYDROCODONE BITARTRATE AND ACETAMINOPHEN 7.5; 325 MG/1; MG/1
TABLET ORAL 4 TIMES DAILY PRN
COMMUNITY
End: 2022-02-28 | Stop reason: CLARIF

## 2019-10-07 RX ORDER — ACETAMINOPHEN 325 MG/1
650 TABLET ORAL EVERY 6 HOURS PRN
Status: DISCONTINUED | OUTPATIENT
Start: 2019-10-07 | End: 2019-10-10 | Stop reason: HOSPADM

## 2019-10-07 RX ORDER — INSULIN ASPART 100 [IU]/ML
0-5 INJECTION, SOLUTION INTRAVENOUS; SUBCUTANEOUS
Status: DISCONTINUED | OUTPATIENT
Start: 2019-10-07 | End: 2019-10-10 | Stop reason: HOSPADM

## 2019-10-07 RX ORDER — VANCOMYCIN HCL IN 5 % DEXTROSE 1.5G/250ML
1500 PLASTIC BAG, INJECTION (ML) INTRAVENOUS
Status: COMPLETED | OUTPATIENT
Start: 2019-10-07 | End: 2019-10-07

## 2019-10-07 RX ORDER — IBUPROFEN 200 MG
16 TABLET ORAL
Status: DISCONTINUED | OUTPATIENT
Start: 2019-10-07 | End: 2019-10-10 | Stop reason: HOSPADM

## 2019-10-07 RX ORDER — TRAMADOL HYDROCHLORIDE 50 MG/1
50 TABLET ORAL EVERY 6 HOURS PRN
Status: DISCONTINUED | OUTPATIENT
Start: 2019-10-07 | End: 2019-10-10 | Stop reason: HOSPADM

## 2019-10-07 RX ORDER — PANTOPRAZOLE SODIUM 40 MG/1
40 TABLET, DELAYED RELEASE ORAL
COMMUNITY
Start: 2018-12-18 | End: 2019-12-18

## 2019-10-07 RX ORDER — ASPIRIN 325 MG
325 TABLET ORAL
Status: COMPLETED | OUTPATIENT
Start: 2019-10-07 | End: 2019-10-07

## 2019-10-07 RX ORDER — GLUCAGON 1 MG
1 KIT INJECTION
Status: DISCONTINUED | OUTPATIENT
Start: 2019-10-07 | End: 2019-10-10 | Stop reason: HOSPADM

## 2019-10-07 RX ORDER — HYDROCODONE BITARTRATE AND ACETAMINOPHEN 7.5; 325 MG/15ML; MG/15ML
15 SOLUTION ORAL EVERY 6 HOURS PRN
Status: DISCONTINUED | OUTPATIENT
Start: 2019-10-07 | End: 2019-10-10 | Stop reason: HOSPADM

## 2019-10-07 RX ORDER — SODIUM CHLORIDE 9 MG/ML
1000 INJECTION, SOLUTION INTRAVENOUS
Status: COMPLETED | OUTPATIENT
Start: 2019-10-07 | End: 2019-10-07

## 2019-10-07 RX ORDER — SODIUM CHLORIDE 0.9 % (FLUSH) 0.9 %
10 SYRINGE (ML) INJECTION
Status: DISCONTINUED | OUTPATIENT
Start: 2019-10-07 | End: 2019-10-10 | Stop reason: HOSPADM

## 2019-10-07 RX ORDER — IPRATROPIUM BROMIDE AND ALBUTEROL SULFATE 2.5; .5 MG/3ML; MG/3ML
3 SOLUTION RESPIRATORY (INHALATION)
Status: COMPLETED | OUTPATIENT
Start: 2019-10-07 | End: 2019-10-07

## 2019-10-07 RX ORDER — ENOXAPARIN SODIUM 100 MG/ML
40 INJECTION SUBCUTANEOUS DAILY
Status: DISCONTINUED | OUTPATIENT
Start: 2019-10-08 | End: 2019-10-10 | Stop reason: HOSPADM

## 2019-10-07 RX ORDER — SODIUM CHLORIDE 9 MG/ML
INJECTION, SOLUTION INTRAVENOUS CONTINUOUS
Status: DISCONTINUED | OUTPATIENT
Start: 2019-10-07 | End: 2019-10-08

## 2019-10-07 RX ADMIN — IPRATROPIUM BROMIDE AND ALBUTEROL SULFATE 3 ML: .5; 3 SOLUTION RESPIRATORY (INHALATION) at 06:10

## 2019-10-07 RX ADMIN — PROMETHAZINE HYDROCHLORIDE AND CODEINE PHOSPHATE 5 ML: 6.25; 1 SOLUTION ORAL at 11:10

## 2019-10-07 RX ADMIN — GUAIFENESIN AND DEXTROMETHORPHAN HYDROBROMIDE 1 TABLET: 600; 30 TABLET, EXTENDED RELEASE ORAL at 08:10

## 2019-10-07 RX ADMIN — SODIUM CHLORIDE 1000 ML: 0.9 INJECTION, SOLUTION INTRAVENOUS at 10:10

## 2019-10-07 RX ADMIN — PIPERACILLIN AND TAZOBACTAM 4.5 G: 4; .5 INJECTION, POWDER, LYOPHILIZED, FOR SOLUTION INTRAVENOUS; PARENTERAL at 11:10

## 2019-10-07 RX ADMIN — SODIUM CHLORIDE 500 ML: 0.9 INJECTION, SOLUTION INTRAVENOUS at 11:10

## 2019-10-07 RX ADMIN — TRAMADOL HYDROCHLORIDE 50 MG: 50 TABLET, FILM COATED ORAL at 08:10

## 2019-10-07 RX ADMIN — PIPERACILLIN AND TAZOBACTAM 4.5 G: 4; .5 INJECTION, POWDER, LYOPHILIZED, FOR SOLUTION INTRAVENOUS; PARENTERAL at 08:10

## 2019-10-07 RX ADMIN — IPRATROPIUM BROMIDE AND ALBUTEROL SULFATE 3 ML: .5; 3 SOLUTION RESPIRATORY (INHALATION) at 09:10

## 2019-10-07 RX ADMIN — SODIUM CHLORIDE 1000 ML: 0.9 INJECTION, SOLUTION INTRAVENOUS at 02:10

## 2019-10-07 RX ADMIN — IPRATROPIUM BROMIDE AND ALBUTEROL SULFATE 3 ML: .5; 3 SOLUTION RESPIRATORY (INHALATION) at 05:10

## 2019-10-07 RX ADMIN — SODIUM CHLORIDE 1000 ML: 0.9 INJECTION, SOLUTION INTRAVENOUS at 11:10

## 2019-10-07 RX ADMIN — SODIUM CHLORIDE: 0.9 INJECTION, SOLUTION INTRAVENOUS at 08:10

## 2019-10-07 RX ADMIN — HYDROCODONE BITARTRATE AND ACETAMINOPHEN 15 ML: 7.5; 325 SOLUTION ORAL at 10:10

## 2019-10-07 RX ADMIN — IOHEXOL 100 ML: 350 INJECTION, SOLUTION INTRAVENOUS at 12:10

## 2019-10-07 RX ADMIN — ASPIRIN 325 MG ORAL TABLET 325 MG: 325 PILL ORAL at 10:10

## 2019-10-07 RX ADMIN — VANCOMYCIN HYDROCHLORIDE 1500 MG: 100 INJECTION, POWDER, LYOPHILIZED, FOR SOLUTION INTRAVENOUS at 11:10

## 2019-10-07 NOTE — ASSESSMENT & PLAN NOTE
-Pneumonia: atypical  -IV Vanco and Zosyn  -fluid bolus  - ECHO pending  -CT chest showed possible atypical pneumonia  - sputum Cx pending  - blood Cx pending  - Incentive Spirometer q 1 hour  -mucinex  -Pulse Ox q 4 hours  -pneumo Vac

## 2019-10-07 NOTE — HPI
Genie Balderas is a 59 y.o. female patient with a PMHx of lymphoma (Dr. Billingsley), last chemo 2/2019, DM, HTN, who presented to the ER for chest pain, onset 4 days PTA. Pain is worse with coughing. Associated sxs include SOB, lower back pain, diaphoresis, and cough. Associate s/s included fever 103 at home, chest pain in center of chest radiated to back and bilateral ribs, productive cough with white green sputum. Patient denies any weakness, numbness, dizziness, headache, and all other sxs at this time. Prior Tx includes Norco. In ER, Labs showed WBC 19.46, potassium 5.2, Glucose 154, troponin 0.043, , procalcitonin 22.65. Xray lumbar spine with bibasilar infiltrate or pneumonia. CTA chest: no PE, consider atypical pneumonias. Admission to [facility] with IV Vanc and Zosyn.

## 2019-10-07 NOTE — H&P
[facility]  History & Physical    Patient Name: Genie Balderas  MRN: [id number]  Admission Date: 10/7/2019  Attending Physician: Dr. Rayne Saucedo   Primary Care Provider: Ryan Sims MD     Patient information was obtained from patient, spouse/SO, past medical records and ER records.     Subjective:     Principal Problem:Severe sepsis    Chief Complaint:   Chief Complaint   Patient presents with    Chest Pain     c/o chest pain, SOB, cough, nausea and weakness        HPI: Genie Balderas is a 59 y.o. female patient with a PMHx of lymphoma (Dr. Billingsley), last chemo 2019, DM, HTN, who presented to the ER for chest pain, onset 4 days PTA. Pain is worse with coughing. Associated sxs include  SOB, lower back pain, diaphoresis, and cough. Associate s/s included fever 103 at home, chest pain in center of chest radiated to back and bilateral ribs, productive cough with white green sputum. Patient denies any weakness, numbness, dizziness, headache, and all other sxs at this time. Prior Tx includes Norco.  In ER, Labs showed WBC 19.46, potassium 5.2, Glucose 154, troponin 0.043, , procalcitonin 22.65. Xray lumbar spine with bibasilar infiltrate or pneumonia. CTA chest: no PE, consider atypical pneumonias. Surrogate Decision Maker: Daughter: Amy Echeverria [phone]. Smoking cessation counseling > 10 minutes. Admission to Tele with IV Vanc and Zosyn.     Past Medical History:   Diagnosis Date    Anticoagulant long-term use     Asthma     Diabetes mellitus     Hypertension        Past Surgical History:   Procedure Laterality Date     SECTION         Review of patient's allergies indicates:  No Known Allergies    No current facility-administered medications on file prior to encounter.      Current Outpatient Medications on File Prior to Encounter   Medication Sig    budesonide-formoterol 160-4.5 mcg (SYMBICORT) 160-4.5 mcg/actuation HFAA Inhale 2 puffs into the  lungs every 12 (twelve) hours. Controller    hydrocodone-acetaminophen (HYCET) solution 7.5-325 mg/15mL Take by mouth 4 (four) times daily as needed for Pain.    LORazepam (ATIVAN) 0.5 MG tablet Take 0.5 mg by mouth.    metoprolol succinate (TOPROL-XL) 50 MG 24 hr tablet Take 50 mg by mouth once daily.    pantoprazole (PROTONIX) 40 MG tablet Take 40 mg by mouth.    blood sugar diagnostic Strp QAC and QHS    blood-glucose meter kit Use as instructed    budesonide (PULMICORT) 0.5 mg/2 mL nebulizer solution Take 2 mLs (0.5 mg total) by nebulization every 12 (twelve) hours. Controller    diltiaZEM (CARDIZEM) 90 MG tablet Take 90 mg by mouth 4 (four) times daily.    ipratropium (ATROVENT) 0.02 % nebulizer solution Take 2.5 mLs (500 mcg total) by nebulization every 6 (six) hours. Rescue    levalbuterol (XOPENEX) 0.63 mg/3 mL nebulizer solution Take 3 mLs (0.63 mg total) by nebulization every 6 (six) hours. Rescue    metformin (GLUCOPHAGE) 500 MG tablet Take 500 mg by mouth 2 (two) times daily with meals.    montelukast (SINGULAIR) 10 mg tablet Take 10 mg by mouth every evening.    rivaroxaban (XARELTO) 20 mg Tab Take 20 mg by mouth daily with dinner or evening meal.     Family History     Problem Relation (Age of Onset)    Heart disease Mother    Heart failure Mother (67)        Tobacco Use    Smoking status: Current Some Day Smoker    Smokeless tobacco: Never Used   Substance and Sexual Activity    Alcohol use: No    Drug use: No    Sexual activity: Not Currently     Review of Systems   Constitutional: Positive for activity change, fatigue and fever. Negative for appetite change and chills.   HENT: Negative.  Negative for congestion, ear discharge, facial swelling, sore throat and trouble swallowing.    Eyes: Negative.  Negative for photophobia, pain, discharge, redness and visual disturbance.   Respiratory: Positive for shortness of breath. Negative for cough, chest tightness, wheezing and stridor.     Cardiovascular: Positive for chest pain. Negative for palpitations and leg swelling.   Gastrointestinal: Positive for constipation. Negative for abdominal distention, abdominal pain, anal bleeding, blood in stool, diarrhea, nausea, rectal pain and vomiting.   Endocrine: Negative.  Negative for cold intolerance, heat intolerance, polydipsia, polyphagia and polyuria.   Genitourinary: Negative.  Negative for difficulty urinating, dysuria, flank pain, frequency, hematuria, pelvic pain, urgency, vaginal bleeding and vaginal discharge.   Musculoskeletal: Positive for myalgias (Bilateral rib pain.). Negative for arthralgias, back pain, gait problem, joint swelling and neck pain.   Skin: Negative for color change, pallor, rash and wound.   Allergic/Immunologic: Negative.  Negative for food allergies and immunocompromised state.   Neurological: Positive for weakness. Negative for dizziness, tremors, seizures, syncope, facial asymmetry, speech difficulty, light-headedness, numbness and headaches.   Hematological: Negative.  Negative for adenopathy. Does not bruise/bleed easily.   Psychiatric/Behavioral: Negative.  Negative for agitation, confusion, hallucinations, sleep disturbance and suicidal ideas. The patient is not nervous/anxious.    All other systems reviewed and are negative.    Objective:     Vital Signs (Most Recent):  Temp: 98.6 °F (37 °C) (10/07/19 1501)  Pulse: 94 (10/07/19 1601)  Resp: (!) 25 (10/07/19 1601)  BP: (!) 90/59 (10/07/19 1601)  SpO2: (!) 91 % (10/07/19 1601) Vital Signs (24h Range):  Temp:  [98.6 °F (37 °C)-98.8 °F (37.1 °C)] 98.6 °F (37 °C)  Pulse:  [] 94  Resp:  [18-25] 25  SpO2:  [91 %-96 %] 91 %  BP: ()/(47-67) 90/59     Weight: 52.2 kg (115 lb)  Body mass index is 19.74 kg/m².    Physical Exam   Constitutional: She is oriented to person, place, and time. She appears well-developed and well-nourished. No distress.   HENT:   Head: Normocephalic and atraumatic.   Nose: Nose normal.    Eyes: Pupils are equal, round, and reactive to light. Conjunctivae and EOM are normal. Right eye exhibits no discharge. Left eye exhibits no discharge.   Neck: Normal range of motion. Neck supple. No JVD present. No tracheal deviation present. No thyromegaly present.   Cardiovascular: Normal rate, regular rhythm and normal heart sounds. Exam reveals no gallop and no friction rub.   No murmur heard.  Pulmonary/Chest: No stridor. No respiratory distress. She has no wheezes. She has no rales. She exhibits tenderness.   Diminished breathe sounds and crackles to bases   Abdominal: Soft. Bowel sounds are normal. She exhibits no distension and no mass. There is no tenderness. There is no guarding.   Musculoskeletal: Normal range of motion. She exhibits no edema, tenderness or deformity.   Lymphadenopathy:     She has no cervical adenopathy.   Neurological: She is alert and oriented to person, place, and time. She has normal reflexes. No cranial nerve deficit. Coordination normal.   Skin: Skin is warm and dry. No rash noted. She is not diaphoretic. No erythema. No pallor.   Psychiatric: She has a normal mood and affect. Her behavior is normal. Judgment and thought content normal.   Nursing note and vitals reviewed.        CRANIAL NERVES     CN III, IV, VI   Pupils are equal, round, and reactive to light.  Extraocular motions are normal.      Significant Labs:   CBC:   Recent Labs   Lab 10/07/19  1021   WBC 19.46*   HGB 12.3   HCT 39.4        CMP:   Recent Labs   Lab 10/07/19  1021   *   K 5.2*   CL 93*   CO2 22*   *   BUN 51*   CREATININE 1.3   CALCIUM 10.7*   PROT 7.2   ALBUMIN 2.4*   BILITOT 2.2*   ALKPHOS 141*   AST 28   ALT 13   ANIONGAP 19*   EGFRNONAA 45*     Cardiac Markers:   Recent Labs   Lab 10/07/19  1021   *       Significant Imaging: I have reviewed all pertinent imaging results/findings within the past 24 hours.   Imaging Results          CTA Chest Non-Coronary (PE Study) (Final  result)  Result time 10/07/19 13:04:24    Final result by Deuce Costello MD (10/07/19 13:04:24)                 Impression:      No evidence of pulmonary embolism.  Confluent areas of airspace disease can be seen in the lung bases bilaterally and in the upper lung zones bilaterally.  Consider atypical pneumonias.  Pre aortic lymphadenopathy.  No hilar adenopathy.    All CT scans at this facility use dose modulation, iterative reconstruction and/or weight based dosing when appropriate to reduce radiation dose to as low as reasonably achievable.      Electronically signed by: Deuce Costello MD  Date:    10/07/2019  Time:    13:04             Narrative:    EXAMINATION:  CTA CHEST NON CORONARY    CLINICAL HISTORY:  Chest pain, acute, pregnant, PE suspected;    TECHNIQUE:  CT of the chest was acquired helically utilizing a low-dose technique from the lung apices through the posterior costophrenic angles status post administration of 100 cc of Omnipaque 350.  Bolus timing was utilized to optimize opacification of the pulmonary arterial system. 3-D maximum intensity projection and multiplanar reconstructions were created from the source data set and interpreted.    COMPARISON:  None    FINDINGS:  Patchy airspace disease seen most significantly at the lung bases bilaterally.  Patchy airspace disease can be seen in the lingula and upper lung zones bilaterally.  No discrete pulmonary nodules or masses are identified.    The aorta demonstrates normal caliber and contour. There is good opacification of the pulmonary arterial system. No intraluminal filling defects are notified within the pulmonary arterial system to suggest pulmonary embolism. There is no pericardial effusion.  No enlarged mediastinal, hilar or axillary lymph nodes are identified.    The visualized upper abdomen is unremarkable in appearance.    The osseous structures are unremarkable in appearance.                               X-Ray Chest PA And Lateral (Final  result)  Result time 10/07/19 11:12:19    Final result by Deuce Costello MD (10/07/19 11:12:19)                 Impression:      Bibasilar infiltrate/pneumonia greater on the left.      Electronically signed by: Deuce Costello MD  Date:    10/07/2019  Time:    11:12             Narrative:    EXAMINATION:  XR CHEST PA AND LATERAL    CLINICAL HISTORY:  Chest Pain;    TECHNIQUE:  PA and lateral views of the chest were performed.    COMPARISON:  10/01/2017    FINDINGS:  Bibasilar infiltrate/pneumonia with airspace disease more extensively seen at the left lung base.  MediPort.    The cardiac silhouette is normal in size. The hilar and mediastinal contours are unremarkable.                               X-Ray Lumbar Spine Ap And Lateral (Final result)  Result time 10/07/19 11:13:06    Final result by Deuce Costello MD (10/07/19 11:13:06)                 Impression:      Mild degenerative changes of the lumbar spine.  Bibasilar infiltrate/pneumonia.      Electronically signed by: Deuce Costello MD  Date:    10/07/2019  Time:    11:13             Narrative:    EXAMINATION:  XR LUMBAR SPINE AP AND LATERAL    CLINICAL HISTORY:  Low back pain, risk factors (osteoporosis or chronic steroid use or elderly);    TECHNIQUE:  AP, lateral and spot images were performed of the lumbar spine.    COMPARISON:  None    FINDINGS:  Bibasilar infiltrate/pneumonia.  Degenerative changes of the facets at the L4-5 and L5-S1 levels.  Scattered vascular calcifications.                              Assessment/Plan:     * Severe sepsis  -Pneumonia: atypical  -IV Vanco and Zosyn  -fluid bolus  - ECHO  -CT chest showed possible atypical pneumonia  - sputum Cx pending  - blood Cx pending  - Incentive Spirometer q 1 hour  -mucinex  -Pulse Ox q 4 hours  -pneumo Vac          Type 2 diabetes mellitus with circulatory disorder, without long-term current use of insulin  Diabetes Mellitus  -SSI and accuchecks  -1800 henry ADA diet  -nutritional counseling      ELOY  (acute kidney injury)  -gentle hydration  -monitor and adjust treatment as needed      Pneumonia of left lower lobe due to infectious organism  -Pneumonia: atypical  -IV Vanco and Zosyn  -fluid bolus  - ECHO  -CT chest showed possible atypical pneumonia  - sputum Cx pending  - blood Cx pending  - Incentive Spirometer q 1 hour  -mucinex  -Pulse Ox q 4 hours  -pneumo Vac          VTE Risk Mitigation (From admission, onward)    None             Delphine Leong NP  Department of Hospital Medicine   [facility]

## 2019-10-07 NOTE — PROGRESS NOTES
Pharmacokinetic Initial Assessment: IV Vancomycin    Assessment/Plan:    Initiate intravenous vancomycin with loading dose of 1500 mg once followed by a maintenance dose of vancomycin 750 mg IV every 24 hours.  Desired empiric serum trough concentration is 15 to 20 mcg/mL.  Draw vancomycin trough level 30 min prior to third dose on 10/09/2019 at approximately 1130.  Pharmacy will continue to follow and monitor vancomycin.      Please contact pharmacy at extension [phone] with any questions regarding this assessment.     Thank you for the consult,   Kellie Mane PharmD     Patient brief summary:  Genie Balderas is a 59 y.o. female initiated on antimicrobial therapy with IV Vancomycin for treatment of suspected pneumonia.    Drug Allergies:   Review of patient's allergies indicates:  No Known Allergies    Actual Body Weight:   52.2 kg    Renal Function:   Estimated Creatinine Clearance: 38.4 mL/min (based on SCr of 1.3 mg/dL).,     Dialysis Method (if applicable):  N/A    CBC (last 72 hours):  Recent Labs   Lab Result Units 10/07/19  1021   WBC K/uL 19.46*   Hemoglobin g/dL 12.3   Hematocrit % 39.4   Platelets K/uL 221     Metabolic Panel (last 72 hours):  Recent Labs   Lab Result Units 10/07/19  1021   Sodium mmol/L 134*   Potassium mmol/L 5.2*   Chloride mmol/L 93*   CO2 mmol/L 22*   Glucose mg/dL 154*   BUN, Bld mg/dL 51*   Creatinine mg/dL 1.3   Albumin g/dL 2.4*   Total Bilirubin mg/dL 2.2*   Alkaline Phosphatase U/L 141*   AST U/L 28   ALT U/L 13     Drug levels (last 3 results):  No results for input(s): VANCOMYCINRA, VANCOMYCINPE, VANCOMYCINTR in the last 72 hours.    Microbiologic Results:  Microbiology Results (last 7 days)     Procedure Component Value Units Date/Time    Culture, Respiratory with Gram Stain [[id number]] Collected:  10/07/19 1134    Order Status:  Sent Specimen:  Respiratory from Sputum, Expectorated Updated:  10/07/19 1153    Blood Culture #2 **CANNOT BE ORDERED STAT** [[id number]] Collected:   10/07/19 1136    Order Status:  Sent Specimen:  Blood from Peripheral, Hand, Left Updated:  10/07/19 1142    Blood Culture #1 **CANNOT BE ORDERED STAT** [[id number]] Collected:  10/07/19 0956    Order Status:  Sent Specimen:  Blood from Peripheral, Forearm, Right Updated:  10/07/19 1039    Influenza A & B by Molecular [[id number]] Collected:  10/07/19 1028    Order Status:  Sent Specimen:  Nasopharyngeal Swab Updated:  10/07/19 1038        Thank you for allowing us to participate in this patient's care.     Kellie Mane PharmD 10/07/2019 12:05 PM

## 2019-10-07 NOTE — ASSESSMENT & PLAN NOTE
-Pneumonia: atypical  -IV Vanco and Zosyn  -fluid bolus  - ECHO  -CT chest showed possible atypical pneumonia  - sputum Cx pending  - blood Cx pending  - Incentive Spirometer q 1 hour  -mucinex  -Pulse Ox q 4 hours  -pneumo Vac

## 2019-10-07 NOTE — ED PROVIDER NOTES
SCRIBE #1 NOTE: I, Chris Griffin, am scribing for, and in the presence of, Gisselle Franklin MD. I have scribed the entire note.      History      Chief Complaint   Patient presents with    Chest Pain     c/o chest pain, SOB, cough, nausea and weakness       Review of patient's allergies indicates:  No Known Allergies     HPI   HPI    10/7/2019, 10:27 AM   History obtained from the patient      History of Present Illness: Genie Balderas is a 59 y.o. female patient with a PMHx of lymphoma, DM, and HTN who presents to the Emergency Department for chest pain, onset 4 days PTA. Symptoms are constant and moderate in severity. Pain is worse with coughing. Associated sxs include lower back pain, diaphoresis, and cough. Patient denies any fever, chills, n/v/d, SOB, weakness, numbness, dizziness, headache, and all other sxs at this time. Prior Tx includes Norco. No further complaints or concerns at this time.     Arrival mode: Personal vehicle    PCP: Ryan Sims MD       Past Medical History:  Past Medical History:   Diagnosis Date    Anticoagulant long-term use     Asthma     Diabetes mellitus     Hypertension        Past Surgical History:  Past Surgical History:   Procedure Laterality Date     SECTION           Family History:  Family History   Problem Relation Age of Onset    Heart disease Mother        Social History:  Social History     Tobacco Use    Smoking status: Current Some Day Smoker    Smokeless tobacco: Never Used   Substance and Sexual Activity    Alcohol use: No    Drug use: No    Sexual activity: Not Currently       ROS   Review of Systems   Constitutional: Positive for diaphoresis. Negative for chills and fever.   HENT: Negative for sore throat.    Respiratory: Positive for cough. Negative for shortness of breath.    Cardiovascular: Positive for chest pain.   Gastrointestinal: Negative for diarrhea, nausea and vomiting.   Genitourinary: Negative for dysuria.   Musculoskeletal:  Positive for back pain (lower).   Skin: Negative for rash and wound.   Neurological: Negative for dizziness, weakness, light-headedness, numbness and headaches.   Hematological: Does not bruise/bleed easily.   All other systems reviewed and are negative.    Physical Exam      Initial Vitals [10/07/19 0939]   BP Pulse Resp Temp SpO2   (!) 90/47 (!) 144 20 98.8 °F (37.1 °C) 96 %      MAP       --          Physical Exam  Nursing Notes and Vital Signs Reviewed.  Constitutional: Patient is in no acute distress. Chronically ill appearing. Appears older than stated age. Cachectic.   Head: Atraumatic. Normocephalic.  Eyes: PERRL. EOM intact. Conjunctivae are not pale. No scleral icterus.  ENT: Mucous membranes are dry. Thrush on tongue. Oropharynx is clear and symmetric.    Neck: Supple. Full ROM. No lymphadenopathy.  Cardiovascular: Tachycardic. Regular rhythm. No murmurs, rubs, or gallops. Distal pulses are 2+ and symmetric.  Pulmonary/Chest: No respiratory distress. Clear to auscultation bilaterally. No wheezing or rales.  Abdominal: Soft and non-distended.  There is no tenderness.  No rebound, guarding, or rigidity. Good bowel sounds.  Musculoskeletal: Moves all extremities. No obvious deformities. No edema.  Skin: Warm and dry.  Neurological:  Alert, awake, and appropriate.  Normal speech.  No acute focal neurological deficits are appreciated.  Psychiatric: Normal affect. Good eye contact. Appropriate in content.    ED Course    Critical Care  Date/Time: 10/7/2019 11:27 AM  Performed by: Gisselle Franklin MD  Authorized by: Gisselle Franklin MD   Direct patient critical care time: 35 minutes  Additional history critical care time: 5 minutes  Ordering / reviewing critical care time: 5 minutes  Documentation critical care time: 5 minutes  Consulting other physicians critical care time: 5 minutes  Consult with family critical care time: 5 minutes  Total critical care time (exclusive of procedural time) : 60  "minutes  Critical care time was exclusive of separately billable procedures and treating other patients and teaching time.  Critical care was necessary to treat or prevent imminent or life-threatening deterioration of the following conditions: sepsis.  Critical care was time spent personally by me on the following activities: blood draw for specimens, development of treatment plan with patient or surrogate, discussions with consultants, interpretation of cardiac output measurements, evaluation of patient's response to treatment, examination of patient, obtaining history from patient or surrogate, ordering and performing treatments and interventions, ordering and review of laboratory studies, ordering and review of radiographic studies, pulse oximetry and re-evaluation of patient's condition.        ED Vital Signs:  Vitals:    10/07/19 0939 10/07/19 0948 10/07/19 0955 10/07/19 1027   BP: (!) 90/47 (!) 93/52     Pulse: (!) 144 (!) 138 (!) 134    Resp: 20 (!) 25 18    Temp: 98.8 °F (37.1 °C)      TempSrc: Oral      SpO2: 96% 95% (!) 93%    Weight:    52.2 kg (115 lb)   Height:    5' 4" (1.626 m)    10/07/19 1033 10/07/19 1124 10/07/19 1202 10/07/19 1302   BP: (!) 89/66 (!) 105/55 (!) 108/57 96/60   Pulse: (!) 146 (!) 141 (!) 130 (!) 131   Resp: (!) 24 (!) 24 (!) 24 (!) 23   Temp:       TempSrc:       SpO2: (!) 93% 96% 95% 96%   Weight:       Height:           Abnormal Lab Results:  Labs Reviewed   CBC W/ AUTO DIFFERENTIAL - Abnormal; Notable for the following components:       Result Value    WBC 19.46 (*)     Mean Corpuscular Hemoglobin Conc 31.2 (*)     Gran% 84.0 (*)     Lymph% 7.0 (*)     All other components within normal limits   COMPREHENSIVE METABOLIC PANEL - Abnormal; Notable for the following components:    Sodium 134 (*)     Potassium 5.2 (*)     Chloride 93 (*)     CO2 22 (*)     Glucose 154 (*)     BUN, Bld 51 (*)     Calcium 10.7 (*)     Albumin 2.4 (*)     Total Bilirubin 2.2 (*)     Alkaline Phosphatase " 141 (*)     Anion Gap 19 (*)     eGFR if  52 (*)     eGFR if non  45 (*)     All other components within normal limits   TROPONIN I - Abnormal; Notable for the following components:    Troponin I 0.078 (*)     All other components within normal limits   B-TYPE NATRIURETIC PEPTIDE - Abnormal; Notable for the following components:     (*)     All other components within normal limits   URINALYSIS, REFLEX TO URINE CULTURE - Abnormal; Notable for the following components:    Ketones, UA Trace (*)     Bilirubin (UA) 2+ (*)     Urobilinogen, UA 4.0-6.0 (*)     All other components within normal limits    Narrative:     Preferred Collection Type->Urine, Catheterized   PROCALCITONIN - Abnormal; Notable for the following components:    Procalcitonin 22.65 (*)     All other components within normal limits   INFLUENZA A & B BY MOLECULAR   CULTURE, BLOOD   CULTURE, BLOOD   CULTURE, RESPIRATORY   PROTIME-INR   APTT   LACTIC ACID, PLASMA   CK   MAGNESIUM   RAPID HIV   TSH   CK   TSH   MAGNESIUM   APTT   PROTIME-INR   RAPID HIV   LACTIC ACID, PLASMA        All Lab Results:  Results for orders placed or performed during the hospital encounter of 10/07/19   Influenza A & B by Molecular   Result Value Ref Range    Influenza A, Molecular Negative Negative    Influenza B, Molecular Negative Negative    Flu A & B Source Nasal swab    CBC auto differential   Result Value Ref Range    WBC 19.46 (H) 3.90 - 12.70 K/uL    RBC 4.26 4.00 - 5.40 M/uL    Hemoglobin 12.3 12.0 - 16.0 g/dL    Hematocrit 39.4 37.0 - 48.5 %    Mean Corpuscular Volume 93 82 - 98 fL    Mean Corpuscular Hemoglobin 28.9 27.0 - 31.0 pg    Mean Corpuscular Hemoglobin Conc 31.2 (L) 32.0 - 36.0 g/dL    RDW 14.5 11.5 - 14.5 %    Platelets 221 150 - 350 K/uL    MPV 10.9 9.2 - 12.9 fL    Immature Granulocytes CANCELED 0.0 - 0.5 %    Immature Grans (Abs) CANCELED 0.00 - 0.04 K/uL    nRBC 0 0 /100 WBC    Gran% 84.0 (H) 38.0 - 73.0 %     Lymph% 7.0 (L) 18.0 - 48.0 %    Mono% 7.0 4.0 - 15.0 %    Eosinophil% 0.0 0.0 - 8.0 %    Basophil% 0.0 0.0 - 1.9 %    Bands 2.0 %    Platelet Estimate Appears normal     Aniso Slight     Differential Method Manual    Comprehensive metabolic panel   Result Value Ref Range    Sodium 134 (L) 136 - 145 mmol/L    Potassium 5.2 (H) 3.5 - 5.1 mmol/L    Chloride 93 (L) 95 - 110 mmol/L    CO2 22 (L) 23 - 29 mmol/L    Glucose 154 (H) 70 - 110 mg/dL    BUN, Bld 51 (H) 6 - 20 mg/dL    Creatinine 1.3 0.5 - 1.4 mg/dL    Calcium 10.7 (H) 8.7 - 10.5 mg/dL    Total Protein 7.2 6.0 - 8.4 g/dL    Albumin 2.4 (L) 3.5 - 5.2 g/dL    Total Bilirubin 2.2 (H) 0.1 - 1.0 mg/dL    Alkaline Phosphatase 141 (H) 55 - 135 U/L    AST 28 10 - 40 U/L    ALT 13 10 - 44 U/L    Anion Gap 19 (H) 8 - 16 mmol/L    eGFR if African American 52 (A) >60 mL/min/1.73 m^2    eGFR if non African American 45 (A) >60 mL/min/1.73 m^2   Troponin I #1   Result Value Ref Range    Troponin I 0.078 (H) 0.000 - 0.026 ng/mL   B-Type natriuretic peptide (BNP)   Result Value Ref Range     (H) 0 - 99 pg/mL   Urinalysis, Reflex to Urine Culture Urine, Catheterized   Result Value Ref Range    Specimen UA Urine, Catheterized     Color, UA Yellow Yellow, Straw, Suzanne    Appearance, UA Clear Clear    pH, UA 6.0 5.0 - 8.0    Specific Gravity, UA 1.015 1.005 - 1.030    Protein, UA Negative Negative    Glucose, UA Negative Negative    Ketones, UA Trace (A) Negative    Bilirubin (UA) 2+ (A) Negative    Occult Blood UA Negative Negative    Nitrite, UA Negative Negative    Urobilinogen, UA 4.0-6.0 (A) <2.0 EU/dL    Leukocytes, UA Negative Negative   Lactic acid, plasma   Result Value Ref Range    Lactate (Lactic Acid) 2.0 0.5 - 2.2 mmol/L   Procalcitonin   Result Value Ref Range    Procalcitonin 22.65 (H) <0.25 ng/mL   CK   Result Value Ref Range     20 - 180 U/L   TSH   Result Value Ref Range    TSH 0.955 0.400 - 4.000 uIU/mL   Magnesium   Result Value Ref Range     Magnesium 2.2 1.6 - 2.6 mg/dL   APTT   Result Value Ref Range    aPTT 32.0 21.0 - 32.0 sec   Protime-INR   Result Value Ref Range    Prothrombin Time 11.4 9.0 - 12.5 sec    INR 1.1 0.8 - 1.2   Rapid HIV   Result Value Ref Range    HIV Rapid Testing Negative Negative     Imaging Results:  Imaging Results          CTA Chest Non-Coronary (PE Study) (Final result)  Result time 10/07/19 13:04:24    Final result by Deuce Costello MD (10/07/19 13:04:24)                 Impression:      No evidence of pulmonary embolism.  Confluent areas of airspace disease can be seen in the lung bases bilaterally and in the upper lung zones bilaterally.  Consider atypical pneumonias.  Pre aortic lymphadenopathy.  No hilar adenopathy.    All CT scans at this facility use dose modulation, iterative reconstruction and/or weight based dosing when appropriate to reduce radiation dose to as low as reasonably achievable.      Electronically signed by: Deuce Costello MD  Date:    10/07/2019  Time:    13:04             Narrative:    EXAMINATION:  CTA CHEST NON CORONARY    CLINICAL HISTORY:  Chest pain, acute, pregnant, PE suspected;    TECHNIQUE:  CT of the chest was acquired helically utilizing a low-dose technique from the lung apices through the posterior costophrenic angles status post administration of 100 cc of Omnipaque 350.  Bolus timing was utilized to optimize opacification of the pulmonary arterial system. 3-D maximum intensity projection and multiplanar reconstructions were created from the source data set and interpreted.    COMPARISON:  None    FINDINGS:  Patchy airspace disease seen most significantly at the lung bases bilaterally.  Patchy airspace disease can be seen in the lingula and upper lung zones bilaterally.  No discrete pulmonary nodules or masses are identified.    The aorta demonstrates normal caliber and contour. There is good opacification of the pulmonary arterial system. No intraluminal filling defects are notified  within the pulmonary arterial system to suggest pulmonary embolism. There is no pericardial effusion.  No enlarged mediastinal, hilar or axillary lymph nodes are identified.    The visualized upper abdomen is unremarkable in appearance.    The osseous structures are unremarkable in appearance.                               X-Ray Chest PA And Lateral (Final result)  Result time 10/07/19 11:12:19    Final result by Deuce Costello MD (10/07/19 11:12:19)                 Impression:      Bibasilar infiltrate/pneumonia greater on the left.      Electronically signed by: Deuce Costello MD  Date:    10/07/2019  Time:    11:12             Narrative:    EXAMINATION:  XR CHEST PA AND LATERAL    CLINICAL HISTORY:  Chest Pain;    TECHNIQUE:  PA and lateral views of the chest were performed.    COMPARISON:  10/01/2017    FINDINGS:  Bibasilar infiltrate/pneumonia with airspace disease more extensively seen at the left lung base.  [employer].    The cardiac silhouette is normal in size. The hilar and mediastinal contours are unremarkable.                               X-Ray Lumbar Spine Ap And Lateral (Final result)  Result time 10/07/19 11:13:06    Final result by Deuce Costello MD (10/07/19 11:13:06)                 Impression:      Mild degenerative changes of the lumbar spine.  Bibasilar infiltrate/pneumonia.      Electronically signed by: Deuce Costello MD  Date:    10/07/2019  Time:    11:13             Narrative:    EXAMINATION:  XR LUMBAR SPINE AP AND LATERAL    CLINICAL HISTORY:  Low back pain, risk factors (osteoporosis or chronic steroid use or elderly);    TECHNIQUE:  AP, lateral and spot images were performed of the lumbar spine.    COMPARISON:  None    FINDINGS:  Bibasilar infiltrate/pneumonia.  Degenerative changes of the facets at the L4-5 and L5-S1 levels.  Scattered vascular calcifications.                               The EKG was ordered, reviewed, and independently interpreted by the ED provider.  Interpretation time:  09:41  Rate: 143 BPM  Rhythm: sinus tachycardia  Interpretation: Biatrial enlargement. Left axis deviation. Pulmonary disease pattern. Junctional ST depression, probably normal. No STEMI.           The Emergency Provider reviewed the vital signs and test results, which are outlined above.    ED Discussion     11:27 AM: 30mL/kg IVF have been administered within 3 hours of identification of SIRS criteria. Vital signs were reviewed and a focal sepsis perfusion assessment was performed.    1:29 PM: Discussed case with Delphine Leong NP ([facility]). Dr. Saucedo agrees with current care and management of pt and accepts admission.   Admitting Service: [facility]  Admitting Physician: Dr. Saucedo  Admit to: Tele    1:30 PM: Re-evaluated pt. I have discussed test results, shared treatment plan, and the need for admission with patient and family at bedside. Pt and family express understanding at this time and agree with all information. All questions answered. Pt and family have no further questions or concerns at this time. Pt is ready for admit.    ED Medication(s):  Medications   piperacillin-tazobactam 4.5 g in dextrose 5 % 100 mL IVPB (ready to mix system) (has no administration in time range)   vancomycin 750 mg in dextrose 5 % 250 mL IVPB (ready to mix system) (750 mg Intravenous Trough Due 30 minutes Before Dose 10/9/19 1130)   0.9%  NaCl infusion (has no administration in time range)   aspirin tablet 325 mg (325 mg Oral Given 10/7/19 1018)   albuterol-ipratropium 2.5 mg-0.5 mg/3 mL nebulizer solution 3 mL (3 mLs Nebulization Given 10/7/19 0955)   sodium chloride 0.9% bolus 1,000 mL (0 mLs Intravenous Stopped 10/7/19 1222)   sodium chloride 0.9% bolus 1,000 mL (0 mLs Intravenous Stopped 10/7/19 1223)   piperacillin-tazobactam 4.5 g in dextrose 5 % 100 mL IVPB (ready to mix system) (0 g Intravenous Stopped 10/7/19 1223)   vancomycin 1.5 g in 5 % dextrose 250 mL IVPB (1,500 mg Intravenous New Bag 10/7/19  1144)   iohexol (OMNIPAQUE 350) injection 100 mL (100 mLs Intravenous Given 10/7/19 1233)     New Prescriptions    No medications on file           Medical Decision Making    Medical Decision Making:   Clinical Tests:   Lab Tests: Ordered and Reviewed  Radiological Study: Ordered and Reviewed  Medical Tests: Ordered and Reviewed           Scribe Attestation:   Scribe #1: I performed the above scribed service and the documentation accurately describes the services I performed. I attest to the accuracy of the note.    Attending:   Physician Attestation Statement for Scribe #1: I, Gisselle Franklin MD, personally performed the services described in this documentation, as scribed by Chris Griffin, in my presence, and it is both accurate and complete.          Clinical Impression       ICD-10-CM ICD-9-CM   1. Pneumonia of left lower lobe due to infectious organism J18.1 486   2. Chest pain R07.9 786.50   3. Severe sepsis A41.9 038.9    R65.20 995.92   4. Intravascular volume depletion E86.1 276.52   5. Tachycardia R00.0 785.0   6. Diffuse large B-cell lymphoma, unspecified body region C83.30 202.80       Disposition:   Disposition: Admitted  Condition: Serious         Gisselle Franklin MD  10/07/19 1341

## 2019-10-07 NOTE — SUBJECTIVE & OBJECTIVE
Past Medical History:   Diagnosis Date    Anticoagulant long-term use     Asthma     Diabetes mellitus     Hypertension        Past Surgical History:   Procedure Laterality Date     SECTION         Review of patient's allergies indicates:  No Known Allergies    No current facility-administered medications on file prior to encounter.      Current Outpatient Medications on File Prior to Encounter   Medication Sig    budesonide-formoterol 160-4.5 mcg (SYMBICORT) 160-4.5 mcg/actuation HFAA Inhale 2 puffs into the lungs every 12 (twelve) hours. Controller    hydrocodone-acetaminophen (HYCET) solution 7.5-325 mg/15mL Take by mouth 4 (four) times daily as needed for Pain.    LORazepam (ATIVAN) 0.5 MG tablet Take 0.5 mg by mouth.    metoprolol succinate (TOPROL-XL) 50 MG 24 hr tablet Take 50 mg by mouth once daily.    pantoprazole (PROTONIX) 40 MG tablet Take 40 mg by mouth.    blood sugar diagnostic Strp QAC and QHS    blood-glucose meter kit Use as instructed    budesonide (PULMICORT) 0.5 mg/2 mL nebulizer solution Take 2 mLs (0.5 mg total) by nebulization every 12 (twelve) hours. Controller    diltiaZEM (CARDIZEM) 90 MG tablet Take 90 mg by mouth 4 (four) times daily.    ipratropium (ATROVENT) 0.02 % nebulizer solution Take 2.5 mLs (500 mcg total) by nebulization every 6 (six) hours. Rescue    levalbuterol (XOPENEX) 0.63 mg/3 mL nebulizer solution Take 3 mLs (0.63 mg total) by nebulization every 6 (six) hours. Rescue    metformin (GLUCOPHAGE) 500 MG tablet Take 500 mg by mouth 2 (two) times daily with meals.    montelukast (SINGULAIR) 10 mg tablet Take 10 mg by mouth every evening.    rivaroxaban (XARELTO) 20 mg Tab Take 20 mg by mouth daily with dinner or evening meal.     Family History     Problem Relation (Age of Onset)    Heart disease Mother    Heart failure Mother (67)        Tobacco Use    Smoking status: Current Some Day Smoker    Smokeless tobacco: Never Used   Substance and Sexual  Activity    Alcohol use: No    Drug use: No    Sexual activity: Not Currently     Review of Systems   Constitutional: Positive for activity change, fatigue and fever. Negative for appetite change and chills.   HENT: Negative.  Negative for congestion, ear discharge, facial swelling, sore throat and trouble swallowing.    Eyes: Negative.  Negative for photophobia, pain, discharge, redness and visual disturbance.   Respiratory: Positive for shortness of breath. Negative for cough, chest tightness, wheezing and stridor.    Cardiovascular: Positive for chest pain. Negative for palpitations and leg swelling.   Gastrointestinal: Positive for constipation. Negative for abdominal distention, abdominal pain, anal bleeding, blood in stool, diarrhea, nausea, rectal pain and vomiting.   Endocrine: Negative.  Negative for cold intolerance, heat intolerance, polydipsia, polyphagia and polyuria.   Genitourinary: Negative.  Negative for difficulty urinating, dysuria, flank pain, frequency, hematuria, pelvic pain, urgency, vaginal bleeding and vaginal discharge.   Musculoskeletal: Positive for myalgias (Bilateral rib pain.). Negative for arthralgias, back pain, gait problem, joint swelling and neck pain.   Skin: Negative for color change, pallor, rash and wound.   Allergic/Immunologic: Negative.  Negative for food allergies and immunocompromised state.   Neurological: Positive for weakness. Negative for dizziness, tremors, seizures, syncope, facial asymmetry, speech difficulty, light-headedness, numbness and headaches.   Hematological: Negative.  Negative for adenopathy. Does not bruise/bleed easily.   Psychiatric/Behavioral: Negative.  Negative for agitation, confusion, hallucinations, sleep disturbance and suicidal ideas. The patient is not nervous/anxious.    All other systems reviewed and are negative.    Objective:     Vital Signs (Most Recent):  Temp: 98.6 °F (37 °C) (10/07/19 1501)  Pulse: 94 (10/07/19 1601)  Resp: (!) 25  (10/07/19 1601)  BP: (!) 90/59 (10/07/19 1601)  SpO2: (!) 91 % (10/07/19 1601) Vital Signs (24h Range):  Temp:  [98.6 °F (37 °C)-98.8 °F (37.1 °C)] 98.6 °F (37 °C)  Pulse:  [] 94  Resp:  [18-25] 25  SpO2:  [91 %-96 %] 91 %  BP: ()/(47-67) 90/59     Weight: 52.2 kg (115 lb)  Body mass index is 19.74 kg/m².    Physical Exam   Constitutional: She is oriented to person, place, and time. She appears well-developed and well-nourished. No distress.   HENT:   Head: Normocephalic and atraumatic.   Nose: Nose normal.   Eyes: Pupils are equal, round, and reactive to light. Conjunctivae and EOM are normal. Right eye exhibits no discharge. Left eye exhibits no discharge.   Neck: Normal range of motion. Neck supple. No JVD present. No tracheal deviation present. No thyromegaly present.   Cardiovascular: Normal rate, regular rhythm and normal heart sounds. Exam reveals no gallop and no friction rub.   No murmur heard.  Pulmonary/Chest: No stridor. No respiratory distress. She has no wheezes. She has no rales. She exhibits tenderness.   Diminished breathe sounds and crackles to bases   Abdominal: Soft. Bowel sounds are normal. She exhibits no distension and no mass. There is no tenderness. There is no guarding.   Musculoskeletal: Normal range of motion. She exhibits no edema, tenderness or deformity.   Lymphadenopathy:     She has no cervical adenopathy.   Neurological: She is alert and oriented to person, place, and time. She has normal reflexes. No cranial nerve deficit. Coordination normal.   Skin: Skin is warm and dry. No rash noted. She is not diaphoretic. No erythema. No pallor.   Psychiatric: She has a normal mood and affect. Her behavior is normal. Judgment and thought content normal.   Nursing note and vitals reviewed.        CRANIAL NERVES     CN III, IV, VI   Pupils are equal, round, and reactive to light.  Extraocular motions are normal.      Significant Labs:   CBC:   Recent Labs   Lab 10/07/19  1021   WBC  19.46*   HGB 12.3   HCT 39.4        CMP:   Recent Labs   Lab 10/07/19  1021   *   K 5.2*   CL 93*   CO2 22*   *   BUN 51*   CREATININE 1.3   CALCIUM 10.7*   PROT 7.2   ALBUMIN 2.4*   BILITOT 2.2*   ALKPHOS 141*   AST 28   ALT 13   ANIONGAP 19*   EGFRNONAA 45*     Cardiac Markers:   Recent Labs   Lab 10/07/19  1021   *       Significant Imaging: I have reviewed all pertinent imaging results/findings within the past 24 hours.   Imaging Results          CTA Chest Non-Coronary (PE Study) (Final result)  Result time 10/07/19 13:04:24    Final result by Deuce Costello MD (10/07/19 13:04:24)                 Impression:      No evidence of pulmonary embolism.  Confluent areas of airspace disease can be seen in the lung bases bilaterally and in the upper lung zones bilaterally.  Consider atypical pneumonias.  Pre aortic lymphadenopathy.  No hilar adenopathy.    All CT scans at this facility use dose modulation, iterative reconstruction and/or weight based dosing when appropriate to reduce radiation dose to as low as reasonably achievable.      Electronically signed by: Deuce Costello MD  Date:    10/07/2019  Time:    13:04             Narrative:    EXAMINATION:  CTA CHEST NON CORONARY    CLINICAL HISTORY:  Chest pain, acute, pregnant, PE suspected;    TECHNIQUE:  CT of the chest was acquired helically utilizing a low-dose technique from the lung apices through the posterior costophrenic angles status post administration of 100 cc of Omnipaque 350.  Bolus timing was utilized to optimize opacification of the pulmonary arterial system. 3-D maximum intensity projection and multiplanar reconstructions were created from the source data set and interpreted.    COMPARISON:  None    FINDINGS:  Patchy airspace disease seen most significantly at the lung bases bilaterally.  Patchy airspace disease can be seen in the lingula and upper lung zones bilaterally.  No discrete pulmonary nodules or masses are  identified.    The aorta demonstrates normal caliber and contour. There is good opacification of the pulmonary arterial system. No intraluminal filling defects are notified within the pulmonary arterial system to suggest pulmonary embolism. There is no pericardial effusion.  No enlarged mediastinal, hilar or axillary lymph nodes are identified.    The visualized upper abdomen is unremarkable in appearance.    The osseous structures are unremarkable in appearance.                               X-Ray Chest PA And Lateral (Final result)  Result time 10/07/19 11:12:19    Final result by Deuce Costello MD (10/07/19 11:12:19)                 Impression:      Bibasilar infiltrate/pneumonia greater on the left.      Electronically signed by: Deuce Costello MD  Date:    10/07/2019  Time:    11:12             Narrative:    EXAMINATION:  XR CHEST PA AND LATERAL    CLINICAL HISTORY:  Chest Pain;    TECHNIQUE:  PA and lateral views of the chest were performed.    COMPARISON:  10/01/2017    FINDINGS:  Bibasilar infiltrate/pneumonia with airspace disease more extensively seen at the left lung base.  MediPort.    The cardiac silhouette is normal in size. The hilar and mediastinal contours are unremarkable.                               X-Ray Lumbar Spine Ap And Lateral (Final result)  Result time 10/07/19 11:13:06    Final result by Deuce Costello MD (10/07/19 11:13:06)                 Impression:      Mild degenerative changes of the lumbar spine.  Bibasilar infiltrate/pneumonia.      Electronically signed by: Deuce Costello MD  Date:    10/07/2019  Time:    11:13             Narrative:    EXAMINATION:  XR LUMBAR SPINE AP AND LATERAL    CLINICAL HISTORY:  Low back pain, risk factors (osteoporosis or chronic steroid use or elderly);    TECHNIQUE:  AP, lateral and spot images were performed of the lumbar spine.    COMPARISON:  None    FINDINGS:  Bibasilar infiltrate/pneumonia.  Degenerative changes of the facets at the L4-5 and L5-S1  levels.  Scattered vascular calcifications.

## 2019-10-08 LAB
ALBUMIN SERPL BCP-MCNC: 2 G/DL (ref 3.5–5.2)
ALP SERPL-CCNC: 117 U/L (ref 55–135)
ALT SERPL W/O P-5'-P-CCNC: 12 U/L (ref 10–44)
ANION GAP SERPL CALC-SCNC: 12 MMOL/L (ref 8–16)
ANISOCYTOSIS BLD QL SMEAR: SLIGHT
AORTIC VALVE STENOSIS: ABNORMAL
AST SERPL-CCNC: 19 U/L (ref 10–40)
BASOPHILS NFR BLD: 0 % (ref 0–1.9)
BILIRUB SERPL-MCNC: 1.2 MG/DL (ref 0.1–1)
BUN SERPL-MCNC: 26 MG/DL (ref 6–20)
CALCIUM SERPL-MCNC: 9.3 MG/DL (ref 8.7–10.5)
CHLORIDE SERPL-SCNC: 103 MMOL/L (ref 95–110)
CO2 SERPL-SCNC: 22 MMOL/L (ref 23–29)
CREAT SERPL-MCNC: 0.8 MG/DL (ref 0.5–1.4)
DIFFERENTIAL METHOD: ABNORMAL
EOSINOPHIL NFR BLD: 0 % (ref 0–8)
ERYTHROCYTE [DISTWIDTH] IN BLOOD BY AUTOMATED COUNT: 14.7 % (ref 11.5–14.5)
EST. GFR  (AFRICAN AMERICAN): >60 ML/MIN/1.73 M^2
EST. GFR  (NON AFRICAN AMERICAN): >60 ML/MIN/1.73 M^2
ESTIMATED PA SYSTOLIC PRESSURE: 49.79
GLUCOSE SERPL-MCNC: 193 MG/DL (ref 70–110)
HCT VFR BLD AUTO: 30.3 % (ref 37–48.5)
HGB BLD-MCNC: 9.3 G/DL (ref 12–16)
IMM GRANULOCYTES # BLD AUTO: ABNORMAL K/UL (ref 0–0.04)
IMM GRANULOCYTES NFR BLD AUTO: ABNORMAL % (ref 0–0.5)
LYMPHOCYTES NFR BLD: 5 % (ref 18–48)
MCH RBC QN AUTO: 29.1 PG (ref 27–31)
MCHC RBC AUTO-ENTMCNC: 30.7 G/DL (ref 32–36)
MCV RBC AUTO: 95 FL (ref 82–98)
MONOCYTES NFR BLD: 3 % (ref 4–15)
NEUTROPHILS NFR BLD: 90 % (ref 38–73)
NEUTS BAND NFR BLD MANUAL: 2 %
NRBC BLD-RTO: 0 /100 WBC
PLATELET # BLD AUTO: 167 K/UL (ref 150–350)
PLATELET BLD QL SMEAR: ABNORMAL
PMV BLD AUTO: 10.7 FL (ref 9.2–12.9)
POCT GLUCOSE: 135 MG/DL (ref 70–110)
POCT GLUCOSE: 137 MG/DL (ref 70–110)
POCT GLUCOSE: 248 MG/DL (ref 70–110)
POTASSIUM SERPL-SCNC: 3.5 MMOL/L (ref 3.5–5.1)
PROT SERPL-MCNC: 5.5 G/DL (ref 6–8.4)
RBC # BLD AUTO: 3.2 M/UL (ref 4–5.4)
RETIRED EF AND QEF - SEE NOTES: 65 (ref 55–65)
SODIUM SERPL-SCNC: 137 MMOL/L (ref 136–145)
TRICUSPID VALVE REGURGITATION: ABNORMAL
TROPONIN I SERPL DL<=0.01 NG/ML-MCNC: 0.04 NG/ML (ref 0–0.03)
WBC # BLD AUTO: 11.35 K/UL (ref 3.9–12.7)

## 2019-10-08 PROCEDURE — 21400001 HC TELEMETRY ROOM

## 2019-10-08 PROCEDURE — 84484 ASSAY OF TROPONIN QUANT: CPT

## 2019-10-08 PROCEDURE — 25000003 PHARM REV CODE 250: Performed by: INTERNAL MEDICINE

## 2019-10-08 PROCEDURE — 36415 COLL VENOUS BLD VENIPUNCTURE: CPT

## 2019-10-08 PROCEDURE — 93010 EKG 12-LEAD: ICD-10-PCS | Mod: ,,, | Performed by: INTERNAL MEDICINE

## 2019-10-08 PROCEDURE — 63600175 PHARM REV CODE 636 W HCPCS: Performed by: INTERNAL MEDICINE

## 2019-10-08 PROCEDURE — 27000221 HC OXYGEN, UP TO 24 HOURS

## 2019-10-08 PROCEDURE — 93010 ELECTROCARDIOGRAM REPORT: CPT | Mod: ,,, | Performed by: INTERNAL MEDICINE

## 2019-10-08 PROCEDURE — 25000242 PHARM REV CODE 250 ALT 637 W/ HCPCS: Performed by: NURSE PRACTITIONER

## 2019-10-08 PROCEDURE — 94640 AIRWAY INHALATION TREATMENT: CPT

## 2019-10-08 PROCEDURE — 80053 COMPREHEN METABOLIC PANEL: CPT

## 2019-10-08 PROCEDURE — 85007 BL SMEAR W/DIFF WBC COUNT: CPT

## 2019-10-08 PROCEDURE — 94761 N-INVAS EAR/PLS OXIMETRY MLT: CPT

## 2019-10-08 PROCEDURE — 94799 UNLISTED PULMONARY SVC/PX: CPT

## 2019-10-08 PROCEDURE — 63600175 PHARM REV CODE 636 W HCPCS: Performed by: NURSE PRACTITIONER

## 2019-10-08 PROCEDURE — 25000003 PHARM REV CODE 250: Performed by: NURSE PRACTITIONER

## 2019-10-08 PROCEDURE — 63600175 PHARM REV CODE 636 W HCPCS: Performed by: EMERGENCY MEDICINE

## 2019-10-08 PROCEDURE — 93005 ELECTROCARDIOGRAM TRACING: CPT

## 2019-10-08 PROCEDURE — 85027 COMPLETE CBC AUTOMATED: CPT

## 2019-10-08 RX ORDER — CEFEPIME HYDROCHLORIDE 2 G/50ML
2 INJECTION, SOLUTION INTRAVENOUS
Status: DISCONTINUED | OUTPATIENT
Start: 2019-10-08 | End: 2019-10-10 | Stop reason: HOSPADM

## 2019-10-08 RX ADMIN — IPRATROPIUM BROMIDE AND ALBUTEROL SULFATE 3 ML: .5; 3 SOLUTION RESPIRATORY (INHALATION) at 12:10

## 2019-10-08 RX ADMIN — IPRATROPIUM BROMIDE AND ALBUTEROL SULFATE 3 ML: .5; 3 SOLUTION RESPIRATORY (INHALATION) at 07:10

## 2019-10-08 RX ADMIN — ENOXAPARIN SODIUM 40 MG: 100 INJECTION SUBCUTANEOUS at 09:10

## 2019-10-08 RX ADMIN — GUAIFENESIN AND DEXTROMETHORPHAN HYDROBROMIDE 1 TABLET: 600; 30 TABLET, EXTENDED RELEASE ORAL at 08:10

## 2019-10-08 RX ADMIN — CEFEPIME HYDROCHLORIDE 2 G: 2 INJECTION, SOLUTION INTRAVENOUS at 08:10

## 2019-10-08 RX ADMIN — HYDROCODONE BITARTRATE AND ACETAMINOPHEN 15 ML: 7.5; 325 SOLUTION ORAL at 07:10

## 2019-10-08 RX ADMIN — PIPERACILLIN AND TAZOBACTAM 4.5 G: 4; .5 INJECTION, POWDER, LYOPHILIZED, FOR SOLUTION INTRAVENOUS; PARENTERAL at 05:10

## 2019-10-08 RX ADMIN — PROMETHAZINE HYDROCHLORIDE AND CODEINE PHOSPHATE 5 ML: 6.25; 1 SOLUTION ORAL at 05:10

## 2019-10-08 RX ADMIN — HYDROCODONE BITARTRATE AND ACETAMINOPHEN 15 ML: 7.5; 325 SOLUTION ORAL at 05:10

## 2019-10-08 RX ADMIN — INSULIN ASPART 2 UNITS: 100 INJECTION, SOLUTION INTRAVENOUS; SUBCUTANEOUS at 11:10

## 2019-10-08 RX ADMIN — PROMETHAZINE HYDROCHLORIDE AND CODEINE PHOSPHATE 5 ML: 6.25; 1 SOLUTION ORAL at 07:10

## 2019-10-08 RX ADMIN — GUAIFENESIN AND DEXTROMETHORPHAN HYDROBROMIDE 1 TABLET: 600; 30 TABLET, EXTENDED RELEASE ORAL at 09:10

## 2019-10-08 RX ADMIN — VANCOMYCIN HYDROCHLORIDE 750 MG: 750 INJECTION, POWDER, LYOPHILIZED, FOR SOLUTION INTRAVENOUS at 01:10

## 2019-10-08 RX ADMIN — PIPERACILLIN AND TAZOBACTAM 4.5 G: 4; .5 INJECTION, POWDER, LYOPHILIZED, FOR SOLUTION INTRAVENOUS; PARENTERAL at 11:10

## 2019-10-08 NOTE — NURSING
Pt arrived to floor via stretcher,with family in tow. Pt able to transfer from stretcher to bed with minimal assistance. IVFs and IV antibiotics infusing to  IV. Pt able to complete admit assessment with float nurse.Oriented to room and floor. Bed in low position with telemetry monitor activated. Will continue to monitor.

## 2019-10-08 NOTE — PLAN OF CARE
Pt is AAOX4; Discussed meds ordered by MD to address diagnosed PNA. Pt requested a decongestant to help with her cough. Because of observed weakness, placed on fall precaution and encouraged to always call for assist before getting out of bed. Verbalized understanding of the Safety concern.Bed remains in low position with rails up X2; Will continue to monitor.

## 2019-10-08 NOTE — PLAN OF CARE
CM met with patient at bedside to assess for discharge needs. Pt states that she lives at home with her  and daughter and is mainly independent with needs. She states that she needs home health and oxygen. List of providers given. DC plan dependent on hospital progress. CM provided a transitional care folder, information on advanced directives, information on pharmacy bedside delivery, and discharge planning begins on admission with contact information for any needs/questions.    D/C Plan:Home health  PCP: Ryan Sims MD  [facility]  Discharge transportation: Family  [facility]: Send link  Pharmacy Bedside Delivery:Yes       10/08/19 0940   Discharge Assessment   Assessment Type Discharge Planning Assessment   Confirmed/corrected address and phone number on facesheet? Yes   Assessment information obtained from? Patient   Expected Length of Stay (days)   (TBD)   Communicated expected length of stay with patient/caregiver yes   Prior to hospitilization cognitive status: Alert/Oriented   Prior to hospitalization functional status: Independent   Current cognitive status: Alert/Oriented   Current Functional Status: Independent;Needs Assistance   Facility Arrived From: Home   Lives With spouse;child(brittany), adult   Able to Return to Prior Arrangements yes   Is patient able to care for self after discharge? Yes   Who are your caregiver(s) and their phone number(s)? Amy Echeverria, Daughter- [phone]   Patient's perception of discharge disposition home health   Readmission Within the Last 30 Days no previous admission in last 30 days   Patient currently being followed by outpatient case management? No   Patient currently receives any other outside agency services? No   Equipment Currently Used at Home none   Do you have any problems affording any of your prescribed medications? No   Is the patient taking medications as prescribed? yes   Does the patient have transportation  home? Yes   Transportation Anticipated family or friend will provide   Dialysis Name and Scheduled days NA   Does the patient receive services at the [facility]? No   Discharge Plan A Home Health   DME Needed Upon Discharge  oxygen   Patient/Family in Agreement with Plan yes

## 2019-10-08 NOTE — PROGRESS NOTES
Pharmacy Consult Sign Off    Therapy with vancomycin complete and/or consult discontinued by provider. Pharmacy will sign off. Please re-consult as needed.     Thank you for allowing us to participate in this patient's care.     Gaurang Veras PharmD 10/8/2019 4:51 PM

## 2019-10-08 NOTE — PLAN OF CARE
Spoke with patient regarding what home health services she felt she needed.   She states she was independent before she came in and hoped she would be on discharge.  Advised hh includes nursing services for things like wound care and PT/OT for strengthening exercises.  Advised CM will monitor her progress and discuss needs with her physician.

## 2019-10-08 NOTE — PROGRESS NOTES
Clinical Pharmacy Progress Note: Telemetry Pharmacist Rounding     Pharmacist counseled patient on the telemetry pharmacist's availability to discuss new medications, side effects, and reasons for changes in medication during admission.   Asked if patient had any medication questions at this time.   Instructed patient to use call light with any questions or concerns to discuss with pharmacy during the admission.  Patient expressed understanding and had no further questions.    Thank you for allowing us to participate in this patient's care.  Kellie Mane PharmD 10/08/2019 3:19 PM

## 2019-10-08 NOTE — PROGRESS NOTES
[facility] Hospital Medicine  Progress Note    Patient Name: Genie Balderas  MRN: [id number]  Patient Class: IP- Inpatient   Admission Date: 10/7/2019  Length of Stay: 1 days  Attending Physician: Rayne Saucedo MD  Primary Care Provider: Ryan Sims MD        Subjective:     Principal Problem:Severe sepsis        HPI:  Genie Balderas is a 59 y.o. female patient with a PMHx of lymphoma (Dr. Billingsley), last chemo 2/2019, DM, HTN, who presented to the ER for chest pain, onset 4 days PTA. Pain is worse with coughing. Associated sxs include  SOB, lower back pain, diaphoresis, and cough. Associate s/s included fever 103 at home, chest pain in center of chest radiated to back and bilateral ribs, productive cough with white green sputum. Patient denies any weakness, numbness, dizziness, headache, and all other sxs at this time. Prior Tx includes Norco.  In ER, Labs showed WBC 19.46, potassium 5.2, Glucose 154, troponin 0.043, , procalcitonin 22.65. Xray lumbar spine with bibasilar infiltrate or pneumonia. CTA chest: no PE, consider atypical pneumonias. Admission to [facility] with IV Vanc and Zosyn.     Overview/Hospital Course:  10/08   Sepsis improved    Cultures - NGTD        Interval History:     Review of Systems   Constitutional: Positive for activity change.   HENT: Positive for congestion.    Respiratory: Positive for cough, shortness of breath and wheezing.    Cardiovascular: Negative.    Gastrointestinal: Negative.    Genitourinary: Negative.    Musculoskeletal: Negative.    Skin: Negative.    Neurological: Negative.    Hematological: Negative.    Psychiatric/Behavioral: Negative.      Objective:     Vital Signs (Most Recent):  Temp: 98 °F (36.7 °C) (10/08/19 1641)  Pulse: (!) 120 (10/08/19 1641)  Resp: 18 (10/08/19 1641)  BP: (!) 140/65 (10/08/19 1641)  SpO2: 100 % (10/08/19 1641) Vital Signs (24h Range):  Temp:  [97.4 °F (36.3 °C)-98.3 °F (36.8 °C)] 98 °F (36.7 °C)  Pulse:  [] 120  Resp:   [17-22] 18  SpO2:  [93 %-100 %] 100 %  BP: ()/(53-65) 140/65     Weight: 53.8 kg (118 lb 9.7 oz)  Body mass index is 20.36 kg/m².    Intake/Output Summary (Last 24 hours) at 10/8/2019 1644  Last data filed at 10/8/2019 0500  Gross per 24 hour   Intake 1990.42 ml   Output 800 ml   Net 1190.42 ml      Physical Exam   Constitutional: She is oriented to person, place, and time. She appears well-developed and well-nourished.   HENT:   Head: Normocephalic and atraumatic.   Eyes: Pupils are equal, round, and reactive to light.   Neck: Normal range of motion. Neck supple.   Cardiovascular: Normal rate.   Pulmonary/Chest: Effort normal. She has wheezes. She has rales.   Abdominal: Soft. Bowel sounds are normal.   Musculoskeletal: Normal range of motion.   Neurological: She is alert and oriented to person, place, and time.   Skin: Skin is warm and dry.   Psychiatric: She has a normal mood and affect. Her behavior is normal. Judgment and thought content normal.   Nursing note and vitals reviewed.      Significant Labs:   BMP:   Recent Labs   Lab 10/07/19  1529 10/08/19  0454   GLU  --  193*   NA  --  137   K  --  3.5   CL  --  103   CO2  --  22*   BUN  --  26*   CREATININE  --  0.8   CALCIUM  --  9.3   MG 2.1  --      CBC:   Recent Labs   Lab 10/07/19  1021 10/08/19  0454   WBC 19.46* 11.35   HGB 12.3 9.3*   HCT 39.4 30.3*    167       Significant Imaging: I have reviewed all pertinent imaging results/findings within the past 24 hours.      Assessment/Plan:      * Severe sepsis  -Pneumonia: atypical  -CT chest showed possible atypical pneumonia  -cultures - NGTD      Type 2 diabetes mellitus with circulatory disorder, without long-term current use of insulin  Diabetes Mellitus  -SSI and accuchecks  -1800 henry ADA diet  -nutritional counseling      ELOY (acute kidney injury)  -gentle hydration  -monitor and adjust treatment as needed      Pneumonia of left lower lobe due to infectious organism  --CT chest showed  possible atypical pneumonia  -improved   -        VTE Risk Mitigation (From admission, onward)         Ordered     enoxaparin injection 40 mg  Daily      10/07/19 1726     IP VTE HIGH RISK PATIENT  Once      10/07/19 1726                      Rayne Saucedo MD  Department of Hospital Medicine   [facility]

## 2019-10-08 NOTE — SUBJECTIVE & OBJECTIVE
Interval History:     Review of Systems   Constitutional: Positive for activity change.   HENT: Positive for congestion.    Respiratory: Positive for cough, shortness of breath and wheezing.    Cardiovascular: Negative.    Gastrointestinal: Negative.    Genitourinary: Negative.    Musculoskeletal: Negative.    Skin: Negative.    Neurological: Negative.    Hematological: Negative.    Psychiatric/Behavioral: Negative.      Objective:     Vital Signs (Most Recent):  Temp: 98 °F (36.7 °C) (10/08/19 1641)  Pulse: (!) 120 (10/08/19 1641)  Resp: 18 (10/08/19 1641)  BP: (!) 140/65 (10/08/19 1641)  SpO2: 100 % (10/08/19 1641) Vital Signs (24h Range):  Temp:  [97.4 °F (36.3 °C)-98.3 °F (36.8 °C)] 98 °F (36.7 °C)  Pulse:  [] 120  Resp:  [17-22] 18  SpO2:  [93 %-100 %] 100 %  BP: ()/(53-65) 140/65     Weight: 53.8 kg (118 lb 9.7 oz)  Body mass index is 20.36 kg/m².    Intake/Output Summary (Last 24 hours) at 10/8/2019 1644  Last data filed at 10/8/2019 0500  Gross per 24 hour   Intake 1990.42 ml   Output 800 ml   Net 1190.42 ml      Physical Exam   Constitutional: She is oriented to person, place, and time. She appears well-developed and well-nourished.   HENT:   Head: Normocephalic and atraumatic.   Eyes: Pupils are equal, round, and reactive to light.   Neck: Normal range of motion. Neck supple.   Cardiovascular: Normal rate.   Pulmonary/Chest: Effort normal. She has wheezes. She has rales.   Abdominal: Soft. Bowel sounds are normal.   Musculoskeletal: Normal range of motion.   Neurological: She is alert and oriented to person, place, and time.   Skin: Skin is warm and dry.   Psychiatric: She has a normal mood and affect. Her behavior is normal. Judgment and thought content normal.   Nursing note and vitals reviewed.      Significant Labs:   BMP:   Recent Labs   Lab 10/07/19  1529 10/08/19  0454   GLU  --  193*   NA  --  137   K  --  3.5   CL  --  103   CO2  --  22*   BUN  --  26*   CREATININE  --  0.8   CALCIUM   --  9.3   MG 2.1  --      CBC:   Recent Labs   Lab 10/07/19  1021 10/08/19  0454   WBC 19.46* 11.35   HGB 12.3 9.3*   HCT 39.4 30.3*    167       Significant Imaging: I have reviewed all pertinent imaging results/findings within the past 24 hours.

## 2019-10-09 LAB
ALBUMIN SERPL BCP-MCNC: 2 G/DL (ref 3.5–5.2)
ALP SERPL-CCNC: 96 U/L (ref 55–135)
ALT SERPL W/O P-5'-P-CCNC: 10 U/L (ref 10–44)
ANION GAP SERPL CALC-SCNC: 9 MMOL/L (ref 8–16)
AST SERPL-CCNC: 16 U/L (ref 10–40)
BASOPHILS # BLD AUTO: 0.02 K/UL (ref 0–0.2)
BASOPHILS NFR BLD: 0.2 % (ref 0–1.9)
BILIRUB SERPL-MCNC: 0.8 MG/DL (ref 0.1–1)
BUN SERPL-MCNC: 12 MG/DL (ref 6–20)
CALCIUM SERPL-MCNC: 9.3 MG/DL (ref 8.7–10.5)
CHLORIDE SERPL-SCNC: 108 MMOL/L (ref 95–110)
CO2 SERPL-SCNC: 24 MMOL/L (ref 23–29)
CREAT SERPL-MCNC: 0.7 MG/DL (ref 0.5–1.4)
DIFFERENTIAL METHOD: ABNORMAL
EOSINOPHIL # BLD AUTO: 0.1 K/UL (ref 0–0.5)
EOSINOPHIL NFR BLD: 1.4 % (ref 0–8)
ERYTHROCYTE [DISTWIDTH] IN BLOOD BY AUTOMATED COUNT: 14.9 % (ref 11.5–14.5)
EST. GFR  (AFRICAN AMERICAN): >60 ML/MIN/1.73 M^2
EST. GFR  (NON AFRICAN AMERICAN): >60 ML/MIN/1.73 M^2
GLUCOSE SERPL-MCNC: 125 MG/DL (ref 70–110)
HCT VFR BLD AUTO: 28.3 % (ref 37–48.5)
HGB BLD-MCNC: 9 G/DL (ref 12–16)
IMM GRANULOCYTES # BLD AUTO: 0.41 K/UL (ref 0–0.04)
IMM GRANULOCYTES NFR BLD AUTO: 5 % (ref 0–0.5)
LYMPHOCYTES # BLD AUTO: 1 K/UL (ref 1–4.8)
LYMPHOCYTES NFR BLD: 12.8 % (ref 18–48)
MCH RBC QN AUTO: 29.8 PG (ref 27–31)
MCHC RBC AUTO-ENTMCNC: 31.8 G/DL (ref 32–36)
MCV RBC AUTO: 94 FL (ref 82–98)
MONOCYTES # BLD AUTO: 0.5 K/UL (ref 0.3–1)
MONOCYTES NFR BLD: 6.4 % (ref 4–15)
NEUTROPHILS # BLD AUTO: 6 K/UL (ref 1.8–7.7)
NEUTROPHILS NFR BLD: 74.2 % (ref 38–73)
NRBC BLD-RTO: 0 /100 WBC
PLATELET # BLD AUTO: 171 K/UL (ref 150–350)
PMV BLD AUTO: 10.7 FL (ref 9.2–12.9)
POCT GLUCOSE: 116 MG/DL (ref 70–110)
POCT GLUCOSE: 134 MG/DL (ref 70–110)
POCT GLUCOSE: 86 MG/DL (ref 70–110)
POTASSIUM SERPL-SCNC: 3.5 MMOL/L (ref 3.5–5.1)
PROT SERPL-MCNC: 5.2 G/DL (ref 6–8.4)
RBC # BLD AUTO: 3.02 M/UL (ref 4–5.4)
SODIUM SERPL-SCNC: 141 MMOL/L (ref 136–145)
WBC # BLD AUTO: 8.14 K/UL (ref 3.9–12.7)

## 2019-10-09 PROCEDURE — 25000242 PHARM REV CODE 250 ALT 637 W/ HCPCS: Performed by: NURSE PRACTITIONER

## 2019-10-09 PROCEDURE — 63600175 PHARM REV CODE 636 W HCPCS: Performed by: INTERNAL MEDICINE

## 2019-10-09 PROCEDURE — 94761 N-INVAS EAR/PLS OXIMETRY MLT: CPT

## 2019-10-09 PROCEDURE — 21400001 HC TELEMETRY ROOM

## 2019-10-09 PROCEDURE — 36415 COLL VENOUS BLD VENIPUNCTURE: CPT

## 2019-10-09 PROCEDURE — 94799 UNLISTED PULMONARY SVC/PX: CPT

## 2019-10-09 PROCEDURE — 94640 AIRWAY INHALATION TREATMENT: CPT

## 2019-10-09 PROCEDURE — 63600175 PHARM REV CODE 636 W HCPCS: Performed by: NURSE PRACTITIONER

## 2019-10-09 PROCEDURE — 80053 COMPREHEN METABOLIC PANEL: CPT

## 2019-10-09 PROCEDURE — 25000003 PHARM REV CODE 250: Performed by: INTERNAL MEDICINE

## 2019-10-09 PROCEDURE — 97802 MEDICAL NUTRITION INDIV IN: CPT

## 2019-10-09 PROCEDURE — 25000003 PHARM REV CODE 250: Performed by: NURSE PRACTITIONER

## 2019-10-09 PROCEDURE — 99900035 HC TECH TIME PER 15 MIN (STAT)

## 2019-10-09 PROCEDURE — 85007 BL SMEAR W/DIFF WBC COUNT: CPT

## 2019-10-09 PROCEDURE — 27000221 HC OXYGEN, UP TO 24 HOURS

## 2019-10-09 PROCEDURE — 85027 COMPLETE CBC AUTOMATED: CPT

## 2019-10-09 RX ADMIN — CEFEPIME HYDROCHLORIDE 2 G: 2 INJECTION, SOLUTION INTRAVENOUS at 11:10

## 2019-10-09 RX ADMIN — HYDROCODONE BITARTRATE AND ACETAMINOPHEN 15 ML: 7.5; 325 SOLUTION ORAL at 08:10

## 2019-10-09 RX ADMIN — PROMETHAZINE HYDROCHLORIDE AND CODEINE PHOSPHATE 5 ML: 6.25; 1 SOLUTION ORAL at 06:10

## 2019-10-09 RX ADMIN — IPRATROPIUM BROMIDE AND ALBUTEROL SULFATE 3 ML: .5; 3 SOLUTION RESPIRATORY (INHALATION) at 12:10

## 2019-10-09 RX ADMIN — IPRATROPIUM BROMIDE AND ALBUTEROL SULFATE 3 ML: .5; 3 SOLUTION RESPIRATORY (INHALATION) at 07:10

## 2019-10-09 RX ADMIN — GUAIFENESIN AND DEXTROMETHORPHAN HYDROBROMIDE 1 TABLET: 600; 30 TABLET, EXTENDED RELEASE ORAL at 10:10

## 2019-10-09 RX ADMIN — IPRATROPIUM BROMIDE AND ALBUTEROL SULFATE 3 ML: .5; 3 SOLUTION RESPIRATORY (INHALATION) at 01:10

## 2019-10-09 RX ADMIN — CEFEPIME HYDROCHLORIDE 2 G: 2 INJECTION, SOLUTION INTRAVENOUS at 08:10

## 2019-10-09 RX ADMIN — HYDROCODONE BITARTRATE AND ACETAMINOPHEN 15 ML: 7.5; 325 SOLUTION ORAL at 06:10

## 2019-10-09 RX ADMIN — CEFEPIME HYDROCHLORIDE 2 G: 2 INJECTION, SOLUTION INTRAVENOUS at 04:10

## 2019-10-09 RX ADMIN — HYDROCODONE BITARTRATE AND ACETAMINOPHEN 15 ML: 7.5; 325 SOLUTION ORAL at 04:10

## 2019-10-09 RX ADMIN — ENOXAPARIN SODIUM 40 MG: 100 INJECTION SUBCUTANEOUS at 10:10

## 2019-10-09 RX ADMIN — HYDROCODONE BITARTRATE AND ACETAMINOPHEN 15 ML: 7.5; 325 SOLUTION ORAL at 11:10

## 2019-10-09 RX ADMIN — GUAIFENESIN AND DEXTROMETHORPHAN HYDROBROMIDE 1 TABLET: 600; 30 TABLET, EXTENDED RELEASE ORAL at 08:10

## 2019-10-09 NOTE — PLAN OF CARE
Pt AAOX4; Progressing steadily. IV abx's changed from Vancomycin to Cefipime. Pt improved minimally. Was able to get OOB and to the bathroom without assist, but encouraged to call for help when she feels weak or incapable of doing so. Appetite is improved. Bed in low position with side rails up. Personal items are within reach as well as call light/remote control.  Will continue to monitor.

## 2019-10-09 NOTE — PLAN OF CARE
Plan of care implemented accordingly. No significant changes. IV Abx adjusted and fluids discontinued. Patient rested quietly throughout day.

## 2019-10-09 NOTE — PROGRESS NOTES
"[facility] -   Adult Nutrition  Progress Note    SUMMARY       Recommendations    Recommendation: 1. Continue current diet. 2. Add boost glucose control TID.  3. Will continue to monitor.  Intervention: Oral nutritional supplement   Goals: (meet> 85% EEN/EPN)  Nutrition Goal Status: new  Communication of RD Recs: (plan of care and sticky note)    Reason for Assessment    Reason For Assessment: identified at risk by screening criteria  Diagnosis: Severe Sepsis  Relevant Medical History: (DM, HTN, Lymphoma)  General Information Comments: pt currently on diabetic diet.pt reports 0% PO intake today and poor appetite ( pt stated that she was not feeling well and did not want to eat her breakfast). pt reports good appetite PTA. pt reports weight loss of 4 lbs over the last 3-6 months. NFPE not performed due to pt refusal ( pt stated she was not feeling well).   Nutrition Discharge Planning: diabetic low sodium diet    Nutrition Risk Screen    Nutrition Risk Screen: no indicators present    Nutrition/Diet History    Patient Reported Diet/Restrictions/Preferences: diabetic diet  Spiritual, Cultural Beliefs, Sabianism Practices, Values that Affect Care: yes    Anthropometrics    Temp: 98 °F (36.7 °C)  Height Method: Stated  Height: 5' 4"  Height (inches): 64 in  Weight Method: Bed Scale  Weight: 54 kg (119 lb 0.8 oz)  Weight (lb): 119.05 lb  Ideal Body Weight (IBW), Female: 120 lb  % Ideal Body Weight, Female (lb): 95.83 lb  BMI (Calculated): 19.8  BMI Grade: 18.5-24.9 - normal       Lab/Procedures/Meds    Pertinent Labs Reviewed: reviewed  Pertinent Labs Comments: Glucose 125, Albumin   Pertinent Medications Reviewed: reviewed    Physical Findings/Assessment         Estimated/Assessed Needs    Weight Used For Calorie Calculations: 54 kg (119 lb)  Energy Calorie Requirements (kcal): 1619  Energy Need Method: kcal/kg  Protein Requirements: 54 - 65 gm  Weight Used For Protein Calculations: 54 kg (119 lb)   "   Estimated Fluid Requirement Method: RDA Method (or per MD)  RDA Method (mL): 1374  CHO Requirement: 50% EEN      Nutrition Prescription Ordered    Current Diet Order: diabetic diet    Evaluation of Received Nutrient/Fluid Intake       % Intake of Estimated Energy Needs: 0 - 25 %  % Meal Intake: 0 - 25 %    Nutrition Risk    Level of Risk/Frequency of Follow-up: (2xweekly)     Assessment and Plan     Nutrition Problem  Inadequate energy intake     Related to (etiology):   Related to decreased appetite.    Signs and Symptoms (as evidenced by):   Pt reports 0% PO intake today    Interventions/Recommendations (treatment strategy):  See above     Nutrition Diagnosis Status:   New        Monitor and Evaluation    Food and Nutrient Intake: energy intake, food and beverage intake  Food and Nutrient Adminstration: diet order  Anthropometric Measurements: weight, weight change                             Nutrition Follow-Up    RD Follow-up?: Yes

## 2019-10-09 NOTE — CARE UPDATE
Recommendation: 1. Continue current diet. 2. Add boost glucose control TID. 3. Will continue to monitor.  Intervention: Oral nutritional supplement   Goals: (meet> 85% EEN/EPN)  Nutrition Goal Status: new  Communication of RD Recs: (plan of care and sticky note)

## 2019-10-09 NOTE — PLAN OF CARE
Pt tolerating inhalation treatments well and no distress noted. Pt states that she uses treatments at home.

## 2019-10-10 VITALS
OXYGEN SATURATION: 94 % | BODY MASS INDEX: 20.25 KG/M2 | RESPIRATION RATE: 18 BRPM | HEART RATE: 114 BPM | SYSTOLIC BLOOD PRESSURE: 127 MMHG | TEMPERATURE: 98 F | WEIGHT: 118.63 LBS | DIASTOLIC BLOOD PRESSURE: 92 MMHG | HEIGHT: 64 IN

## 2019-10-10 PROBLEM — N17.9 AKI (ACUTE KIDNEY INJURY): Status: RESOLVED | Noted: 2019-10-07 | Resolved: 2019-10-10

## 2019-10-10 PROBLEM — A41.9 SEVERE SEPSIS: Status: RESOLVED | Noted: 2017-09-25 | Resolved: 2019-10-10

## 2019-10-10 PROBLEM — R65.20 SEVERE SEPSIS: Status: RESOLVED | Noted: 2017-09-25 | Resolved: 2019-10-10

## 2019-10-10 LAB
ALBUMIN SERPL BCP-MCNC: 2.1 G/DL (ref 3.5–5.2)
ALP SERPL-CCNC: 107 U/L (ref 55–135)
ALT SERPL W/O P-5'-P-CCNC: 9 U/L (ref 10–44)
ANION GAP SERPL CALC-SCNC: 10 MMOL/L (ref 8–16)
AST SERPL-CCNC: 15 U/L (ref 10–40)
BACTERIA SPEC AEROBE CULT: ABNORMAL
BACTERIA SPEC AEROBE CULT: ABNORMAL
BASOPHILS # BLD AUTO: 0.02 K/UL (ref 0–0.2)
BASOPHILS NFR BLD: 0.3 % (ref 0–1.9)
BILIRUB SERPL-MCNC: 0.8 MG/DL (ref 0.1–1)
BUN SERPL-MCNC: 7 MG/DL (ref 6–20)
CALCIUM SERPL-MCNC: 9.5 MG/DL (ref 8.7–10.5)
CHLORIDE SERPL-SCNC: 103 MMOL/L (ref 95–110)
CO2 SERPL-SCNC: 28 MMOL/L (ref 23–29)
CREAT SERPL-MCNC: 0.7 MG/DL (ref 0.5–1.4)
DIFFERENTIAL METHOD: ABNORMAL
EOSINOPHIL # BLD AUTO: 0.1 K/UL (ref 0–0.5)
EOSINOPHIL NFR BLD: 1.6 % (ref 0–8)
ERYTHROCYTE [DISTWIDTH] IN BLOOD BY AUTOMATED COUNT: 14.8 % (ref 11.5–14.5)
EST. GFR  (AFRICAN AMERICAN): >60 ML/MIN/1.73 M^2
EST. GFR  (NON AFRICAN AMERICAN): >60 ML/MIN/1.73 M^2
GLUCOSE SERPL-MCNC: 112 MG/DL (ref 70–110)
GRAM STN SPEC: ABNORMAL
HCT VFR BLD AUTO: 32.6 % (ref 37–48.5)
HGB BLD-MCNC: 9.7 G/DL (ref 12–16)
IMM GRANULOCYTES # BLD AUTO: 0.29 K/UL (ref 0–0.04)
IMM GRANULOCYTES NFR BLD AUTO: 4.5 % (ref 0–0.5)
LYMPHOCYTES # BLD AUTO: 1.3 K/UL (ref 1–4.8)
LYMPHOCYTES NFR BLD: 19.4 % (ref 18–48)
MCH RBC QN AUTO: 29.2 PG (ref 27–31)
MCHC RBC AUTO-ENTMCNC: 29.8 G/DL (ref 32–36)
MCV RBC AUTO: 98 FL (ref 82–98)
MONOCYTES # BLD AUTO: 0.4 K/UL (ref 0.3–1)
MONOCYTES NFR BLD: 6.7 % (ref 4–15)
NEUTROPHILS # BLD AUTO: 4.4 K/UL (ref 1.8–7.7)
NEUTROPHILS NFR BLD: 67.5 % (ref 38–73)
NRBC BLD-RTO: 0 /100 WBC
PLATELET # BLD AUTO: 164 K/UL (ref 150–350)
PMV BLD AUTO: 10.7 FL (ref 9.2–12.9)
POCT GLUCOSE: 131 MG/DL (ref 70–110)
POCT GLUCOSE: 96 MG/DL (ref 70–110)
POTASSIUM SERPL-SCNC: 3.6 MMOL/L (ref 3.5–5.1)
PROT SERPL-MCNC: 5.5 G/DL (ref 6–8.4)
RBC # BLD AUTO: 3.32 M/UL (ref 4–5.4)
SODIUM SERPL-SCNC: 141 MMOL/L (ref 136–145)
WBC # BLD AUTO: 6.45 K/UL (ref 3.9–12.7)

## 2019-10-10 PROCEDURE — 25000003 PHARM REV CODE 250: Performed by: INTERNAL MEDICINE

## 2019-10-10 PROCEDURE — 80053 COMPREHEN METABOLIC PANEL: CPT

## 2019-10-10 PROCEDURE — 85025 COMPLETE CBC W/AUTO DIFF WBC: CPT

## 2019-10-10 PROCEDURE — 36415 COLL VENOUS BLD VENIPUNCTURE: CPT

## 2019-10-10 PROCEDURE — 94799 UNLISTED PULMONARY SVC/PX: CPT

## 2019-10-10 PROCEDURE — 94761 N-INVAS EAR/PLS OXIMETRY MLT: CPT

## 2019-10-10 PROCEDURE — 94640 AIRWAY INHALATION TREATMENT: CPT

## 2019-10-10 PROCEDURE — 63600175 PHARM REV CODE 636 W HCPCS: Performed by: INTERNAL MEDICINE

## 2019-10-10 PROCEDURE — 63600175 PHARM REV CODE 636 W HCPCS: Performed by: NURSE PRACTITIONER

## 2019-10-10 PROCEDURE — 25000242 PHARM REV CODE 250 ALT 637 W/ HCPCS: Performed by: NURSE PRACTITIONER

## 2019-10-10 PROCEDURE — 99900035 HC TECH TIME PER 15 MIN (STAT)

## 2019-10-10 PROCEDURE — 25000003 PHARM REV CODE 250: Performed by: NURSE PRACTITIONER

## 2019-10-10 RX ORDER — PROMETHAZINE HYDROCHLORIDE AND CODEINE PHOSPHATE 6.25; 1 MG/5ML; MG/5ML
5 SOLUTION ORAL EVERY 6 HOURS PRN
Qty: 240 ML | Refills: 0 | Status: SHIPPED | OUTPATIENT
Start: 2019-10-10 | End: 2019-10-20

## 2019-10-10 RX ORDER — LEVOFLOXACIN 500 MG/1
500 TABLET, FILM COATED ORAL DAILY
Qty: 10 TABLET | Refills: 0 | Status: SHIPPED | OUTPATIENT
Start: 2019-10-10 | End: 2022-02-28

## 2019-10-10 RX ORDER — TRAMADOL HYDROCHLORIDE 50 MG/1
50 TABLET ORAL EVERY 6 HOURS PRN
Qty: 30 TABLET | Refills: 0 | Status: SHIPPED | OUTPATIENT
Start: 2019-10-10 | End: 2022-02-28

## 2019-10-10 RX ADMIN — PROMETHAZINE HYDROCHLORIDE AND CODEINE PHOSPHATE 5 ML: 6.25; 1 SOLUTION ORAL at 09:10

## 2019-10-10 RX ADMIN — CEFEPIME HYDROCHLORIDE 2 G: 2 INJECTION, SOLUTION INTRAVENOUS at 04:10

## 2019-10-10 RX ADMIN — ENOXAPARIN SODIUM 40 MG: 100 INJECTION SUBCUTANEOUS at 09:10

## 2019-10-10 RX ADMIN — IPRATROPIUM BROMIDE AND ALBUTEROL SULFATE 3 ML: .5; 3 SOLUTION RESPIRATORY (INHALATION) at 07:10

## 2019-10-10 RX ADMIN — HYDROCODONE BITARTRATE AND ACETAMINOPHEN 15 ML: 7.5; 325 SOLUTION ORAL at 05:10

## 2019-10-10 RX ADMIN — IPRATROPIUM BROMIDE AND ALBUTEROL SULFATE 3 ML: .5; 3 SOLUTION RESPIRATORY (INHALATION) at 12:10

## 2019-10-10 RX ADMIN — HYDROCODONE BITARTRATE AND ACETAMINOPHEN 15 ML: 7.5; 325 SOLUTION ORAL at 11:10

## 2019-10-10 RX ADMIN — GUAIFENESIN AND DEXTROMETHORPHAN HYDROBROMIDE 1 TABLET: 600; 30 TABLET, EXTENDED RELEASE ORAL at 09:10

## 2019-10-10 NOTE — NURSING
Went over discharge instructions with patient.   Stressed importance of making and keeping all follow ups as well as making prescribed medication changes.   Printed rx x1 provided to pt upon discharge.  Prescriptions x2 to be delivered to pt bedside via [facility] prior to discharge.  Patient verbalized understanding and has no questions in regards to discharge.  IV removed without complications.  Telemetry box removed and returned to monitor tech.  Patient awaiting personal transportation, instructed to call nurse station once ride has arrived.  Primary nurse notified of pt's discharge status.

## 2019-10-10 NOTE — PLAN OF CARE
Patient is awake, alert and oriented x4, she is able to ambulate to bedside commode or bathroom with assist. She is on o2 nasal cannula. She is NSR on tele monitor. She has family at bedside. She has remained free from falls and hospital acquired injury this shift.

## 2019-10-10 NOTE — PLAN OF CARE
"Pt AAO x4.  VSS.  Pt remained afebrile throughout this shift.   IV abx administered per order.   Pt remained free of falls this shift.   Pt had c/o pain this shift.Pain meds administered as ordered.   Plan of care reviewed. Patient verbalizes understanding.   Pt moving/turning independently.   Patient NSR on monitor.   Bed low, side rails up x 2, wheels locked, call light in reach.   Patient instructed to call for assistance.   Hourly rounding completed.   Will continue to monitor.    /65 (BP Location: Left arm, Patient Position: Lying)   Pulse 91   Temp 98 °F (36.7 °C) (Oral)   Resp 18   Ht 5' 4" (1.626 m)   Wt 54 kg (119 lb 0.8 oz)   LMP  (LMP Unknown)   SpO2 95%   Breastfeeding? No   BMI 20.43 kg/m²     "

## 2019-10-11 NOTE — DISCHARGE SUMMARY
[facility] Hospital Medicine  Discharge Summary      Patient Name: Genie Balderas  MRN: [id number]  Admission Date: 10/7/2019  Hospital Length of Stay: 3 days  Discharge Date and Time: 10/10/2019  2:12 PM  Attending Physician: No att. providers found   Discharging Provider: Rebel Ortega MD  Primary Care Provider: Ryan Sims MD      HPI:   Genie Balderas is a 59 y.o. female patient with a PMHx of lymphoma (Dr. Billingsley), last chemo 2/2019, DM, HTN, who presented to the ER for chest pain, onset 4 days PTA. Pain is worse with coughing. Associated sxs include  SOB, lower back pain, diaphoresis, and cough. Associate s/s included fever 103 at home, chest pain in center of chest radiated to back and bilateral ribs, productive cough with white green sputum. Patient denies any weakness, numbness, dizziness, headache, and all other sxs at this time. Prior Tx includes Norco.  In ER, Labs showed WBC 19.46, potassium 5.2, Glucose 154, troponin 0.043, , procalcitonin 22.65. Xray lumbar spine with bibasilar infiltrate or pneumonia. CTA chest: no PE, consider atypical pneumonias. Admission to Tele with IV Vanc and Zosyn.     * No surgery found *      Hospital Course:   10/08   Sepsis improved    Cultures - NGTD      Patient 58 yo female admitted to hospital with Severe Sepsis. Patient initiated on ABX for a BiBasilar pneumonia. Patient improved with treatments, tolerating diet. Patient seen and examined today prior to her discharge to home labs having improved and patient afebrile. Patient to complete a course of abx at home and follw up with her PCP.        Consults:   Consults (From admission, onward)        Status Ordering Provider     Inpatient consult to Social Work  Once     Provider:  (Not yet assigned)    Completed ISABELLE ARIZA          No new Assessment & Plan notes have been filed under this hospital service since the last note was generated.  Service: Hospital Medicine    Final Active  Diagnoses:    Diagnosis Date Noted POA    Pneumonia of left lower lobe due to infectious organism [J18.1] 10/07/2019 Yes    Type 2 diabetes mellitus with circulatory disorder, without long-term current use of insulin [E11.59] 10/07/2019 Yes      Problems Resolved During this Admission:    Diagnosis Date Noted Date Resolved POA    PRINCIPAL PROBLEM:  Severe sepsis [A41.9, R65.20] 09/25/2017 10/10/2019 Yes    Pneumonia due to infectious organism [J18.9] 10/07/2019 10/07/2019 Yes    ELOY (acute kidney injury) [N17.9] 10/07/2019 10/10/2019 Yes       Discharged Condition: stable    Disposition: Home or Self Care    Follow Up:  Follow-up Information     Ryan Sims MD In 3 days.    Specialty:  Family Medicine  Contact information:  [address] [facility] [address] [phone]                 Patient Instructions:      Diet Cardiac     Activity as tolerated       Significant Diagnostic Studies: Labs:   BMP:   Recent Labs   Lab 10/09/19  0515 10/10/19  0502   * 112*    141   K 3.5 3.6    103   CO2 24 28   BUN 12 7   CREATININE 0.7 0.7   CALCIUM 9.3 9.5   , CMP   Recent Labs   Lab 10/09/19  0515 10/10/19  0502    141   K 3.5 3.6    103   CO2 24 28   * 112*   BUN 12 7   CREATININE 0.7 0.7   CALCIUM 9.3 9.5   PROT 5.2* 5.5*   ALBUMIN 2.0* 2.1*   BILITOT 0.8 0.8   ALKPHOS 96 107   AST 16 15   ALT 10 9*   ANIONGAP 9 10   ESTGFRAFRICA >60 >60   EGFRNONAA >60 >60   , CBC   Recent Labs   Lab 10/09/19  0515 10/10/19  0502   WBC 8.14 6.45   HGB 9.0* 9.7*   HCT 28.3* 32.6*    164    and All labs within the past 24 hours have been reviewed    Pending Diagnostic Studies:     None         Medications:  Reconciled Home Medications:      Medication List      START taking these medications    levoFLOXacin 500 MG tablet  Commonly known as:  LEVAQUIN  Take 1 tablet (500 mg total) by mouth once daily.     promethazine-codeine 6.25-10 mg/5 ml 6.25-10 mg/5 mL  syrup  Commonly known as:  PHENERGAN with CODEINE  Take 5 mLs by mouth every 6 (six) hours as needed for Cough.     traMADol 50 mg tablet  Commonly known as:  ULTRAM  Take 1 tablet (50 mg total) by mouth every 6 (six) hours as needed.        CONTINUE taking these medications    blood sugar diagnostic Strp  QAC and QHS     blood-glucose meter kit  Use as instructed     budesonide 0.5 mg/2 mL nebulizer solution  Commonly known as:  PULMICORT  Take 2 mLs (0.5 mg total) by nebulization every 12 (twelve) hours. Controller     diltiaZEM 90 MG tablet  Commonly known as:  CARDIZEM  Take 90 mg by mouth 4 (four) times daily.     hydrocodone-apap 7.5-325 MG/15 ML oral solution  Commonly known as:  HYCET  Take by mouth 4 (four) times daily as needed for Pain.     ipratropium 0.02 % nebulizer solution  Commonly known as:  ATROVENT  Take 2.5 mLs (500 mcg total) by nebulization every 6 (six) hours. Rescue     levalbuterol 0.63 mg/3 mL nebulizer solution  Commonly known as:  XOPENEX  Take 3 mLs (0.63 mg total) by nebulization every 6 (six) hours. Rescue     LORazepam 0.5 MG tablet  Commonly known as:  ATIVAN  Take 0.5 mg by mouth.     metFORMIN 500 MG tablet  Commonly known as:  GLUCOPHAGE  Take 500 mg by mouth 2 (two) times daily with meals.     metoprolol succinate 50 MG 24 hr tablet  Commonly known as:  TOPROL-XL  Take 50 mg by mouth once daily.     montelukast 10 mg tablet  Commonly known as:  SINGULAIR  Take 10 mg by mouth every evening.     pantoprazole 40 MG tablet  Commonly known as:  PROTONIX  Take 40 mg by mouth.     SYMBICORT 160-4.5 mcg/actuation Hfaa  Generic drug:  budesonide-formoterol 160-4.5 mcg  Inhale 2 puffs into the lungs every 12 (twelve) hours. Controller     XARELTO 20 mg Tab  Generic drug:  rivaroxaban  Take 20 mg by mouth daily with dinner or evening meal.            Indwelling Lines/Drains at time of discharge:   Lines/Drains/Airways     Central Venous Catheter Line                 Port A Cath Single  Lumen right subclavian -- days                Time spent on the discharge of patient: 35 minutes  Patient was seen and examined on the date of discharge and determined to be suitable for discharge.         Rebel Ortega MD  Department of Hospital Medicine  [facility]

## 2019-10-12 LAB
BACTERIA BLD CULT: NORMAL
BACTERIA BLD CULT: NORMAL

## 2019-10-14 NOTE — PLAN OF CARE
10/14/19 1747   Final Note   Assessment Type Final Discharge Note   Anticipated Discharge Disposition Home   Right Care Referral Info   Post Acute Recommendation No Care

## 2019-11-21 NOTE — SUBJECTIVE & OBJECTIVE
Interval History:     Review of Systems   Constitutional: Positive for activity change.   HENT: Positive for congestion.    Respiratory: Positive for cough, shortness of breath and wheezing.    Cardiovascular: Negative.    Gastrointestinal: Negative.    Genitourinary: Negative.    Musculoskeletal: Negative.    Skin: Negative.    Neurological: Negative.    Hematological: Negative.    Psychiatric/Behavioral: Negative.      Objective:     Vital Signs (Most Recent):  Temp: 98.2 °F (36.8 °C) (10/10/19 1223)  Pulse: (!) 114 (10/10/19 1223)  Resp: 18 (10/10/19 1223)  BP: (!) 127/92 (10/10/19 1223)  SpO2: (!) 94 % (10/10/19 1223) Vital Signs (24h Range):        Weight: 53.8 kg (118 lb 9.7 oz)  Body mass index is 20.36 kg/m².  No intake or output data in the 24 hours ending 11/21/19 1549   Physical Exam   Constitutional: She is oriented to person, place, and time. She appears well-developed and well-nourished.   HENT:   Head: Normocephalic and atraumatic.   Eyes: Pupils are equal, round, and reactive to light.   Neck: Normal range of motion. Neck supple.   Cardiovascular: Normal rate.   Pulmonary/Chest: Effort normal. She has wheezes. She has rales.   Abdominal: Soft. Bowel sounds are normal.   Musculoskeletal: Normal range of motion.   Neurological: She is alert and oriented to person, place, and time.   Skin: Skin is warm and dry.   Psychiatric: She has a normal mood and affect. Her behavior is normal. Judgment and thought content normal.   Nursing note and vitals reviewed.      Significant Labs:   BMP:   No results for input(s): GLU, NA, K, CL, CO2, BUN, CREATININE, CALCIUM, MG in the last 48 hours.  CBC:   No results for input(s): WBC, HGB, HCT, PLT in the last 48 hours.    Significant Imaging: I have reviewed all pertinent imaging results/findings within the past 24 hours.

## 2019-11-21 NOTE — PROGRESS NOTES
[facility] Hospital Medicine  Progress Note    Patient Name: Genie Balderas  MRN: [id number]  Patient Class: IP- Inpatient   Admission Date: 10/7/2019  Length of Stay: 3 days  Attending Physician: No att. providers found  Primary Care Provider: Ryan Sims MD        Subjective:     Principal Problem:Severe sepsis        HPI:  Genie Balderas is a 59 y.o. female patient with a PMHx of lymphoma (Dr. Billingsley), last chemo 2/2019, DM, HTN, who presented to the ER for chest pain, onset 4 days PTA. Pain is worse with coughing. Associated sxs include  SOB, lower back pain, diaphoresis, and cough. Associate s/s included fever 103 at home, chest pain in center of chest radiated to back and bilateral ribs, productive cough with white green sputum. Patient denies any weakness, numbness, dizziness, headache, and all other sxs at this time. Prior Tx includes Norco.  In ER, Labs showed WBC 19.46, potassium 5.2, Glucose 154, troponin 0.043, , procalcitonin 22.65. Xray lumbar spine with bibasilar infiltrate or pneumonia. CTA chest: no PE, consider atypical pneumonias. Admission to Tele with IV Vanc and Zosyn.     Overview/Hospital Course:  10/08   Sepsis improved    Cultures - NGTD      10/9  No events improving      Interval History:     Review of Systems   Constitutional: Positive for activity change.   HENT: Positive for congestion.    Respiratory: Positive for cough, shortness of breath and wheezing.    Cardiovascular: Negative.    Gastrointestinal: Negative.    Genitourinary: Negative.    Musculoskeletal: Negative.    Skin: Negative.    Neurological: Negative.    Hematological: Negative.    Psychiatric/Behavioral: Negative.      Objective:     Vital Signs (Most Recent):  Temp: 98.2 °F (36.8 °C) (10/10/19 1223)  Pulse: (!) 114 (10/10/19 1223)  Resp: 18 (10/10/19 1223)  BP: (!) 127/92 (10/10/19 1223)  SpO2: (!) 94 % (10/10/19 1223) Vital Signs (24h Range):        Weight: 53.8 kg (118 lb 9.7 oz)  Body mass  index is 20.36 kg/m².  No intake or output data in the 24 hours ending 11/21/19 4725   Physical Exam   Constitutional: She is oriented to person, place, and time. She appears well-developed and well-nourished.   HENT:   Head: Normocephalic and atraumatic.   Eyes: Pupils are equal, round, and reactive to light.   Neck: Normal range of motion. Neck supple.   Cardiovascular: Normal rate.   Pulmonary/Chest: Effort normal. She has wheezes. She has rales.   Abdominal: Soft. Bowel sounds are normal.   Musculoskeletal: Normal range of motion.   Neurological: She is alert and oriented to person, place, and time.   Skin: Skin is warm and dry.   Psychiatric: She has a normal mood and affect. Her behavior is normal. Judgment and thought content normal.   Nursing note and vitals reviewed.      Significant Labs:   BMP:   No results for input(s): GLU, NA, K, CL, CO2, BUN, CREATININE, CALCIUM, MG in the last 48 hours.  CBC:   No results for input(s): WBC, HGB, HCT, PLT in the last 48 hours.    Significant Imaging: I have reviewed all pertinent imaging results/findings within the past 24 hours.      Assessment/Plan:      Type 2 diabetes mellitus with circulatory disorder, without long-term current use of insulin  Diabetes Mellitus  -SSI and accuchecks  -1800 henry ADA diet  -nutritional counseling      Pneumonia of left lower lobe due to infectious organism  --CT chest showed possible atypical pneumonia  -improved   -        VTE Risk Mitigation (From admission, onward)         Ordered     IP VTE HIGH RISK PATIENT  Once      10/07/19 2042                      Rebel Ortega MD  Department of Hospital Medicine   [facility]

## 2022-02-28 ENCOUNTER — HOSPITAL ENCOUNTER (INPATIENT)
Facility: HOSPITAL | Age: 62
LOS: 3 days | Discharge: HOME HEALTH | DRG: 309 | End: 2022-03-03
Attending: EMERGENCY MEDICINE | Admitting: EMERGENCY MEDICINE
Payer: MEDICAID

## 2022-02-28 DIAGNOSIS — R05.9 COUGH: ICD-10-CM

## 2022-02-28 DIAGNOSIS — I48.91 AFIB: ICD-10-CM

## 2022-02-28 DIAGNOSIS — J44.1 COPD EXACERBATION: ICD-10-CM

## 2022-02-28 DIAGNOSIS — R06.02 SHORTNESS OF BREATH: ICD-10-CM

## 2022-02-28 DIAGNOSIS — R00.0 TACHYCARDIA: ICD-10-CM

## 2022-02-28 DIAGNOSIS — I48.91 A-FIB: ICD-10-CM

## 2022-02-28 DIAGNOSIS — I48.91 ATRIAL FIBRILLATION WITH RVR: Primary | ICD-10-CM

## 2022-02-28 PROBLEM — E11.59 TYPE 2 DIABETES MELLITUS WITH CIRCULATORY DISORDER, WITHOUT LONG-TERM CURRENT USE OF INSULIN: Chronic | Status: ACTIVE | Noted: 2019-10-07

## 2022-02-28 PROBLEM — D63.8 ANEMIA OF CHRONIC DISEASE: Chronic | Status: ACTIVE | Noted: 2022-02-28

## 2022-02-28 PROBLEM — I10 PRIMARY HYPERTENSION: Chronic | Status: ACTIVE | Noted: 2017-09-25

## 2022-02-28 LAB
ALBUMIN SERPL BCP-MCNC: 2.5 G/DL (ref 3.5–5.2)
ALP SERPL-CCNC: 180 U/L (ref 55–135)
ALT SERPL W/O P-5'-P-CCNC: <5 U/L (ref 10–44)
ANION GAP SERPL CALC-SCNC: 15 MMOL/L (ref 8–16)
AST SERPL-CCNC: 13 U/L (ref 10–40)
BASOPHILS # BLD AUTO: 0.04 K/UL (ref 0–0.2)
BASOPHILS NFR BLD: 0.3 % (ref 0–1.9)
BILIRUB SERPL-MCNC: 0.4 MG/DL (ref 0.1–1)
BNP SERPL-MCNC: 65 PG/ML (ref 0–99)
BUN SERPL-MCNC: 20 MG/DL (ref 8–23)
CALCIUM SERPL-MCNC: 9.5 MG/DL (ref 8.7–10.5)
CHLORIDE SERPL-SCNC: 102 MMOL/L (ref 95–110)
CO2 SERPL-SCNC: 21 MMOL/L (ref 23–29)
CREAT SERPL-MCNC: 1.3 MG/DL (ref 0.5–1.4)
CTP QC/QA: YES
D DIMER PPP IA.FEU-MCNC: 4.97 MG/L FEU
DIFFERENTIAL METHOD: ABNORMAL
EOSINOPHIL # BLD AUTO: 0 K/UL (ref 0–0.5)
EOSINOPHIL NFR BLD: 0.3 % (ref 0–8)
ERYTHROCYTE [DISTWIDTH] IN BLOOD BY AUTOMATED COUNT: 18.1 % (ref 11.5–14.5)
EST. GFR  (AFRICAN AMERICAN): 51 ML/MIN/1.73 M^2
EST. GFR  (NON AFRICAN AMERICAN): 44 ML/MIN/1.73 M^2
GLUCOSE SERPL-MCNC: 67 MG/DL (ref 70–110)
HCT VFR BLD AUTO: 27 % (ref 37–48.5)
HCV AB SERPL QL IA: NEGATIVE
HEP C VIRUS HOLD SPECIMEN: NORMAL
HGB BLD-MCNC: 8.6 G/DL (ref 12–16)
HIV 1+2 AB+HIV1 P24 AG SERPL QL IA: NEGATIVE
IMM GRANULOCYTES # BLD AUTO: 0.12 K/UL (ref 0–0.04)
IMM GRANULOCYTES NFR BLD AUTO: 0.9 % (ref 0–0.5)
LACTATE SERPL-SCNC: 1.3 MMOL/L (ref 0.5–2.2)
LYMPHOCYTES # BLD AUTO: 1.5 K/UL (ref 1–4.8)
LYMPHOCYTES NFR BLD: 10.9 % (ref 18–48)
MCH RBC QN AUTO: 28.9 PG (ref 27–31)
MCHC RBC AUTO-ENTMCNC: 31.9 G/DL (ref 32–36)
MCV RBC AUTO: 91 FL (ref 82–98)
MONOCYTES # BLD AUTO: 0.5 K/UL (ref 0.3–1)
MONOCYTES NFR BLD: 3.5 % (ref 4–15)
NEUTROPHILS # BLD AUTO: 11.8 K/UL (ref 1.8–7.7)
NEUTROPHILS NFR BLD: 84.1 % (ref 38–73)
NRBC BLD-RTO: 0 /100 WBC
PLATELET # BLD AUTO: 385 K/UL (ref 150–450)
PMV BLD AUTO: 9.2 FL (ref 9.2–12.9)
POTASSIUM SERPL-SCNC: 3.5 MMOL/L (ref 3.5–5.1)
PROT SERPL-MCNC: 7.4 G/DL (ref 6–8.4)
RBC # BLD AUTO: 2.98 M/UL (ref 4–5.4)
SARS-COV-2 RDRP RESP QL NAA+PROBE: NEGATIVE
SODIUM SERPL-SCNC: 138 MMOL/L (ref 136–145)
TROPONIN I SERPL DL<=0.01 NG/ML-MCNC: 0.01 NG/ML (ref 0–0.03)
WBC # BLD AUTO: 14 K/UL (ref 3.9–12.7)

## 2022-02-28 PROCEDURE — 96374 THER/PROPH/DIAG INJ IV PUSH: CPT | Mod: 59

## 2022-02-28 PROCEDURE — 99291 CRITICAL CARE FIRST HOUR: CPT | Mod: 25

## 2022-02-28 PROCEDURE — 80053 COMPREHEN METABOLIC PANEL: CPT | Performed by: EMERGENCY MEDICINE

## 2022-02-28 PROCEDURE — 96365 THER/PROPH/DIAG IV INF INIT: CPT

## 2022-02-28 PROCEDURE — 96367 TX/PROPH/DG ADDL SEQ IV INF: CPT

## 2022-02-28 PROCEDURE — 85379 FIBRIN DEGRADATION QUANT: CPT | Performed by: EMERGENCY MEDICINE

## 2022-02-28 PROCEDURE — 25000003 PHARM REV CODE 250: Performed by: EMERGENCY MEDICINE

## 2022-02-28 PROCEDURE — 83735 ASSAY OF MAGNESIUM: CPT | Performed by: INTERNAL MEDICINE

## 2022-02-28 PROCEDURE — 25000003 PHARM REV CODE 250: Performed by: INTERNAL MEDICINE

## 2022-02-28 PROCEDURE — 85025 COMPLETE CBC W/AUTO DIFF WBC: CPT | Performed by: EMERGENCY MEDICINE

## 2022-02-28 PROCEDURE — 25000242 PHARM REV CODE 250 ALT 637 W/ HCPCS: Performed by: EMERGENCY MEDICINE

## 2022-02-28 PROCEDURE — 84146 ASSAY OF PROLACTIN: CPT | Performed by: INTERNAL MEDICINE

## 2022-02-28 PROCEDURE — 25000003 PHARM REV CODE 250: Performed by: NURSE PRACTITIONER

## 2022-02-28 PROCEDURE — G0378 HOSPITAL OBSERVATION PER HR: HCPCS

## 2022-02-28 PROCEDURE — 93010 EKG 12-LEAD: ICD-10-PCS | Mod: 76,59,, | Performed by: INTERNAL MEDICINE

## 2022-02-28 PROCEDURE — U0002 COVID-19 LAB TEST NON-CDC: HCPCS | Performed by: EMERGENCY MEDICINE

## 2022-02-28 PROCEDURE — 94640 AIRWAY INHALATION TREATMENT: CPT

## 2022-02-28 PROCEDURE — 86803 HEPATITIS C AB TEST: CPT | Performed by: EMERGENCY MEDICINE

## 2022-02-28 PROCEDURE — 96375 TX/PRO/DX INJ NEW DRUG ADDON: CPT | Mod: 59

## 2022-02-28 PROCEDURE — 96366 THER/PROPH/DIAG IV INF ADDON: CPT

## 2022-02-28 PROCEDURE — 83880 ASSAY OF NATRIURETIC PEPTIDE: CPT | Performed by: EMERGENCY MEDICINE

## 2022-02-28 PROCEDURE — 93010 ELECTROCARDIOGRAM REPORT: CPT | Mod: ,,, | Performed by: INTERNAL MEDICINE

## 2022-02-28 PROCEDURE — 93005 ELECTROCARDIOGRAM TRACING: CPT

## 2022-02-28 PROCEDURE — 11000001 HC ACUTE MED/SURG PRIVATE ROOM

## 2022-02-28 PROCEDURE — 87040 BLOOD CULTURE FOR BACTERIA: CPT | Mod: 59 | Performed by: EMERGENCY MEDICINE

## 2022-02-28 PROCEDURE — 83605 ASSAY OF LACTIC ACID: CPT | Performed by: EMERGENCY MEDICINE

## 2022-02-28 PROCEDURE — 84484 ASSAY OF TROPONIN QUANT: CPT | Performed by: EMERGENCY MEDICINE

## 2022-02-28 PROCEDURE — 63600175 PHARM REV CODE 636 W HCPCS: Performed by: EMERGENCY MEDICINE

## 2022-02-28 PROCEDURE — 36415 COLL VENOUS BLD VENIPUNCTURE: CPT | Performed by: INTERNAL MEDICINE

## 2022-02-28 PROCEDURE — 87389 HIV-1 AG W/HIV-1&-2 AB AG IA: CPT | Performed by: EMERGENCY MEDICINE

## 2022-02-28 PROCEDURE — 25500020 PHARM REV CODE 255: Performed by: EMERGENCY MEDICINE

## 2022-02-28 PROCEDURE — 84443 ASSAY THYROID STIM HORMONE: CPT | Performed by: INTERNAL MEDICINE

## 2022-02-28 RX ORDER — SODIUM CHLORIDE 9 MG/ML
INJECTION, SOLUTION INTRAVENOUS CONTINUOUS
Status: DISCONTINUED | OUTPATIENT
Start: 2022-02-28 | End: 2022-02-28

## 2022-02-28 RX ORDER — SODIUM CHLORIDE 9 MG/ML
INJECTION, SOLUTION INTRAVENOUS CONTINUOUS
Status: DISCONTINUED | OUTPATIENT
Start: 2022-02-28 | End: 2022-03-01

## 2022-02-28 RX ORDER — BUDESONIDE AND FORMOTEROL FUMARATE DIHYDRATE 160; 4.5 UG/1; UG/1
2 AEROSOL RESPIRATORY (INHALATION) EVERY 12 HOURS
Status: DISCONTINUED | OUTPATIENT
Start: 2022-02-28 | End: 2022-02-28 | Stop reason: CLARIF

## 2022-02-28 RX ORDER — ONDANSETRON 8 MG/1
8 TABLET, ORALLY DISINTEGRATING ORAL EVERY 8 HOURS PRN
Status: DISCONTINUED | OUTPATIENT
Start: 2022-02-28 | End: 2022-03-03 | Stop reason: HOSPADM

## 2022-02-28 RX ORDER — HYDROCODONE BITARTRATE AND ACETAMINOPHEN 7.5; 325 MG/1; MG/1
1 TABLET ORAL
COMMUNITY

## 2022-02-28 RX ORDER — GUAIFENESIN 100 MG/5ML
200 SOLUTION ORAL EVERY 4 HOURS PRN
Status: DISCONTINUED | OUTPATIENT
Start: 2022-02-28 | End: 2022-03-03 | Stop reason: HOSPADM

## 2022-02-28 RX ORDER — MORPHINE SULFATE 4 MG/ML
4 INJECTION, SOLUTION INTRAMUSCULAR; INTRAVENOUS
Status: DISPENSED | OUTPATIENT
Start: 2022-02-28 | End: 2022-03-01

## 2022-02-28 RX ORDER — METOPROLOL SUCCINATE 50 MG/1
50 TABLET, EXTENDED RELEASE ORAL DAILY
Status: DISCONTINUED | OUTPATIENT
Start: 2022-02-28 | End: 2022-03-02

## 2022-02-28 RX ORDER — ACETAMINOPHEN 325 MG/1
650 TABLET ORAL EVERY 6 HOURS PRN
Status: DISCONTINUED | OUTPATIENT
Start: 2022-02-28 | End: 2022-03-03 | Stop reason: HOSPADM

## 2022-02-28 RX ORDER — ARFORMOTEROL TARTRATE 15 UG/2ML
15 SOLUTION RESPIRATORY (INHALATION) 2 TIMES DAILY
Status: DISCONTINUED | OUTPATIENT
Start: 2022-02-28 | End: 2022-03-03 | Stop reason: HOSPADM

## 2022-02-28 RX ORDER — DILTIAZEM HCL/D5W 125 MG/125
5 PLASTIC BAG, INJECTION (ML) INTRAVENOUS CONTINUOUS
Status: DISCONTINUED | OUTPATIENT
Start: 2022-02-28 | End: 2022-03-01

## 2022-02-28 RX ORDER — ONDANSETRON 2 MG/ML
4 INJECTION INTRAMUSCULAR; INTRAVENOUS
Status: COMPLETED | OUTPATIENT
Start: 2022-02-28 | End: 2022-02-28

## 2022-02-28 RX ORDER — MAG HYDROX/ALUMINUM HYD/SIMETH 200-200-20
30 SUSPENSION, ORAL (FINAL DOSE FORM) ORAL EVERY 6 HOURS PRN
Status: DISCONTINUED | OUTPATIENT
Start: 2022-02-28 | End: 2022-03-03 | Stop reason: HOSPADM

## 2022-02-28 RX ORDER — PREDNISONE 20 MG/1
40 TABLET ORAL DAILY
Status: DISCONTINUED | OUTPATIENT
Start: 2022-03-01 | End: 2022-03-03 | Stop reason: HOSPADM

## 2022-02-28 RX ORDER — LORAZEPAM 0.5 MG/1
0.5 TABLET ORAL NIGHTLY PRN
Status: DISCONTINUED | OUTPATIENT
Start: 2022-02-28 | End: 2022-03-03 | Stop reason: HOSPADM

## 2022-02-28 RX ORDER — HYDROCODONE BITARTRATE AND ACETAMINOPHEN 7.5; 325 MG/1; MG/1
1 TABLET ORAL EVERY 8 HOURS PRN
Status: DISCONTINUED | OUTPATIENT
Start: 2022-02-28 | End: 2022-03-01

## 2022-02-28 RX ORDER — METHYLPREDNISOLONE SOD SUCC 125 MG
125 VIAL (EA) INJECTION
Status: COMPLETED | OUTPATIENT
Start: 2022-02-28 | End: 2022-02-28

## 2022-02-28 RX ORDER — BUDESONIDE 0.5 MG/2ML
0.5 INHALANT ORAL EVERY 12 HOURS
Status: DISCONTINUED | OUTPATIENT
Start: 2022-02-28 | End: 2022-03-03 | Stop reason: HOSPADM

## 2022-02-28 RX ORDER — PROMETHAZINE HYDROCHLORIDE 25 MG/1
25 TABLET ORAL EVERY 6 HOURS PRN
Status: DISCONTINUED | OUTPATIENT
Start: 2022-02-28 | End: 2022-03-03 | Stop reason: HOSPADM

## 2022-02-28 RX ORDER — MONTELUKAST SODIUM 10 MG/1
10 TABLET ORAL NIGHTLY
Status: DISCONTINUED | OUTPATIENT
Start: 2022-02-28 | End: 2022-03-03 | Stop reason: HOSPADM

## 2022-02-28 RX ORDER — DILTIAZEM HYDROCHLORIDE 5 MG/ML
20 INJECTION INTRAVENOUS
Status: COMPLETED | OUTPATIENT
Start: 2022-02-28 | End: 2022-02-28

## 2022-02-28 RX ORDER — IPRATROPIUM BROMIDE AND ALBUTEROL SULFATE 2.5; .5 MG/3ML; MG/3ML
3 SOLUTION RESPIRATORY (INHALATION) EVERY 4 HOURS PRN
Status: DISCONTINUED | OUTPATIENT
Start: 2022-02-28 | End: 2022-03-03 | Stop reason: HOSPADM

## 2022-02-28 RX ORDER — MORPHINE SULFATE 4 MG/ML
2 INJECTION, SOLUTION INTRAMUSCULAR; INTRAVENOUS
Status: COMPLETED | OUTPATIENT
Start: 2022-02-28 | End: 2022-02-28

## 2022-02-28 RX ORDER — DILTIAZEM HYDROCHLORIDE 5 MG/ML
10 INJECTION INTRAVENOUS
Status: COMPLETED | OUTPATIENT
Start: 2022-02-28 | End: 2022-02-28

## 2022-02-28 RX ORDER — SODIUM CHLORIDE 0.9 % (FLUSH) 0.9 %
3 SYRINGE (ML) INJECTION
Status: DISCONTINUED | OUTPATIENT
Start: 2022-02-28 | End: 2022-03-03 | Stop reason: HOSPADM

## 2022-02-28 RX ORDER — IPRATROPIUM BROMIDE AND ALBUTEROL SULFATE 2.5; .5 MG/3ML; MG/3ML
3 SOLUTION RESPIRATORY (INHALATION)
Status: COMPLETED | OUTPATIENT
Start: 2022-02-28 | End: 2022-02-28

## 2022-02-28 RX ORDER — LEVALBUTEROL INHALATION SOLUTION 0.63 MG/3ML
0.63 SOLUTION RESPIRATORY (INHALATION)
Status: DISCONTINUED | OUTPATIENT
Start: 2022-03-01 | End: 2022-03-03 | Stop reason: HOSPADM

## 2022-02-28 RX ORDER — AZITHROMYCIN 250 MG/1
500 TABLET, FILM COATED ORAL EVERY 24 HOURS
Status: COMPLETED | OUTPATIENT
Start: 2022-03-01 | End: 2022-03-03

## 2022-02-28 RX ADMIN — MONTELUKAST 10 MG: 10 TABLET, FILM COATED ORAL at 10:02

## 2022-02-28 RX ADMIN — IPRATROPIUM BROMIDE AND ALBUTEROL SULFATE 3 ML: 2.5; .5 SOLUTION RESPIRATORY (INHALATION) at 11:02

## 2022-02-28 RX ADMIN — MORPHINE SULFATE 2 MG: 4 INJECTION INTRAVENOUS at 04:02

## 2022-02-28 RX ADMIN — METHYLPREDNISOLONE SODIUM SUCCINATE 125 MG: 125 INJECTION, POWDER, FOR SOLUTION INTRAMUSCULAR; INTRAVENOUS at 12:02

## 2022-02-28 RX ADMIN — SODIUM CHLORIDE, SODIUM LACTATE, POTASSIUM CHLORIDE, AND CALCIUM CHLORIDE 1000 ML: .6; .31; .03; .02 INJECTION, SOLUTION INTRAVENOUS at 06:02

## 2022-02-28 RX ADMIN — HYDROCODONE BITARTRATE AND ACETAMINOPHEN 1 TABLET: 7.5; 325 TABLET ORAL at 10:02

## 2022-02-28 RX ADMIN — POTASSIUM BICARBONATE 25 MEQ: 977.5 TABLET, EFFERVESCENT ORAL at 10:02

## 2022-02-28 RX ADMIN — Medication 5 MG/HR: at 05:02

## 2022-02-28 RX ADMIN — DILTIAZEM HYDROCHLORIDE 10 MG: 5 INJECTION INTRAVENOUS at 06:02

## 2022-02-28 RX ADMIN — DILTIAZEM HYDROCHLORIDE 10 MG: 5 INJECTION INTRAVENOUS at 05:02

## 2022-02-28 RX ADMIN — SODIUM CHLORIDE, SODIUM LACTATE, POTASSIUM CHLORIDE, AND CALCIUM CHLORIDE 1000 ML: .6; .31; .03; .02 INJECTION, SOLUTION INTRAVENOUS at 01:02

## 2022-02-28 RX ADMIN — IOHEXOL 100 ML: 350 INJECTION, SOLUTION INTRAVENOUS at 04:02

## 2022-02-28 RX ADMIN — ARFORMOTEROL TARTRATE 15 MCG: 15 SOLUTION RESPIRATORY (INHALATION) at 10:02

## 2022-02-28 RX ADMIN — SODIUM CHLORIDE: 0.9 INJECTION, SOLUTION INTRAVENOUS at 10:02

## 2022-02-28 RX ADMIN — AZITHROMYCIN MONOHYDRATE 500 MG: 500 INJECTION, POWDER, LYOPHILIZED, FOR SOLUTION INTRAVENOUS at 01:02

## 2022-02-28 RX ADMIN — CEFTRIAXONE 2 G: 2 INJECTION, SOLUTION INTRAVENOUS at 04:02

## 2022-02-28 RX ADMIN — ONDANSETRON 4 MG: 2 INJECTION INTRAMUSCULAR; INTRAVENOUS at 05:02

## 2022-02-28 RX ADMIN — BUDESONIDE 0.5 MG: 0.5 INHALANT ORAL at 10:02

## 2022-02-28 NOTE — PHARMACY MED REC
"  Admission Medication History     The home medication history was taken by Odilon Cam.    You may go to "Admission" then "Reconcile Home Medications" tabs to review and/or act upon these items.      The home medication list has been updated by the Pharmacy department.    Please read ALL comments highlighted in yellow.    Please address this information as you see fit.     Feel free to contact us if you have any questions or require assistance.      The medications listed below were removed from the home medication list. Please reorder if appropriate:  Patient reports no longer taking the following medication(s):   BUDESONIDE 0.5 MG/2 mL NEBULIZER SOLUTION   DILTIAZEM 90 MG TABLET   HYDROCODONE-ACETAMINOPHEN SOLUTION 7.5-325 MG/15 mL (TAKING TABS)   LEVOFLOXACIN 500 MG TABLET   METFORMIN 500 MG TABLET   TRAMADOL 50 MG TABLET    Medications listed below were obtained from: Patient/family, Analytic software- [employer] and Medical records  (Not in a hospital admission)      Potential issues to be addressed PRIOR TO DISCHARGE: NONE      Odilon Cam, [facility]  Spectralwgw [phone]      Current Outpatient Medications on File Prior to Encounter   Medication Sig Dispense Refill Last Dose    budesonide-formoterol 160-4.5 mcg (SYMBICORT) 160-4.5 mcg/actuation HFAA Inhale 2 puffs into the lungs every 12 (twelve) hours. Controller   2/28/2022 at Unknown time    HYDROcodone-acetaminophen (NORCO) 7.5-325 mg per tablet Take 1 tablet by mouth every 4 to 6 hours as needed for Pain.   2/28/2022 at Unknown time    ipratropium (ATROVENT) 0.02 % nebulizer solution Take 2.5 mLs (500 mcg total) by nebulization every 6 (six) hours. Rescue 300 mL 0 2/27/2022 at Unknown time    levalbuterol (XOPENEX) 0.63 mg/3 mL nebulizer solution Take 3 mLs (0.63 mg total) by nebulization every 6 (six) hours. Rescue 120 mL 0 2/27/2022 at Unknown time    LORazepam (ATIVAN) 0.5 MG tablet Take 0.5 mg by mouth every evening.   2/27/2022 at " Unknown time    metoprolol succinate (TOPROL-XL) 50 MG 24 hr tablet Take 50 mg by mouth once daily.   2/28/2022 at Unknown time    montelukast (SINGULAIR) 10 mg tablet Take 10 mg by mouth every evening.   2/27/2022 at Unknown time    rivaroxaban (XARELTO) 20 mg Tab Take 20 mg by mouth daily with dinner or evening meal.   2/28/2022 at Unknown time    [DISCONTINUED] budesonide (PULMICORT) 0.5 mg/2 mL nebulizer solution Take 2 mLs (0.5 mg total) by nebulization every 12 (twelve) hours. Controller 120 mL 0 2/28/2022 at Unknown time    blood sugar diagnostic Strp QAC and  strip 0     [DISCONTINUED] blood-glucose meter kit Use as instructed 1 each 0     [DISCONTINUED] diltiaZEM (CARDIZEM) 90 MG tablet Take 90 mg by mouth 4 (four) times daily.       [DISCONTINUED] HYDROcodone-acetaminophen (NORCO) 7.5-325 mg per tablet Take by mouth 4 (four) times daily as needed for Pain.       [DISCONTINUED] levoFLOXacin (LEVAQUIN) 500 MG tablet Take 1 tablet (500 mg total) by mouth once daily. 10 tablet 0     [DISCONTINUED] metformin (GLUCOPHAGE) 500 MG tablet Take 500 mg by mouth 2 (two) times daily with meals.       [DISCONTINUED] traMADol (ULTRAM) 50 mg tablet Take 1 tablet (50 mg total) by mouth every 6 (six) hours as needed. 30 tablet 0                            .

## 2022-02-28 NOTE — ED PROVIDER NOTES
SCRIBE #1 NOTE: I, Chris Griffin, am scribing for, and in the presence of, Ethan Morales MD. I have scribed the entire note.      History      Chief Complaint   Patient presents with    Cough     X 3-4 days, not able to produce any sputum. Reports severe and abd pain associated with cough      Review of patient's allergies indicates:  No Known Allergies     HPI   HPI    2022, 11:26 AM   History obtained from the patient      History of Present Illness: Genie Balderas is a 61 y.o. female patient with a PMHx of DM, HTN, asthma who presents to the Emergency Department for cough, onset several days PTA. Symptoms are episodic and moderate in severity. No mitigating or exacerbating factors reported. Associated sxs include back pain, chest pain, SOB, bilateral rib pain, and generalized abdominal pain; pt states that her pain started after her coughing began. Patient denies any fever, chills, n/v/d, weakness, numbness, dizziness, headache, and all other sxs at this time. Pt is an active smoker. No further complaints or concerns at this time.     Arrival mode: Personal vehicle    PCP: Dawit Grey MD       Past Medical History:  Past Medical History:   Diagnosis Date    Anemia of chronic disease     Anticoagulant long-term use     Asthma     COPD (chronic obstructive pulmonary disease)     Diabetes mellitus     GERD (gastroesophageal reflux disease)     Hypertension     Lymphoma     Mixed hyperlipidemia     PAF (paroxysmal atrial fibrillation)     Thrombosis of right internal jugular vein        Past Surgical History:  Past Surgical History:   Procedure Laterality Date     SECTION           Family History:  Family History   Problem Relation Age of Onset    Diabetes Mother     Hypertension Mother     Heart disease Mother     Heart failure Mother 67    Cancer Father     Lupus Sister     Lupus Sister     Lupus Daughter        Social History:  Social History     Tobacco Use    Smoking  status: Current Some Day Smoker     Packs/day: 0.75     Years: 45.00     Pack years: 33.75    Smokeless tobacco: Never Used   Substance and Sexual Activity    Alcohol use: No    Drug use: No    Sexual activity: Not Currently       ROS   Review of Systems   Constitutional: Negative for chills and fever.   HENT: Negative for sore throat.    Respiratory: Positive for cough and shortness of breath.    Cardiovascular: Positive for chest pain.   Gastrointestinal: Positive for abdominal pain (generalized). Negative for diarrhea, nausea and vomiting.   Genitourinary: Negative for dysuria.   Musculoskeletal: Positive for back pain and myalgias (bilateral ribs).   Skin: Negative for rash.   Neurological: Negative for dizziness, weakness, light-headedness, numbness and headaches.   Hematological: Does not bruise/bleed easily.   All other systems reviewed and are negative.    Physical Exam      Initial Vitals [02/28/22 1114]   BP Pulse Resp Temp SpO2   95/63 109 18 98.1 °F (36.7 °C) (!) 91 %      MAP       --          Physical Exam  Nursing Notes and Vital Signs Reviewed.  Constitutional: Patient is in no acute distress. Well-developed and well-nourished.  Head: Atraumatic. Normocephalic.  Eyes: PERRL. EOM intact. Conjunctivae are not pale. No scleral icterus.  ENT: Mucous membranes are moist. Oropharynx is clear and symmetric.    Neck: Supple. Full ROM.  Cardiovascular: Tachycardic rate. Regular rhythm. No murmurs, rubs, or gallops. Distal pulses are 2+ and symmetric.  Pulmonary/Chest: No respiratory distress. Diffuse inspiratory and expiratory wheezing.  Abdominal: Soft and non-distended.  There is no tenderness.  No rebound, guarding, or rigidity.   Musculoskeletal: Moves all extremities. No obvious deformities. No edema.  Skin: Warm and dry.  Neurological:  Alert, awake, and appropriate.  Normal speech.  No acute focal neurological deficits are appreciated.  Psychiatric: Normal affect. Good eye contact. Appropriate in  content.    ED Course    Critical Care    Date/Time: 2/28/2022 5:28 PM  Performed by: Ethan Morales MD  Authorized by: Ethan Morales MD   Direct patient critical care time: 15 minutes  Additional history critical care time: 5 minutes  Ordering / reviewing critical care time: 10 minutes  Documentation critical care time: 5 minutes  Consulting other physicians critical care time: 5 minutes  Total critical care time (exclusive of procedural time) : 40 minutes  Critical care time was exclusive of separately billable procedures and treating other patients and teaching time.  Critical care was necessary to treat or prevent imminent or life-threatening deterioration of the following conditions: Afib with RVR.  Critical care was time spent personally by me on the following activities: blood draw for specimens, development of treatment plan with patient or surrogate, discussions with consultants, interpretation of cardiac output measurements, evaluation of patient's response to treatment, examination of patient, obtaining history from patient or surrogate, ordering and performing treatments and interventions, ordering and review of laboratory studies, ordering and review of radiographic studies, pulse oximetry, re-evaluation of patient's condition and review of old charts.        ED Vital Signs:  Vitals:    02/28/22 1801 02/28/22 1806 02/28/22 1811 02/28/22 1827   BP: (!) 100/51 (!) 92/51 (!) 107/59 (!) 93/54   Pulse: (!) 130 (!) 135 (!) 136 (!) 119   Resp: 16 15 15 14   Temp:       TempSrc:       SpO2: 95% 98% 98% 96%   Weight:       Height:        02/28/22 1832 02/28/22 1837 02/28/22 1842 02/28/22 1847   BP: (!) 103/59 (!) 103/57 (!) 105/56 (!) 97/52   Pulse: (!) 118 (!) 125 (!) 128 (!) 129   Resp: 14 14 14 13   Temp:       TempSrc:       SpO2: 97% 97% 98% 97%   Weight:       Height:        02/28/22 1900 02/28/22 1905 02/28/22 1953 02/28/22 2036   BP: (!) 99/55 (!) 112/59 125/70    Pulse: (!) 127 (!) 125 (!) 130    Resp:  14 15 18    Temp:   98.5 °F (36.9 °C)    TempSrc:   Oral    SpO2: (!) 93% 98% 98% 98%   Weight:       Height:        02/28/22 2055 02/28/22 2202 02/28/22 2204   BP: (!) 96/57     Pulse: (!) 113 105 105   Resp: 18 18 18   Temp: 98.1 °F (36.7 °C)     TempSrc: Oral     SpO2: 96%  95%   Weight:      Height:          Abnormal Lab Results:  Labs Reviewed   CBC W/ AUTO DIFFERENTIAL - Abnormal; Notable for the following components:       Result Value    WBC 14.00 (*)     RBC 2.98 (*)     Hemoglobin 8.6 (*)     Hematocrit 27.0 (*)     MCHC 31.9 (*)     RDW 18.1 (*)     Immature Granulocytes 0.9 (*)     Gran # (ANC) 11.8 (*)     Immature Grans (Abs) 0.12 (*)     Gran % 84.1 (*)     Lymph % 10.9 (*)     Mono % 3.5 (*)     All other components within normal limits    Narrative:     Release to patient->Immediate   COMPREHENSIVE METABOLIC PANEL - Abnormal; Notable for the following components:    CO2 21 (*)     Glucose 67 (*)     Albumin 2.5 (*)     Alkaline Phosphatase 180 (*)     ALT <5 (*)     eGFR if  51 (*)     eGFR if non  44 (*)     All other components within normal limits    Narrative:     Release to patient->Immediate   D DIMER, QUANTITATIVE - Abnormal; Notable for the following components:    D-Dimer 4.97 (*)     All other components within normal limits    Narrative:     Release to patient->Immediate   CULTURE, BLOOD   CULTURE, BLOOD   HIV 1 / 2 ANTIBODY    Narrative:     Release to patient->Immediate   HEPATITIS C ANTIBODY    Narrative:     Release to patient->Immediate   HEP C VIRUS HOLD SPECIMEN    Narrative:     Release to patient->Immediate   B-TYPE NATRIURETIC PEPTIDE    Narrative:     Release to patient->Immediate   TROPONIN I    Narrative:     Release to patient->Immediate   LACTIC ACID, PLASMA   SARS-COV-2 RDRP GENE    Narrative:     This test utilizes isothermal nucleic acid amplification   technology to detect the SARS-CoV-2 RdRp nucleic acid segment.   The analytical  sensitivity (limit of detection) is 125 genome   equivalents/mL.   A POSITIVE result implies infection with the SARS-CoV-2 virus;   the patient is presumed to be contagious.     A NEGATIVE result means that SARS-CoV-2 nucleic acids are not   present above the limit of detection. A NEGATIVE result should be   treated as presumptive. It does not rule out the possibility of   COVID-19 and should not be the sole basis for treatment decisions.   If COVID-19 is strongly suspected based on clinical and exposure   history, re-testing using an alternate molecular assay should be   considered.   This test is only for use under the Food and Drug   Administration s Emergency Use Authorization (EUA).   Commercial kits are provided by [employer].   Performance characteristics of the EUA have been independently   verified by [facility] Department of   Pathology and Laboratory Medicine.   _________________________________________________________________   The authorized Fact Sheet for Healthcare Providers and the authorized Fact   Sheet for Patients of the ID NOW COVID-19 are available on the FDA   website:     https://www.fda.gov/media/346605/download  https://www.fda.gov/media/337502/download               All Lab Results:  Results for orders placed or performed during the hospital encounter of 02/28/22   HIV 1/2 Ag/Ab (4th Gen)   Result Value Ref Range    HIV 1/2 Ag/Ab Negative Negative   Hepatitis C Antibody   Result Value Ref Range    Hepatitis C Ab Negative Negative   HCV Virus Hold Specimen   Result Value Ref Range    HEP C Virus Hold Specimen Hold for HCV sendout    CBC Auto Differential   Result Value Ref Range    WBC 14.00 (H) 3.90 - 12.70 K/uL    RBC 2.98 (L) 4.00 - 5.40 M/uL    Hemoglobin 8.6 (L) 12.0 - 16.0 g/dL    Hematocrit 27.0 (L) 37.0 - 48.5 %    MCV 91 82 - 98 fL    MCH 28.9 27.0 - 31.0 pg    MCHC 31.9 (L) 32.0 - 36.0 g/dL    RDW 18.1 (H) 11.5 - 14.5 %    Platelets 385 150 - 450 K/uL    MPV  9.2 9.2 - 12.9 fL    Immature Granulocytes 0.9 (H) 0.0 - 0.5 %    Gran # (ANC) 11.8 (H) 1.8 - 7.7 K/uL    Immature Grans (Abs) 0.12 (H) 0.00 - 0.04 K/uL    Lymph # 1.5 1.0 - 4.8 K/uL    Mono # 0.5 0.3 - 1.0 K/uL    Eos # 0.0 0.0 - 0.5 K/uL    Baso # 0.04 0.00 - 0.20 K/uL    nRBC 0 0 /100 WBC    Gran % 84.1 (H) 38.0 - 73.0 %    Lymph % 10.9 (L) 18.0 - 48.0 %    Mono % 3.5 (L) 4.0 - 15.0 %    Eosinophil % 0.3 0.0 - 8.0 %    Basophil % 0.3 0.0 - 1.9 %    Differential Method Automated    Comprehensive Metabolic Panel   Result Value Ref Range    Sodium 138 136 - 145 mmol/L    Potassium 3.5 3.5 - 5.1 mmol/L    Chloride 102 95 - 110 mmol/L    CO2 21 (L) 23 - 29 mmol/L    Glucose 67 (L) 70 - 110 mg/dL    BUN 20 8 - 23 mg/dL    Creatinine 1.3 0.5 - 1.4 mg/dL    Calcium 9.5 8.7 - 10.5 mg/dL    Total Protein 7.4 6.0 - 8.4 g/dL    Albumin 2.5 (L) 3.5 - 5.2 g/dL    Total Bilirubin 0.4 0.1 - 1.0 mg/dL    Alkaline Phosphatase 180 (H) 55 - 135 U/L    AST 13 10 - 40 U/L    ALT <5 (L) 10 - 44 U/L    Anion Gap 15 8 - 16 mmol/L    eGFR if African American 51 (A) >60 mL/min/1.73 m^2    eGFR if non African American 44 (A) >60 mL/min/1.73 m^2   Brain Natriuretic Peptide   Result Value Ref Range    BNP 65 0 - 99 pg/mL   Troponin I   Result Value Ref Range    Troponin I 0.008 0.000 - 0.026 ng/mL   D-Dimer, Quantitative   Result Value Ref Range    D-Dimer 4.97 (H) <0.50 mg/L FEU   Lactic acid, plasma   Result Value Ref Range    Lactate (Lactic Acid) 1.3 0.5 - 2.2 mmol/L   POCT COVID-19 Rapid Screening   Result Value Ref Range    POC Rapid COVID Negative Negative     Acceptable Yes      Imaging Results:  Imaging Results          CTA Chest Non-Coronary (PE Study) (Final result)  Result time 02/28/22 17:36:38    Final result by Jae Horner MD (02/28/22 17:36:38)                 Impression:      No pulmonary embolism.  No dissection.    Chronic patchy areas of bronchovascular thickening and bronchovascular infiltrates bilateral  lungs with Mariah lymphatic nodules.  Correlate clinically for chronic process such as sarcoidosis.  Consider biopsy or further diagnostic workup.    All CT scans   are performed using dose optimization techniques including the following: automated exposure control; adjustment of the mA and/or kV; use of iterative reconstruction technique.  Dose modulation was employed for ALARA by means of: Automated exposure control; adjustment of the mA and/or kV according to patient size (this includes techniques or standardized protocols for targeted exams where dose is matched to indication/reason for exam; i.e. extremities or head); and/or use of iterative reconstructive technique.      Electronically signed by: Evens Rowe  Date:    02/28/2022  Time:    17:36             Narrative:    EXAMINATION:  CTA CHEST NON CORONARY    CLINICAL HISTORY:  Pulmonary embolism (PE) suspected, positive D-dimer;    TECHNIQUE:  Low dose axial images, sagittal and coronal reformations were obtained from the thoracic inlet to the lung bases following the IV administration of 100 mL of Omnipaque 350.  Contrast timing was optimized to evaluate the pulmonary arteries.  MIP images were performed.    COMPARISON:  Prior 2017    FINDINGS:  No evidence for pulmonary embolism.  No evidence for aortic dissection or aneurysm.  Bronchovascular opacities in the right middle lobe bilateral lung bases with irregular margins.  Subcentimeter Mariah lymphatic nodule in the right middle lobe.  Bronchovascular opacities in the bilateral upper lobes and superior segment of left lower lobe.  Mild pre vascular anterior mediastinal lymph nodes.  Irregular nodules left lung apex.  Emphysema in the upper lung zones.                               X-Ray Chest 1 View (Final result)  Result time 02/28/22 12:27:00    Final result by Jerry Paulson MD (02/28/22 12:27:00)                 Impression:      Findings concerning for developing pneumonia or aspiration sequela.   Atypical/viral etiology pneumonia should be considered.  Recommend continued follow-up.      Electronically signed by: Jerry Paulson  Date:    02/28/2022  Time:    12:27             Narrative:    EXAMINATION:  XR CHEST 1 VIEW    CLINICAL HISTORY:  shortness of breath;    TECHNIQUE:  Single frontal view of the chest was performed.    COMPARISON:  Chest radiograph 10/07/2019    FINDINGS:  Subtle patchy opacities are scattered throughout the lungs with focal reticulonodular/consolidative opacification in the right lower lung.  No effusion or pneumothorax.  Unchanged appearance of the cardiomediastinal silhouette.  No free air under the diaphragm.  No acute osseous abnormality.                               The EKG was ordered, reviewed, and independently interpreted by the ED provider.  Interpretation time: 11:35  Rate: 101 BPM  Rhythm: sinus tachycardia  Interpretation: No acute ST changes. No STEMI.    The EKG was ordered, reviewed, and independently interpreted by the ED provider.  Interpretation time: 17:19  Rate: 144 BPM  Rhythm: Atrial fibrillation with rapid ventricular response  Interpretation: Nonspecific ST and T wave abnormality. No STEMI.           The Emergency Provider reviewed the vital signs and test results, which are outlined above.    ED Discussion     6:23 PM: Discussed case with Palak Zhu NP (Hospital Medicine). Dr. Westbrook agrees with current care and management of pt and accepts admission.   Admitting Service: Hospital Medicine  Admitting Physician: Dr. Westbrook  Admit to: Obs Tele    6:24 PM: Re-evaluated pt. I have discussed test results, shared treatment plan, and the need for admission with patient and family at bedside. Pt and family express understanding at this time and agree with all information. All questions answered. Pt and family have no further questions or concerns at this time. Pt is ready for admit.    ED Course as of 02/28/22 2211   Mon Feb 28, 2022   1726 Patient has gone into  A fib with RVR in the room. Will treat.  [BA]      ED Course User Index  [BA] Ethan Morales MD       ED Medication(s):  Medications   diltiaZEM 125 mg in dextrose 5% 125 mL infusion (non-titrating) (5 mg/hr Intravenous New Bag 2/28/22 1740)   morphine injection 4 mg (4 mg Intravenous Not Given 2/28/22 1730)   HYDROcodone-acetaminophen 7.5-325 mg per tablet 1 tablet (has no administration in time range)   levalbuterol nebulizer solution 0.63 mg (has no administration in time range)   LORazepam tablet 0.5 mg (has no administration in time range)   metoprolol succinate (TOPROL-XL) 24 hr tablet 50 mg (has no administration in time range)   montelukast tablet 10 mg (has no administration in time range)   rivaroxaban tablet 20 mg (has no administration in time range)   sodium chloride 0.9% flush 3 mL (has no administration in time range)   predniSONE tablet 40 mg (has no administration in time range)   albuterol-ipratropium 2.5 mg-0.5 mg/3 mL nebulizer solution 3 mL (has no administration in time range)   cefTRIAXone (ROCEPHIN) 1 g/50 mL D5W IVPB (has no administration in time range)     And   azithromycin tablet 500 mg (has no administration in time range)   ondansetron disintegrating tablet 8 mg (has no administration in time range)   promethazine tablet 25 mg (has no administration in time range)   acetaminophen tablet 650 mg (has no administration in time range)   aluminum-magnesium hydroxide-simethicone 200-200-20 mg/5 mL suspension 30 mL (has no administration in time range)   guaiFENesin 100 mg/5 ml syrup 200 mg (has no administration in time range)   potassium bicarbonate disintegrating tablet 25 mEq (has no administration in time range)   0.9%  NaCl infusion (has no administration in time range)   budesonide nebulizer solution 0.5 mg (0.5 mg Nebulization Given 2/28/22 2202)   arformoteroL nebulizer solution 15 mcg (15 mcg Nebulization Given 2/28/22 2204)   albuterol-ipratropium 2.5 mg-0.5 mg/3 mL nebulizer  solution 3 mL (3 mLs Nebulization Given 2/28/22 1158)   methylPREDNISolone sodium succinate injection 125 mg (125 mg Intravenous Given 2/28/22 1210)   cefTRIAXone (ROCEPHIN) 2 g/50 mL D5W IVPB (0 g Intravenous Stopped 2/28/22 1741)   azithromycin 500 mg in dextrose 5 % 250 mL IVPB (ready to mix system) (0 mg Intravenous Stopped 2/28/22 1533)   lactated ringers bolus 1,000 mL (0 mLs Intravenous Stopped 2/28/22 1745)   morphine injection 2 mg (2 mg Intravenous Given 2/28/22 1621)   iohexoL (OMNIPAQUE 350) injection 100 mL (100 mLs Intravenous Given 2/28/22 1657)   diltiaZEM injection 20 mg (10 mg Intravenous Given 2/28/22 1734)   ondansetron injection 4 mg (4 mg Intravenous Given 2/28/22 1738)   lactated ringers bolus 1,000 mL (0 mLs Intravenous Stopped 2/28/22 1909)   diltiaZEM injection 10 mg (10 mg Intravenous Given 2/28/22 1755)   diltiaZEM injection 10 mg (10 mg Intravenous Given 2/28/22 1806)          Current Discharge Medication List            Medical Decision Making    Medical Decision Making:   Clinical Tests:   Lab Tests: Ordered and Reviewed  Radiological Study: Ordered and Reviewed  Medical Tests: Ordered and Reviewed           Scribe Attestation:   Scribe #1: I performed the above scribed service and the documentation accurately describes the services I performed. I attest to the accuracy of the note.    Attending:   Physician Attestation Statement for Scribe #1: I, Ethan Morales MD, personally performed the services described in this documentation, as scribed by Chirs Griffin, in my presence, and it is both accurate and complete.          Clinical Impression       ICD-10-CM ICD-9-CM   1. Atrial fibrillation with RVR  I48.91 427.31   2. Shortness of breath  R06.02 786.05   3. Cough  R05.9 786.2   4. Tachycardia  R00.0 785.0   5. COPD exacerbation  J44.1 491.21       Disposition:   Disposition: Placed in Observation  Condition: Fair         Ethan Morales MD  02/28/22 5903

## 2022-03-01 LAB
ANION GAP SERPL CALC-SCNC: 13 MMOL/L (ref 8–16)
AORTIC ROOT ANNULUS: 2.98 CM
ASCENDING AORTA: 2.75 CM
AV INDEX (PROSTH): 0.78
AV MEAN GRADIENT: 6 MMHG
AV PEAK GRADIENT: 8 MMHG
AV VALVE AREA: 2.31 CM2
AV VELOCITY RATIO: 0.59
BASOPHILS # BLD AUTO: 0 K/UL (ref 0–0.2)
BASOPHILS NFR BLD: 0 % (ref 0–1.9)
BSA FOR ECHO PROCEDURE: 1.63 M2
BUN SERPL-MCNC: 19 MG/DL (ref 8–23)
CALCIUM SERPL-MCNC: 9 MG/DL (ref 8.7–10.5)
CHLORIDE SERPL-SCNC: 107 MMOL/L (ref 95–110)
CO2 SERPL-SCNC: 20 MMOL/L (ref 23–29)
CREAT SERPL-MCNC: 1.1 MG/DL (ref 0.5–1.4)
CV ECHO LV RWT: 0.54 CM
DIFFERENTIAL METHOD: ABNORMAL
DOP CALC AO PEAK VEL: 1.43 M/S
DOP CALC AO VTI: 22.6 CM
DOP CALC LVOT AREA: 3 CM2
DOP CALC LVOT DIAMETER: 1.94 CM
DOP CALC LVOT PEAK VEL: 0.84 M/S
DOP CALC LVOT STROKE VOLUME: 52.29 CM3
DOP CALC RVOT PEAK VEL: 0.93 M/S
DOP CALC RVOT VTI: 20 CM
DOP CALCLVOT PEAK VEL VTI: 17.7 CM
E WAVE DECELERATION TIME: 101.1 MSEC
E/E' RATIO: 6.52 M/S
ECHO EF ESTIMATED: 68 %
ECHO LV POSTERIOR WALL: 1.12 CM (ref 0.6–1.1)
EJECTION FRACTION: 55 %
EOSINOPHIL # BLD AUTO: 0 K/UL (ref 0–0.5)
EOSINOPHIL NFR BLD: 0 % (ref 0–8)
ERYTHROCYTE [DISTWIDTH] IN BLOOD BY AUTOMATED COUNT: 17.7 % (ref 11.5–14.5)
EST. GFR  (AFRICAN AMERICAN): >60 ML/MIN/1.73 M^2
EST. GFR  (NON AFRICAN AMERICAN): 54 ML/MIN/1.73 M^2
ESTIMATED AVG GLUCOSE: 91 MG/DL (ref 68–131)
FERRITIN SERPL-MCNC: 428 NG/ML (ref 20–300)
FOLATE SERPL-MCNC: 3.5 NG/ML (ref 4–24)
FRACTIONAL SHORTENING: 37 % (ref 28–44)
GLUCOSE SERPL-MCNC: 226 MG/DL (ref 70–110)
HBA1C MFR BLD: 4.8 % (ref 4–5.6)
HCT VFR BLD AUTO: 21.9 % (ref 37–48.5)
HGB BLD-MCNC: 7.1 G/DL (ref 12–16)
IMM GRANULOCYTES # BLD AUTO: 0.08 K/UL (ref 0–0.04)
IMM GRANULOCYTES NFR BLD AUTO: 1.1 % (ref 0–0.5)
INTERVENTRICULAR SEPTUM: 1.23 CM (ref 0.6–1.1)
IRON SERPL-MCNC: 15 UG/DL (ref 30–160)
IVC DIAMETER: 1.3 CM
IVRT: 55.36 MSEC
LA MAJOR: 4.99 CM
LA MINOR: 4.48 CM
LA WIDTH: 3.1 CM
LEFT ATRIUM SIZE: 3.17 CM
LEFT ATRIUM VOLUME INDEX: 24.5 ML/M2
LEFT ATRIUM VOLUME: 39.44 CM3
LEFT INTERNAL DIMENSION IN SYSTOLE: 2.58 CM (ref 2.1–4)
LEFT VENTRICLE DIASTOLIC VOLUME INDEX: 46.53 ML/M2
LEFT VENTRICLE DIASTOLIC VOLUME: 74.92 ML
LEFT VENTRICLE MASS INDEX: 104 G/M2
LEFT VENTRICLE SYSTOLIC VOLUME INDEX: 15 ML/M2
LEFT VENTRICLE SYSTOLIC VOLUME: 24.17 ML
LEFT VENTRICULAR INTERNAL DIMENSION IN DIASTOLE: 4.12 CM (ref 3.5–6)
LEFT VENTRICULAR MASS: 167.75 G
LV LATERAL E/E' RATIO: 5.5 M/S
LV SEPTAL E/E' RATIO: 8 M/S
LVOT MG: 1.89 MMHG
LVOT MV: 0.66 CM/S
LYMPHOCYTES # BLD AUTO: 0.8 K/UL (ref 1–4.8)
LYMPHOCYTES NFR BLD: 11.3 % (ref 18–48)
MAGNESIUM SERPL-MCNC: 1.8 MG/DL (ref 1.6–2.6)
MAGNESIUM SERPL-MCNC: 1.9 MG/DL (ref 1.6–2.6)
MCH RBC QN AUTO: 28.7 PG (ref 27–31)
MCHC RBC AUTO-ENTMCNC: 32.4 G/DL (ref 32–36)
MCV RBC AUTO: 89 FL (ref 82–98)
MONOCYTES # BLD AUTO: 0.1 K/UL (ref 0.3–1)
MONOCYTES NFR BLD: 1.4 % (ref 4–15)
MV PEAK E VEL: 0.88 M/S
MV STENOSIS PRESSURE HALF TIME: 29.32 MS
MV VALVE AREA P 1/2 METHOD: 7.5 CM2
NEUTROPHILS # BLD AUTO: 6.1 K/UL (ref 1.8–7.7)
NEUTROPHILS NFR BLD: 86.2 % (ref 38–73)
NRBC BLD-RTO: 0 /100 WBC
PHOSPHATE SERPL-MCNC: 3.4 MG/DL (ref 2.7–4.5)
PISA MRMAX VEL: 4.33 M/S
PISA TR MAX VEL: 3.47 M/S
PLATELET # BLD AUTO: 317 K/UL (ref 150–450)
PMV BLD AUTO: 9.2 FL (ref 9.2–12.9)
POCT GLUCOSE: 214 MG/DL (ref 70–110)
POCT GLUCOSE: 228 MG/DL (ref 70–110)
POCT GLUCOSE: 230 MG/DL (ref 70–110)
POCT GLUCOSE: 232 MG/DL (ref 70–110)
POCT GLUCOSE: 255 MG/DL (ref 70–110)
POTASSIUM SERPL-SCNC: 4.7 MMOL/L (ref 3.5–5.1)
PROLACTIN SERPL IA-MCNC: 9.5 NG/ML (ref 5.2–26.5)
PV MEAN GRADIENT: 2.42 MMHG
RA MAJOR: 3.58 CM
RA PRESSURE: 3 MMHG
RBC # BLD AUTO: 2.47 M/UL (ref 4–5.4)
RIGHT VENTRICULAR END-DIASTOLIC DIMENSION: 2.4 CM
SATURATED IRON: 13 % (ref 20–50)
SODIUM SERPL-SCNC: 140 MMOL/L (ref 136–145)
TDI LATERAL: 0.16 M/S
TDI SEPTAL: 0.11 M/S
TDI: 0.14 M/S
TOTAL IRON BINDING CAPACITY: 118 UG/DL (ref 250–450)
TR MAX PG: 48 MMHG
TR MEAN GRADIENT: 35 MMHG
TRANSFERRIN SERPL-MCNC: 80 MG/DL (ref 200–375)
TRANSFERRIN SERPL-MCNC: 80 MG/DL (ref 200–375)
TRICUSPID ANNULAR PLANE SYSTOLIC EXCURSION: 1.5 CM
TSH SERPL DL<=0.005 MIU/L-ACNC: 0.41 UIU/ML (ref 0.4–4)
TV REST PULMONARY ARTERY PRESSURE: 51 MMHG
VIT B12 SERPL-MCNC: 1348 PG/ML (ref 210–950)
WBC # BLD AUTO: 7.07 K/UL (ref 3.9–12.7)

## 2022-03-01 PROCEDURE — 27000221 HC OXYGEN, UP TO 24 HOURS

## 2022-03-01 PROCEDURE — 25000003 PHARM REV CODE 250: Performed by: NURSE PRACTITIONER

## 2022-03-01 PROCEDURE — 84100 ASSAY OF PHOSPHORUS: CPT | Performed by: NURSE PRACTITIONER

## 2022-03-01 PROCEDURE — 82607 VITAMIN B-12: CPT | Performed by: NURSE PRACTITIONER

## 2022-03-01 PROCEDURE — 99223 1ST HOSP IP/OBS HIGH 75: CPT | Mod: ,,, | Performed by: INTERNAL MEDICINE

## 2022-03-01 PROCEDURE — 82728 ASSAY OF FERRITIN: CPT | Performed by: NURSE PRACTITIONER

## 2022-03-01 PROCEDURE — 63600175 PHARM REV CODE 636 W HCPCS: Performed by: EMERGENCY MEDICINE

## 2022-03-01 PROCEDURE — 25000242 PHARM REV CODE 250 ALT 637 W/ HCPCS: Performed by: INTERNAL MEDICINE

## 2022-03-01 PROCEDURE — 80048 BASIC METABOLIC PNL TOTAL CA: CPT | Performed by: NURSE PRACTITIONER

## 2022-03-01 PROCEDURE — 99223 PR INITIAL HOSPITAL CARE,LEVL III: ICD-10-PCS | Mod: ,,, | Performed by: INTERNAL MEDICINE

## 2022-03-01 PROCEDURE — 94761 N-INVAS EAR/PLS OXIMETRY MLT: CPT

## 2022-03-01 PROCEDURE — 63700000 PHARM REV CODE 250 ALT 637 W/O HCPCS: Performed by: INTERNAL MEDICINE

## 2022-03-01 PROCEDURE — 82746 ASSAY OF FOLIC ACID SERUM: CPT | Performed by: NURSE PRACTITIONER

## 2022-03-01 PROCEDURE — 11000001 HC ACUTE MED/SURG PRIVATE ROOM

## 2022-03-01 PROCEDURE — 84466 ASSAY OF TRANSFERRIN: CPT | Performed by: NURSE PRACTITIONER

## 2022-03-01 PROCEDURE — 36415 COLL VENOUS BLD VENIPUNCTURE: CPT | Performed by: NURSE PRACTITIONER

## 2022-03-01 PROCEDURE — 94640 AIRWAY INHALATION TREATMENT: CPT

## 2022-03-01 PROCEDURE — 96372 THER/PROPH/DIAG INJ SC/IM: CPT | Performed by: NURSE PRACTITIONER

## 2022-03-01 PROCEDURE — 21400001 HC TELEMETRY ROOM

## 2022-03-01 PROCEDURE — 93005 ELECTROCARDIOGRAM TRACING: CPT

## 2022-03-01 PROCEDURE — 93010 EKG 12-LEAD: ICD-10-PCS | Mod: ,,, | Performed by: INTERNAL MEDICINE

## 2022-03-01 PROCEDURE — 83735 ASSAY OF MAGNESIUM: CPT | Performed by: NURSE PRACTITIONER

## 2022-03-01 PROCEDURE — 25000242 PHARM REV CODE 250 ALT 637 W/ HCPCS: Performed by: EMERGENCY MEDICINE

## 2022-03-01 PROCEDURE — 99900035 HC TECH TIME PER 15 MIN (STAT)

## 2022-03-01 PROCEDURE — 63600175 PHARM REV CODE 636 W HCPCS: Performed by: NURSE PRACTITIONER

## 2022-03-01 PROCEDURE — 93010 ELECTROCARDIOGRAM REPORT: CPT | Mod: ,,, | Performed by: INTERNAL MEDICINE

## 2022-03-01 PROCEDURE — 25000003 PHARM REV CODE 250: Performed by: EMERGENCY MEDICINE

## 2022-03-01 PROCEDURE — 85025 COMPLETE CBC W/AUTO DIFF WBC: CPT | Performed by: NURSE PRACTITIONER

## 2022-03-01 PROCEDURE — C9399 UNCLASSIFIED DRUGS OR BIOLOG: HCPCS | Performed by: EMERGENCY MEDICINE

## 2022-03-01 PROCEDURE — 25000003 PHARM REV CODE 250: Performed by: INTERNAL MEDICINE

## 2022-03-01 PROCEDURE — 83036 HEMOGLOBIN GLYCOSYLATED A1C: CPT | Performed by: NURSE PRACTITIONER

## 2022-03-01 PROCEDURE — 63600175 PHARM REV CODE 636 W HCPCS: Performed by: INTERNAL MEDICINE

## 2022-03-01 RX ORDER — GLUCAGON 1 MG
1 KIT INJECTION
Status: DISCONTINUED | OUTPATIENT
Start: 2022-03-01 | End: 2022-03-03 | Stop reason: HOSPADM

## 2022-03-01 RX ORDER — HYDROCODONE BITARTRATE AND ACETAMINOPHEN 7.5; 325 MG/1; MG/1
1 TABLET ORAL EVERY 6 HOURS PRN
Status: DISCONTINUED | OUTPATIENT
Start: 2022-03-01 | End: 2022-03-03 | Stop reason: HOSPADM

## 2022-03-01 RX ORDER — INSULIN ASPART 100 [IU]/ML
0-10 INJECTION, SOLUTION INTRAVENOUS; SUBCUTANEOUS
Status: DISCONTINUED | OUTPATIENT
Start: 2022-03-01 | End: 2022-03-03 | Stop reason: HOSPADM

## 2022-03-01 RX ORDER — THIAMINE HCL 100 MG
100 TABLET ORAL DAILY
Status: DISCONTINUED | OUTPATIENT
Start: 2022-03-01 | End: 2022-03-03 | Stop reason: HOSPADM

## 2022-03-01 RX ORDER — ACETAMINOPHEN 500 MG
5000 TABLET ORAL DAILY
Status: DISCONTINUED | OUTPATIENT
Start: 2022-03-01 | End: 2022-03-03 | Stop reason: HOSPADM

## 2022-03-01 RX ORDER — CYANOCOBALAMIN 1000 UG/ML
1000 INJECTION, SOLUTION INTRAMUSCULAR; SUBCUTANEOUS DAILY
Status: COMPLETED | OUTPATIENT
Start: 2022-03-01 | End: 2022-03-03

## 2022-03-01 RX ORDER — IBUPROFEN 200 MG
16 TABLET ORAL
Status: DISCONTINUED | OUTPATIENT
Start: 2022-03-01 | End: 2022-03-03 | Stop reason: HOSPADM

## 2022-03-01 RX ORDER — IBUPROFEN 200 MG
24 TABLET ORAL
Status: DISCONTINUED | OUTPATIENT
Start: 2022-03-01 | End: 2022-03-03 | Stop reason: HOSPADM

## 2022-03-01 RX ORDER — INSULIN ASPART 100 [IU]/ML
0-5 INJECTION, SOLUTION INTRAVENOUS; SUBCUTANEOUS
Status: DISCONTINUED | OUTPATIENT
Start: 2022-03-01 | End: 2022-03-01

## 2022-03-01 RX ADMIN — LEVALBUTEROL HYDROCHLORIDE 0.63 MG: 0.63 SOLUTION RESPIRATORY (INHALATION) at 08:03

## 2022-03-01 RX ADMIN — LEVALBUTEROL HYDROCHLORIDE 0.63 MG: 0.63 SOLUTION RESPIRATORY (INHALATION) at 07:03

## 2022-03-01 RX ADMIN — BUDESONIDE 0.5 MG: 0.5 INHALANT ORAL at 07:03

## 2022-03-01 RX ADMIN — SODIUM CHLORIDE 500 ML: 0.9 INJECTION, SOLUTION INTRAVENOUS at 01:03

## 2022-03-01 RX ADMIN — HYDROCODONE BITARTRATE AND ACETAMINOPHEN 1 TABLET: 7.5; 325 TABLET ORAL at 05:03

## 2022-03-01 RX ADMIN — INSULIN ASPART 2 UNITS: 100 INJECTION, SOLUTION INTRAVENOUS; SUBCUTANEOUS at 05:03

## 2022-03-01 RX ADMIN — Medication 5000 UNITS: at 06:03

## 2022-03-01 RX ADMIN — Medication 1 TABLET: at 06:03

## 2022-03-01 RX ADMIN — INSULIN ASPART 2 UNITS: 100 INJECTION, SOLUTION INTRAVENOUS; SUBCUTANEOUS at 01:03

## 2022-03-01 RX ADMIN — CYANOCOBALAMIN 1000 MCG: 1000 INJECTION, SOLUTION INTRAMUSCULAR at 06:03

## 2022-03-01 RX ADMIN — INSULIN DETEMIR 10 UNITS: 100 INJECTION, SOLUTION SUBCUTANEOUS at 06:03

## 2022-03-01 RX ADMIN — SODIUM CHLORIDE: 0.9 INJECTION, SOLUTION INTRAVENOUS at 02:03

## 2022-03-01 RX ADMIN — LEVALBUTEROL HYDROCHLORIDE 0.63 MG: 0.63 SOLUTION RESPIRATORY (INHALATION) at 02:03

## 2022-03-01 RX ADMIN — IRON SUCROSE 200 MG: 20 INJECTION, SOLUTION INTRAVENOUS at 06:03

## 2022-03-01 RX ADMIN — THERA TABS 1 TABLET: TAB at 06:03

## 2022-03-01 RX ADMIN — RIVAROXABAN 15 MG: 15 TABLET, FILM COATED ORAL at 04:03

## 2022-03-01 RX ADMIN — INSULIN ASPART 1 UNITS: 100 INJECTION, SOLUTION INTRAVENOUS; SUBCUTANEOUS at 10:03

## 2022-03-01 RX ADMIN — AZITHROMYCIN MONOHYDRATE 500 MG: 250 TABLET ORAL at 09:03

## 2022-03-01 RX ADMIN — MONTELUKAST 10 MG: 10 TABLET, FILM COATED ORAL at 08:03

## 2022-03-01 RX ADMIN — ARFORMOTEROL TARTRATE 15 MCG: 15 SOLUTION RESPIRATORY (INHALATION) at 07:03

## 2022-03-01 RX ADMIN — ARFORMOTEROL TARTRATE 15 MCG: 15 SOLUTION RESPIRATORY (INHALATION) at 08:03

## 2022-03-01 RX ADMIN — PREDNISONE 40 MG: 20 TABLET ORAL at 09:03

## 2022-03-01 RX ADMIN — CEFTRIAXONE 1 G: 1 INJECTION, SOLUTION INTRAVENOUS at 04:03

## 2022-03-01 RX ADMIN — Medication 100 MG: at 06:03

## 2022-03-01 RX ADMIN — SODIUM CHLORIDE: 0.9 INJECTION, SOLUTION INTRAVENOUS at 05:03

## 2022-03-01 RX ADMIN — BUDESONIDE 0.5 MG: 0.5 INHALANT ORAL at 08:03

## 2022-03-01 RX ADMIN — HYDROCODONE BITARTRATE AND ACETAMINOPHEN 1 TABLET: 7.5; 325 TABLET ORAL at 11:03

## 2022-03-01 NOTE — ASSESSMENT & PLAN NOTE
-Presents with afib with RVR in setting of COPD exacerbation and worsening anemia  -D/C diltiazem drip, resume oral BB  -Monitor BP  -Continue Xarelto for CVA prophylaxis

## 2022-03-01 NOTE — H&P
[facility] Medicine  History & Physical    Patient Name: Genie Balderas  MRN: [id number]  Patient Class: OP- Observation  Admission Date: 2/28/2022  Attending Physician: Jono Westbrook MD   Primary Care Provider: Dawit Grey MD         Patient information was obtained from patient, past medical records and ER records.     Subjective:     Principal Problem:Atrial fibrillation with RVR    Chief Complaint:   Chief Complaint   Patient presents with    Cough     X 3-4 days, not able to produce any sputum. Reports severe and abd pain associated with cough         HPI: Genie Balderas is a 61 y.o. female with a PMHx of asthma, anemia of chronic disease, COPD, DM II, GERD, h/o lymphoma, HTN, HLD, continued tobacco use, and PAF on Xarelto who presented to the Emergency Department with c/o nonproductive cough x 3-4 days. No aggravating or alleviating factors. Associated SOB, palpitations, pleuritic chest pain, generalized ABD pain, and back pain. Denies wheezing, orthopnea, PND, edema, weight gain, N/V/D, HA, lightheadedness, dizziness, syncope, weakness, rhinorrhea, sore throat, congestion, fever or chills. Upon arrival to the ED, patient was mildly hypoxic with O2 sat 91% on RA. She was placed on 3 L NC to improve sat to >92%. Work-up in the ED showed: WBC 14, D-Dimer 4.97, BNP 65, troponin 0.008, lactic 1.3, COVID negative. CTA of chest showed no evidence of PE, no dissection; chronic patchy areas of bronchovascular thickening and bronchovascular infiltrates bilateral lungs with Mariah lymphatic nodules, correlate clinically for chronic process such as sarcoidosis, consider biopsy or further diagnostic workup. Patient received Duonebs x 3, 125 mg IV Solumedrol, IV azithromycin, IV Rocephin, and 2 L IV fluids. While in the ED, patient converted to AF with RVR (HR 130s-150s), remained hemodynamically stable. IV diltiazem 10 mg x 3 doses given with good HR response, and diltiazem drip initiated at 5 mg/hr.  Hospital Medicine was contacted for admission and patient placed in Observation.   Patient is a Full Code.       Past Medical History:   Diagnosis Date    Anemia of chronic disease     Anticoagulant long-term use     Asthma     COPD (chronic obstructive pulmonary disease)     Diabetes mellitus     GERD (gastroesophageal reflux disease)     Hypertension     Lymphoma     Mixed hyperlipidemia     PAF (paroxysmal atrial fibrillation)     Thrombosis of right internal jugular vein        Past Surgical History:   Procedure Laterality Date     SECTION         Review of patient's allergies indicates:  No Known Allergies    No current facility-administered medications on file prior to encounter.     Current Outpatient Medications on File Prior to Encounter   Medication Sig    budesonide-formoterol 160-4.5 mcg (SYMBICORT) 160-4.5 mcg/actuation HFAA Inhale 2 puffs into the lungs every 12 (twelve) hours. Controller    HYDROcodone-acetaminophen (NORCO) 7.5-325 mg per tablet Take 1 tablet by mouth every 4 to 6 hours as needed for Pain.    ipratropium (ATROVENT) 0.02 % nebulizer solution Take 2.5 mLs (500 mcg total) by nebulization every 6 (six) hours. Rescue    levalbuterol (XOPENEX) 0.63 mg/3 mL nebulizer solution Take 3 mLs (0.63 mg total) by nebulization every 6 (six) hours. Rescue    LORazepam (ATIVAN) 0.5 MG tablet Take 0.5 mg by mouth every evening.    metoprolol succinate (TOPROL-XL) 50 MG 24 hr tablet Take 50 mg by mouth once daily.    montelukast (SINGULAIR) 10 mg tablet Take 10 mg by mouth every evening.    rivaroxaban (XARELTO) 20 mg Tab Take 20 mg by mouth daily with dinner or evening meal.    [DISCONTINUED] budesonide (PULMICORT) 0.5 mg/2 mL nebulizer solution Take 2 mLs (0.5 mg total) by nebulization every 12 (twelve) hours. Controller    blood sugar diagnostic Strp QAC and QHS    [DISCONTINUED] blood-glucose meter kit Use as instructed    [DISCONTINUED] diltiaZEM (CARDIZEM) 90 MG  tablet Take 90 mg by mouth 4 (four) times daily.    [DISCONTINUED] HYDROcodone-acetaminophen (NORCO) 7.5-325 mg per tablet Take by mouth 4 (four) times daily as needed for Pain.    [DISCONTINUED] levoFLOXacin (LEVAQUIN) 500 MG tablet Take 1 tablet (500 mg total) by mouth once daily.    [DISCONTINUED] metformin (GLUCOPHAGE) 500 MG tablet Take 500 mg by mouth 2 (two) times daily with meals.    [DISCONTINUED] traMADol (ULTRAM) 50 mg tablet Take 1 tablet (50 mg total) by mouth every 6 (six) hours as needed.     Family History       Problem Relation (Age of Onset)    Cancer Father    Diabetes Mother    Heart disease Mother    Heart failure Mother (67)    Hypertension Mother    Lupus Sister, Sister, Daughter          Tobacco Use    Smoking status: Current Some Day Smoker     Packs/day: 0.75     Years: 45.00     Pack years: 33.75    Smokeless tobacco: Never Used   Substance and Sexual Activity    Alcohol use: No    Drug use: No    Sexual activity: Not Currently     Review of Systems   Constitutional:  Negative for chills, diaphoresis, fatigue and fever.   HENT:  Negative for congestion and sore throat.    Respiratory:  Positive for cough and shortness of breath. Negative for wheezing.    Cardiovascular:  Positive for palpitations. Negative for chest pain and leg swelling.   Gastrointestinal:  Positive for abdominal pain. Negative for abdominal distention, blood in stool, constipation, diarrhea, nausea and vomiting.   Genitourinary:  Negative for dysuria and hematuria.   Musculoskeletal:  Positive for back pain. Negative for arthralgias and myalgias.   Skin:  Negative for pallor and rash.   Neurological:  Negative for dizziness, syncope, weakness, light-headedness, numbness and headaches.   Psychiatric/Behavioral:  The patient is not nervous/anxious.    All other systems reviewed and are negative.  Objective:     Vital Signs (Most Recent):  Temp: 98.1 °F (36.7 °C) (02/28/22 2055)  Pulse: (!) 113 (02/28/22  2055)  Resp: 18 (02/28/22 2055)  BP: (!) 96/57 (02/28/22 2055)  SpO2: 96 % (02/28/22 2055)   Vital Signs (24h Range):  Temp:  [98.1 °F (36.7 °C)-98.5 °F (36.9 °C)] 98.1 °F (36.7 °C)  Pulse:  [] 113  Resp:  [13-20] 18  SpO2:  [91 %-100 %] 96 %  BP: ()/(51-70) 96/57     Weight: 52 kg (114 lb 9.6 oz)  Body mass index is 20.96 kg/m².    Physical Exam  Vitals and nursing note reviewed.   Constitutional:       General: She is awake. She is not in acute distress.     Appearance: Normal appearance. She is well-developed. She is not diaphoretic.   HENT:      Head: Normocephalic and atraumatic.   Eyes:      Conjunctiva/sclera: Conjunctivae normal.      Comments: PERRL; EOM intact.   Neck:      Vascular: No JVD.   Cardiovascular:      Rate and Rhythm: Tachycardia present. Rhythm irregularly irregular.      Heart sounds: S1 normal and S2 normal. No murmur heard.    No gallop.   Pulmonary:      Effort: Pulmonary effort is normal. No tachypnea, accessory muscle usage or respiratory distress.      Breath sounds: Normal air entry. Wheezing (scattered inspiratory and expiratory) present. No rhonchi or rales.   Abdominal:      General: Bowel sounds are normal. There is no distension.      Palpations: Abdomen is soft.      Tenderness: There is no abdominal tenderness. There is no guarding or rebound. Negative signs include Chamorro's sign.   Musculoskeletal:         General: No tenderness or deformity. Normal range of motion.      Cervical back: Normal range of motion and neck supple.      Right lower leg: No edema.      Left lower leg: No edema.   Skin:     General: Skin is warm and dry.      Capillary Refill: Capillary refill takes less than 2 seconds.      Findings: No erythema or rash.   Neurological:      General: No focal deficit present.      Mental Status: She is alert and oriented to person, place, and time.   Psychiatric:         Mood and Affect: Mood and affect normal.         Behavior: Behavior normal. Behavior  is cooperative.           Significant Labs:  Results for orders placed or performed during the hospital encounter of 02/28/22   HIV 1/2 Ag/Ab (4th Gen)   Result Value Ref Range    HIV 1/2 Ag/Ab Negative Negative   Hepatitis C Antibody   Result Value Ref Range    Hepatitis C Ab Negative Negative   HCV Virus Hold Specimen   Result Value Ref Range    HEP C Virus Hold Specimen Hold for HCV sendout    CBC Auto Differential   Result Value Ref Range    WBC 14.00 (H) 3.90 - 12.70 K/uL    RBC 2.98 (L) 4.00 - 5.40 M/uL    Hemoglobin 8.6 (L) 12.0 - 16.0 g/dL    Hematocrit 27.0 (L) 37.0 - 48.5 %    MCV 91 82 - 98 fL    MCH 28.9 27.0 - 31.0 pg    MCHC 31.9 (L) 32.0 - 36.0 g/dL    RDW 18.1 (H) 11.5 - 14.5 %    Platelets 385 150 - 450 K/uL    MPV 9.2 9.2 - 12.9 fL    Immature Granulocytes 0.9 (H) 0.0 - 0.5 %    Gran # (ANC) 11.8 (H) 1.8 - 7.7 K/uL    Immature Grans (Abs) 0.12 (H) 0.00 - 0.04 K/uL    Lymph # 1.5 1.0 - 4.8 K/uL    Mono # 0.5 0.3 - 1.0 K/uL    Eos # 0.0 0.0 - 0.5 K/uL    Baso # 0.04 0.00 - 0.20 K/uL    nRBC 0 0 /100 WBC    Gran % 84.1 (H) 38.0 - 73.0 %    Lymph % 10.9 (L) 18.0 - 48.0 %    Mono % 3.5 (L) 4.0 - 15.0 %    Eosinophil % 0.3 0.0 - 8.0 %    Basophil % 0.3 0.0 - 1.9 %    Differential Method Automated    Comprehensive Metabolic Panel   Result Value Ref Range    Sodium 138 136 - 145 mmol/L    Potassium 3.5 3.5 - 5.1 mmol/L    Chloride 102 95 - 110 mmol/L    CO2 21 (L) 23 - 29 mmol/L    Glucose 67 (L) 70 - 110 mg/dL    BUN 20 8 - 23 mg/dL    Creatinine 1.3 0.5 - 1.4 mg/dL    Calcium 9.5 8.7 - 10.5 mg/dL    Total Protein 7.4 6.0 - 8.4 g/dL    Albumin 2.5 (L) 3.5 - 5.2 g/dL    Total Bilirubin 0.4 0.1 - 1.0 mg/dL    Alkaline Phosphatase 180 (H) 55 - 135 U/L    AST 13 10 - 40 U/L    ALT <5 (L) 10 - 44 U/L    Anion Gap 15 8 - 16 mmol/L    eGFR if African American 51 (A) >60 mL/min/1.73 m^2    eGFR if non African American 44 (A) >60 mL/min/1.73 m^2   Brain Natriuretic Peptide   Result Value Ref Range    BNP 65 0 - 99  pg/mL   Troponin I   Result Value Ref Range    Troponin I 0.008 0.000 - 0.026 ng/mL   D-Dimer, Quantitative   Result Value Ref Range    D-Dimer 4.97 (H) <0.50 mg/L FEU   Lactic acid, plasma   Result Value Ref Range    Lactate (Lactic Acid) 1.3 0.5 - 2.2 mmol/L   POCT COVID-19 Rapid Screening   Result Value Ref Range    POC Rapid COVID Negative Negative     Acceptable Yes       All pertinent labs within the past 24 hours have been reviewed.    Significant Imaging:  Imaging Results              CTA Chest Non-Coronary (PE Study) (Final result)  Result time 02/28/22 17:36:38      Final result by Jae Horner MD (02/28/22 17:36:38)                   Impression:      No pulmonary embolism.  No dissection.    Chronic patchy areas of bronchovascular thickening and bronchovascular infiltrates bilateral lungs with Mariah lymphatic nodules.  Correlate clinically for chronic process such as sarcoidosis.  Consider biopsy or further diagnostic workup.    All CT scans   are performed using dose optimization techniques including the following: automated exposure control; adjustment of the mA and/or kV; use of iterative reconstruction technique.  Dose modulation was employed for ALARA by means of: Automated exposure control; adjustment of the mA and/or kV according to patient size (this includes techniques or standardized protocols for targeted exams where dose is matched to indication/reason for exam; i.e. extremities or head); and/or use of iterative reconstructive technique.      Electronically signed by: Evens Rowe  Date:    02/28/2022  Time:    17:36               Narrative:    EXAMINATION:  CTA CHEST NON CORONARY    CLINICAL HISTORY:  Pulmonary embolism (PE) suspected, positive D-dimer;    TECHNIQUE:  Low dose axial images, sagittal and coronal reformations were obtained from the thoracic inlet to the lung bases following the IV administration of 100 mL of Omnipaque 350.  Contrast timing was optimized to  evaluate the pulmonary arteries.  MIP images were performed.    COMPARISON:  Prior 2017    FINDINGS:  No evidence for pulmonary embolism.  No evidence for aortic dissection or aneurysm.  Bronchovascular opacities in the right middle lobe bilateral lung bases with irregular margins.  Subcentimeter Mariah lymphatic nodule in the right middle lobe.  Bronchovascular opacities in the bilateral upper lobes and superior segment of left lower lobe.  Mild pre vascular anterior mediastinal lymph nodes.  Irregular nodules left lung apex.  Emphysema in the upper lung zones.                                       X-Ray Chest 1 View (Final result)  Result time 02/28/22 12:27:00      Final result by Jerry Paulson MD (02/28/22 12:27:00)                   Impression:      Findings concerning for developing pneumonia or aspiration sequela.  Atypical/viral etiology pneumonia should be considered.  Recommend continued follow-up.      Electronically signed by: Jerry Paulson  Date:    02/28/2022  Time:    12:27               Narrative:    EXAMINATION:  XR CHEST 1 VIEW    CLINICAL HISTORY:  shortness of breath;    TECHNIQUE:  Single frontal view of the chest was performed.    COMPARISON:  Chest radiograph 10/07/2019    FINDINGS:  Subtle patchy opacities are scattered throughout the lungs with focal reticulonodular/consolidative opacification in the right lower lung.  No effusion or pneumothorax.  Unchanged appearance of the cardiomediastinal silhouette.  No free air under the diaphragm.  No acute osseous abnormality.                                     I have reviewed all pertinent imaging results/findings within the past 24 hours.      Results for orders placed or performed during the hospital encounter of 02/28/22   EKG 12-lead    Collection Time: 02/28/22 11:35 AM    Narrative    Test Reason : R06.02,    Vent. Rate : 101 BPM     Atrial Rate : 101 BPM     P-R Int : 138 ms          QRS Dur : 082 ms      QT Int : 334 ms       P-R-T  Axes : 080 030 062 degrees     QTc Int : 433 ms    Sinus tachycardia  Otherwise normal ECG  When compared with ECG of 08-OCT-2019 05:42,  No significant change was found    Referred By: NELLY   SELF           Confirmed By:              Assessment/Plan:     * Atrial fibrillation with RVR  - Rate controlled on admission. Remains hemodynamically stable. Monitor telemetry.   - Continue diltiazem drip at 5 mg/hr.  - Continue home BB.  - Continue Xarelto for AC.  - Cardiology consult.     COPD exacerbation  - Place in observation on COPD pathway.  - O2 sat stable on 3L NC, wean as tolerated.  - Continue bronchodilators.  - Steroids.  - Empiric antibiotics.     Anemia of chronic disease  - H&H stable on admission. Follow CBC and transfuse as needed.     Type 2 diabetes mellitus with circulatory disorder, without long-term current use of insulin  - Diet controlled at home.  - Accuchecks with low dose SSI if needed.  - Diabetic diet.     Primary hypertension  - BP soft, but stable since starting diltiazem drip. Continue home BB as BP allows.      VTE Risk Mitigation (From admission, onward)         Ordered     rivaroxaban tablet 15 mg  With dinner         02/28/22 2137     Place sequential compression device  Until discontinued         02/28/22 2137     IP VTE LOW RISK PATIENT  Once         02/28/22 2137                   Palak Zhu NP  Department of Hospital Medicine   [facility] - Med S[facility]

## 2022-03-01 NOTE — PLAN OF CARE
Cardizem gtt continued at 5mg/hr for A.Fib.     Problem: Adult Inpatient Plan of Care  Goal: Plan of Care Review  3/1/2022 0147 by Obi Scott RN  Outcome: Ongoing, Progressing  3/1/2022 0146 by Obi Scott RN  Outcome: Ongoing, Progressing  Goal: Patient-Specific Goal (Individualized)  3/1/2022 0147 by Obi Scott RN  Outcome: Ongoing, Progressing  3/1/2022 0146 by Obi Scott RN  Outcome: Ongoing, Progressing  Goal: Absence of Hospital-Acquired Illness or Injury  3/1/2022 0147 by Obi Scott RN  Outcome: Ongoing, Progressing  3/1/2022 0146 by Obi Scott RN  Outcome: Ongoing, Progressing  Goal: Optimal Comfort and Wellbeing  3/1/2022 0147 by Obi Scott RN  Outcome: Ongoing, Progressing  3/1/2022 0146 by Obi Scott RN  Outcome: Ongoing, Progressing  Goal: Readiness for Transition of Care  3/1/2022 0147 by Obi Scott RN  Outcome: Ongoing, Progressing  3/1/2022 0146 by Obi Scott RN  Outcome: Ongoing, Progressing     Problem: Diabetes Comorbidity  Goal: Blood Glucose Level Within Targeted Range  3/1/2022 0147 by Obi Scott RN  Outcome: Ongoing, Progressing  3/1/2022 0146 by Obi Scott RN  Outcome: Ongoing, Progressing     Problem: Infection  Goal: Absence of Infection Signs and Symptoms  3/1/2022 0147 by bOi Scott RN  Outcome: Ongoing, Progressing  3/1/2022 0146 by Obi Scott RN  Outcome: Ongoing, Progressing     Problem: Adjustment to Illness COPD (Chronic Obstructive Pulmonary Disease)  Goal: Optimal Chronic Illness Coping  3/1/2022 0147 by Obi Scott RN  Outcome: Ongoing, Progressing  3/1/2022 0146 by Obi Scott RN  Outcome: Ongoing, Progressing     Problem: Functional Ability Impaired COPD (Chronic Obstructive Pulmonary Disease)  Goal: Optimal Level of Functional [place]  3/1/2022 0147 by Obi Scott, ERICKA  Outcome: Ongoing, Progressing  3/1/2022 0146 by Obi Scott RN  Outcome: Ongoing, Progressing     Problem: Infection COPD  (Chronic Obstructive Pulmonary Disease)  Goal: Absence of Infection Signs and Symptoms  3/1/2022 0147 by Obi Scott RN  Outcome: Ongoing, Progressing  3/1/2022 0146 by Obi Scott RN  Outcome: Ongoing, Progressing     Problem: Oral Intake Inadequate COPD (Chronic Obstructive Pulmonary Disease)  Goal: Improved Nutrition Intake  3/1/2022 0147 by Obi Scott RN  Outcome: Ongoing, Progressing  3/1/2022 0146 by Obi Scott RN  Outcome: Ongoing, Progressing     Problem: Respiratory Compromise COPD (Chronic Obstructive Pulmonary Disease)  Goal: Effective Oxygenation and Ventilation  3/1/2022 0147 by Obi Scott RN  Outcome: Ongoing, Progressing  3/1/2022 0146 by Obi Scott RN  Outcome: Ongoing, Progressing

## 2022-03-01 NOTE — ASSESSMENT & PLAN NOTE
- Diet controlled at home.  - Accuchecks with low dose SSI if needed.  - Diabetic diet.     BS rising- may need Levemir plus SSI

## 2022-03-01 NOTE — HPI
Ms. Oseguera is a 61 year old female patient whose current medical conditions include asthma, anemia of chronic diease, COPD, DM type II, GERD, history of lymphoma, HTN, hyperlipidemia, tobacco abuse, and PAF (on Xarelto) who presented to [facility] ED yesterday with a chief complaint of worsening cough over the past 3-4 days. Associated symptoms included SOB, palpitations, pleuritic chest discomfort, abdominal pain, decreased appetite, weight loss, and back pain. She denied any associated wheezing, orthopnea, PND, fever, palpitations, near syncope, or syncope. Initial workup in ED revealed leukocytosis (WBC 14,000), mild hypoxia (O2 sat 91% on RA), and +d-dimer. CTA of chest negative for PE but did reveal chronic patchy areas of bronchovascular thickening and bronchovascular infiltrates with lisa lymphatic nodules. While in ED, patient converted to afib with RVR (HR in 150's) and she was subsequently started on a cardizem drip and admitted for further evaluation and treatment. Cardiology consulted to assist with management. Patient seen and examined today, resting in bed. Feels ok. Complains of increased fatigue and weakness. States she was too tired to ambulate to the bathroom this AM. SOB slightly improved. No brendan chest pain. She denies any prior history of CAD or MI. States she is not followed by any cardiologist as OP, but does report compliance with Xarelto. Chart reviewed. Troponin x 1 negative. H/H dipped to 7.1/21.9. Echo pending.

## 2022-03-01 NOTE — PROGRESS NOTES
[facility] Medicine  Progress Note    Patient Name: Genie Balderas  MRN: [id number]  Patient Class: IP- Inpatient   Admission Date: 2/28/2022  Length of Stay: 0 days  Attending Physician: Jono Westbrook MD  Primary Care Provider: Dawit Grey MD        Subjective:     Principal Problem:Atrial fibrillation with RVR        HPI:  Genie Balderas is a 61 y.o. female with a PMHx of asthma, anemia of chronic disease, COPD, DM II, GERD, h/o lymphoma, HTN, HLD, continued tobacco use, and PAF on Xarelto who presented to the Emergency Department with c/o nonproductive cough x 3-4 days. No aggravating or alleviating factors. Associated SOB, palpitations, pleuritic chest pain, generalized ABD pain, and back pain. Denies wheezing, orthopnea, PND, edema, weight gain, N/V/D, HA, lightheadedness, dizziness, syncope, weakness, rhinorrhea, sore throat, congestion, fever or chills. Upon arrival to the ED, patient was mildly hypoxic with O2 sat 91% on RA. She was placed on 3 L NC to improve sat to >92%. Work-up in the ED showed: WBC 14, D-Dimer 4.97, BNP 65, troponin 0.008, lactic 1.3, COVID negative. CTA of chest showed no evidence of PE, no dissection; chronic patchy areas of bronchovascular thickening and bronchovascular infiltrates bilateral lungs with Mariah lymphatic nodules, correlate clinically for chronic process such as sarcoidosis, consider biopsy or further diagnostic workup. Patient received Duonebs x 3, 125 mg IV Solumedrol, IV azithromycin, IV Rocephin, and 2 L IV fluids. While in the ED, patient converted to AF with RVR (HR 130s-150s), remained hemodynamically stable. IV diltiazem 10 mg x 3 doses given with good HR response, and diltiazem drip initiated at 5 mg/hr. Hospital Medicine was contacted for admission and patient placed in Observation.   Patient is a Full Code.       Overview/Hospital Course:  61 y.o. female with asthma, anemia of chronic disease, COPD, DM II, GERD, h/o lymphoma, HTN, HLD,  continued tobacco use, and PAF on Xarelto who presented to the Emergency Department with c/o nonproductive cough x 3-4 days. Associated SOB, palpitations, pleuritic chest pain, generalized ABD pain, and back pain. In the ER, patient was mildly hypoxic with O2 sat 91% on RA. She was placed on 3 L NC to improve sat to >92%. WBC 14, D-Dimer 4.97, BNP 65, troponin 0.008, lactic 1.3, COVID negative. CTA of chest showed no evidence of PE, no dissection; chronic patchy areas of bronchovascular thickening and bronchovascular infiltrates bilateral lungs with Mariah lymphatic nodules, correlate clinically for chronic process such as sarcoidosis, consider biopsy or further diagnostic workup. Patient received Duonebs x 3, 125 mg IV Solumedrol, IV azithromycin, IV Rocephin, and 2 L IV fluids. While in the ED, patient converted to AF with RVR (HR 130s-150s), remained hemodynamically stable. IV diltiazem 10 mg x 3 doses given with good HR response, and diltiazem drip initiated at 5 mg/hr.    3/1- looks a little better, remains in Afib but rate controlled at 85/ min. C/o back, rib pain from the pneumonia she had earlier, got Norco. Off Diltiazem gtt. Has severe GARIMA- getting IV Venofer plus B12 and triple Vitamins. Encouraged to get OOB and ambulate.       Interval History: looks a little better, remains in Afib but rate controlled at 85/ min. C/o back, rib pain from the pneumonia she had earlier, got Norco. Off Diltiazem gtt. Has severe GARIMA- getting IV Venofer plus B12 and triple Vitamins. Encouraged to get OOB and ambulate.     Review of Systems   Constitutional:  Positive for activity change and fatigue. Negative for chills, diaphoresis and fever.   HENT:  Negative for congestion and sore throat.    Respiratory:  Positive for cough and shortness of breath. Negative for wheezing.    Cardiovascular:  Positive for palpitations. Negative for chest pain and leg swelling.   Gastrointestinal:  Negative for abdominal distention, abdominal  pain, blood in stool, constipation, diarrhea, nausea and vomiting.   Genitourinary:  Negative for dysuria and hematuria.   Musculoskeletal:  Positive for back pain. Negative for arthralgias and myalgias.   Skin:  Negative for pallor and rash.   Neurological:  Negative for dizziness, syncope, weakness, light-headedness, numbness and headaches.   Psychiatric/Behavioral:  The patient is not nervous/anxious.    All other systems reviewed and are negative.  Objective:     Vital Signs (Most Recent):  Temp: 98.3 °F (36.8 °C) (03/01/22 1520)  Pulse: 83 (03/01/22 1603)  Resp: 18 (03/01/22 1727)  BP: (!) 102/56 (03/01/22 1603)  SpO2: 99 % (03/01/22 1520)   Vital Signs (24h Range):  Temp:  [97.6 °F (36.4 °C)-98.5 °F (36.9 °C)] 98.3 °F (36.8 °C)  Pulse:  [] 83  Resp:  [13-20] 18  SpO2:  [91 %-99 %] 99 %  BP: ()/(50-70) 102/56     Weight: 60.8 kg (134 lb)  Body mass index is 24.51 kg/m².    Intake/Output Summary (Last 24 hours) at 3/1/2022 1747  Last data filed at 3/1/2022 1719  Gross per 24 hour   Intake 2892.61 ml   Output --   Net 2892.61 ml      Physical Exam  Vitals and nursing note reviewed.   Constitutional:       General: She is awake. She is not in acute distress.     Appearance: Normal appearance. She is well-developed. She is not diaphoretic.   HENT:      Head: Normocephalic and atraumatic.   Eyes:      Conjunctiva/sclera: Conjunctivae normal.      Comments: PERRL; EOM intact.   Neck:      Vascular: No JVD.   Cardiovascular:      Rate and Rhythm: Tachycardia present. Rhythm irregularly irregular.      Heart sounds: S1 normal and S2 normal. No murmur heard.    No gallop.   Pulmonary:      Effort: Pulmonary effort is normal. No tachypnea, accessory muscle usage or respiratory distress.      Breath sounds: Normal air entry. Wheezing (scattered inspiratory and expiratory) present. No rhonchi or rales.   Abdominal:      General: Bowel sounds are normal. There is no distension.      Palpations: Abdomen is soft.       Tenderness: There is no abdominal tenderness. There is no guarding or rebound. Negative signs include Chamorro's sign.   Musculoskeletal:         General: No tenderness or deformity. Normal range of motion.      Cervical back: Normal range of motion and neck supple.      Right lower leg: No edema.      Left lower leg: No edema.   Skin:     General: Skin is warm and dry.      Capillary Refill: Capillary refill takes less than 2 seconds.      Findings: No erythema or rash.   Neurological:      General: No focal deficit present.      Mental Status: She is alert and oriented to person, place, and time.   Psychiatric:         Mood and Affect: Mood and affect normal.         Behavior: Behavior normal. Behavior is cooperative.     Flow (L/min): 2  Oxygen Concentration (%):  [24-28] 28  Temp:  [97.6 °F (36.4 °C)-98.5 °F (36.9 °C)] 98.3 °F (36.8 °C)  Pulse:  [] 83  Resp:  [13-20] 18  SpO2:  [91 %-99 %] 99 %  BP: ()/(50-70) 102/56      Lab Results   Component Value Date    [id number] Negative 02/28/2022      Recent Labs   Lab 02/28/22  1200 02/28/22  1322 02/28/22  2351 03/01/22  0453   FERRITIN  --   --   --  428*   LACTATE  --  1.3  --   --    TROPONINI 0.008  --   --   --    DDIMER 4.97*  --   --   --      --   --  140   WBC 14.00*  --   --  7.07   GRAN 84.1*  11.8*  --   --  86.2*  6.1   LYMPH 10.9*  1.5  --   --  11.3*  0.8*   HGB 8.6*  --   --  7.1*   HCT 27.0*  --   --  21.9*   BUN 20  --   --  19   CREATININE 1.3  --   --  1.1   ESTGFRAFRICA 51*  --   --  >60   EGFRNONAA 44*  --   --  54*   K 3.5  --   --  4.7     --   --  107   CO2 21*  --   --  20*   PHOS  --   --   --  3.4   MG  --   --  1.8 1.9     Microbiology Results (last 7 days)       Procedure Component Value Units Date/Time    Blood culture #1 **CANNOT BE ORDERED STAT** [[id number]] Collected: 02/28/22 1328    Order Status: Completed Specimen: Blood from Peripheral, Antecubital, Right Updated: 03/01/22 0945     Blood Culture,  "Routine No Growth to date    Blood culture #2 **CANNOT BE ORDERED STAT** [[id number]] Collected: 02/28/22 1329    Order Status: Completed Specimen: Blood from Peripheral, Antecubital, Left Updated: 03/01/22 0945     Blood Culture, Routine No Growth to date          Antibiotics (From admission, onward)                Start     Stop Route Frequency Ordered    03/01/22 1600  cefTRIAXone (ROCEPHIN) 1 g/50 mL D5W IVPB  (CEFTRIAXONE IV/ AZITHROMYCIN PO PANEL)        "And" Linked Group Details    03/06 1559 IV Every 24 hours (non-standard times) 02/28/22 2137 03/01/22 0900  azithromycin tablet 500 mg  (CEFTRIAXONE IV/ AZITHROMYCIN PO PANEL)        "And" Linked Group Details    03/04 0859 Oral Daily 02/28/22 2137          Anticoagulants       Ordered     Route Frequency Start Stop    02/28/22 2137  rivaroxaban         Oral With dinner 03/01/22 1700 --          Echo  · The left ventricle is normal in size with concentric hypertrophy and   normal systolic function.  · The estimated ejection fraction is 55%.  · Indeterminate left ventricular diastolic function.  · Normal right ventricular size with low normal right ventricular systolic   function.  · Moderate tricuspid regurgitation.  · Mild mitral regurgitation.  · Normal central venous pressure (3 mmHg).  · The estimated PA systolic pressure is 51 mmHg.  · There is pulmonary hypertension.      Significant Labs: All pertinent labs within the past 24 hours have been reviewed.    Significant Imaging: I have reviewed all pertinent imaging results/findings within the past 24 hours.      Assessment/Plan:      * Atrial fibrillation with RVR  - Rate controlled on admission. Remains hemodynamically stable. Monitor telemetry.   - Continue diltiazem drip at 5 mg/hr.  - Continue home BB.  - Continue Xarelto for AC.  - Cardiology consult.     Better, rate controlled, still in afib, off cardizem gtt    COPD exacerbation  - Place in observation on COPD pathway.  - O2 sat stable on 3L NC, " wean as tolerated.  - Continue bronchodilators.  - Steroids.  - Empiric antibiotics.     Getting better    Anemia of chronic disease  - H&H stable on admission. Follow CBC and transfuse as needed.     Severe GARIMA- start iron, may need blood    Type 2 diabetes mellitus with circulatory disorder, without long-term current use of insulin  - Diet controlled at home.  - Accuchecks with low dose SSI if needed.  - Diabetic diet.     BS rising- may need Levemir plus SSI    Primary hypertension  - BP soft, but stable since starting diltiazem drip. Continue home BB as BP allows.      VTE Risk Mitigation (From admission, onward)         Ordered     rivaroxaban tablet 15 mg  With dinner         02/28/22 2137     Place sequential compression device  Until discontinued         02/28/22 2137     IP VTE LOW RISK PATIENT  Once         02/28/22 2137                Discharge Planning   LANI:      Code Status: Full Code   Is the patient medically ready for discharge?:     Reason for patient still in hospital (select all that apply): Patient trending condition, Laboratory test, Treatment, Imaging and Consult recommendations  Discharge Plan A: Home Health        Jono Westbrook MD  Department of Hospital Medicine   [facility] - Med Surg

## 2022-03-01 NOTE — HPI
Genie Balderas is a 61 y.o. female with a PMHx of asthma, anemia of chronic disease, COPD, DM II, GERD, h/o lymphoma, HTN, HLD, continued tobacco use, and PAF on Xarelto who presented to the Emergency Department with c/o nonproductive cough x 3-4 days. No aggravating or alleviating factors. Associated SOB, palpitations, pleuritic chest pain, generalized ABD pain, and back pain. Denies wheezing, orthopnea, PND, edema, weight gain, N/V/D, HA, lightheadedness, dizziness, syncope, weakness, rhinorrhea, sore throat, congestion, fever or chills. Upon arrival to the ED, patient was mildly hypoxic with O2 sat 91% on RA. She was placed on 3 L NC to improve sat to >92%. Work-up in the ED showed: WBC 14, D-Dimer 4.97, BNP 65, troponin 0.008, lactic 1.3, COVID negative. CTA of chest showed no evidence of PE, no dissection; chronic patchy areas of bronchovascular thickening and bronchovascular infiltrates bilateral lungs with Mariah lymphatic nodules, correlate clinically for chronic process such as sarcoidosis, consider biopsy or further diagnostic workup. Patient received Duonebs x 3, 125 mg IV Solumedrol, IV azithromycin, IV Rocephin, and 2 L IV fluids. While in the ED, patient converted to AF with RVR (HR 130s-150s), remained hemodynamically stable. IV diltiazem 10 mg x 3 doses given with good HR response, and diltiazem drip initiated at 5 mg/hr. Hospital Medicine was contacted for admission and patient placed in Observation.   Patient is a Full Code.

## 2022-03-01 NOTE — CONSULTS
[facility]  Cardiology  Consult Note    Patient Name: Genie Balderas  MRN: [id number]  Admission Date: 2/28/2022  Hospital Length of Stay: 0 days  Code Status: Full Code   Attending Provider: Jono Westbrook MD   Consulting Provider: Deborah Macdonald PA-C  Primary Care Physician: Dawit Grey MD  Principal Problem:Atrial fibrillation with RVR    Patient information was obtained from patient, past medical records and ER records.     Inpatient consult to Cardiology  Consult performed by: Deborah Macdonald PA-C  Consult ordered by: Palak Zhu NP        Subjective:     Chief Complaint: SOB/fatigue    HPI:   Ms. Oseguera is a 61 year old female patient whose current medical conditions include asthma, anemia of chronic diease, COPD, DM type II, GERD, history of lymphoma, HTN, hyperlipidemia, tobacco abuse, and PAF (on Xarelto) who presented to [facility] ED yesterday with a chief complaint of worsening cough over the past 3-4 days. Associated symptoms included SOB, palpitations, pleuritic chest discomfort, abdominal pain, decreased appetite, weight loss, and back pain. She denied any associated wheezing, orthopnea, PND, fever, palpitations, near syncope, or syncope. Initial workup in ED revealed leukocytosis (WBC 14,000), mild hypoxia (O2 sat 91% on RA), and +d-dimer. CTA of chest negative for PE but did reveal chronic patchy areas of bronchovascular thickening and bronchovascular infiltrates with lisa lymphatic nodules. While in ED, patient converted to afib with RVR (HR in 150's) and she was subsequently started on a cardizem drip and admitted for further evaluation and treatment. Cardiology consulted to assist with management. Patient seen and examined today, resting in bed. Feels ok. Complains of increased fatigue and weakness. States she was too tired to ambulate to the bathroom this AM. SOB slightly improved. No brendan chest pain. She denies any prior history of CAD or MI. States she is not followed by  any cardiologist as OP, but does report compliance with Xarelto. Chart reviewed. Troponin x 1 negative. H/H dipped to 7.1/21.9. Echo pending.           Past Medical History:   Diagnosis Date    Anemia of chronic disease     Anticoagulant long-term use     Asthma     COPD (chronic obstructive pulmonary disease)     Diabetes mellitus     GERD (gastroesophageal reflux disease)     Hypertension     Lymphoma     Mixed hyperlipidemia     PAF (paroxysmal atrial fibrillation)     Thrombosis of right internal jugular vein        Past Surgical History:   Procedure Laterality Date     SECTION         Review of patient's allergies indicates:  No Known Allergies    No current facility-administered medications on file prior to encounter.     Current Outpatient Medications on File Prior to Encounter   Medication Sig    budesonide-formoterol 160-4.5 mcg (SYMBICORT) 160-4.5 mcg/actuation HFAA Inhale 2 puffs into the lungs every 12 (twelve) hours. Controller    HYDROcodone-acetaminophen (NORCO) 7.5-325 mg per tablet Take 1 tablet by mouth every 4 to 6 hours as needed for Pain.    ipratropium (ATROVENT) 0.02 % nebulizer solution Take 2.5 mLs (500 mcg total) by nebulization every 6 (six) hours. Rescue    levalbuterol (XOPENEX) 0.63 mg/3 mL nebulizer solution Take 3 mLs (0.63 mg total) by nebulization every 6 (six) hours. Rescue    LORazepam (ATIVAN) 0.5 MG tablet Take 0.5 mg by mouth every evening.    metoprolol succinate (TOPROL-XL) 50 MG 24 hr tablet Take 50 mg by mouth once daily.    montelukast (SINGULAIR) 10 mg tablet Take 10 mg by mouth every evening.    rivaroxaban (XARELTO) 20 mg Tab Take 20 mg by mouth daily with dinner or evening meal.    blood sugar diagnostic [facility] and [facility]     Family History       Problem Relation (Age of Onset)    Cancer Father    Diabetes Mother    Heart disease Mother    Heart failure Mother (67)    Hypertension Mother    Lupus Sister, Sister, Daughter          Tobacco Use     Smoking status: Current Some Day Smoker     Packs/day: 0.75     Years: 45.00     Pack years: 33.75    Smokeless tobacco: Never Used   Substance and Sexual Activity    Alcohol use: No    Drug use: No    Sexual activity: Not Currently     Review of Systems   Constitutional: Positive for decreased appetite, malaise/fatigue and weight loss.   HENT: Negative.     Eyes: Negative.    Cardiovascular:  Positive for chest pain (pleuritic) and dyspnea on exertion.   Respiratory:  Positive for cough and shortness of breath.    Hematologic/Lymphatic: Negative.    Skin: Negative.    Musculoskeletal:  Positive for arthritis, back pain and joint pain.   Gastrointestinal:  Positive for abdominal pain.   Genitourinary: Negative.    Neurological: Negative.    Psychiatric/Behavioral: Negative.     Allergic/Immunologic: Negative.    Objective:     Vital Signs (Most Recent):  Temp: 97.9 °F (36.6 °C) (03/01/22 1141)  Pulse: 97 (03/01/22 1141)  Resp: 16 (03/01/22 1141)  BP: (!) 91/54 (03/01/22 1141)  SpO2: 99 % (03/01/22 1141)   Vital Signs (24h Range):  Temp:  [97.6 °F (36.4 °C)-98.5 °F (36.9 °C)] 97.9 °F (36.6 °C)  Pulse:  [] 97  Resp:  [13-20] 16  SpO2:  [91 %-99 %] 99 %  BP: ()/(50-70) 91/54     Weight: 60.8 kg (134 lb)  Body mass index is 24.51 kg/m².    SpO2: 99 %  O2 Device (Oxygen Therapy): nasal cannula      Intake/Output Summary (Last 24 hours) at 3/1/2022 1224  Last data filed at 3/1/2022 0529  Gross per 24 hour   Intake 2993.51 ml   Output --   Net 2993.51 ml       Lines/Drains/Airways       Peripheral Intravenous Line  Duration                  Peripheral IV - Single Lumen 02/28/22 1633 18 G Right Antecubital <1 day         Peripheral IV - Single Lumen 03/01/22 0100 20 G Anterior;Left;Lateral Forearm <1 day         Peripheral IV - Single Lumen 03/01/22 0100 22 G Anterior;Left;Medial Forearm <1 day                    Physical Exam  Vitals and nursing note reviewed.   Constitutional:       General: She is not  in acute distress.     Appearance: She is well-developed. She is ill-appearing. She is not diaphoretic.      Comments: On supplemental O2   HENT:      Head: Normocephalic and atraumatic.   Eyes:      General:         Right eye: No discharge.         Left eye: No discharge.      Pupils: Pupils are equal, round, and reactive to light.   Neck:      Thyroid: No thyromegaly.      Vascular: No JVD.      Trachea: No tracheal deviation.   Cardiovascular:      Rate and Rhythm: Normal rate. Rhythm irregularly irregular.      Heart sounds: Normal heart sounds, S1 normal and S2 normal. No murmur heard.     Comments: Afib with HR in 90's during exam  Pulmonary:      Effort: Pulmonary effort is normal. No respiratory distress.      Breath sounds: Normal breath sounds. No wheezing or rales.   Abdominal:      General: There is no distension.      Tenderness: There is no rebound.   Musculoskeletal:      Cervical back: Neck supple.      Right lower leg: No edema.      Left lower leg: No edema.   Skin:     General: Skin is warm and dry.      Findings: No erythema.   Neurological:      General: No focal deficit present.      Mental Status: She is alert and oriented to person, place, and time.   Psychiatric:         Mood and Affect: Mood normal.         Behavior: Behavior normal.         Thought Content: Thought content normal.       Significant Labs: CMP   Recent Labs   Lab 02/28/22  1200 03/01/22  0453    140   K 3.5 4.7    107   CO2 21* 20*   GLU 67* 226*   BUN 20 19   CREATININE 1.3 1.1   CALCIUM 9.5 9.0   PROT 7.4  --    ALBUMIN 2.5*  --    BILITOT 0.4  --    ALKPHOS 180*  --    AST 13  --    ALT <5*  --    ANIONGAP 15 13   ESTGFRAFRICA 51* >60   EGFRNONAA 44* 54*   , CBC   Recent Labs   Lab 02/28/22  1200 03/01/22  0453   WBC 14.00* 7.07   HGB 8.6* 7.1*   HCT 27.0* 21.9*    317   , Troponin   Recent Labs   Lab 02/28/22  1200   TROPONINI 0.008   , and All pertinent lab results from the last 24 hours have been  reviewed.    Significant Imaging: Echocardiogram: Transthoracic echo (TTE) complete (Cupid Only):   Results for orders placed or performed during the hospital encounter of 02/28/22   Echo   Result Value Ref Range    BSA 1.63 m2    TDI SEPTAL 0.11 m/s    LV LATERAL E/E' RATIO 5.50 m/s    LV SEPTAL E/E' RATIO 8.00 m/s    LA WIDTH 3.10 cm    IVC diameter 1.3 cm    Left Ventricular Outflow Tract Mean Velocity 0.4607442256 cm/s    Left Ventricular Outflow Tract Mean Gradient 1.89 mmHg    TV mean gradient 35 mmHg    TDI LATERAL 0.16 m/s    LVIDd 4.12 3.5 - 6.0 cm    IVS 1.23 (A) 0.6 - 1.1 cm    Posterior Wall 1.12 (A) 0.6 - 1.1 cm    Ao root annulus 2.98 cm    LVIDs 2.58 2.1 - 4.0 cm    FS 37 28 - 44 %    LA volume 39.44 cm3    Ascending aorta 2.75 cm    LV mass 167.75 g    LA size 3.17 cm    RVDD 2.40 cm    TAPSE 1.50 cm    Left Ventricle Relative Wall Thickness 0.54 cm    AV mean gradient 6 mmHg    AV valve area 2.31 cm2    AV Velocity Ratio 0.59     AV index (prosthetic) 0.78     MV valve area p 1/2 method 7.50 cm2    Mean e' 0.14 m/s    E wave deceleration time 101.778787338579268 msec    IVRT 55.913211689518772 msec    LVOT diameter 1.94 cm    LVOT area 3.0 cm2    LVOT peak wing 0.84 m/s    LVOT peak VTI 17.70 cm    Ao peak wing 1.43 m/s    Ao VTI 22.6 cm    RVOT peak wing 0.93 m/s    RVOT peak VTI 20.0 cm    Mr max wing 4.33 m/s    LVOT stroke volume 52.29 cm3    AV peak gradient 8 mmHg    PV mean gradient 2.42 mmHg    E/E' ratio 6.52 m/s    MV Peak E Wing 0.88 m/s    TR Max Wing 3.47 m/s    MV stenosis pressure 1/2 time 29.5323846 ms    LV Systolic Volume 24.17 mL    LV Systolic Volume Index 15.0 mL/m2    LV Diastolic Volume 74.92 mL    LV Diastolic Volume Index 46.53 mL/m2    LA Volume Index 24.5 mL/m2    LV Mass Index 104 g/m2    Echo EF Estimated 68 %    RA Major Axis 3.58 cm    Left Atrium Minor Axis 4.48 cm    Left Atrium Major Axis 4.99 cm    Triscuspid Valve Regurgitation Peak Gradient 48 mmHg   , EKG: Reviewed,  and X-Ray: CXR: X-Ray Chest 1 View (CXR):   Results for orders placed or performed during the hospital encounter of 02/28/22   X-Ray Chest 1 View    Narrative    EXAMINATION:  XR CHEST 1 VIEW    CLINICAL HISTORY:  shortness of breath;    TECHNIQUE:  Single frontal view of the chest was performed.    COMPARISON:  Chest radiograph 10/07/2019    FINDINGS:  Subtle patchy opacities are scattered throughout the lungs with focal reticulonodular/consolidative opacification in the right lower lung.  No effusion or pneumothorax.  Unchanged appearance of the cardiomediastinal silhouette.  No free air under the diaphragm.  No acute osseous abnormality.      Impression    Findings concerning for developing pneumonia or aspiration sequela.  Atypical/viral etiology pneumonia should be considered.  Recommend continued follow-up.      Electronically signed by: Jerry Paulson  Date:    02/28/2022  Time:    12:27    and X-Ray Chest PA and Lateral (CXR): No results found for this visit on 02/28/22.    Assessment and Plan:   Patient who presents with PAF in setting of COPD exacerbation and worsening anemia. D/C caridzem drip, resume oral BB. Monitor BP trend. Recommend transfusion. Monitor H/H closely on AC, and d/c if any active bleeding. Check echo.    * Atrial fibrillation with RVR  -Presents with afib with RVR in setting of COPD exacerbation and worsening anemia  -D/C diltiazem drip, resume oral BB  -Monitor BP  -Continue Xarelto for CVA prophylaxis     Anemia of chronic disease  -H/H dipped to 7.1/21.9  -Recommend transfusion which will assist with hypotension/PAF  -Defer to primary team  -Monitor closely on AC    COPD exacerbation  -Mgmt as per hospital medicine    Type 2 diabetes mellitus with circulatory disorder, without long-term current use of insulin  -Mgmt as per primary team    Primary hypertension  -BP low  -Monitor on BB therapy        VTE Risk Mitigation (From admission, onward)         Ordered     rivaroxaban tablet 15  mg  With dinner         02/28/22 2137     Place sequential compression device  Until discontinued         02/28/22 2137     IP VTE LOW RISK PATIENT  Once         02/28/22 2137                Thank you for your consult. I will follow-up with patient. Please contact us if you have any additional questions.    Deborah Macdonald PA-C  Cardiology   [facility]

## 2022-03-01 NOTE — SUBJECTIVE & OBJECTIVE
Interval History: looks a little better, remains in Afib but rate controlled at 85/ min. C/o back, rib pain from the pneumonia she had earlier, got Norco. Off Diltiazem gtt. Has severe GARIMA- getting IV Venofer plus B12 and triple Vitamins. Encouraged to get OOB and ambulate.     Review of Systems   Constitutional:  Positive for activity change and fatigue. Negative for chills, diaphoresis and fever.   HENT:  Negative for congestion and sore throat.    Respiratory:  Positive for cough and shortness of breath. Negative for wheezing.    Cardiovascular:  Positive for palpitations. Negative for chest pain and leg swelling.   Gastrointestinal:  Negative for abdominal distention, abdominal pain, blood in stool, constipation, diarrhea, nausea and vomiting.   Genitourinary:  Negative for dysuria and hematuria.   Musculoskeletal:  Positive for back pain. Negative for arthralgias and myalgias.   Skin:  Negative for pallor and rash.   Neurological:  Negative for dizziness, syncope, weakness, light-headedness, numbness and headaches.   Psychiatric/Behavioral:  The patient is not nervous/anxious.    All other systems reviewed and are negative.  Objective:     Vital Signs (Most Recent):  Temp: 98.3 °F (36.8 °C) (03/01/22 1520)  Pulse: 83 (03/01/22 1603)  Resp: 18 (03/01/22 1727)  BP: (!) 102/56 (03/01/22 1603)  SpO2: 99 % (03/01/22 1520)   Vital Signs (24h Range):  Temp:  [97.6 °F (36.4 °C)-98.5 °F (36.9 °C)] 98.3 °F (36.8 °C)  Pulse:  [] 83  Resp:  [13-20] 18  SpO2:  [91 %-99 %] 99 %  BP: ()/(50-70) 102/56     Weight: 60.8 kg (134 lb)  Body mass index is 24.51 kg/m².    Intake/Output Summary (Last 24 hours) at 3/1/2022 1747  Last data filed at 3/1/2022 1719  Gross per 24 hour   Intake 2892.61 ml   Output --   Net 2892.61 ml      Physical Exam  Vitals and nursing note reviewed.   Constitutional:       General: She is awake. She is not in acute distress.     Appearance: Normal appearance. She is well-developed. She is  not diaphoretic.   HENT:      Head: Normocephalic and atraumatic.   Eyes:      Conjunctiva/sclera: Conjunctivae normal.      Comments: PERRL; EOM intact.   Neck:      Vascular: No JVD.   Cardiovascular:      Rate and Rhythm: Tachycardia present. Rhythm irregularly irregular.      Heart sounds: S1 normal and S2 normal. No murmur heard.    No gallop.   Pulmonary:      Effort: Pulmonary effort is normal. No tachypnea, accessory muscle usage or respiratory distress.      Breath sounds: Normal air entry. Wheezing (scattered inspiratory and expiratory) present. No rhonchi or rales.   Abdominal:      General: Bowel sounds are normal. There is no distension.      Palpations: Abdomen is soft.      Tenderness: There is no abdominal tenderness. There is no guarding or rebound. Negative signs include Chamorro's sign.   Musculoskeletal:         General: No tenderness or deformity. Normal range of motion.      Cervical back: Normal range of motion and neck supple.      Right lower leg: No edema.      Left lower leg: No edema.   Skin:     General: Skin is warm and dry.      Capillary Refill: Capillary refill takes less than 2 seconds.      Findings: No erythema or rash.   Neurological:      General: No focal deficit present.      Mental Status: She is alert and oriented to person, place, and time.   Psychiatric:         Mood and Affect: Mood and affect normal.         Behavior: Behavior normal. Behavior is cooperative.     Flow (L/min): 2  Oxygen Concentration (%):  [24-28] 28  Temp:  [97.6 °F (36.4 °C)-98.5 °F (36.9 °C)] 98.3 °F (36.8 °C)  Pulse:  [] 83  Resp:  [13-20] 18  SpO2:  [91 %-99 %] 99 %  BP: ()/(50-70) 102/56      Lab Results   Component Value Date    [id number] Negative 02/28/2022      Recent Labs   Lab 02/28/22  1200 02/28/22  1322 02/28/22  2351 03/01/22  0453   FERRITIN  --   --   --  428*   LACTATE  --  1.3  --   --    TROPONINI 0.008  --   --   --    DDIMER 4.97*  --   --   --      --   --  140  "  WBC 14.00*  --   --  7.07   GRAN 84.1*  11.8*  --   --  86.2*  6.1   LYMPH 10.9*  1.5  --   --  11.3*  0.8*   HGB 8.6*  --   --  7.1*   HCT 27.0*  --   --  21.9*   BUN 20  --   --  19   CREATININE 1.3  --   --  1.1   ESTGFRAFRICA 51*  --   --  >60   EGFRNONAA 44*  --   --  54*   K 3.5  --   --  4.7     --   --  107   CO2 21*  --   --  20*   PHOS  --   --   --  3.4   MG  --   --  1.8 1.9     Microbiology Results (last 7 days)       Procedure Component Value Units Date/Time    Blood culture #1 **CANNOT BE ORDERED STAT** [[id number]] Collected: 02/28/22 1328    Order Status: Completed Specimen: Blood from Peripheral, Antecubital, Right Updated: 03/01/22 0945     Blood Culture, Routine No Growth to date    Blood culture #2 **CANNOT BE ORDERED STAT** [[id number]] Collected: 02/28/22 1329    Order Status: Completed Specimen: Blood from Peripheral, Antecubital, Left Updated: 03/01/22 0945     Blood Culture, Routine No Growth to date          Antibiotics (From admission, onward)                Start     Stop Route Frequency Ordered    03/01/22 1600  cefTRIAXone (ROCEPHIN) 1 g/50 mL D5W IVPB  (CEFTRIAXONE IV/ AZITHROMYCIN PO PANEL)        "And" Linked Group Details    03/06 1559 IV Every 24 hours (non-standard times) 02/28/22 2137 03/01/22 0900  azithromycin tablet 500 mg  (CEFTRIAXONE IV/ AZITHROMYCIN PO PANEL)        "And" Linked Group Details    03/04 0859 Oral Daily 02/28/22 2137          Anticoagulants       Ordered     Route Frequency Start Stop    02/28/22 2137  rivaroxaban         Oral With dinner 03/01/22 1700 --          Echo  · The left ventricle is normal in size with concentric hypertrophy and   normal systolic function.  · The estimated ejection fraction is 55%.  · Indeterminate left ventricular diastolic function.  · Normal right ventricular size with low normal right ventricular systolic   function.  · Moderate tricuspid regurgitation.  · Mild mitral regurgitation.  · Normal central venous " pressure (3 mmHg).  · The estimated PA systolic pressure is 51 mmHg.  · There is pulmonary hypertension.      Significant Labs: All pertinent labs within the past 24 hours have been reviewed.    Significant Imaging: I have reviewed all pertinent imaging results/findings within the past 24 hours.

## 2022-03-01 NOTE — SUBJECTIVE & OBJECTIVE
Past Medical History:   Diagnosis Date    Anemia of chronic disease     Anticoagulant long-term use     Asthma     COPD (chronic obstructive pulmonary disease)     Diabetes mellitus     GERD (gastroesophageal reflux disease)     Hypertension     Lymphoma     Mixed hyperlipidemia     PAF (paroxysmal atrial fibrillation)     Thrombosis of right internal jugular vein        Past Surgical History:   Procedure Laterality Date     SECTION         Review of patient's allergies indicates:  No Known Allergies    No current facility-administered medications on file prior to encounter.     Current Outpatient Medications on File Prior to Encounter   Medication Sig    budesonide-formoterol 160-4.5 mcg (SYMBICORT) 160-4.5 mcg/actuation HFAA Inhale 2 puffs into the lungs every 12 (twelve) hours. Controller    HYDROcodone-acetaminophen (NORCO) 7.5-325 mg per tablet Take 1 tablet by mouth every 4 to 6 hours as needed for Pain.    ipratropium (ATROVENT) 0.02 % nebulizer solution Take 2.5 mLs (500 mcg total) by nebulization every 6 (six) hours. Rescue    levalbuterol (XOPENEX) 0.63 mg/3 mL nebulizer solution Take 3 mLs (0.63 mg total) by nebulization every 6 (six) hours. Rescue    LORazepam (ATIVAN) 0.5 MG tablet Take 0.5 mg by mouth every evening.    metoprolol succinate (TOPROL-XL) 50 MG 24 hr tablet Take 50 mg by mouth once daily.    montelukast (SINGULAIR) 10 mg tablet Take 10 mg by mouth every evening.    rivaroxaban (XARELTO) 20 mg Tab Take 20 mg by mouth daily with dinner or evening meal.    blood sugar diagnostic Strp QAC and QHS     Family History       Problem Relation (Age of Onset)    Cancer Father    Diabetes Mother    Heart disease Mother    Heart failure Mother (67)    Hypertension Mother    Lupus Sister, Sister, Daughter          Tobacco Use    Smoking status: Current Some Day Smoker     Packs/day: 0.75     Years: 45.00     Pack years: 33.75    Smokeless tobacco: Never Used   Substance and Sexual Activity     Alcohol use: No    Drug use: No    Sexual activity: Not Currently     Review of Systems   Constitutional: Positive for decreased appetite, malaise/fatigue and weight loss.   HENT: Negative.     Eyes: Negative.    Cardiovascular:  Positive for chest pain (pleuritic) and dyspnea on exertion.   Respiratory:  Positive for cough and shortness of breath.    Hematologic/Lymphatic: Negative.    Skin: Negative.    Musculoskeletal:  Positive for arthritis, back pain and joint pain.   Gastrointestinal:  Positive for abdominal pain.   Genitourinary: Negative.    Neurological: Negative.    Psychiatric/Behavioral: Negative.     Allergic/Immunologic: Negative.    Objective:     Vital Signs (Most Recent):  Temp: 97.9 °F (36.6 °C) (03/01/22 1141)  Pulse: 97 (03/01/22 1141)  Resp: 16 (03/01/22 1141)  BP: (!) 91/54 (03/01/22 1141)  SpO2: 99 % (03/01/22 1141)   Vital Signs (24h Range):  Temp:  [97.6 °F (36.4 °C)-98.5 °F (36.9 °C)] 97.9 °F (36.6 °C)  Pulse:  [] 97  Resp:  [13-20] 16  SpO2:  [91 %-99 %] 99 %  BP: ()/(50-70) 91/54     Weight: 60.8 kg (134 lb)  Body mass index is 24.51 kg/m².    SpO2: 99 %  O2 Device (Oxygen Therapy): nasal cannula      Intake/Output Summary (Last 24 hours) at 3/1/2022 1224  Last data filed at 3/1/2022 0529  Gross per 24 hour   Intake 2993.51 ml   Output --   Net 2993.51 ml       Lines/Drains/Airways       Peripheral Intravenous Line  Duration                  Peripheral IV - Single Lumen 02/28/22 1633 18 G Right Antecubital <1 day         Peripheral IV - Single Lumen 03/01/22 0100 20 G Anterior;Left;Lateral Forearm <1 day         Peripheral IV - Single Lumen 03/01/22 0100 22 G Anterior;Left;Medial Forearm <1 day                    Physical Exam  Vitals and nursing note reviewed.   Constitutional:       General: She is not in acute distress.     Appearance: She is well-developed. She is ill-appearing. She is not diaphoretic.      Comments: On supplemental O2   HENT:      Head: Normocephalic and  atraumatic.   Eyes:      General:         Right eye: No discharge.         Left eye: No discharge.      Pupils: Pupils are equal, round, and reactive to light.   Neck:      Thyroid: No thyromegaly.      Vascular: No JVD.      Trachea: No tracheal deviation.   Cardiovascular:      Rate and Rhythm: Normal rate. Rhythm irregularly irregular.      Heart sounds: Normal heart sounds, S1 normal and S2 normal. No murmur heard.     Comments: Afib with HR in 90's during exam  Pulmonary:      Effort: Pulmonary effort is normal. No respiratory distress.      Breath sounds: Normal breath sounds. No wheezing or rales.   Abdominal:      General: There is no distension.      Tenderness: There is no rebound.   Musculoskeletal:      Cervical back: Neck supple.      Right lower leg: No edema.      Left lower leg: No edema.   Skin:     General: Skin is warm and dry.      Findings: No erythema.   Neurological:      General: No focal deficit present.      Mental Status: She is alert and oriented to person, place, and time.   Psychiatric:         Mood and Affect: Mood normal.         Behavior: Behavior normal.         Thought Content: Thought content normal.       Significant Labs: CMP   Recent Labs   Lab 02/28/22  1200 03/01/22  0453    140   K 3.5 4.7    107   CO2 21* 20*   GLU 67* 226*   BUN 20 19   CREATININE 1.3 1.1   CALCIUM 9.5 9.0   PROT 7.4  --    ALBUMIN 2.5*  --    BILITOT 0.4  --    ALKPHOS 180*  --    AST 13  --    ALT <5*  --    ANIONGAP 15 13   ESTGFRAFRICA 51* >60   EGFRNONAA 44* 54*   , CBC   Recent Labs   Lab 02/28/22  1200 03/01/22  0453   WBC 14.00* 7.07   HGB 8.6* 7.1*   HCT 27.0* 21.9*    317   , Troponin   Recent Labs   Lab 02/28/22  1200   TROPONINI 0.008   , and All pertinent lab results from the last 24 hours have been reviewed.    Significant Imaging: Echocardiogram: Transthoracic echo (TTE) complete ([facility] Only):   Results for orders placed or performed during the hospital encounter of  02/28/22   Echo   Result Value Ref Range    BSA 1.63 m2    TDI SEPTAL 0.11 m/s    LV LATERAL E/E' RATIO 5.50 m/s    LV SEPTAL E/E' RATIO 8.00 m/s    LA WIDTH 3.10 cm    IVC diameter 1.3 cm    Left Ventricular Outflow Tract Mean Velocity 0.9577875530 cm/s    Left Ventricular Outflow Tract Mean Gradient 1.89 mmHg    TV mean gradient 35 mmHg    TDI LATERAL 0.16 m/s    LVIDd 4.12 3.5 - 6.0 cm    IVS 1.23 (A) 0.6 - 1.1 cm    Posterior Wall 1.12 (A) 0.6 - 1.1 cm    Ao root annulus 2.98 cm    LVIDs 2.58 2.1 - 4.0 cm    FS 37 28 - 44 %    LA volume 39.44 cm3    Ascending aorta 2.75 cm    LV mass 167.75 g    LA size 3.17 cm    RVDD 2.40 cm    TAPSE 1.50 cm    Left Ventricle Relative Wall Thickness 0.54 cm    AV mean gradient 6 mmHg    AV valve area 2.31 cm2    AV Velocity Ratio 0.59     AV index (prosthetic) 0.78     MV valve area p 1/2 method 7.50 cm2    Mean e' 0.14 m/s    E wave deceleration time 101.272116553726654 msec    IVRT 55.665705811456880 msec    LVOT diameter 1.94 cm    LVOT area 3.0 cm2    LVOT peak wing 0.84 m/s    LVOT peak VTI 17.70 cm    Ao peak wing 1.43 m/s    Ao VTI 22.6 cm    RVOT peak wing 0.93 m/s    RVOT peak VTI 20.0 cm    Mr max wing 4.33 m/s    LVOT stroke volume 52.29 cm3    AV peak gradient 8 mmHg    PV mean gradient 2.42 mmHg    E/E' ratio 6.52 m/s    MV Peak E Wing 0.88 m/s    TR Max Wing 3.47 m/s    MV stenosis pressure 1/2 time 29.0716297 ms    LV Systolic Volume 24.17 mL    LV Systolic Volume Index 15.0 mL/m2    LV Diastolic Volume 74.92 mL    LV Diastolic Volume Index 46.53 mL/m2    LA Volume Index 24.5 mL/m2    LV Mass Index 104 g/m2    Echo EF Estimated 68 %    RA Major Axis 3.58 cm    Left Atrium Minor Axis 4.48 cm    Left Atrium Major Axis 4.99 cm    Triscuspid Valve Regurgitation Peak Gradient 48 mmHg   , EKG: Reviewed, and X-Ray: CXR: X-Ray Chest 1 View (CXR):   Results for orders placed or performed during the hospital encounter of 02/28/22   X-Ray Chest 1 View    Narrative     EXAMINATION:  XR CHEST 1 VIEW    CLINICAL HISTORY:  shortness of breath;    TECHNIQUE:  Single frontal view of the chest was performed.    COMPARISON:  Chest radiograph 10/07/2019    FINDINGS:  Subtle patchy opacities are scattered throughout the lungs with focal reticulonodular/consolidative opacification in the right lower lung.  No effusion or pneumothorax.  Unchanged appearance of the cardiomediastinal silhouette.  No free air under the diaphragm.  No acute osseous abnormality.      Impression    Findings concerning for developing pneumonia or aspiration sequela.  Atypical/viral etiology pneumonia should be considered.  Recommend continued follow-up.      Electronically signed by: Jerry Paulson  Date:    02/28/2022  Time:    12:27    and X-Ray Chest PA and Lateral (CXR): No results found for this visit on 02/28/22.

## 2022-03-01 NOTE — HOSPITAL COURSE
61 y.o. female with asthma, anemia of chronic disease, COPD, DM II, GERD, h/o lymphoma, HTN, HLD, continued tobacco use, and PAF on Xarelto who presented to the Emergency Department with c/o nonproductive cough x 3-4 days. Associated SOB, palpitations, pleuritic chest pain, generalized ABD pain, and back pain. In the ER, patient was mildly hypoxic with O2 sat 91% on RA. She was placed on 3 L NC to improve sat to >92%. WBC 14, D-Dimer 4.97, BNP 65, troponin 0.008, lactic 1.3, COVID negative. CTA of chest showed no evidence of PE, no dissection; chronic patchy areas of bronchovascular thickening and bronchovascular infiltrates bilateral lungs with Mariah lymphatic nodules, correlate clinically for chronic process such as sarcoidosis, consider biopsy or further diagnostic workup. Patient received Duonebs x 3, 125 mg IV Solumedrol, IV azithromycin, IV Rocephin, and 2 L IV fluids. While in the ED, patient converted to AF with RVR (HR 130s-150s), remained hemodynamically stable. IV diltiazem 10 mg x 3 doses given with good HR response, and diltiazem drip initiated at 5 mg/hr.    3/1- looks a little better, remains in Afib but rate controlled at 85/ min. C/o back, rib pain from the pneumonia she had earlier, got Norco. Off Diltiazem gtt. Has severe GARIMA- getting IV Venofer plus B12 and triple Vitamins. Encouraged to get OOB and ambulate.     3/2- resting in bed, feels a little better, back/rib pain improved, remains in afib with . H/h 7.4/22- will transfuse 2 units of blood and give her Triple Vitamins plus vit D and venofer. Encouraged to do IS and Acapela. Encouraged to sit in chair and ambulate.     Patient improved with Steroids, O2, Nebs, therapies. Patient transfused PRBC's with good effect. Hgb 11.1 at d/c. Patient evaluated for home O2 need and qualified will go home on O2 2 LPM - 95%. Additionally the patient has been referred to H/H amd NP at home care. Patient stable for a safe discharge to home.

## 2022-03-01 NOTE — PLAN OF CARE
[facility] - Med Surg  Initial Discharge Assessment       Primary Care Provider: Dawit Grey MD    Admission Diagnosis: Shortness of breath [R06.02]  Cough [R05.9]  Tachycardia [R00.0]  COPD exacerbation [J44.1]  Atrial fibrillation with RVR [I48.91]    Admission Date: 2/28/2022  Expected Discharge Date:     Discharge Barriers Identified: None    Payor: MEDICAID / Plan: [place] [employer] / Product Type: Managed Medicaid /     Extended Emergency Contact Information  Primary Emergency Contact: Amy Echeverria   [place]  Mobile Phone: [phone]  Relation: Daughter    Discharge Plan A: Home Health  Discharge Plan B: Home with family      [facility] [place] - [place], [place] - [address] [place] [address]  [address] [address]  [address]  [place] [address]  Phone: [phone] Fax: [phone]      Initial Assessment (most recent)     Adult Discharge Assessment - 03/01/22 1053        Discharge Assessment    Assessment Type Discharge Planning Assessment     Confirmed/corrected address, phone number and insurance Yes     Confirmed Demographics Correct on Facesheet     Source of Information patient     Communicated LANI with patient/caregiver Date not available/Unable to determine     Lives With spouse     Do you expect to return to your current living situation? Yes     Do you have help at home or someone to help you manage your care at home? Yes     Prior to hospitilization cognitive status: Alert/Oriented     Current cognitive status: Alert/Oriented     Walking or Climbing Stairs Difficulty none     Dressing/Bathing Difficulty none     Home Layout Able to live on 1st floor     Equipment Currently Used at Home oxygen;nebulizer     Readmission within 30 days? No     Patient currently being followed by outpatient case management? No     Do you currently have service(s) that help you manage your care at home? No     Do you take prescription medications? Yes     Do you have prescription  coverage? Yes     Do you have any problems affording any of your prescribed medications? No     Is the patient taking medications as prescribed? yes     Who is going to help you get home at discharge? Family     How do you get to doctors appointments? family or friend will provide     Are you on dialysis? No     Do you take coumadin? No     Discharge Plan A Home Health     Discharge Plan B Home with family     DME Needed Upon Discharge  oxygen;walker, rolling     Discharge Plan discussed with: Patient;Adult children     Discharge Barriers Identified None               Met with pt and her daughter at bedside for DC assessment. Pt lives at home with her  and uses home O2 and a nebulizer. Pt requested a portable O2 concentrator and RW. Pt may also benefit from HH at DC. SWer provided a transitional care folder, information on advanced directives, information on pharmacy bedside delivery, and discharge planning begins on admission with contact information for any needs/questions.      Nicholas Figueroa LMSW 3/1/2022 11:01 AM

## 2022-03-01 NOTE — ASSESSMENT & PLAN NOTE
- Place in observation on COPD pathway.  - O2 sat stable on 3L NC, wean as tolerated.  - Continue bronchodilators.  - Steroids.  - Empiric antibiotics.     Getting better

## 2022-03-01 NOTE — SUBJECTIVE & OBJECTIVE
Past Medical History:   Diagnosis Date    Anemia of chronic disease     Anticoagulant long-term use     Asthma     COPD (chronic obstructive pulmonary disease)     Diabetes mellitus     GERD (gastroesophageal reflux disease)     Hypertension     Lymphoma     Mixed hyperlipidemia     PAF (paroxysmal atrial fibrillation)     Thrombosis of right internal jugular vein        Past Surgical History:   Procedure Laterality Date     SECTION         Review of patient's allergies indicates:  No Known Allergies    No current facility-administered medications on file prior to encounter.     Current Outpatient Medications on File Prior to Encounter   Medication Sig    budesonide-formoterol 160-4.5 mcg (SYMBICORT) 160-4.5 mcg/actuation HFAA Inhale 2 puffs into the lungs every 12 (twelve) hours. Controller    HYDROcodone-acetaminophen (NORCO) 7.5-325 mg per tablet Take 1 tablet by mouth every 4 to 6 hours as needed for Pain.    ipratropium (ATROVENT) 0.02 % nebulizer solution Take 2.5 mLs (500 mcg total) by nebulization every 6 (six) hours. Rescue    levalbuterol (XOPENEX) 0.63 mg/3 mL nebulizer solution Take 3 mLs (0.63 mg total) by nebulization every 6 (six) hours. Rescue    LORazepam (ATIVAN) 0.5 MG tablet Take 0.5 mg by mouth every evening.    metoprolol succinate (TOPROL-XL) 50 MG 24 hr tablet Take 50 mg by mouth once daily.    montelukast (SINGULAIR) 10 mg tablet Take 10 mg by mouth every evening.    rivaroxaban (XARELTO) 20 mg Tab Take 20 mg by mouth daily with dinner or evening meal.    [DISCONTINUED] budesonide (PULMICORT) 0.5 mg/2 mL nebulizer solution Take 2 mLs (0.5 mg total) by nebulization every 12 (twelve) hours. Controller    blood sugar diagnostic Strp QAC and QHS    [DISCONTINUED] blood-glucose meter kit Use as instructed    [DISCONTINUED] diltiaZEM (CARDIZEM) 90 MG tablet Take 90 mg by mouth 4 (four) times daily.    [DISCONTINUED] HYDROcodone-acetaminophen (NORCO) 7.5-325 mg per tablet Take by mouth 4  (four) times daily as needed for Pain.    [DISCONTINUED] levoFLOXacin (LEVAQUIN) 500 MG tablet Take 1 tablet (500 mg total) by mouth once daily.    [DISCONTINUED] metformin (GLUCOPHAGE) 500 MG tablet Take 500 mg by mouth 2 (two) times daily with meals.    [DISCONTINUED] traMADol (ULTRAM) 50 mg tablet Take 1 tablet (50 mg total) by mouth every 6 (six) hours as needed.     Family History       Problem Relation (Age of Onset)    Cancer Father    Diabetes Mother    Heart disease Mother    Heart failure Mother (67)    Hypertension Mother    Lupus Sister, Sister, Daughter          Tobacco Use    Smoking status: Current Some Day Smoker     Packs/day: 0.75     Years: 45.00     Pack years: 33.75    Smokeless tobacco: Never Used   Substance and Sexual Activity    Alcohol use: No    Drug use: No    Sexual activity: Not Currently     Review of Systems   Constitutional:  Negative for chills, diaphoresis, fatigue and fever.   HENT:  Negative for congestion and sore throat.    Respiratory:  Positive for cough and shortness of breath. Negative for wheezing.    Cardiovascular:  Positive for palpitations. Negative for chest pain and leg swelling.   Gastrointestinal:  Positive for abdominal pain. Negative for abdominal distention, blood in stool, constipation, diarrhea, nausea and vomiting.   Genitourinary:  Negative for dysuria and hematuria.   Musculoskeletal:  Positive for back pain. Negative for arthralgias and myalgias.   Skin:  Negative for pallor and rash.   Neurological:  Negative for dizziness, syncope, weakness, light-headedness, numbness and headaches.   Psychiatric/Behavioral:  The patient is not nervous/anxious.    All other systems reviewed and are negative.  Objective:     Vital Signs (Most Recent):  Temp: 98.1 °F (36.7 °C) (02/28/22 2055)  Pulse: (!) 113 (02/28/22 2055)  Resp: 18 (02/28/22 2055)  BP: (!) 96/57 (02/28/22 2055)  SpO2: 96 % (02/28/22 2055)   Vital Signs (24h Range):  Temp:  [98.1 °F (36.7 °C)-98.5 °F  (36.9 °C)] 98.1 °F (36.7 °C)  Pulse:  [] 113  Resp:  [13-20] 18  SpO2:  [91 %-100 %] 96 %  BP: ()/(51-70) 96/57     Weight: 52 kg (114 lb 9.6 oz)  Body mass index is 20.96 kg/m².    Physical Exam  Vitals and nursing note reviewed.   Constitutional:       General: She is awake. She is not in acute distress.     Appearance: Normal appearance. She is well-developed. She is not diaphoretic.   HENT:      Head: Normocephalic and atraumatic.   Eyes:      Conjunctiva/sclera: Conjunctivae normal.      Comments: PERRL; EOM intact.   Neck:      Vascular: No JVD.   Cardiovascular:      Rate and Rhythm: Tachycardia present. Rhythm irregularly irregular.      Heart sounds: S1 normal and S2 normal. No murmur heard.    No gallop.   Pulmonary:      Effort: Pulmonary effort is normal. No tachypnea, accessory muscle usage or respiratory distress.      Breath sounds: Normal air entry. Wheezing (scattered inspiratory and expiratory) present. No rhonchi or rales.   Abdominal:      General: Bowel sounds are normal. There is no distension.      Palpations: Abdomen is soft.      Tenderness: There is no abdominal tenderness. There is no guarding or rebound. Negative signs include Chamorro's sign.   Musculoskeletal:         General: No tenderness or deformity. Normal range of motion.      Cervical back: Normal range of motion and neck supple.      Right lower leg: No edema.      Left lower leg: No edema.   Skin:     General: Skin is warm and dry.      Capillary Refill: Capillary refill takes less than 2 seconds.      Findings: No erythema or rash.   Neurological:      General: No focal deficit present.      Mental Status: She is alert and oriented to person, place, and time.   Psychiatric:         Mood and Affect: Mood and affect normal.         Behavior: Behavior normal. Behavior is cooperative.           Significant Labs:  Results for orders placed or performed during the hospital encounter of 02/28/22   HIV 1/2 Ag/Ab (4th Gen)    Result Value Ref Range    HIV 1/2 Ag/Ab Negative Negative   Hepatitis C Antibody   Result Value Ref Range    Hepatitis C Ab Negative Negative   HCV Virus Hold Specimen   Result Value Ref Range    HEP C Virus Hold Specimen Hold for HCV sendout    CBC Auto Differential   Result Value Ref Range    WBC 14.00 (H) 3.90 - 12.70 K/uL    RBC 2.98 (L) 4.00 - 5.40 M/uL    Hemoglobin 8.6 (L) 12.0 - 16.0 g/dL    Hematocrit 27.0 (L) 37.0 - 48.5 %    MCV 91 82 - 98 fL    MCH 28.9 27.0 - 31.0 pg    MCHC 31.9 (L) 32.0 - 36.0 g/dL    RDW 18.1 (H) 11.5 - 14.5 %    Platelets 385 150 - 450 K/uL    MPV 9.2 9.2 - 12.9 fL    Immature Granulocytes 0.9 (H) 0.0 - 0.5 %    Gran # (ANC) 11.8 (H) 1.8 - 7.7 K/uL    Immature Grans (Abs) 0.12 (H) 0.00 - 0.04 K/uL    Lymph # 1.5 1.0 - 4.8 K/uL    Mono # 0.5 0.3 - 1.0 K/uL    Eos # 0.0 0.0 - 0.5 K/uL    Baso # 0.04 0.00 - 0.20 K/uL    nRBC 0 0 /100 WBC    Gran % 84.1 (H) 38.0 - 73.0 %    Lymph % 10.9 (L) 18.0 - 48.0 %    Mono % 3.5 (L) 4.0 - 15.0 %    Eosinophil % 0.3 0.0 - 8.0 %    Basophil % 0.3 0.0 - 1.9 %    Differential Method Automated    Comprehensive Metabolic Panel   Result Value Ref Range    Sodium 138 136 - 145 mmol/L    Potassium 3.5 3.5 - 5.1 mmol/L    Chloride 102 95 - 110 mmol/L    CO2 21 (L) 23 - 29 mmol/L    Glucose 67 (L) 70 - 110 mg/dL    BUN 20 8 - 23 mg/dL    Creatinine 1.3 0.5 - 1.4 mg/dL    Calcium 9.5 8.7 - 10.5 mg/dL    Total Protein 7.4 6.0 - 8.4 g/dL    Albumin 2.5 (L) 3.5 - 5.2 g/dL    Total Bilirubin 0.4 0.1 - 1.0 mg/dL    Alkaline Phosphatase 180 (H) 55 - 135 U/L    AST 13 10 - 40 U/L    ALT <5 (L) 10 - 44 U/L    Anion Gap 15 8 - 16 mmol/L    eGFR if African American 51 (A) >60 mL/min/1.73 m^2    eGFR if non African American 44 (A) >60 mL/min/1.73 m^2   Brain Natriuretic Peptide   Result Value Ref Range    BNP 65 0 - 99 pg/mL   Troponin I   Result Value Ref Range    Troponin I 0.008 0.000 - 0.026 ng/mL   D-Dimer, Quantitative   Result Value Ref Range    D-Dimer 4.97  (H) <0.50 mg/L FEU   Lactic acid, plasma   Result Value Ref Range    Lactate (Lactic Acid) 1.3 0.5 - 2.2 mmol/L   POCT COVID-19 Rapid Screening   Result Value Ref Range    POC Rapid COVID Negative Negative     Acceptable Yes       All pertinent labs within the past 24 hours have been reviewed.    Significant Imaging:  Imaging Results              CTA Chest Non-Coronary (PE Study) (Final result)  Result time 02/28/22 17:36:38      Final result by Jae Horner MD (02/28/22 17:36:38)                   Impression:      No pulmonary embolism.  No dissection.    Chronic patchy areas of bronchovascular thickening and bronchovascular infiltrates bilateral lungs with Mariah lymphatic nodules.  Correlate clinically for chronic process such as sarcoidosis.  Consider biopsy or further diagnostic workup.    All CT scans   are performed using dose optimization techniques including the following: automated exposure control; adjustment of the mA and/or kV; use of iterative reconstruction technique.  Dose modulation was employed for ALARA by means of: Automated exposure control; adjustment of the mA and/or kV according to patient size (this includes techniques or standardized protocols for targeted exams where dose is matched to indication/reason for exam; i.e. extremities or head); and/or use of iterative reconstructive technique.      Electronically signed by: Evens Rowe  Date:    02/28/2022  Time:    17:36               Narrative:    EXAMINATION:  CTA CHEST NON CORONARY    CLINICAL HISTORY:  Pulmonary embolism (PE) suspected, positive D-dimer;    TECHNIQUE:  Low dose axial images, sagittal and coronal reformations were obtained from the thoracic inlet to the lung bases following the IV administration of 100 mL of Omnipaque 350.  Contrast timing was optimized to evaluate the pulmonary arteries.  MIP images were performed.    COMPARISON:  Prior 2017    FINDINGS:  No evidence for pulmonary embolism.  No evidence  for aortic dissection or aneurysm.  Bronchovascular opacities in the right middle lobe bilateral lung bases with irregular margins.  Subcentimeter Mariah lymphatic nodule in the right middle lobe.  Bronchovascular opacities in the bilateral upper lobes and superior segment of left lower lobe.  Mild pre vascular anterior mediastinal lymph nodes.  Irregular nodules left lung apex.  Emphysema in the upper lung zones.                                       X-Ray Chest 1 View (Final result)  Result time 02/28/22 12:27:00      Final result by Jerry Paulson MD (02/28/22 12:27:00)                   Impression:      Findings concerning for developing pneumonia or aspiration sequela.  Atypical/viral etiology pneumonia should be considered.  Recommend continued follow-up.      Electronically signed by: Jerry Paulson  Date:    02/28/2022  Time:    12:27               Narrative:    EXAMINATION:  XR CHEST 1 VIEW    CLINICAL HISTORY:  shortness of breath;    TECHNIQUE:  Single frontal view of the chest was performed.    COMPARISON:  Chest radiograph 10/07/2019    FINDINGS:  Subtle patchy opacities are scattered throughout the lungs with focal reticulonodular/consolidative opacification in the right lower lung.  No effusion or pneumothorax.  Unchanged appearance of the cardiomediastinal silhouette.  No free air under the diaphragm.  No acute osseous abnormality.                                     I have reviewed all pertinent imaging results/findings within the past 24 hours.      Results for orders placed or performed during the hospital encounter of 02/28/22   EKG 12-lead    Collection Time: 02/28/22 11:35 AM    Narrative    Test Reason : R06.02,    Vent. Rate : 101 BPM     Atrial Rate : 101 BPM     P-R Int : 138 ms          QRS Dur : 082 ms      QT Int : 334 ms       P-R-T Axes : 080 030 062 degrees     QTc Int : 433 ms    Sinus tachycardia  Otherwise normal ECG  When compared with ECG of 08-OCT-2019 05:42,  No significant  change was found    Referred By: [email or web address]   SELF           Confirmed By:

## 2022-03-01 NOTE — ASSESSMENT & PLAN NOTE
- Rate controlled on admission. Remains hemodynamically stable. Monitor telemetry.   - Continue diltiazem drip at 5 mg/hr.  - Continue home BB.  - Continue Xarelto for AC.  - Cardiology consult.     Better, rate controlled, still in afib, off cardizem gtt

## 2022-03-01 NOTE — ASSESSMENT & PLAN NOTE
- Place in observation on COPD pathway.  - O2 sat stable on 3L NC, wean as tolerated.  - Continue bronchodilators.  - Steroids.  - Empiric antibiotics.

## 2022-03-01 NOTE — ASSESSMENT & PLAN NOTE
- Rate controlled on admission. Remains hemodynamically stable. Monitor telemetry.   - Continue diltiazem drip at 5 mg/hr.  - Continue home BB.  - Continue Xarelto for AC.  - Cardiology consult.

## 2022-03-01 NOTE — ASSESSMENT & PLAN NOTE
- H&H stable on admission. Follow CBC and transfuse as needed.     Severe GARIMA- start iron, may need blood

## 2022-03-01 NOTE — ASSESSMENT & PLAN NOTE
-H/H dipped to 7.1/21.9  -Recommend transfusion which will assist with hypotension/PAF  -Defer to primary team  -Monitor closely on AC

## 2022-03-02 LAB
ABO + RH BLD: NORMAL
ANION GAP SERPL CALC-SCNC: 11 MMOL/L (ref 8–16)
BASOPHILS # BLD AUTO: 0.02 K/UL (ref 0–0.2)
BASOPHILS NFR BLD: 0.2 % (ref 0–1.9)
BLD GP AB SCN CELLS X3 SERPL QL: NORMAL
BLD PROD TYP BPU: NORMAL
BLD PROD TYP BPU: NORMAL
BLOOD UNIT EXPIRATION DATE: NORMAL
BLOOD UNIT EXPIRATION DATE: NORMAL
BLOOD UNIT TYPE CODE: 5100
BLOOD UNIT TYPE CODE: 5100
BLOOD UNIT TYPE: NORMAL
BLOOD UNIT TYPE: NORMAL
BUN SERPL-MCNC: 16 MG/DL (ref 8–23)
CALCIUM SERPL-MCNC: 9.6 MG/DL (ref 8.7–10.5)
CHLORIDE SERPL-SCNC: 107 MMOL/L (ref 95–110)
CO2 SERPL-SCNC: 22 MMOL/L (ref 23–29)
CODING SYSTEM: NORMAL
CODING SYSTEM: NORMAL
CREAT SERPL-MCNC: 1 MG/DL (ref 0.5–1.4)
DIFFERENTIAL METHOD: ABNORMAL
DISPENSE STATUS: NORMAL
DISPENSE STATUS: NORMAL
EOSINOPHIL # BLD AUTO: 0 K/UL (ref 0–0.5)
EOSINOPHIL NFR BLD: 0 % (ref 0–8)
ERYTHROCYTE [DISTWIDTH] IN BLOOD BY AUTOMATED COUNT: 18.1 % (ref 11.5–14.5)
EST. GFR  (AFRICAN AMERICAN): >60 ML/MIN/1.73 M^2
EST. GFR  (NON AFRICAN AMERICAN): >60 ML/MIN/1.73 M^2
GLUCOSE SERPL-MCNC: 149 MG/DL (ref 70–110)
HCT VFR BLD AUTO: 23.2 % (ref 37–48.5)
HGB BLD-MCNC: 7.4 G/DL (ref 12–16)
IMM GRANULOCYTES # BLD AUTO: 0.44 K/UL (ref 0–0.04)
IMM GRANULOCYTES NFR BLD AUTO: 4.3 % (ref 0–0.5)
LYMPHOCYTES # BLD AUTO: 1.3 K/UL (ref 1–4.8)
LYMPHOCYTES NFR BLD: 12.6 % (ref 18–48)
MAGNESIUM SERPL-MCNC: 2 MG/DL (ref 1.6–2.6)
MCH RBC QN AUTO: 29.1 PG (ref 27–31)
MCHC RBC AUTO-ENTMCNC: 31.9 G/DL (ref 32–36)
MCV RBC AUTO: 91 FL (ref 82–98)
MONOCYTES # BLD AUTO: 0.3 K/UL (ref 0.3–1)
MONOCYTES NFR BLD: 3.1 % (ref 4–15)
NEUTROPHILS # BLD AUTO: 8.1 K/UL (ref 1.8–7.7)
NEUTROPHILS NFR BLD: 79.8 % (ref 38–73)
NRBC BLD-RTO: 0 /100 WBC
NUM UNITS TRANS PACKED RBC: NORMAL
NUM UNITS TRANS PACKED RBC: NORMAL
PHOSPHATE SERPL-MCNC: 3 MG/DL (ref 2.7–4.5)
PLATELET # BLD AUTO: 352 K/UL (ref 150–450)
PMV BLD AUTO: 9.4 FL (ref 9.2–12.9)
POCT GLUCOSE: 168 MG/DL (ref 70–110)
POCT GLUCOSE: 183 MG/DL (ref 70–110)
POCT GLUCOSE: 226 MG/DL (ref 70–110)
POCT GLUCOSE: 236 MG/DL (ref 70–110)
POTASSIUM SERPL-SCNC: 4.6 MMOL/L (ref 3.5–5.1)
RBC # BLD AUTO: 2.54 M/UL (ref 4–5.4)
SODIUM SERPL-SCNC: 140 MMOL/L (ref 136–145)
WBC # BLD AUTO: 10.12 K/UL (ref 3.9–12.7)

## 2022-03-02 PROCEDURE — 25000003 PHARM REV CODE 250: Performed by: INTERNAL MEDICINE

## 2022-03-02 PROCEDURE — 63600175 PHARM REV CODE 636 W HCPCS: Performed by: INTERNAL MEDICINE

## 2022-03-02 PROCEDURE — 36415 COLL VENOUS BLD VENIPUNCTURE: CPT | Performed by: NURSE PRACTITIONER

## 2022-03-02 PROCEDURE — 25000242 PHARM REV CODE 250 ALT 637 W/ HCPCS: Performed by: EMERGENCY MEDICINE

## 2022-03-02 PROCEDURE — 86901 BLOOD TYPING SEROLOGIC RH(D): CPT | Performed by: EMERGENCY MEDICINE

## 2022-03-02 PROCEDURE — 27000646 HC AEROBIKA DEVICE

## 2022-03-02 PROCEDURE — 80048 BASIC METABOLIC PNL TOTAL CA: CPT | Performed by: NURSE PRACTITIONER

## 2022-03-02 PROCEDURE — 99233 SBSQ HOSP IP/OBS HIGH 50: CPT | Mod: ,,, | Performed by: INTERNAL MEDICINE

## 2022-03-02 PROCEDURE — 27000221 HC OXYGEN, UP TO 24 HOURS

## 2022-03-02 PROCEDURE — 85025 COMPLETE CBC W/AUTO DIFF WBC: CPT | Performed by: NURSE PRACTITIONER

## 2022-03-02 PROCEDURE — 83735 ASSAY OF MAGNESIUM: CPT | Performed by: NURSE PRACTITIONER

## 2022-03-02 PROCEDURE — 25000003 PHARM REV CODE 250: Performed by: NURSE PRACTITIONER

## 2022-03-02 PROCEDURE — 63700000 PHARM REV CODE 250 ALT 637 W/O HCPCS: Performed by: INTERNAL MEDICINE

## 2022-03-02 PROCEDURE — 94664 DEMO&/EVAL PT USE INHALER: CPT

## 2022-03-02 PROCEDURE — 36415 COLL VENOUS BLD VENIPUNCTURE: CPT | Performed by: EMERGENCY MEDICINE

## 2022-03-02 PROCEDURE — 86920 COMPATIBILITY TEST SPIN: CPT | Performed by: EMERGENCY MEDICINE

## 2022-03-02 PROCEDURE — 25000003 PHARM REV CODE 250: Performed by: EMERGENCY MEDICINE

## 2022-03-02 PROCEDURE — 94640 AIRWAY INHALATION TREATMENT: CPT

## 2022-03-02 PROCEDURE — 94799 UNLISTED PULMONARY SVC/PX: CPT

## 2022-03-02 PROCEDURE — 99233 PR SUBSEQUENT HOSPITAL CARE,LEVL III: ICD-10-PCS | Mod: ,,, | Performed by: INTERNAL MEDICINE

## 2022-03-02 PROCEDURE — 63600175 PHARM REV CODE 636 W HCPCS: Performed by: EMERGENCY MEDICINE

## 2022-03-02 PROCEDURE — 36430 TRANSFUSION BLD/BLD COMPNT: CPT

## 2022-03-02 PROCEDURE — 25000242 PHARM REV CODE 250 ALT 637 W/ HCPCS: Performed by: INTERNAL MEDICINE

## 2022-03-02 PROCEDURE — 99900035 HC TECH TIME PER 15 MIN (STAT)

## 2022-03-02 PROCEDURE — P9016 RBC LEUKOCYTES REDUCED: HCPCS | Performed by: EMERGENCY MEDICINE

## 2022-03-02 PROCEDURE — 84100 ASSAY OF PHOSPHORUS: CPT | Performed by: NURSE PRACTITIONER

## 2022-03-02 PROCEDURE — 21400001 HC TELEMETRY ROOM

## 2022-03-02 PROCEDURE — 94761 N-INVAS EAR/PLS OXIMETRY MLT: CPT

## 2022-03-02 RX ORDER — METOPROLOL SUCCINATE 50 MG/1
50 TABLET, EXTENDED RELEASE ORAL 2 TIMES DAILY
Status: DISCONTINUED | OUTPATIENT
Start: 2022-03-02 | End: 2022-03-03 | Stop reason: HOSPADM

## 2022-03-02 RX ORDER — HYDROCODONE BITARTRATE AND ACETAMINOPHEN 500; 5 MG/1; MG/1
TABLET ORAL
Status: DISCONTINUED | OUTPATIENT
Start: 2022-03-02 | End: 2022-03-03 | Stop reason: HOSPADM

## 2022-03-02 RX ADMIN — CEFTRIAXONE 1 G: 1 INJECTION, SOLUTION INTRAVENOUS at 04:03

## 2022-03-02 RX ADMIN — THERA TABS 1 TABLET: TAB at 10:03

## 2022-03-02 RX ADMIN — METOPROLOL SUCCINATE 50 MG: 50 TABLET, EXTENDED RELEASE ORAL at 08:03

## 2022-03-02 RX ADMIN — HYDROCODONE BITARTRATE AND ACETAMINOPHEN 1 TABLET: 7.5; 325 TABLET ORAL at 11:03

## 2022-03-02 RX ADMIN — INSULIN DETEMIR 10 UNITS: 100 INJECTION, SOLUTION SUBCUTANEOUS at 10:03

## 2022-03-02 RX ADMIN — LEVALBUTEROL HYDROCHLORIDE 0.63 MG: 0.63 SOLUTION RESPIRATORY (INHALATION) at 12:03

## 2022-03-02 RX ADMIN — ARFORMOTEROL TARTRATE 15 MCG: 15 SOLUTION RESPIRATORY (INHALATION) at 07:03

## 2022-03-02 RX ADMIN — HYDROCODONE BITARTRATE AND ACETAMINOPHEN 1 TABLET: 7.5; 325 TABLET ORAL at 06:03

## 2022-03-02 RX ADMIN — Medication 5000 UNITS: at 10:03

## 2022-03-02 RX ADMIN — LEVALBUTEROL HYDROCHLORIDE 0.63 MG: 0.63 SOLUTION RESPIRATORY (INHALATION) at 08:03

## 2022-03-02 RX ADMIN — MONTELUKAST 10 MG: 10 TABLET, FILM COATED ORAL at 08:03

## 2022-03-02 RX ADMIN — PREDNISONE 40 MG: 20 TABLET ORAL at 10:03

## 2022-03-02 RX ADMIN — Medication 100 MG: at 10:03

## 2022-03-02 RX ADMIN — BUDESONIDE 0.5 MG: 0.5 INHALANT ORAL at 08:03

## 2022-03-02 RX ADMIN — IRON SUCROSE 200 MG: 20 INJECTION, SOLUTION INTRAVENOUS at 09:03

## 2022-03-02 RX ADMIN — AZITHROMYCIN MONOHYDRATE 500 MG: 250 TABLET ORAL at 10:03

## 2022-03-02 RX ADMIN — INSULIN DETEMIR 10 UNITS: 100 INJECTION, SOLUTION SUBCUTANEOUS at 08:03

## 2022-03-02 RX ADMIN — INSULIN ASPART 1 UNITS: 100 INJECTION, SOLUTION INTRAVENOUS; SUBCUTANEOUS at 08:03

## 2022-03-02 RX ADMIN — LEVALBUTEROL HYDROCHLORIDE 0.63 MG: 0.63 SOLUTION RESPIRATORY (INHALATION) at 07:03

## 2022-03-02 RX ADMIN — HYDROCODONE BITARTRATE AND ACETAMINOPHEN 1 TABLET: 7.5; 325 TABLET ORAL at 05:03

## 2022-03-02 RX ADMIN — INSULIN ASPART 2 UNITS: 100 INJECTION, SOLUTION INTRAVENOUS; SUBCUTANEOUS at 05:03

## 2022-03-02 RX ADMIN — ARFORMOTEROL TARTRATE 15 MCG: 15 SOLUTION RESPIRATORY (INHALATION) at 08:03

## 2022-03-02 RX ADMIN — BUDESONIDE 0.5 MG: 0.5 INHALANT ORAL at 07:03

## 2022-03-02 RX ADMIN — CYANOCOBALAMIN 1000 MCG: 1000 INJECTION, SOLUTION INTRAMUSCULAR at 10:03

## 2022-03-02 RX ADMIN — METOPROLOL SUCCINATE 50 MG: 50 TABLET, EXTENDED RELEASE ORAL at 05:03

## 2022-03-02 RX ADMIN — Medication 1 TABLET: at 10:03

## 2022-03-02 RX ADMIN — RIVAROXABAN 20 MG: 20 TABLET, FILM COATED ORAL at 05:03

## 2022-03-02 NOTE — ASSESSMENT & PLAN NOTE
- H&H stable on admission. Follow CBC and transfuse as needed.     Severe GARIMA- start iron, may need blood    Getting 2 units of blood now

## 2022-03-02 NOTE — NURSING
Patient's heart rate noted fluctuating in the high 100's,130-170. Patient asymptomatic. Cardiology notified ( MARKY Ann). Instructed to give patient her morning metoprolol now. No acute distress noted at this time. Call bell with in reach. Observation continued.

## 2022-03-02 NOTE — HOSPITAL COURSE
3/2/22-Patient seen and examined today, sitting up in bed. Feeling better. SOB improving. Still weak/fatigued. Remains in afib, HR elevated this AM but improved after po BB dose. Labs reviewed. H/H 7.4/23.2. Creatinine normal. Echo showed normal EF, pulmonary HTN.    3/3/22-Patient  seen and examined today, sitting up in bedside chair. Feels much better, back to baseline. Still in afib, HR controlled. H/H improved post transfusion.

## 2022-03-02 NOTE — SUBJECTIVE & OBJECTIVE
Interval History: resting in bed, feels a little better, back/rib pain improved, remains in afib with . H/h 7.4/23- will transfuse 2 units of blood and give her Triple Vitamins plus vit D and venofer. Encouraged to do IS and [place]ela. Encouraged to sit in chair and ambulate.     Review of Systems   Constitutional:  Positive for activity change and fatigue. Negative for chills, diaphoresis and fever.   HENT:  Negative for congestion and sore throat.    Respiratory:  Positive for cough and shortness of breath. Negative for wheezing.    Cardiovascular:  Positive for palpitations. Negative for chest pain and leg swelling.   Gastrointestinal:  Negative for abdominal distention, abdominal pain, blood in stool, constipation, diarrhea, nausea and vomiting.   Genitourinary:  Negative for dysuria and hematuria.   Musculoskeletal:  Positive for back pain. Negative for arthralgias and myalgias.   Skin:  Negative for pallor and rash.   Neurological:  Negative for dizziness, syncope, weakness, light-headedness, numbness and headaches.   Psychiatric/Behavioral:  The patient is not nervous/anxious.    All other systems reviewed and are negative.  Objective:     Vital Signs (Most Recent):  Temp: 98.4 °F (36.9 °C) (03/02/22 1209)  Pulse: 94 (03/02/22 1209)  Resp: 17 (03/02/22 1209)  BP: (!) 103/53 (03/02/22 1209)  SpO2: 100 % (03/02/22 1209)   Vital Signs (24h Range):  Temp:  [97.4 °F (36.3 °C)-98.4 °F (36.9 °C)] 98.4 °F (36.9 °C)  Pulse:  [] 94  Resp:  [16-18] 17  SpO2:  [98 %-100 %] 100 %  BP: ()/(51-81) 103/53     Weight: 63 kg (138 lb 14.2 oz)  Body mass index is 25.4 kg/m².    Intake/Output Summary (Last 24 hours) at 3/2/2022 1242  Last data filed at 3/1/2022 1719  Gross per 24 hour   Intake 1149.1 ml   Output --   Net 1149.1 ml      Physical Exam  Vitals and nursing note reviewed.   Constitutional:       General: She is awake. She is not in acute distress.     Appearance: Normal appearance. She is  well-developed. She is not diaphoretic.   HENT:      Head: Normocephalic and atraumatic.   Eyes:      Conjunctiva/sclera: Conjunctivae normal.      Comments: PERRL; EOM intact.   Neck:      Vascular: No JVD.   Cardiovascular:      Rate and Rhythm: Tachycardia present. Rhythm irregularly irregular.      Heart sounds: S1 normal and S2 normal. No murmur heard.    No gallop.   Pulmonary:      Effort: Pulmonary effort is normal. No tachypnea, accessory muscle usage or respiratory distress.      Breath sounds: Normal air entry. Wheezing (scattered inspiratory and expiratory) present. No rhonchi or rales.   Abdominal:      General: Bowel sounds are normal. There is no distension.      Palpations: Abdomen is soft.      Tenderness: There is no abdominal tenderness. There is no guarding or rebound. Negative signs include Chamorro's sign.   Musculoskeletal:         General: No tenderness or deformity. Normal range of motion.      Cervical back: Normal range of motion and neck supple.      Right lower leg: No edema.      Left lower leg: No edema.   Skin:     General: Skin is warm and dry.      Capillary Refill: Capillary refill takes less than 2 seconds.      Findings: No erythema or rash.   Neurological:      General: No focal deficit present.      Mental Status: She is alert and oriented to person, place, and time.   Psychiatric:         Mood and Affect: Mood and affect normal.         Behavior: Behavior normal. Behavior is cooperative.       Significant Labs: All pertinent labs within the past 24 hours have been reviewed.  ABGs: No results for input(s): PH, PCO2, HCO3, POCSATURATED, BE, TOTALHB, COHB, METHB, O2HB, POCFIO2, PO2 in the last 48 hours.  BMP:   Recent Labs   Lab 03/02/22  0443   *      K 4.6      CO2 22*   BUN 16   CREATININE 1.0   CALCIUM 9.6   MG 2.0     CBC:   Recent Labs   Lab 03/01/22  0453 03/02/22  0443   WBC 7.07 10.12   HGB 7.1* 7.4*   HCT 21.9* 23.2*    352     CMP:   Recent Labs    Lab 03/01/22  0453 03/02/22  0443    140   K 4.7 4.6    107   CO2 20* 22*   * 149*   BUN 19 16   CREATININE 1.1 1.0   CALCIUM 9.0 9.6   ANIONGAP 13 11   EGFRNONAA 54* >60     Magnesium:   Recent Labs   Lab 02/28/22  2351 03/01/22  0453 03/02/22  0443   MG 1.8 1.9 2.0     Respiratory Culture: No results for input(s): GSRESP, RESPIRATORYC in the last 48 hours.  Troponin:   TSH:   Recent Labs   Lab 02/28/22  2351   TSH 0.406       Significant Imaging: I have reviewed all pertinent imaging results/findings within the past 24 hours.

## 2022-03-02 NOTE — ASSESSMENT & PLAN NOTE
-H/H dipped to 7.1/21.9  -Recommend transfusion which will assist with hypotension/PAF  -Defer to primary team  -Monitor closely on AC    3/2/22  -Recommend transfusion

## 2022-03-02 NOTE — ASSESSMENT & PLAN NOTE
- Place in observation on COPD pathway.  - O2 sat stable on 3L NC, wean as tolerated.  - Continue bronchodilators.  - Steroids.  - Empiric antibiotics.     Getting better    improving

## 2022-03-02 NOTE — SUBJECTIVE & OBJECTIVE
Review of Systems   Constitutional: Positive for malaise/fatigue.   HENT: Negative.     Eyes: Negative.    Cardiovascular:  Positive for dyspnea on exertion.   Respiratory: Negative.     Endocrine: Negative.    Hematologic/Lymphatic: Negative.    Skin: Negative.    Musculoskeletal:  Positive for arthritis and joint pain.   Gastrointestinal: Negative.    Genitourinary: Negative.    Neurological: Negative.    Psychiatric/Behavioral: Negative.     Allergic/Immunologic: Negative.    Objective:     Vital Signs (Most Recent):  Temp: 98.4 °F (36.9 °C) (03/02/22 1209)  Pulse: 104 (03/02/22 1256)  Resp: 18 (03/02/22 1256)  BP: (!) 103/53 (03/02/22 1209)  SpO2: 95 % (03/02/22 1256)   Vital Signs (24h Range):  Temp:  [97.4 °F (36.3 °C)-98.4 °F (36.9 °C)] 98.4 °F (36.9 °C)  Pulse:  [] 104  Resp:  [16-18] 18  SpO2:  [95 %-100 %] 95 %  BP: ()/(51-81) 103/53     Weight: 63 kg (138 lb 14.2 oz)  Body mass index is 25.4 kg/m².     SpO2: 95 %  O2 Device (Oxygen Therapy): room air      Intake/Output Summary (Last 24 hours) at 3/2/2022 1329  Last data filed at 3/1/2022 1719  Gross per 24 hour   Intake 1149.1 ml   Output --   Net 1149.1 ml       Lines/Drains/Airways       Peripheral Intravenous Line  Duration                  Peripheral IV - Single Lumen 02/28/22 1633 18 G Right Antecubital 1 day         Peripheral IV - Single Lumen 03/01/22 0100 20 G Anterior;Left;Lateral Forearm 1 day         Peripheral IV - Single Lumen 03/01/22 0100 22 G Anterior;Left;Medial Forearm 1 day                    Physical Exam  Vitals and nursing note reviewed.   Constitutional:       General: She is not in acute distress.     Appearance: She is well-developed. She is ill-appearing. She is not diaphoretic.      Comments: On supplemental O2   HENT:      Head: Normocephalic and atraumatic.   Eyes:      General:         Right eye: No discharge.         Left eye: No discharge.      Pupils: Pupils are equal, round, and reactive to light.   Neck:       Thyroid: No thyromegaly.      Vascular: No JVD.      Trachea: No tracheal deviation.   Cardiovascular:      Rate and Rhythm: Normal rate. Rhythm irregularly irregular.      Pulses: Intact distal pulses.      Heart sounds: Normal heart sounds, S1 normal and S2 normal. No murmur heard.  Pulmonary:      Effort: Pulmonary effort is normal. No respiratory distress.      Breath sounds: Normal breath sounds. No wheezing or rales.   Abdominal:      General: There is no distension.      Palpations: Abdomen is soft.      Tenderness: There is no rebound.   Musculoskeletal:      Cervical back: Neck supple.      Right lower leg: No edema.      Left lower leg: No edema.   Skin:     General: Skin is warm and dry.      Findings: No erythema.   Neurological:      General: No focal deficit present.      Mental Status: She is alert and oriented to person, place, and time.   Psychiatric:         Mood and Affect: Mood normal.         Behavior: Behavior normal.         Thought Content: Thought content normal.       Significant Labs: CMP   Recent Labs   Lab 03/01/22  0453 03/02/22  0443    140   K 4.7 4.6    107   CO2 20* 22*   * 149*   BUN 19 16   CREATININE 1.1 1.0   CALCIUM 9.0 9.6   ANIONGAP 13 11   ESTGFRAFRICA >60 >60   EGFRNONAA 54* >60   , CBC   Recent Labs   Lab 03/01/22  0453 03/02/22  0443   WBC 7.07 10.12   HGB 7.1* 7.4*   HCT 21.9* 23.2*    352   , Troponin No results for input(s): TROPONINI in the last 48 hours., and All pertinent lab results from the last 24 hours have been reviewed.    Significant Imaging: Echocardiogram: Transthoracic echo (TTE) complete (Cupid Only):   Results for orders placed or performed during the hospital encounter of 02/28/22   Echo   Result Value Ref Range    BSA 1.63 m2    TDI SEPTAL 0.11 m/s    LV LATERAL E/E' RATIO 5.50 m/s    LV SEPTAL E/E' RATIO 8.00 m/s    LA WIDTH 3.10 cm    IVC diameter 1.3 cm    Left Ventricular Outflow Tract Mean Velocity 0.1088319237 cm/s     Left Ventricular Outflow Tract Mean Gradient 1.89 mmHg    TV mean gradient 35 mmHg    TDI LATERAL 0.16 m/s    LVIDd 4.12 3.5 - 6.0 cm    IVS 1.23 (A) 0.6 - 1.1 cm    Posterior Wall 1.12 (A) 0.6 - 1.1 cm    Ao root annulus 2.98 cm    LVIDs 2.58 2.1 - 4.0 cm    FS 37 28 - 44 %    LA volume 39.44 cm3    Ascending aorta 2.75 cm    LV mass 167.75 g    LA size 3.17 cm    RVDD 2.40 cm    TAPSE 1.50 cm    Left Ventricle Relative Wall Thickness 0.54 cm    AV mean gradient 6 mmHg    AV valve area 2.31 cm2    AV Velocity Ratio 0.59     AV index (prosthetic) 0.78     MV valve area p 1/2 method 7.50 cm2    Mean e' 0.14 m/s    E wave deceleration time 101.329537032349859 msec    IVRT 55.291835799076215 msec    LVOT diameter 1.94 cm    LVOT area 3.0 cm2    LVOT peak wing 0.84 m/s    LVOT peak VTI 17.70 cm    Ao peak wing 1.43 m/s    Ao VTI 22.6 cm    RVOT peak wing 0.93 m/s    RVOT peak VTI 20.0 cm    Mr max wing 4.33 m/s    LVOT stroke volume 52.29 cm3    AV peak gradient 8 mmHg    PV mean gradient 2.42 mmHg    E/E' ratio 6.52 m/s    MV Peak E Wing 0.88 m/s    TR Max Wing 3.47 m/s    MV stenosis pressure 1/2 time 29.5378272 ms    LV Systolic Volume 24.17 mL    LV Systolic Volume Index 15.0 mL/m2    LV Diastolic Volume 74.92 mL    LV Diastolic Volume Index 46.53 mL/m2    LA Volume Index 24.5 mL/m2    LV Mass Index 104 g/m2    Echo EF Estimated 68 %    RA Major Axis 3.58 cm    Left Atrium Minor Axis 4.48 cm    Left Atrium Major Axis 4.99 cm    Triscuspid Valve Regurgitation Peak Gradient 48 mmHg    Right Atrial Pressure (from IVC) 3 mmHg    EF 55 %    TV rest pulmonary artery pressure 51 mmHg    Narrative    · The left ventricle is normal in size with concentric hypertrophy and   normal systolic function.  · The estimated ejection fraction is 55%.  · Indeterminate left ventricular diastolic function.  · Normal right ventricular size with low normal right ventricular systolic   function.  · Moderate tricuspid regurgitation.  · Mild  mitral regurgitation.  · Normal central venous pressure (3 mmHg).  · The estimated PA systolic pressure is 51 mmHg.  · There is pulmonary hypertension.      , EKG: Reviewed, and X-Ray: CXR: X-Ray Chest 1 View (CXR):   Results for orders placed or performed during the hospital encounter of 02/28/22   X-Ray Chest 1 View    Narrative    EXAMINATION:  XR CHEST 1 VIEW    CLINICAL HISTORY:  shortness of breath;    TECHNIQUE:  Single frontal view of the chest was performed.    COMPARISON:  Chest radiograph 10/07/2019    FINDINGS:  Subtle patchy opacities are scattered throughout the lungs with focal reticulonodular/consolidative opacification in the right lower lung.  No effusion or pneumothorax.  Unchanged appearance of the cardiomediastinal silhouette.  No free air under the diaphragm.  No acute osseous abnormality.      Impression    Findings concerning for developing pneumonia or aspiration sequela.  Atypical/viral etiology pneumonia should be considered.  Recommend continued follow-up.      Electronically signed by: Jerry Paulson  Date:    02/28/2022  Time:    12:27    and X-Ray Chest PA and Lateral (CXR): No results found for this visit on 02/28/22.

## 2022-03-02 NOTE — PROGRESS NOTES
[facility] Medicine  Progress Note    Patient Name: Genie Balderas  MRN: [id number]  Patient Class: IP- Inpatient   Admission Date: 2/28/2022  Length of Stay: 1 days  Attending Physician: Jono Westbrook MD  Primary Care Provider: Dawit Grey MD        Subjective:     Principal Problem:Atrial fibrillation with RVR        HPI:  Genie Balderas is a 61 y.o. female with a PMHx of asthma, anemia of chronic disease, COPD, DM II, GERD, h/o lymphoma, HTN, HLD, continued tobacco use, and PAF on Xarelto who presented to the Emergency Department with c/o nonproductive cough x 3-4 days. No aggravating or alleviating factors. Associated SOB, palpitations, pleuritic chest pain, generalized ABD pain, and back pain. Denies wheezing, orthopnea, PND, edema, weight gain, N/V/D, HA, lightheadedness, dizziness, syncope, weakness, rhinorrhea, sore throat, congestion, fever or chills. Upon arrival to the ED, patient was mildly hypoxic with O2 sat 91% on RA. She was placed on 3 L NC to improve sat to >92%. Work-up in the ED showed: WBC 14, D-Dimer 4.97, BNP 65, troponin 0.008, lactic 1.3, COVID negative. CTA of chest showed no evidence of PE, no dissection; chronic patchy areas of bronchovascular thickening and bronchovascular infiltrates bilateral lungs with Mariah lymphatic nodules, correlate clinically for chronic process such as sarcoidosis, consider biopsy or further diagnostic workup. Patient received Duonebs x 3, 125 mg IV Solumedrol, IV azithromycin, IV Rocephin, and 2 L IV fluids. While in the ED, patient converted to AF with RVR (HR 130s-150s), remained hemodynamically stable. IV diltiazem 10 mg x 3 doses given with good HR response, and diltiazem drip initiated at 5 mg/hr. Hospital Medicine was contacted for admission and patient placed in Observation.   Patient is a Full Code.       Overview/Hospital Course:  61 y.o. female with asthma, anemia of chronic disease, COPD, DM II, GERD, h/o lymphoma, HTN, HLD,  continued tobacco use, and PAF on Xarelto who presented to the Emergency Department with c/o nonproductive cough x 3-4 days. Associated SOB, palpitations, pleuritic chest pain, generalized ABD pain, and back pain. In the ER, patient was mildly hypoxic with O2 sat 91% on RA. She was placed on 3 L NC to improve sat to >92%. WBC 14, D-Dimer 4.97, BNP 65, troponin 0.008, lactic 1.3, COVID negative. CTA of chest showed no evidence of PE, no dissection; chronic patchy areas of bronchovascular thickening and bronchovascular infiltrates bilateral lungs with Mariah lymphatic nodules, correlate clinically for chronic process such as sarcoidosis, consider biopsy or further diagnostic workup. Patient received Duonebs x 3, 125 mg IV Solumedrol, IV azithromycin, IV Rocephin, and 2 L IV fluids. While in the ED, patient converted to AF with RVR (HR 130s-150s), remained hemodynamically stable. IV diltiazem 10 mg x 3 doses given with good HR response, and diltiazem drip initiated at 5 mg/hr.    3/1- looks a little better, remains in Afib but rate controlled at 85/ min. C/o back, rib pain from the pneumonia she had earlier, got Norco. Off Diltiazem gtt. Has severe GARIMA- getting IV Venofer plus B12 and triple Vitamins. Encouraged to get OOB and ambulate.     3/2- resting in bed, feels a little better, back/rib pain improved, remains in afib with . H/h 7.4/22- will transfuse 2 units of blood and give her Triple Vitamins plus vit D and venofer. Encouraged to do IS and Acapela. Encouraged to sit in chair and ambulate.       Interval History: resting in bed, feels a little better, back/rib pain improved, remains in afib with . H/h 7.4/23- will transfuse 2 units of blood and give her Triple Vitamins plus vit D and venofer. Encouraged to do IS and Acapela. Encouraged to sit in chair and ambulate.     Review of Systems   Constitutional:  Positive for activity change and fatigue. Negative for chills, diaphoresis and fever.   HENT:   Negative for congestion and sore throat.    Respiratory:  Positive for cough and shortness of breath. Negative for wheezing.    Cardiovascular:  Positive for palpitations. Negative for chest pain and leg swelling.   Gastrointestinal:  Negative for abdominal distention, abdominal pain, blood in stool, constipation, diarrhea, nausea and vomiting.   Genitourinary:  Negative for dysuria and hematuria.   Musculoskeletal:  Positive for back pain. Negative for arthralgias and myalgias.   Skin:  Negative for pallor and rash.   Neurological:  Negative for dizziness, syncope, weakness, light-headedness, numbness and headaches.   Psychiatric/Behavioral:  The patient is not nervous/anxious.    All other systems reviewed and are negative.  Objective:     Vital Signs (Most Recent):  Temp: 98.4 °F (36.9 °C) (03/02/22 1209)  Pulse: 94 (03/02/22 1209)  Resp: 17 (03/02/22 1209)  BP: (!) 103/53 (03/02/22 1209)  SpO2: 100 % (03/02/22 1209)   Vital Signs (24h Range):  Temp:  [97.4 °F (36.3 °C)-98.4 °F (36.9 °C)] 98.4 °F (36.9 °C)  Pulse:  [] 94  Resp:  [16-18] 17  SpO2:  [98 %-100 %] 100 %  BP: ()/(51-81) 103/53     Weight: 63 kg (138 lb 14.2 oz)  Body mass index is 25.4 kg/m².    Intake/Output Summary (Last 24 hours) at 3/2/2022 1242  Last data filed at 3/1/2022 1719  Gross per 24 hour   Intake 1149.1 ml   Output --   Net 1149.1 ml      Physical Exam  Vitals and nursing note reviewed.   Constitutional:       General: She is awake. She is not in acute distress.     Appearance: Normal appearance. She is well-developed. She is not diaphoretic.   HENT:      Head: Normocephalic and atraumatic.   Eyes:      Conjunctiva/sclera: Conjunctivae normal.      Comments: PERRL; EOM intact.   Neck:      Vascular: No JVD.   Cardiovascular:      Rate and Rhythm: Tachycardia present. Rhythm irregularly irregular.      Heart sounds: S1 normal and S2 normal. No murmur heard.    No gallop.   Pulmonary:      Effort: Pulmonary effort is normal. No  tachypnea, accessory muscle usage or respiratory distress.      Breath sounds: Normal air entry. Wheezing (scattered inspiratory and expiratory) present. No rhonchi or rales.   Abdominal:      General: Bowel sounds are normal. There is no distension.      Palpations: Abdomen is soft.      Tenderness: There is no abdominal tenderness. There is no guarding or rebound. Negative signs include Chamorro's sign.   Musculoskeletal:         General: No tenderness or deformity. Normal range of motion.      Cervical back: Normal range of motion and neck supple.      Right lower leg: No edema.      Left lower leg: No edema.   Skin:     General: Skin is warm and dry.      Capillary Refill: Capillary refill takes less than 2 seconds.      Findings: No erythema or rash.   Neurological:      General: No focal deficit present.      Mental Status: She is alert and oriented to person, place, and time.   Psychiatric:         Mood and Affect: Mood and affect normal.         Behavior: Behavior normal. Behavior is cooperative.       Significant Labs: All pertinent labs within the past 24 hours have been reviewed.  ABGs: No results for input(s): PH, PCO2, HCO3, POCSATURATED, BE, TOTALHB, COHB, METHB, O2HB, POCFIO2, PO2 in the last 48 hours.  BMP:   Recent Labs   Lab 03/02/22  0443   *      K 4.6      CO2 22*   BUN 16   CREATININE 1.0   CALCIUM 9.6   MG 2.0     CBC:   Recent Labs   Lab 03/01/22  0453 03/02/22  0443   WBC 7.07 10.12   HGB 7.1* 7.4*   HCT 21.9* 23.2*    352     CMP:   Recent Labs   Lab 03/01/22  0453 03/02/22  0443    140   K 4.7 4.6    107   CO2 20* 22*   * 149*   BUN 19 16   CREATININE 1.1 1.0   CALCIUM 9.0 9.6   ANIONGAP 13 11   EGFRNONAA 54* >60     Magnesium:   Recent Labs   Lab 02/28/22  2351 03/01/22  0453 03/02/22  0443   MG 1.8 1.9 2.0     Respiratory Culture: No results for input(s): GSRESP, RESPIRATORYC in the last 48 hours.  Troponin:   TSH:   Recent Labs   Lab  02/28/22  2351   TSH 0.406       Significant Imaging: I have reviewed all pertinent imaging results/findings within the past 24 hours.      Assessment/Plan:      * Atrial fibrillation with RVR  - Rate controlled on admission. Remains hemodynamically stable. Monitor telemetry.   - Continue diltiazem drip at 5 mg/hr.  - Continue home BB.  - Continue Xarelto for AC.  - Cardiology consult.     Better, rate controlled, still in afib, off cardizem gtt    Still present, Cardiology also wants to give her blood to improve her cardiac functions as well    COPD exacerbation  - Place in observation on COPD pathway.  - O2 sat stable on 3L NC, wean as tolerated.  - Continue bronchodilators.  - Steroids.  - Empiric antibiotics.     Getting better    improving    Anemia of chronic disease  - H&H stable on admission. Follow CBC and transfuse as needed.     Severe GARIMA- start iron, may need blood    Getting 2 units of blood now    Type 2 diabetes mellitus with circulatory disorder, without long-term current use of insulin  - Diet controlled at home.  - Accuchecks with low dose SSI if needed.  - Diabetic diet.     BS rising- may need Levemir plus SSI     Getting better        Primary hypertension  - BP soft, but stable since starting diltiazem drip. Continue home BB as BP allows.      VTE Risk Mitigation (From admission, onward)         Ordered     rivaroxaban tablet 20 mg  With dinner         03/02/22 1137     Place sequential compression device  Until discontinued         02/28/22 2137     IP VTE LOW RISK PATIENT  Once         02/28/22 2137                Discharge Planning   LANI:      Code Status: Full Code   Is the patient medically ready for discharge?:     Reason for patient still in hospital (select all that apply): Patient trending condition, Laboratory test, Treatment, Imaging, Consult recommendations and PT / OT recommendations  Discharge Plan A: Home Health        Jono Westbrook MD  Department of Hospital Medicine   [email or web address] -  Med Surg

## 2022-03-02 NOTE — ASSESSMENT & PLAN NOTE
- Rate controlled on admission. Remains hemodynamically stable. Monitor telemetry.   - Continue diltiazem drip at 5 mg/hr.  - Continue home BB.  - Continue Xarelto for AC.  - Cardiology consult.     Better, rate controlled, still in afib, off cardizem gtt    Still present, Cardiology also wants to give her blood to improve her cardiac functions as well

## 2022-03-02 NOTE — PROGRESS NOTES
[facility]  Cardiology  Progress Note    Patient Name: Genie Balderas  MRN: [id number]  Admission Date: 2/28/2022  Hospital Length of Stay: 1 days  Code Status: Full Code   Attending Physician: Jono Westbrook MD   Primary Care Physician: Dawit Grey MD  Expected Discharge Date:   Principal Problem:Atrial fibrillation with RVR    Subjective:   HPI:  Ms. Oseguera is a 61 year old female patient whose current medical conditions include asthma, anemia of chronic diease, COPD, DM type II, GERD, history of lymphoma, HTN, hyperlipidemia, tobacco abuse, and PAF (on Xarelto) who presented to [facility] ED yesterday with a chief complaint of worsening cough over the past 3-4 days. Associated symptoms included SOB, palpitations, pleuritic chest discomfort, abdominal pain, decreased appetite, weight loss, and back pain. She denied any associated wheezing, orthopnea, PND, fever, palpitations, near syncope, or syncope. Initial workup in ED revealed leukocytosis (WBC 14,000), mild hypoxia (O2 sat 91% on RA), and +d-dimer. CTA of chest negative for PE but did reveal chronic patchy areas of bronchovascular thickening and bronchovascular infiltrates with lisa lymphatic nodules. While in ED, patient converted to afib with RVR (HR in 150's) and she was subsequently started on a cardizem drip and admitted for further evaluation and treatment. Cardiology consulted to assist with management. Patient seen and examined today, resting in bed. Feels ok. Complains of increased fatigue and weakness. States she was too tired to ambulate to the bathroom this AM. SOB slightly improved. No brendan chest pain. She denies any prior history of CAD or MI. States she is not followed by any cardiologist as OP, but does report compliance with Xarelto. Chart reviewed. Troponin x 1 negative. H/H dipped to 7.1/21.9. Echo pending.                 Hospital Course:   3/2/22-Patient seen and examined today, sitting up in bed. Feeling better. SOB improving.  Still weak/fatigued. Remains in afib, HR elevated this AM but improved after po BB dose. Labs reviewed. H/H 7.4/23.2. Creatinine normal. Echo showed normal EF, pulmonary HTN.          Review of Systems   Constitutional: Positive for malaise/fatigue.   HENT: Negative.     Eyes: Negative.    Cardiovascular:  Positive for dyspnea on exertion.   Respiratory: Negative.     Endocrine: Negative.    Hematologic/Lymphatic: Negative.    Skin: Negative.    Musculoskeletal:  Positive for arthritis and joint pain.   Gastrointestinal: Negative.    Genitourinary: Negative.    Neurological: Negative.    Psychiatric/Behavioral: Negative.     Allergic/Immunologic: Negative.    Objective:     Vital Signs (Most Recent):  Temp: 98.4 °F (36.9 °C) (03/02/22 1209)  Pulse: 104 (03/02/22 1256)  Resp: 18 (03/02/22 1256)  BP: (!) 103/53 (03/02/22 1209)  SpO2: 95 % (03/02/22 1256)   Vital Signs (24h Range):  Temp:  [97.4 °F (36.3 °C)-98.4 °F (36.9 °C)] 98.4 °F (36.9 °C)  Pulse:  [] 104  Resp:  [16-18] 18  SpO2:  [95 %-100 %] 95 %  BP: ()/(51-81) 103/53     Weight: 63 kg (138 lb 14.2 oz)  Body mass index is 25.4 kg/m².     SpO2: 95 %  O2 Device (Oxygen Therapy): room air      Intake/Output Summary (Last 24 hours) at 3/2/2022 1329  Last data filed at 3/1/2022 1719  Gross per 24 hour   Intake 1149.1 ml   Output --   Net 1149.1 ml       Lines/Drains/Airways       Peripheral Intravenous Line  Duration                  Peripheral IV - Single Lumen 02/28/22 1633 18 G Right Antecubital 1 day         Peripheral IV - Single Lumen 03/01/22 0100 20 G Anterior;Left;Lateral Forearm 1 day         Peripheral IV - Single Lumen 03/01/22 0100 22 G Anterior;Left;Medial Forearm 1 day                    Physical Exam  Vitals and nursing note reviewed.   Constitutional:       General: She is not in acute distress.     Appearance: She is well-developed. She is ill-appearing. She is not diaphoretic.      Comments: On supplemental O2   HENT:      Head:  Normocephalic and atraumatic.   Eyes:      General:         Right eye: No discharge.         Left eye: No discharge.      Pupils: Pupils are equal, round, and reactive to light.   Neck:      Thyroid: No thyromegaly.      Vascular: No JVD.      Trachea: No tracheal deviation.   Cardiovascular:      Rate and Rhythm: Normal rate. Rhythm irregularly irregular.      Pulses: Intact distal pulses.      Heart sounds: Normal heart sounds, S1 normal and S2 normal. No murmur heard.  Pulmonary:      Effort: Pulmonary effort is normal. No respiratory distress.      Breath sounds: Normal breath sounds. No wheezing or rales.   Abdominal:      General: There is no distension.      Palpations: Abdomen is soft.      Tenderness: There is no rebound.   Musculoskeletal:      Cervical back: Neck supple.      Right lower leg: No edema.      Left lower leg: No edema.   Skin:     General: Skin is warm and dry.      Findings: No erythema.   Neurological:      General: No focal deficit present.      Mental Status: She is alert and oriented to person, place, and time.   Psychiatric:         Mood and Affect: Mood normal.         Behavior: Behavior normal.         Thought Content: Thought content normal.       Significant Labs: CMP   Recent Labs   Lab 03/01/22  0453 03/02/22  0443    140   K 4.7 4.6    107   CO2 20* 22*   * 149*   BUN 19 16   CREATININE 1.1 1.0   CALCIUM 9.0 9.6   ANIONGAP 13 11   ESTGFRAFRICA >60 >60   EGFRNONAA 54* >60   , CBC   Recent Labs   Lab 03/01/22  0453 03/02/22  0443   WBC 7.07 10.12   HGB 7.1* 7.4*   HCT 21.9* 23.2*    352   , Troponin No results for input(s): TROPONINI in the last 48 hours., and All pertinent lab results from the last 24 hours have been reviewed.    Significant Imaging: Echocardiogram: Transthoracic echo (TTE) complete (Cupid Only):   Results for orders placed or performed during the hospital encounter of 02/28/22   Echo   Result Value Ref Range    BSA 1.63 m2    TDI SEPTAL  0.11 m/s    LV LATERAL E/E' RATIO 5.50 m/s    LV SEPTAL E/E' RATIO 8.00 m/s    LA WIDTH 3.10 cm    IVC diameter 1.3 cm    Left Ventricular Outflow Tract Mean Velocity 0.1141051494 cm/s    Left Ventricular Outflow Tract Mean Gradient 1.89 mmHg    TV mean gradient 35 mmHg    TDI LATERAL 0.16 m/s    LVIDd 4.12 3.5 - 6.0 cm    IVS 1.23 (A) 0.6 - 1.1 cm    Posterior Wall 1.12 (A) 0.6 - 1.1 cm    Ao root annulus 2.98 cm    LVIDs 2.58 2.1 - 4.0 cm    FS 37 28 - 44 %    LA volume 39.44 cm3    Ascending aorta 2.75 cm    LV mass 167.75 g    LA size 3.17 cm    RVDD 2.40 cm    TAPSE 1.50 cm    Left Ventricle Relative Wall Thickness 0.54 cm    AV mean gradient 6 mmHg    AV valve area 2.31 cm2    AV Velocity Ratio 0.59     AV index (prosthetic) 0.78     MV valve area p 1/2 method 7.50 cm2    Mean e' 0.14 m/s    E wave deceleration time 101.572640411293370 msec    IVRT 55.954185997700080 msec    LVOT diameter 1.94 cm    LVOT area 3.0 cm2    LVOT peak wing 0.84 m/s    LVOT peak VTI 17.70 cm    Ao peak wing 1.43 m/s    Ao VTI 22.6 cm    RVOT peak wing 0.93 m/s    RVOT peak VTI 20.0 cm    Mr max wing 4.33 m/s    LVOT stroke volume 52.29 cm3    AV peak gradient 8 mmHg    PV mean gradient 2.42 mmHg    E/E' ratio 6.52 m/s    MV Peak E Wing 0.88 m/s    TR Max Wing 3.47 m/s    MV stenosis pressure 1/2 time 29.4368334 ms    LV Systolic Volume 24.17 mL    LV Systolic Volume Index 15.0 mL/m2    LV Diastolic Volume 74.92 mL    LV Diastolic Volume Index 46.53 mL/m2    LA Volume Index 24.5 mL/m2    LV Mass Index 104 g/m2    Echo EF Estimated 68 %    RA Major Axis 3.58 cm    Left Atrium Minor Axis 4.48 cm    Left Atrium Major Axis 4.99 cm    Triscuspid Valve Regurgitation Peak Gradient 48 mmHg    Right Atrial Pressure (from IVC) 3 mmHg    EF 55 %    TV rest pulmonary artery pressure 51 mmHg    Narrative    · The left ventricle is normal in size with concentric hypertrophy and   normal systolic function.  · The estimated ejection fraction is 55%.  ·  Indeterminate left ventricular diastolic function.  · Normal right ventricular size with low normal right ventricular systolic   function.  · Moderate tricuspid regurgitation.  · Mild mitral regurgitation.  · Normal central venous pressure (3 mmHg).  · The estimated PA systolic pressure is 51 mmHg.  · There is pulmonary hypertension.      , EKG: Reviewed, and X-Ray: CXR: X-Ray Chest 1 View (CXR):   Results for orders placed or performed during the hospital encounter of 02/28/22   X-Ray Chest 1 View    Narrative    EXAMINATION:  XR CHEST 1 VIEW    CLINICAL HISTORY:  shortness of breath;    TECHNIQUE:  Single frontal view of the chest was performed.    COMPARISON:  Chest radiograph 10/07/2019    FINDINGS:  Subtle patchy opacities are scattered throughout the lungs with focal reticulonodular/consolidative opacification in the right lower lung.  No effusion or pneumothorax.  Unchanged appearance of the cardiomediastinal silhouette.  No free air under the diaphragm.  No acute osseous abnormality.      Impression    Findings concerning for developing pneumonia or aspiration sequela.  Atypical/viral etiology pneumonia should be considered.  Recommend continued follow-up.      Electronically signed by: Jerry Paulson  Date:    02/28/2022  Time:    12:27    and X-Ray Chest PA and Lateral (CXR): No results found for this visit on 02/28/22.    Assessment and Plan:   Patient who presents with afib with RVR in setting of anemia and COPD exacerbation. HR improved meds adjusted. Transfusion planned.    * Atrial fibrillation with RVR  -Presents with afib with RVR in setting of COPD exacerbation and worsening anemia  -D/C diltiazem drip, resume oral BB  -Monitor BP  -Continue Xarelto for CVA prophylaxis     3/2/22  -HR variable but improved  -Toprol XL increased to 50 mg BID  -Continue Xarelto for CVA prophylaxis     Anemia of chronic disease  -H/H dipped to 7.1/21.9  -Recommend transfusion which will assist with  hypotension/PAF  -Defer to primary team  -Monitor closely on AC    3/2/22  -Recommend transfusion    COPD exacerbation  -Mgmt as per hospital medicine    Type 2 diabetes mellitus with circulatory disorder, without long-term current use of insulin  -Mgmt as per primary team    Primary hypertension  -BP low  -Monitor on BB therapy        VTE Risk Mitigation (From admission, onward)         Ordered     rivaroxaban tablet 20 mg  With dinner         03/02/22 1137     Place sequential compression device  Until discontinued         02/28/22 2137     IP VTE LOW RISK PATIENT  Once         02/28/22 2137                Deborah Macdonald PA-C  Cardiology  [facility]

## 2022-03-02 NOTE — PLAN OF CARE
No acute distress noted. Patient remains in A-Fib. No voiced complaints at this time. Call bell with in reach. Observation continued.

## 2022-03-02 NOTE — ASSESSMENT & PLAN NOTE
-Presents with afib with RVR in setting of COPD exacerbation and worsening anemia  -D/C diltiazem drip, resume oral BB  -Monitor BP  -Continue Xarelto for CVA prophylaxis     3/2/22  -HR variable but improved  -Toprol XL increased to 50 mg BID  -Continue Xarelto for CVA prophylaxis

## 2022-03-02 NOTE — ASSESSMENT & PLAN NOTE
- Diet controlled at home.  - Accuchecks with low dose SSI if needed.  - Diabetic diet.     BS rising- may need Levemir plus SSI     Getting better

## 2022-03-03 ENCOUNTER — TELEPHONE (OUTPATIENT)
Dept: CARDIOLOGY | Facility: HOSPITAL | Age: 62
End: 2022-03-03

## 2022-03-03 VITALS
SYSTOLIC BLOOD PRESSURE: 127 MMHG | WEIGHT: 128.31 LBS | HEIGHT: 62 IN | RESPIRATION RATE: 16 BRPM | DIASTOLIC BLOOD PRESSURE: 77 MMHG | BODY MASS INDEX: 23.61 KG/M2 | TEMPERATURE: 99 F | OXYGEN SATURATION: 100 % | HEART RATE: 96 BPM

## 2022-03-03 PROBLEM — J44.1 COPD EXACERBATION: Status: RESOLVED | Noted: 2022-02-28 | Resolved: 2022-03-03

## 2022-03-03 LAB
ANION GAP SERPL CALC-SCNC: 10 MMOL/L (ref 8–16)
ANISOCYTOSIS BLD QL SMEAR: SLIGHT
BASOPHILS NFR BLD: 0 % (ref 0–1.9)
BUN SERPL-MCNC: 16 MG/DL (ref 8–23)
CALCIUM SERPL-MCNC: 9.8 MG/DL (ref 8.7–10.5)
CHLORIDE SERPL-SCNC: 102 MMOL/L (ref 95–110)
CO2 SERPL-SCNC: 30 MMOL/L (ref 23–29)
CREAT SERPL-MCNC: 0.9 MG/DL (ref 0.5–1.4)
DIFFERENTIAL METHOD: ABNORMAL
EOSINOPHIL NFR BLD: 0 % (ref 0–8)
ERYTHROCYTE [DISTWIDTH] IN BLOOD BY AUTOMATED COUNT: 17.2 % (ref 11.5–14.5)
EST. GFR  (AFRICAN AMERICAN): >60 ML/MIN/1.73 M^2
EST. GFR  (NON AFRICAN AMERICAN): >60 ML/MIN/1.73 M^2
GLUCOSE SERPL-MCNC: 91 MG/DL (ref 70–110)
HCT VFR BLD AUTO: 33.7 % (ref 37–48.5)
HGB BLD-MCNC: 11.1 G/DL (ref 12–16)
HYPOCHROMIA BLD QL SMEAR: ABNORMAL
IMM GRANULOCYTES # BLD AUTO: ABNORMAL K/UL (ref 0–0.04)
IMM GRANULOCYTES NFR BLD AUTO: ABNORMAL % (ref 0–0.5)
LYMPHOCYTES NFR BLD: 27 % (ref 18–48)
MAGNESIUM SERPL-MCNC: 1.9 MG/DL (ref 1.6–2.6)
MCH RBC QN AUTO: 28.8 PG (ref 27–31)
MCHC RBC AUTO-ENTMCNC: 32.9 G/DL (ref 32–36)
MCV RBC AUTO: 87 FL (ref 82–98)
MONOCYTES NFR BLD: 4 % (ref 4–15)
MYELOCYTES NFR BLD MANUAL: 6 %
NEUTROPHILS NFR BLD: 60 % (ref 38–73)
NEUTS BAND NFR BLD MANUAL: 3 %
NRBC BLD-RTO: 1 /100 WBC
OVALOCYTES BLD QL SMEAR: ABNORMAL
PHOSPHATE SERPL-MCNC: 2.4 MG/DL (ref 2.7–4.5)
PLATELET # BLD AUTO: 343 K/UL (ref 150–450)
PLATELET BLD QL SMEAR: ABNORMAL
PMV BLD AUTO: 9.1 FL (ref 9.2–12.9)
POCT GLUCOSE: 92 MG/DL (ref 70–110)
POIKILOCYTOSIS BLD QL SMEAR: SLIGHT
POLYCHROMASIA BLD QL SMEAR: ABNORMAL
POTASSIUM SERPL-SCNC: 4.3 MMOL/L (ref 3.5–5.1)
RBC # BLD AUTO: 3.86 M/UL (ref 4–5.4)
SODIUM SERPL-SCNC: 142 MMOL/L (ref 136–145)
STOMATOCYTES BLD QL SMEAR: PRESENT
TARGETS BLD QL SMEAR: ABNORMAL
WBC # BLD AUTO: 11.56 K/UL (ref 3.9–12.7)

## 2022-03-03 PROCEDURE — 80048 BASIC METABOLIC PNL TOTAL CA: CPT | Performed by: EMERGENCY MEDICINE

## 2022-03-03 PROCEDURE — 94640 AIRWAY INHALATION TREATMENT: CPT

## 2022-03-03 PROCEDURE — 94799 UNLISTED PULMONARY SVC/PX: CPT

## 2022-03-03 PROCEDURE — 99900035 HC TECH TIME PER 15 MIN (STAT)

## 2022-03-03 PROCEDURE — 63700000 PHARM REV CODE 250 ALT 637 W/O HCPCS: Performed by: INTERNAL MEDICINE

## 2022-03-03 PROCEDURE — 85027 COMPLETE CBC AUTOMATED: CPT | Performed by: EMERGENCY MEDICINE

## 2022-03-03 PROCEDURE — 25000003 PHARM REV CODE 250: Performed by: NURSE PRACTITIONER

## 2022-03-03 PROCEDURE — 94664 DEMO&/EVAL PT USE INHALER: CPT

## 2022-03-03 PROCEDURE — 99233 PR SUBSEQUENT HOSPITAL CARE,LEVL III: ICD-10-PCS | Mod: ,,, | Performed by: INTERNAL MEDICINE

## 2022-03-03 PROCEDURE — 63600175 PHARM REV CODE 636 W HCPCS: Performed by: INTERNAL MEDICINE

## 2022-03-03 PROCEDURE — 27000646 HC AEROBIKA DEVICE

## 2022-03-03 PROCEDURE — 94761 N-INVAS EAR/PLS OXIMETRY MLT: CPT

## 2022-03-03 PROCEDURE — 25000003 PHARM REV CODE 250: Performed by: INTERNAL MEDICINE

## 2022-03-03 PROCEDURE — 36415 COLL VENOUS BLD VENIPUNCTURE: CPT | Performed by: NURSE PRACTITIONER

## 2022-03-03 PROCEDURE — 85007 BL SMEAR W/DIFF WBC COUNT: CPT | Performed by: EMERGENCY MEDICINE

## 2022-03-03 PROCEDURE — 84100 ASSAY OF PHOSPHORUS: CPT | Performed by: NURSE PRACTITIONER

## 2022-03-03 PROCEDURE — 99233 SBSQ HOSP IP/OBS HIGH 50: CPT | Mod: ,,, | Performed by: INTERNAL MEDICINE

## 2022-03-03 PROCEDURE — 97530 THERAPEUTIC ACTIVITIES: CPT

## 2022-03-03 PROCEDURE — 97162 PT EVAL MOD COMPLEX 30 MIN: CPT

## 2022-03-03 PROCEDURE — 97530 THERAPEUTIC ACTIVITIES: CPT | Performed by: PHYSICAL THERAPIST

## 2022-03-03 PROCEDURE — 63600175 PHARM REV CODE 636 W HCPCS: Performed by: EMERGENCY MEDICINE

## 2022-03-03 PROCEDURE — 25000242 PHARM REV CODE 250 ALT 637 W/ HCPCS: Performed by: EMERGENCY MEDICINE

## 2022-03-03 PROCEDURE — 25000242 PHARM REV CODE 250 ALT 637 W/ HCPCS: Performed by: INTERNAL MEDICINE

## 2022-03-03 PROCEDURE — 83735 ASSAY OF MAGNESIUM: CPT | Performed by: NURSE PRACTITIONER

## 2022-03-03 PROCEDURE — 97166 OT EVAL MOD COMPLEX 45 MIN: CPT | Performed by: PHYSICAL THERAPIST

## 2022-03-03 PROCEDURE — 25000003 PHARM REV CODE 250: Performed by: EMERGENCY MEDICINE

## 2022-03-03 RX ORDER — METHYLPREDNISOLONE 4 MG/1
TABLET ORAL
Qty: 21 EACH | Refills: 0 | Status: SHIPPED | OUTPATIENT
Start: 2022-03-03 | End: 2022-03-24

## 2022-03-03 RX ORDER — METOPROLOL SUCCINATE 50 MG/1
50 TABLET, EXTENDED RELEASE ORAL 2 TIMES DAILY
Qty: 60 TABLET | Refills: 11 | Status: SHIPPED | OUTPATIENT
Start: 2022-03-03 | End: 2023-03-03

## 2022-03-03 RX ADMIN — ARFORMOTEROL TARTRATE 15 MCG: 15 SOLUTION RESPIRATORY (INHALATION) at 07:03

## 2022-03-03 RX ADMIN — AZITHROMYCIN MONOHYDRATE 500 MG: 250 TABLET ORAL at 10:03

## 2022-03-03 RX ADMIN — METOPROLOL SUCCINATE 50 MG: 50 TABLET, EXTENDED RELEASE ORAL at 10:03

## 2022-03-03 RX ADMIN — IRON SUCROSE 200 MG: 20 INJECTION, SOLUTION INTRAVENOUS at 09:03

## 2022-03-03 RX ADMIN — Medication 1 TABLET: at 10:03

## 2022-03-03 RX ADMIN — PREDNISONE 40 MG: 20 TABLET ORAL at 10:03

## 2022-03-03 RX ADMIN — HYDROCODONE BITARTRATE AND ACETAMINOPHEN 1 TABLET: 7.5; 325 TABLET ORAL at 12:03

## 2022-03-03 RX ADMIN — CYANOCOBALAMIN 1000 MCG: 1000 INJECTION, SOLUTION INTRAMUSCULAR at 10:03

## 2022-03-03 RX ADMIN — THERA TABS 1 TABLET: TAB at 10:03

## 2022-03-03 RX ADMIN — BUDESONIDE 0.5 MG: 0.5 INHALANT ORAL at 07:03

## 2022-03-03 RX ADMIN — LEVALBUTEROL HYDROCHLORIDE 0.63 MG: 0.63 SOLUTION RESPIRATORY (INHALATION) at 01:03

## 2022-03-03 RX ADMIN — Medication 100 MG: at 10:03

## 2022-03-03 RX ADMIN — HYDROCODONE BITARTRATE AND ACETAMINOPHEN 1 TABLET: 7.5; 325 TABLET ORAL at 07:03

## 2022-03-03 RX ADMIN — LEVALBUTEROL HYDROCHLORIDE 0.63 MG: 0.63 SOLUTION RESPIRATORY (INHALATION) at 07:03

## 2022-03-03 RX ADMIN — Medication 5000 UNITS: at 10:03

## 2022-03-03 NOTE — PLAN OF CARE
RD Recommendations     1. Diet: consistent carbohydrate 1600 calories and cardiac   2. RD to follow up to monitor intake, tolerance, and labs.    *Please send consult if acute nutrition needs arise.    Jennifer De La Garza MS, RD, LDN

## 2022-03-03 NOTE — PLAN OF CARE
AAOX4. Pt on 2L NC. Pt remained free from fall and injury. No sign of any distress noted. Safety precautions alert. Pt asked to call for assistance if needed. IV access clean dry and intact infusing . Pt has starting on blood components receiving !unit of PRBCS so far. Afib on tele monitoring.  Chart checked reviewed. VSS. Will continue to monitor on going.

## 2022-03-03 NOTE — ASSESSMENT & PLAN NOTE
-H/H dipped to 7.1/21.9  -Recommend transfusion which will assist with hypotension/PAF  -Defer to primary team  -Monitor closely on AC    3/2/22  -Recommend transfusion    3/3/22  -H/H improved post transfusion

## 2022-03-03 NOTE — RESPIRATORY THERAPY
Home Oxygen Evaluation    Date Performed: 3/3/2022    1) Patient's Home O2 Sat on room air, while at rest: 90        If O2 sats on room air at rest are 88% or below, patient qualifies. No additional testing needed. Document N/A in steps 2 and 3. If 89% or above, complete steps 2.      2) Patient's O2 Sat on room air while exercisin        If O2 sats on room air while exercising remain 89% or above patient does not qualify, no further testing needed Document N/A in step 3. If O2 sats on room air while exercising are 88% or below, continue to step 3.      3) Patient's O2 Sat while exercising on O2: 95 at 2 LPM         (Must show improvement from #2 for patients to qualify)    If O2 sats improve on oxygen, patient qualifies for portable oxygen. If not, the patient does not qualify.

## 2022-03-03 NOTE — PROGRESS NOTES
[facility]  Cardiology  Progress Note    Patient Name: Genie Balderas  MRN: [id number]  Admission Date: 2/28/2022  Hospital Length of Stay: 2 days  Code Status: Full Code   Attending Physician: Rebel Ortega MD   Primary Care Physician: Dawit Grey MD  Expected Discharge Date: 3/3/2022  Principal Problem:Atrial fibrillation with RVR    Subjective:   HPI:  Ms. Oseguera is a 61 year old female patient whose current medical conditions include asthma, anemia of chronic diease, COPD, DM type II, GERD, history of lymphoma, HTN, hyperlipidemia, tobacco abuse, and PAF (on Xarelto) who presented to [facility] ED yesterday with a chief complaint of worsening cough over the past 3-4 days. Associated symptoms included SOB, palpitations, pleuritic chest discomfort, abdominal pain, decreased appetite, weight loss, and back pain. She denied any associated wheezing, orthopnea, PND, fever, palpitations, near syncope, or syncope. Initial workup in ED revealed leukocytosis (WBC 14,000), mild hypoxia (O2 sat 91% on RA), and +d-dimer. CTA of chest negative for PE but did reveal chronic patchy areas of bronchovascular thickening and bronchovascular infiltrates with lisa lymphatic nodules. While in ED, patient converted to afib with RVR (HR in 150's) and she was subsequently started on a cardizem drip and admitted for further evaluation and treatment. Cardiology consulted to assist with management. Patient seen and examined today, resting in bed. Feels ok. Complains of increased fatigue and weakness. States she was too tired to ambulate to the bathroom this AM. SOB slightly improved. No brendan chest pain. She denies any prior history of CAD or MI. States she is not followed by any cardiologist as OP, but does report compliance with Xarelto. Chart reviewed. Troponin x 1 negative. H/H dipped to 7.1/21.9. Echo pending.      Hospital Course:   3/2/22-Patient seen and examined today, sitting up in bed. Feeling better. SOB improving.  Still weak/fatigued. Remains in afib, HR elevated this AM but improved after po BB dose. Labs reviewed. H/H 7.4/23.2. Creatinine normal. Echo showed normal EF, pulmonary HTN.    3/3/22-Patient  seen and examined today, sitting up in bedside chair. Feels much better, back to baseline. Still in afib, HR controlled. H/H improved post transfusion.           Review of Systems   Constitutional: Positive for malaise/fatigue.   HENT: Negative.     Eyes: Negative.    Cardiovascular:  Positive for dyspnea on exertion (improved).   Respiratory: Negative.     Endocrine: Negative.    Hematologic/Lymphatic: Negative.    Skin: Negative.    Musculoskeletal:  Positive for arthritis and joint pain.   Gastrointestinal: Negative.    Genitourinary: Negative.    Neurological: Negative.    Psychiatric/Behavioral: Negative.     Allergic/Immunologic: Negative.    Objective:     Vital Signs (Most Recent):  Temp: 98.6 °F (37 °C) (03/03/22 1144)  Pulse: 96 (03/03/22 1353)  Resp: 16 (03/03/22 1353)  BP: 127/77 (03/03/22 1144)  SpO2: 100 % (03/03/22 1144) Vital Signs (24h Range):  Temp:  [97.6 °F (36.4 °C)-98.7 °F (37.1 °C)] 98.6 °F (37 °C)  Pulse:  [] 96  Resp:  [16-18] 16  SpO2:  [93 %-100 %] 100 %  BP: ()/(54-92) 127/77     Weight: 58.2 kg (128 lb 4.9 oz)  Body mass index is 23.47 kg/m².     SpO2: 100 %  O2 Device (Oxygen Therapy): room air      Intake/Output Summary (Last 24 hours) at 3/3/2022 1453  Last data filed at 3/3/2022 0900  Gross per 24 hour   Intake 1074.82 ml   Output --   Net 1074.82 ml       Lines/Drains/Airways       Peripheral Intravenous Line  Duration                  Peripheral IV - Single Lumen 02/28/22 1633 18 G Right Antecubital 2 days         Peripheral IV - Single Lumen 03/01/22 0100 20 G Anterior;Left;Lateral Forearm 2 days         Peripheral IV - Single Lumen 03/01/22 0100 22 G Anterior;Left;Medial Forearm 2 days                    Physical Exam  Vitals and nursing note reviewed.   Constitutional:        General: She is not in acute distress.     Appearance: Normal appearance. She is well-developed. She is not diaphoretic.      Comments: On supplemental O2   HENT:      Head: Normocephalic and atraumatic.   Eyes:      General:         Right eye: No discharge.         Left eye: No discharge.      Pupils: Pupils are equal, round, and reactive to light.   Neck:      Thyroid: No thyromegaly.      Vascular: No JVD.      Trachea: No tracheal deviation.   Cardiovascular:      Rate and Rhythm: Normal rate. Rhythm irregularly irregular.      Heart sounds: Normal heart sounds, S1 normal and S2 normal. No murmur heard.  Pulmonary:      Effort: Pulmonary effort is normal. No respiratory distress.      Breath sounds: Normal breath sounds. No wheezing or rales.   Abdominal:      General: There is no distension.      Tenderness: There is no rebound.   Musculoskeletal:      Cervical back: Neck supple.      Right lower leg: No edema.      Left lower leg: No edema.   Skin:     General: Skin is warm and dry.      Findings: No erythema.   Neurological:      Mental Status: She is alert and oriented to person, place, and time.   Psychiatric:         Mood and Affect: Mood normal.         Behavior: Behavior normal.         Thought Content: Thought content normal.       Significant Labs: CBC   Recent Labs   Lab 03/02/22  0443 03/03/22  0702   WBC 10.12 11.56   HGB 7.4* 11.1*   HCT 23.2* 33.7*    343    and All pertinent lab results from the last 24 hours have been reviewed.  CMP   Recent Labs   Lab 03/02/22 0443 03/03/22  0702    142   K 4.6 4.3    102   CO2 22* 30*   * 91   BUN 16 16   CREATININE 1.0 0.9   CALCIUM 9.6 9.8   ANIONGAP 11 10   ESTGFRAFRICA >60 >60   EGFRNONAA >60 >60   , CBC   Recent Labs   Lab 03/02/22  0443 03/03/22  0702   WBC 10.12 11.56   HGB 7.4* 11.1*   HCT 23.2* 33.7*    343   , Troponin No results for input(s): TROPONINI in the last 48 hours., and All pertinent lab results from the  last 24 hours have been reviewed.  Significant Imaging: Echocardiogram: Transthoracic echo (TTE) complete (Cupid Only):   Results for orders placed or performed during the hospital encounter of 02/28/22   Echo   Result Value Ref Range    BSA 1.63 m2    TDI SEPTAL 0.11 m/s    LV LATERAL E/E' RATIO 5.50 m/s    LV SEPTAL E/E' RATIO 8.00 m/s    LA WIDTH 3.10 cm    IVC diameter 1.3 cm    Left Ventricular Outflow Tract Mean Velocity 0.2827033026 cm/s    Left Ventricular Outflow Tract Mean Gradient 1.89 mmHg    TV mean gradient 35 mmHg    TDI LATERAL 0.16 m/s    LVIDd 4.12 3.5 - 6.0 cm    IVS 1.23 (A) 0.6 - 1.1 cm    Posterior Wall 1.12 (A) 0.6 - 1.1 cm    Ao root annulus 2.98 cm    LVIDs 2.58 2.1 - 4.0 cm    FS 37 28 - 44 %    LA volume 39.44 cm3    Ascending aorta 2.75 cm    LV mass 167.75 g    LA size 3.17 cm    RVDD 2.40 cm    TAPSE 1.50 cm    Left Ventricle Relative Wall Thickness 0.54 cm    AV mean gradient 6 mmHg    AV valve area 2.31 cm2    AV Velocity Ratio 0.59     AV index (prosthetic) 0.78     MV valve area p 1/2 method 7.50 cm2    Mean e' 0.14 m/s    E wave deceleration time 101.053402428904116 msec    IVRT 55.841958146515793 msec    LVOT diameter 1.94 cm    LVOT area 3.0 cm2    LVOT peak wing 0.84 m/s    LVOT peak VTI 17.70 cm    Ao peak wing 1.43 m/s    Ao VTI 22.6 cm    RVOT peak wing 0.93 m/s    RVOT peak VTI 20.0 cm    Mr max wing 4.33 m/s    LVOT stroke volume 52.29 cm3    AV peak gradient 8 mmHg    PV mean gradient 2.42 mmHg    E/E' ratio 6.52 m/s    MV Peak E Wing 0.88 m/s    TR Max Wing 3.47 m/s    MV stenosis pressure 1/2 time 29.0539029 ms    LV Systolic Volume 24.17 mL    LV Systolic Volume Index 15.0 mL/m2    LV Diastolic Volume 74.92 mL    LV Diastolic Volume Index 46.53 mL/m2    LA Volume Index 24.5 mL/m2    LV Mass Index 104 g/m2    Echo EF Estimated 68 %    RA Major Axis 3.58 cm    Left Atrium Minor Axis 4.48 cm    Left Atrium Major Axis 4.99 cm    Triscuspid Valve Regurgitation Peak Gradient 48  mmHg    Right Atrial Pressure (from IVC) 3 mmHg    EF 55 %    TV rest pulmonary artery pressure 51 mmHg    Narrative    · The left ventricle is normal in size with concentric hypertrophy and   normal systolic function.  · The estimated ejection fraction is 55%.  · Indeterminate left ventricular diastolic function.  · Normal right ventricular size with low normal right ventricular systolic   function.  · Moderate tricuspid regurgitation.  · Mild mitral regurgitation.  · Normal central venous pressure (3 mmHg).  · The estimated PA systolic pressure is 51 mmHg.  · There is pulmonary hypertension.      , EKG: Reviewed, Stress Test: Reviewed, and X-Ray: CXR: X-Ray Chest 1 View (CXR):   Results for orders placed or performed during the hospital encounter of 02/28/22   X-Ray Chest 1 View    Narrative    EXAMINATION:  XR CHEST 1 VIEW    CLINICAL HISTORY:  shortness of breath;    TECHNIQUE:  Single frontal view of the chest was performed.    COMPARISON:  Chest radiograph 10/07/2019    FINDINGS:  Subtle patchy opacities are scattered throughout the lungs with focal reticulonodular/consolidative opacification in the right lower lung.  No effusion or pneumothorax.  Unchanged appearance of the cardiomediastinal silhouette.  No free air under the diaphragm.  No acute osseous abnormality.      Impression    Findings concerning for developing pneumonia or aspiration sequela.  Atypical/viral etiology pneumonia should be considered.  Recommend continued follow-up.      Electronically signed by: Jerry Paulson  Date:    02/28/2022  Time:    12:27    and X-Ray Chest PA and Lateral (CXR): No results found for this visit on 02/28/22.    Assessment and Plan:   Patient who presents with afib with RVR in setting of anemia/COPD exacerbation. Clinically improved. Follow-up in clinic.    * Atrial fibrillation with RVR  -Presents with afib with RVR in setting of COPD exacerbation and worsening anemia  -D/C diltiazem drip, resume oral  BB  -Monitor BP  -Continue Xarelto for CVA prophylaxis     3/2/22  -HR variable but improved  -Toprol XL increased to 50 mg BID  -Continue Xarelto for CVA prophylaxis     3/3/22  -HR stable  -Continue Toprol XL 50 mg BID  -Continue Xarelto  -Follow-up in clinic    Anemia of chronic disease  -H/H dipped to 7.1/21.9  -Recommend transfusion which will assist with hypotension/PAF  -Defer to primary team  -Monitor closely on AC    3/2/22  -Recommend transfusion    3/3/22  -H/H improved post transfusion    Type 2 diabetes mellitus with circulatory disorder, without long-term current use of insulin  -Mgmt as per primary team    Primary hypertension  -BP low  -Monitor on BB therapy        VTE Risk Mitigation (From admission, onward)         Ordered     rivaroxaban tablet 20 mg  With dinner         03/02/22 1137     Place sequential compression device  Until discontinued         02/28/22 2137     IP VTE LOW RISK PATIENT  Once         02/28/22 2137                Deborah Macdonald PA-C  Cardiology  [facility]

## 2022-03-03 NOTE — PLAN OF CARE
P.T. EVAL COMPLETE, PT CURRENTLY REQUIRES  SBA FOR BED MOBILITY, CGA FOR TF'S AND GAIT WITH RW.  P.T. RECOMMENDS HHPT

## 2022-03-03 NOTE — PLAN OF CARE
Patient currently having blood transfusion. Patient tolerating well.no adverse reactions noted at this time. Call bell with in reach. Observation continued.

## 2022-03-03 NOTE — DISCHARGE SUMMARY
[facility] Medicine  Discharge Summary      Patient Name: Genie Balderas  MRN: [id number]  Patient Class: IP- Inpatient  Admission Date: 2/28/2022  Hospital Length of Stay: 2 days  Discharge Date and Time: No discharge date for patient encounter.  Attending Physician: Rebel Ortega MD   Discharging Provider: Rebel Ortega MD  Primary Care Provider: Dawit Grey MD      HPI:   Genie Balderas is a 61 y.o. female with a PMHx of asthma, anemia of chronic disease, COPD, DM II, GERD, h/o lymphoma, HTN, HLD, continued tobacco use, and PAF on Xarelto who presented to the Emergency Department with c/o nonproductive cough x 3-4 days. No aggravating or alleviating factors. Associated SOB, palpitations, pleuritic chest pain, generalized ABD pain, and back pain. Denies wheezing, orthopnea, PND, edema, weight gain, N/V/D, HA, lightheadedness, dizziness, syncope, weakness, rhinorrhea, sore throat, congestion, fever or chills. Upon arrival to the ED, patient was mildly hypoxic with O2 sat 91% on RA. She was placed on 3 L NC to improve sat to >92%. Work-up in the ED showed: WBC 14, D-Dimer 4.97, BNP 65, troponin 0.008, lactic 1.3, COVID negative. CTA of chest showed no evidence of PE, no dissection; chronic patchy areas of bronchovascular thickening and bronchovascular infiltrates bilateral lungs with Mariah lymphatic nodules, correlate clinically for chronic process such as sarcoidosis, consider biopsy or further diagnostic workup. Patient received Duonebs x 3, 125 mg IV Solumedrol, IV azithromycin, IV Rocephin, and 2 L IV fluids. While in the ED, patient converted to AF with RVR (HR 130s-150s), remained hemodynamically stable. IV diltiazem 10 mg x 3 doses given with good HR response, and diltiazem drip initiated at 5 mg/hr. Hospital Medicine was contacted for admission and patient placed in Observation.   Patient is a Full Code.       * No surgery found *      Hospital Course:   61 y.o. female with asthma,  anemia of chronic disease, COPD, DM II, GERD, h/o lymphoma, HTN, HLD, continued tobacco use, and PAF on Xarelto who presented to the Emergency Department with c/o nonproductive cough x 3-4 days. Associated SOB, palpitations, pleuritic chest pain, generalized ABD pain, and back pain. In the ER, patient was mildly hypoxic with O2 sat 91% on RA. She was placed on 3 L NC to improve sat to >92%. WBC 14, D-Dimer 4.97, BNP 65, troponin 0.008, lactic 1.3, COVID negative. CTA of chest showed no evidence of PE, no dissection; chronic patchy areas of bronchovascular thickening and bronchovascular infiltrates bilateral lungs with Mariah lymphatic nodules, correlate clinically for chronic process such as sarcoidosis, consider biopsy or further diagnostic workup. Patient received Duonebs x 3, 125 mg IV Solumedrol, IV azithromycin, IV Rocephin, and 2 L IV fluids. While in the ED, patient converted to AF with RVR (HR 130s-150s), remained hemodynamically stable. IV diltiazem 10 mg x 3 doses given with good HR response, and diltiazem drip initiated at 5 mg/hr.    3/1- looks a little better, remains in Afib but rate controlled at 85/ min. C/o back, rib pain from the pneumonia she had earlier, got Norco. Off Diltiazem gtt. Has severe GARIMA- getting IV Venofer plus B12 and triple Vitamins. Encouraged to get OOB and ambulate.     3/2- resting in bed, feels a little better, back/rib pain improved, remains in afib with . H/h 7.4/22- will transfuse 2 units of blood and give her Triple Vitamins plus vit D and venofer. Encouraged to do IS and Acapela. Encouraged to sit in chair and ambulate.     Patient improved with Steroids, O2, Nebs, therapies. Patient transfused PRBC's with good effect. Hgb 11.1 at d/c. Patient evaluated for home O2 need and qualified will go home on O2 2 LPM - 95%. Additionally the patient has been referred to H/H amd NP at home care. Patient stable for a safe discharge to home.       Goals of Care Treatment  "Preferences:  Code Status: Full Code      Consults:   Consults (From admission, onward)        Status Ordering Provider     Inpatient consult to Social Work/Case Management  Once        Provider:  (Not yet assigned)    Ordered HERB SPAIN     Inpatient consult to Cardiology  Once        Provider:  Cesar Hou MD    Completed CLAUDIA DESAI     Inpatient consult to Registered Dietitian/Nutritionist  Once        Provider:  (Not yet assigned)    Acknowledged CLAUDIA DESAI          Primary hypertension  - BP soft, but stable since starting diltiazem drip. Continue home BB as BP allows.      Final Active Diagnoses:    Diagnosis Date Noted POA    PRINCIPAL PROBLEM:  Atrial fibrillation with RVR [I48.91] 02/28/2022 Yes    Anemia of chronic disease [D63.8] 02/28/2022 Yes     Chronic    Type 2 diabetes mellitus with circulatory disorder, without long-term current use of insulin [E11.59] 10/07/2019 Yes     Chronic    Primary hypertension [I10] 09/25/2017 Yes     Chronic      Problems Resolved During this Admission:    Diagnosis Date Noted Date Resolved POA    COPD exacerbation [J44.1] 02/28/2022 03/03/2022 Yes       Discharged Condition: stable    Disposition: Home or Self Care    Follow Up:   Follow-up Information     Dawit Grey MD Follow up on 3/7/2022.    Specialty: Family Medicine  Why: Hospital Follow-up at 10:00AM  Contact information:  [address] [facility] [address] [phone]                       Patient Instructions:      WALKER FOR HOME USE     Order Specific Question Answer Comments   Type of Walker: Adult (5'4"-6'6")    With wheels? Yes    Height: 5' 2" (1.575 m)    Weight: 58.2 kg (128 lb 4.9 oz)    Length of need (1-99 months): 99    Does patient have medical equipment at home? rollator PT USES HER 'S ROLLATOR WALKER / PT USES HER 'S ROLLATOR WALKER / PT USES HER 'S ROLLATOR WALKER   Does patient have medical equipment at home? shower " "chair    Does patient have medical equipment at home? grab bar    Please check all that apply: Patient's condition impairs ambulation.      OXYGEN FOR HOME USE     Order Specific Question Answer Comments   Liter Flow 2    Duration Continuous    Qualifying Test Performed at: Activity    Oxygen saturation at rest 90    Oxygen saturation with activity 86    Oxygen saturation with activity on oxygen 95    Portable mode: continuous    Route nasal cannula    Device: home concentrator with portable tanks    Length of need (in months): 99 mos    Patient condition with qualifying saturation COPD    Height: 5' 2" (1.575 m)    Weight: 58.2 kg (128 lb 4.9 oz)    Alternative treatment measures have been tried or considered and deemed clinically ineffective. Yes      Ambulatory referral/consult to [facility]   Standing Status: Future   Referral Priority: Routine Referral Type: Consultation   Referral Reason: Specialty Services Required   Number of Visits Requested: 1     Diet Cardiac     Activity as tolerated       Significant Diagnostic Studies: Labs:   BMP:   Recent Labs   Lab 03/02/22  0443 03/03/22  0702   * 91    142   K 4.6 4.3    102   CO2 22* 30*   BUN 16 16   CREATININE 1.0 0.9   CALCIUM 9.6 9.8   MG 2.0 1.9   , CMP   Recent Labs   Lab 03/02/22  0443 03/03/22  0702    142   K 4.6 4.3    102   CO2 22* 30*   * 91   BUN 16 16   CREATININE 1.0 0.9   CALCIUM 9.6 9.8   ANIONGAP 11 10   ESTGFRAFRICA >60 >60   EGFRNONAA >60 >60   , CBC   Recent Labs   Lab 03/02/22  0443 03/03/22  0702   WBC 10.12 11.56   HGB 7.4* 11.1*   HCT 23.2* 33.7*    343   , Troponin   Recent Labs   Lab 02/28/22  1200   TROPONINI 0.008    and All labs within the past 24 hours have been reviewed    Pending Diagnostic Studies:     None         Medications:  Reconciled Home Medications:      Medication List      START taking these medications    methylPREDNISolone 4 mg " tablet  Commonly known as: MEDROL DOSEPACK  Take as directed        CHANGE how you take these medications    metoprolol succinate 50 MG 24 hr tablet  Commonly known as: TOPROL-XL  Take 1 tablet (50 mg total) by mouth 2 (two) times daily.  What changed: when to take this        CONTINUE taking these medications    blood sugar diagnostic Strp  QAC and QHS     budesonide-formoterol 160-4.5 mcg 160-4.5 mcg/actuation Hfaa  Commonly known as: SYMBICORT  Inhale 2 puffs into the lungs every 12 (twelve) hours. Controller     HYDROcodone-acetaminophen 7.5-325 mg per tablet  Commonly known as: NORCO  Take 1 tablet by mouth every 4 to 6 hours as needed for Pain.     ipratropium 0.02 % nebulizer solution  Commonly known as: ATROVENT  Take 2.5 mLs (500 mcg total) by nebulization every 6 (six) hours. Rescue     levalbuterol 0.63 mg/3 mL nebulizer solution  Commonly known as: XOPENEX  Take 3 mLs (0.63 mg total) by nebulization every 6 (six) hours. Rescue     LORazepam 0.5 MG tablet  Commonly known as: ATIVAN  Take 0.5 mg by mouth every evening.     montelukast 10 mg tablet  Commonly known as: SINGULAIR  Take 10 mg by mouth every evening.     rivaroxaban 20 mg Tab  Commonly known as: XARELTO  Take 20 mg by mouth daily with dinner or evening meal.            Indwelling Lines/Drains at time of discharge:   Lines/Drains/Airways     None                 Time spent on the discharge of patient: 36 minutes         Rebel Ortega MD  Department of Hospital Medicine  '[place] [facility]

## 2022-03-03 NOTE — SUBJECTIVE & OBJECTIVE
Review of Systems   Constitutional: Positive for malaise/fatigue.   HENT: Negative.     Eyes: Negative.    Cardiovascular:  Positive for dyspnea on exertion (improved).   Respiratory: Negative.     Endocrine: Negative.    Hematologic/Lymphatic: Negative.    Skin: Negative.    Musculoskeletal:  Positive for arthritis and joint pain.   Gastrointestinal: Negative.    Genitourinary: Negative.    Neurological: Negative.    Psychiatric/Behavioral: Negative.     Allergic/Immunologic: Negative.    Objective:     Vital Signs (Most Recent):  Temp: 98.6 °F (37 °C) (03/03/22 1144)  Pulse: 96 (03/03/22 1353)  Resp: 16 (03/03/22 1353)  BP: 127/77 (03/03/22 1144)  SpO2: 100 % (03/03/22 1144) Vital Signs (24h Range):  Temp:  [97.6 °F (36.4 °C)-98.7 °F (37.1 °C)] 98.6 °F (37 °C)  Pulse:  [] 96  Resp:  [16-18] 16  SpO2:  [93 %-100 %] 100 %  BP: ()/(54-92) 127/77     Weight: 58.2 kg (128 lb 4.9 oz)  Body mass index is 23.47 kg/m².     SpO2: 100 %  O2 Device (Oxygen Therapy): room air      Intake/Output Summary (Last 24 hours) at 3/3/2022 1453  Last data filed at 3/3/2022 0900  Gross per 24 hour   Intake 1074.82 ml   Output --   Net 1074.82 ml       Lines/Drains/Airways       Peripheral Intravenous Line  Duration                  Peripheral IV - Single Lumen 02/28/22 1633 18 G Right Antecubital 2 days         Peripheral IV - Single Lumen 03/01/22 0100 20 G Anterior;Left;Lateral Forearm 2 days         Peripheral IV - Single Lumen 03/01/22 0100 22 G Anterior;Left;Medial Forearm 2 days                    Physical Exam  Vitals and nursing note reviewed.   Constitutional:       General: She is not in acute distress.     Appearance: Normal appearance. She is well-developed. She is not diaphoretic.      Comments: On supplemental O2   HENT:      Head: Normocephalic and atraumatic.   Eyes:      General:         Right eye: No discharge.         Left eye: No discharge.      Pupils: Pupils are equal, round, and reactive to light.    Neck:      Thyroid: No thyromegaly.      Vascular: No JVD.      Trachea: No tracheal deviation.   Cardiovascular:      Rate and Rhythm: Normal rate. Rhythm irregularly irregular.      Heart sounds: Normal heart sounds, S1 normal and S2 normal. No murmur heard.  Pulmonary:      Effort: Pulmonary effort is normal. No respiratory distress.      Breath sounds: Normal breath sounds. No wheezing or rales.   Abdominal:      General: There is no distension.      Tenderness: There is no rebound.   Musculoskeletal:      Cervical back: Neck supple.      Right lower leg: No edema.      Left lower leg: No edema.   Skin:     General: Skin is warm and dry.      Findings: No erythema.   Neurological:      Mental Status: She is alert and oriented to person, place, and time.   Psychiatric:         Mood and Affect: Mood normal.         Behavior: Behavior normal.         Thought Content: Thought content normal.       Significant Labs: CBC   Recent Labs   Lab 03/02/22  0443 03/03/22  0702   WBC 10.12 11.56   HGB 7.4* 11.1*   HCT 23.2* 33.7*    343    and All pertinent lab results from the last 24 hours have been reviewed.  CMP   Recent Labs   Lab 03/02/22  0443 03/03/22  0702    142   K 4.6 4.3    102   CO2 22* 30*   * 91   BUN 16 16   CREATININE 1.0 0.9   CALCIUM 9.6 9.8   ANIONGAP 11 10   ESTGFRAFRICA >60 >60   EGFRNONAA >60 >60   , CBC   Recent Labs   Lab 03/02/22 0443 03/03/22  0702   WBC 10.12 11.56   HGB 7.4* 11.1*   HCT 23.2* 33.7*    343   , Troponin No results for input(s): TROPONINI in the last 48 hours., and All pertinent lab results from the last 24 hours have been reviewed.  Significant Imaging: Echocardiogram: Transthoracic echo (TTE) complete (Cupid Only):   Results for orders placed or performed during the hospital encounter of 02/28/22   Echo   Result Value Ref Range    BSA 1.63 m2    TDI SEPTAL 0.11 m/s    LV LATERAL E/E' RATIO 5.50 m/s    LV SEPTAL E/E' RATIO 8.00 m/s    LA WIDTH  3.10 cm    IVC diameter 1.3 cm    Left Ventricular Outflow Tract Mean Velocity 0.9753264439 cm/s    Left Ventricular Outflow Tract Mean Gradient 1.89 mmHg    TV mean gradient 35 mmHg    TDI LATERAL 0.16 m/s    LVIDd 4.12 3.5 - 6.0 cm    IVS 1.23 (A) 0.6 - 1.1 cm    Posterior Wall 1.12 (A) 0.6 - 1.1 cm    Ao root annulus 2.98 cm    LVIDs 2.58 2.1 - 4.0 cm    FS 37 28 - 44 %    LA volume 39.44 cm3    Ascending aorta 2.75 cm    LV mass 167.75 g    LA size 3.17 cm    RVDD 2.40 cm    TAPSE 1.50 cm    Left Ventricle Relative Wall Thickness 0.54 cm    AV mean gradient 6 mmHg    AV valve area 2.31 cm2    AV Velocity Ratio 0.59     AV index (prosthetic) 0.78     MV valve area p 1/2 method 7.50 cm2    Mean e' 0.14 m/s    E wave deceleration time 101.743515137087629 msec    IVRT 55.610762250216479 msec    LVOT diameter 1.94 cm    LVOT area 3.0 cm2    LVOT peak wing 0.84 m/s    LVOT peak VTI 17.70 cm    Ao peak wing 1.43 m/s    Ao VTI 22.6 cm    RVOT peak wing 0.93 m/s    RVOT peak VTI 20.0 cm    Mr max wing 4.33 m/s    LVOT stroke volume 52.29 cm3    AV peak gradient 8 mmHg    PV mean gradient 2.42 mmHg    E/E' ratio 6.52 m/s    MV Peak E Wing 0.88 m/s    TR Max Wing 3.47 m/s    MV stenosis pressure 1/2 time 29.3973683 ms    LV Systolic Volume 24.17 mL    LV Systolic Volume Index 15.0 mL/m2    LV Diastolic Volume 74.92 mL    LV Diastolic Volume Index 46.53 mL/m2    LA Volume Index 24.5 mL/m2    LV Mass Index 104 g/m2    Echo EF Estimated 68 %    RA Major Axis 3.58 cm    Left Atrium Minor Axis 4.48 cm    Left Atrium Major Axis 4.99 cm    Triscuspid Valve Regurgitation Peak Gradient 48 mmHg    Right Atrial Pressure (from IVC) 3 mmHg    EF 55 %    TV rest pulmonary artery pressure 51 mmHg    Narrative    · The left ventricle is normal in size with concentric hypertrophy and   normal systolic function.  · The estimated ejection fraction is 55%.  · Indeterminate left ventricular diastolic function.  · Normal right ventricular size with  low normal right ventricular systolic   function.  · Moderate tricuspid regurgitation.  · Mild mitral regurgitation.  · Normal central venous pressure (3 mmHg).  · The estimated PA systolic pressure is 51 mmHg.  · There is pulmonary hypertension.      , EKG: Reviewed, Stress Test: Reviewed, and X-Ray: CXR: X-Ray Chest 1 View (CXR):   Results for orders placed or performed during the hospital encounter of 02/28/22   X-Ray Chest 1 View    Narrative    EXAMINATION:  XR CHEST 1 VIEW    CLINICAL HISTORY:  shortness of breath;    TECHNIQUE:  Single frontal view of the chest was performed.    COMPARISON:  Chest radiograph 10/07/2019    FINDINGS:  Subtle patchy opacities are scattered throughout the lungs with focal reticulonodular/consolidative opacification in the right lower lung.  No effusion or pneumothorax.  Unchanged appearance of the cardiomediastinal silhouette.  No free air under the diaphragm.  No acute osseous abnormality.      Impression    Findings concerning for developing pneumonia or aspiration sequela.  Atypical/viral etiology pneumonia should be considered.  Recommend continued follow-up.      Electronically signed by: Jerry Paulson  Date:    02/28/2022  Time:    12:27    and X-Ray Chest PA and Lateral (CXR): No results found for this visit on 02/28/22.

## 2022-03-03 NOTE — PT/OT/SLP EVAL
Physical Therapy Evaluation    Patient Name:  Genie Balderas   MRN:  [id number]    Recommendations:     Discharge Recommendations:  home health PT   Discharge Equipment Recommendations: none   Barriers to discharge: None    Assessment:     Genie Balderas is a 61 y.o. female admitted with a medical diagnosis of Atrial fibrillation with RVR.  She presents with the following impairments/functional limitations:  weakness, impaired endurance, impaired functional mobilty, gait instability, impaired balance, pain, decreased safety awareness, decreased lower extremity function.    Rehab Prognosis: Good; patient would benefit from acute skilled PT services to address these deficits and reach maximum level of function.    Recent Surgery: * No surgery found *     Plan:     During this hospitalization, patient to be seen 3 x/week to address the identified rehab impairments via gait training, therapeutic activities, therapeutic exercises and progress toward the following goals:    · Plan of Care Expires:  03/17/22    Subjective     Chief Complaint: PAIN, WEAKNESS  Patient/Family Comments/goals:   Pain/Comfort:  · Pain Rating 1: 10/10  · Location - Side 1: Bilateral  · Location - Orientation 1: generalized  · Location 1: back (AND ABDOMEN)  · Pain Rating Post-Intervention 1: 10/10  · Pain Rating 2: 10/10  · Location - Side 2: Bilateral  · Location - Orientation 2: generalized  · Location 2: leg  · Pain Addressed 2: Reposition, Distraction  · Pain Rating Post-Intervention 2: 10/10    Patients cultural, spiritual, Mu-ism conflicts given the current situation:      Living Environment:  PT LIVES WITH , 2 STORY HOUSE, BR AND BATH ON 1ST FLOOR, NO STEPS TO ENTER, AMB INDEP IN HOME, USES ROLLATOR AS NEEDED OR FOR COMMUNITY DISTANCES, DOES NOT DRIVE, INDEP WITH ADL'S  Prior to admission, patients level of function was EBONY.  Equipment used at home: rollator, shower chair, grab bar (PT USES HER 'S ROLLATOR WALKER).  DME owned  (not currently used): none.  Upon discharge, patient will have assistance from .    Objective:     Communicated with NURSE [place] prior to session.  Patient found supine with peripheral IV, bed alarm, telemetry  upon PT entry to room.    General Precautions: Standard, fall   Orthopedic Precautions:N/A   Braces: N/A  Respiratory Status: Room air    Exams:  · Cognitive Exam:  Patient is oriented to Person, Place, Time and Situation  · Postural Exam:  Patient presented with the following abnormalities:    · -       Rounded shoulders  · -       Forward head  · Sensation:    · -       Impaired  light/touch BLE  · Skin Integrity/Edema:      · -       Edema: Mild B FEET  · RLE ROM: WFL  · RLE Strength: GROSSLY 3+/5  · LLE ROM: WFL  · LLE Strength: GROSSLY 3+/5    Functional Mobility:  · Bed Mobility:     · Rolling Left:  stand by assistance  · Scooting: stand by assistance  · Supine to Sit: stand by assistance  · Transfers:     · Sit to Stand:  contact guard assistance with rolling walker  · Bed to Chair: contact guard assistance with  rolling walker  using  Step Transfer  · Gait: PT AMB 20' IN ROOM WITH RW AND MIN/CGA, SLOW PACE, C/O MILD DIZZINESS, CUES FOR UPRIGHT POSTURE AND RW SAFETY  · Balance: FAIR-    Therapeutic Activities and Exercises:   PT EDUCATED IN ROLE OF P.T. AND POC, PT EDUCATED IN RW USE AND SAFETY DURING TF'S AND GAIT, PT ENCOURAGED TO INCREASE TIME OOB IN CHAIR, PT EDUCATED IN BLE THEREX TO PERFORM WHILE SEATED IN CHAIR: HIP FLEX/EXT, LAQ, AP'S    AM-PAC 6 CLICK MOBILITY  Total Score:18     Patient left up in chair with all lines intact, call button in reach and NURSE notified.    GOALS:   Multidisciplinary Problems     Physical Therapy Goals        Problem: Physical Therapy Goal    Goal Priority Disciplines Outcome Goal Variances Interventions   Physical Therapy Goal     PT, PT/OT      Description: LTG'S TO BE MET IN 14 DAYS (3-17-22)  1. PT WILL BE EBONY WITH BED MOBILITY  2. PT WILL REQUIRE  SPV FOR TF'S  3. PT WILL ' WITH RW AND SPV                   History:     Past Medical History:   Diagnosis Date    Anemia of chronic disease     Anticoagulant long-term use     Asthma     COPD (chronic obstructive pulmonary disease)     Diabetes mellitus     GERD (gastroesophageal reflux disease)     Hypertension     Lymphoma     Mixed hyperlipidemia     PAF (paroxysmal atrial fibrillation)     Thrombosis of right internal jugular vein        Past Surgical History:   Procedure Laterality Date     SECTION         Time Tracking:     PT Received On: 22  PT Start Time: 820     PT Stop Time: 843  PT Total Time (min): 23 min     Billable Minutes: Evaluation 15 and Therapeutic Activity 8    2022

## 2022-03-03 NOTE — PLAN OF CARE
Discharge summary reviewed with pt. No sign of any acute distress noted. IV access discontinued per MD order.

## 2022-03-03 NOTE — CONSULTS
"  [facility] - Med Surg  Adult Nutrition  Consult Note    SUMMARY     Recommendations     1. Diet: consistent carbohydrate 1600 calories and cardiac   2. RD to follow up to monitor intake, tolerance, and labs.    *Please send consult if acute nutrition needs arise.    Goals:  Pt will tolerate >65% estimated energy and protein needs by RD follow up.    Nutrition Goal Status: new   Communication of RD recs: POC and sticky note    Assessment and Plan  No nutrition diagnosis at this time    Reason for Assessment  Reason for assessment: consult   Diagnosis: Atrial fibrillation with RVR   Relevant medical history: anemia, COPD, DM2, HTN, HLD, GERD    General information comments:   03/03/2022: Pt with % intake since admit, weights appear stable PTA and IP. Continue with current nutritional plan. Will continue to monitor.     Nutrition Discharge Planning - pending medical course, cardiac dm 1600 kcal diet    Nutrition Risk Screen  Have you recently lost weight without trying?: No  Have you been eating poorly because of a decreased appetite?: No  Nutrition Risk Screen: no indicators present     Nutrition/Diet History  Patient Reported Diet/Restrictions/Preferences: SHAWN   Food Allergies: NKFA  Factors Affecting Nutritional Intake: none identified at this time and sore mouth   Spiritual, Cultural Beliefs, Yazidism Practices, Values that Affect Care: no      Anthropometrics  Temp: 98.6 °F (37 °C)  Height Method: Stated  Height: 5' 2" (157.5 cm)  Height (inches): 62 in  Weight Method: Bed Scale  Weight: 58.2 kg (128 lb 4.9 oz)  Weight (lb): 128.31 lb  Ideal Body Weight (IBW), Female: 110 lb  % Ideal Body Weight, Female (lb): 121.82 %  BMI (Calculated): 23.5       Labs  Pertinent Labs: reviewed  Lab Results   Component Value Date    HGB 11.1 (L) 03/03/2022    HCT 33.7 (L) 03/03/2022     03/03/2022    CALCIUM 9.8 03/03/2022    K 4.3 03/03/2022    PHOS 2.4 (L) 03/03/2022    BUN 16 03/03/2022    CREATININE 0.9 03/03/2022 "    ESTGFRAFRICA >60 03/03/2022    EGFRNONAA >60 03/03/2022    ALBUMIN 2.5 (L) 02/28/2022     Lab Results   Component Value Date    HGBA1C 4.8 03/01/2022     Recent Labs   Lab 03/03/22  0627   POCTGLUCOSE 92     Lab Results   Component Value Date    ALT <5 (L) 02/28/2022    AST 13 02/28/2022    ALKPHOS 180 (H) 02/28/2022    BILITOT 0.4 02/28/2022     No results found for: HDL, CHOL, TRIG  Meds  Pertinent Medications: reviewed    arformoteroL  15 mcg Nebulization BID    budesonide  0.5 mg Nebulization Q12H    cefTRIAXone (ROCEPHIN) IVPB  1 g Intravenous Q24H    cholecalciferol (vitamin D3)  5,000 Units Oral Daily    folic acid-vit B6-vit B12 2.5-25-2 mg  1 tablet Oral Daily    insulin detemir U-100  10 Units Subcutaneous BID    levalbuterol  0.63 mg Nebulization Q6H WAKE    metoprolol succinate  50 mg Oral BID    montelukast  10 mg Oral QHS    multivitamin  1 tablet Oral Daily    predniSONE  40 mg Oral Daily    rivaroxaban  20 mg Oral Daily with dinner    thiamine  100 mg Oral Daily     Continuous Infusions:  PRN Meds:sodium chloride, acetaminophen, albuterol-ipratropium, aluminum-magnesium hydroxide-simethicone, dextrose 10%, dextrose 10%, glucagon (human recombinant), glucose, glucose, guaiFENesin 100 mg/5 ml, HYDROcodone-acetaminophen, insulin aspart U-100, LORazepam, ondansetron, promethazine, sodium chloride 0.9%     Physical Findings/Assessment  N/V:   not indicated   Wounds: not indicated   Edema: 1+ feet  Last Bowel Movement: 02/27/22      Roger Score: 20  Mouth/Teeth WDL: WDL except, teeth Teeth Symptoms: dental appliance present  O2 Device (Oxygen Therapy): room air  NFPE not performed, pt appears well nourished    Malnutrition Assessment  Patient does not meet at least 2 ASPEN criteria for malnutrition at this time.   Will continue to monitor.       Estimated/Assessed Needs  Weight Used For Calorie Calculations: 58.2 kg (128 lb 4.9 oz)  Energy Calorie Requirements (kcal): 5209-3817  (25-30)  Energy Need Method: Kcal/kg  Weight Used For Protein Calculations: 58.2 kg (128 lb 4.9 oz)  Protein Requirements: 46-58g(.8-1)  RDA Method (mL): 1455ml fluid or per MD/NP  CHO Requirement: 163-240g (45-55% CHO)    Nutrition Prescription Ordered  Cardiac consistent carbohydrate 2000 calories     Evaluation of Received Nutrients  Energy Calories Required: meeting needs  Protein Required: meeting needs  Tolerance: tolerating  % Intake of Estimated Energy Needs: 75 - 100 %  % Meal Intake: 75 - 100 %    Monitor and Evaluation  Food and Nutrient Intake: food and beverage intake  Food and Nutrient Administration: diet order  Anthropometric Measurements: weight, weight change, body mass index  Biochemical Data, Medical Tests and Procedures: electrolyte and renal panel, lipid profile, gastrointestinal profile, glucose/endocrine profile, Inflammatory profile  Nutrition-Focused Physical Findings: overall appearance     Nutrition Follow-Up  Level of nutrition risk: low  Frequency of follow-up: Once weekly   Tentative Next Date to be Seen by RD: 03/10/22  Jennifer De La Garza, MS, RD, LDN

## 2022-03-03 NOTE — NURSING
Patient completed 2 units PRBC during this shift. Patient tolerated both units well.  No adverse reactions noted. Patient voiced no complaints. Will continue to monitor.

## 2022-03-03 NOTE — PT/OT/SLP EVAL
Occupational Therapy   Evaluation and Discharge Note    Name: Genie Balderas  MRN: [id number]  Admitting Diagnosis:  Atrial fibrillation with RVR   Recent Surgery: * No surgery found *      Recommendations:     Discharge Recommendations: home health PT  Discharge Equipment Recommendations:  none  Barriers to discharge:  None    Assessment:     Genie Balderas is a 61 y.o. female with a medical diagnosis of Atrial fibrillation with RVR. At this time, patient is functioning at their prior level of function and does not require further acute OT services. Defer to PT for ambulation and balance.    Plan:     During this hospitalization, patient does not require further acute OT services.  Please re-consult if situation changes.    · Plan of Care Reviewed with: patient    Subjective     Chief Complaint: pain, weakness  Patient/Family Comments/goals: to return home    Occupational Profile:  Living Environment: lives with  in 2 story house with all needs on 1st floor  Previous level of function: Mod I with ADLs and ambulates with Rollator in community  Roles and Routines: does not drive  Equipment Used at home:  rollator, shower chair, grab bar, other (see comments) (PT USES HER 'S ROLLATOR WALKER)  Assistance upon Discharge:     Pain/Comfort:  · Pain Rating 1: 10/10  · Location - Side 1: Bilateral  · Location - Orientation 1: generalized  · Location 1: back (and abdoman)  · Pain Addressed 1: Reposition, Distraction  · Pain Rating Post-Intervention 1: 10/10    Patients cultural, spiritual, Catholic conflicts given the current situation:      Objective:     Communicated with: Nurse Nagel and epic chart review prior to session.  Patient found HOB elevated with peripheral IV, bed alarm, telemetry upon OT entry to room.    General Precautions: Standard, fall   Orthopedic Precautions:N/A   Braces: N/A  Respiratory Status: Room air     Occupational Performance:    Bed Mobility:    · Patient completed  Rolling/Turning to Left with  modified independence  · Patient completed Rolling/Turning to Right with modified independence  · Patient completed Scooting/Bridging with modified independence  · Patient completed Supine to Sit with modified independence    Functional Mobility/Transfers:  · Patient completed Sit <> Stand Transfer with stand by assistance  with  rolling walker   · Patient completed Bed <> Chair Transfer using Step Transfer technique with contact guard assistance with rolling walker  · Functional Mobility: ~20ft using RW with CGA, slow pace    Activities of Daily Living:  · Upper Body Dressing: independence .  · Lower Body Dressing: modified independence .    Cognitive/Visual Perceptual:  Cognitive/Psychosocial Skills:     -       Oriented to: Person, Place, Time and Situation   -       Follows Commands/attention:Easily distracted and Follows two-step commands  -       Communication: clear/fluent  -       Memory: No Deficits noted  -       Safety awareness/insight to disability: intact   -       Mood/Affect/Coping skills/emotional control: Appropriate to situation  Visual/Perceptual:      -Intact .    Physical Exam:  Balance:    -       Fair standing  Postural examination/scapula alignment:    -       No postural abnormalities identified  Sensation:    -       Intact  Motor Planning:    -       WNL  Dominant hand:    -       right  Upper Extremity Range of Motion:     -       Right Upper Extremity: WNL  -       Left Upper Extremity: WNL  Upper Extremity Strength:    -       Right Upper Extremity: WNL  -       Left Upper Extremity: WNL   Strength:    -       Right Upper Extremity: WNL  -       Left Upper Extremity: WNL  Fine Motor Coordination:    -       Intact    AMPAC 6 Click ADL:  AMPAC Total Score: 23    Treatment & Education:  OT Eval completed as listed above. Pt functioning at high level and does not require skilled OT services at this time. Defer to PT for ambulation and balance.     Pt  educated in safety with bed mobility and t/fs, (B) UE exercises, role of OT and POC. Pt encouraged to increase time OOB and to eat all meals sitting upright. Pt instructed to call for assistance when ready to return to bed or use restroom. Pt verbalized understanding of all.       Education:    Patient left up in chair with all lines intact, call button in reach and Nurse Shona notified    GOALS:   Multidisciplinary Problems     Occupational Therapy Goals     Not on file                History:     Past Medical History:   Diagnosis Date    Anemia of chronic disease     Anticoagulant long-term use     Asthma     COPD (chronic obstructive pulmonary disease)     Diabetes mellitus     GERD (gastroesophageal reflux disease)     Hypertension     Lymphoma     Mixed hyperlipidemia     PAF (paroxysmal atrial fibrillation)     Thrombosis of right internal jugular vein        Past Surgical History:   Procedure Laterality Date     SECTION         Time Tracking:     OT Date of Treatment: 22  OT Start Time: 900  OT Stop Time: 925  OT Total Time (min): 25 min    Billable Minutes:Evaluation 15 min  Therapeutic Activity 10 min    3/3/2022

## 2022-03-03 NOTE — ASSESSMENT & PLAN NOTE
-Presents with afib with RVR in setting of COPD exacerbation and worsening anemia  -D/C diltiazem drip, resume oral BB  -Monitor BP  -Continue Xarelto for CVA prophylaxis     3/2/22  -HR variable but improved  -Toprol XL increased to 50 mg BID  -Continue Xarelto for CVA prophylaxis     3/3/22  -HR stable  -Continue Toprol XL 50 mg BID  -Continue Xarelto  -Follow-up in clinic

## 2022-03-03 NOTE — PLAN OF CARE
OT Eval completed. Pt functioning at high level and does not require skilled OT services at this time. Defer to PT for ambulation and balance. Recommend Home with Home Health.

## 2022-03-03 NOTE — CONSULTS
AARON sent orders for o2 and walker to [facility]. Per edwin CAUSEY, they are not able to service the area that the patient lives in for the oxygen. Edwin will review the order for the walker to approve it.     AARON sent oxygen order to [facility], pending approval.

## 2022-03-04 NOTE — PLAN OF CARE
[facility] - [facility]  Discharge Final Note    Primary Care Provider: Dawit Grey MD    Expected Discharge Date: 3/3/2022    Final Discharge Note (most recent)     Final Note - 03/04/22 0834        Final Note    Assessment Type Final Discharge Note     Anticipated Discharge Disposition Home-Health Care [facility]     Hospital Resources/Appts/Education Provided Provided patient/caregiver with written discharge plan information;Appointments scheduled and added to AVS        Post-Acute Status    Discharge Delays None known at this time                 Important Message from Medicare  Important Message from Medicare regarding Discharge Appeal Rights: Given to patient/caregiver, Other (comments), Explained to patient/caregiver, Signed/date by patient/caregiver     Date IMM was signed: 03/03/22  Time IMM was signed: 0924    Contact Info     Dawit Grey MD   Specialty: Family Medicine   Relationship: PCP - General    [address] [place] [address]   Phone: [phone]       Next Steps: Follow up on 3/7/2022    Instructions: Hospital Follow-up at 10:00AM        Patient to dc with Robina Chavez appt scheduled and added to AVS. No other cm needs.

## 2022-03-06 LAB
BACTERIA BLD CULT: NORMAL
BACTERIA BLD CULT: NORMAL

## 2022-03-09 ENCOUNTER — TELEPHONE (OUTPATIENT)
Dept: CARDIOLOGY | Facility: CLINIC | Age: 62
End: 2022-03-09
Payer: MEDICAID

## 2022-03-09 NOTE — TELEPHONE ENCOUNTER
Left message with patient in regards to Xarelto.     ----- Message from Deborah Macdonald PA-C sent at 3/9/2022  1:31 PM CST -----  Please call her and make sure she has Xarelto and is taking it    Thanks

## 2022-03-11 ENCOUNTER — EXTERNAL HOME HEALTH (OUTPATIENT)
Dept: HOME HEALTH SERVICES | Facility: HOSPITAL | Age: 62
End: 2022-03-11
Payer: MEDICAID

## 2023-10-17 NOTE — ASSESSMENT & PLAN NOTE
-Improving with O2  -Repeat ABGs if need  -Patient someday smoker and asthmatic, Possibly with undiagnosed COPD and now with PNE acutely worsening  counseled on smoking cessation.   -OP follow up with pulmonary     6/26: continue present regimen with monitoring       Zyclara Pregnancy And Lactation Text: This medication is Pregnancy Category C. It is unknown if this medication is excreted in breast milk.

## 2024-10-04 DIAGNOSIS — J44.9 CHRONIC OBSTRUCTIVE PULMONARY DISEASE, UNSPECIFIED COPD TYPE: Primary | ICD-10-CM

## 2025-02-20 ENCOUNTER — HOSPITAL ENCOUNTER (INPATIENT)
Facility: HOSPITAL | Age: 65
LOS: 6 days | Discharge: NURSING, REHAB OR LONG-TERM CARE | DRG: 638 | End: 2025-02-26
Attending: EMERGENCY MEDICINE | Admitting: SPECIALIST
Payer: MEDICAID

## 2025-02-20 DIAGNOSIS — R33.8 ACUTE URINARY RETENTION: ICD-10-CM

## 2025-02-20 DIAGNOSIS — F11.20 OPIATE DEPENDENCE, CONTINUOUS: ICD-10-CM

## 2025-02-20 DIAGNOSIS — R91.8 LUNG NODULES: ICD-10-CM

## 2025-02-20 DIAGNOSIS — M54.9 CHRONIC BACK PAIN GREATER THAN 3 MONTHS DURATION: ICD-10-CM

## 2025-02-20 DIAGNOSIS — R11.2 NAUSEA & VOMITING: ICD-10-CM

## 2025-02-20 DIAGNOSIS — G89.29 CHRONIC BACK PAIN GREATER THAN 3 MONTHS DURATION: ICD-10-CM

## 2025-02-20 DIAGNOSIS — K59.03 CONSTIPATION DUE TO OPIOID THERAPY: ICD-10-CM

## 2025-02-20 DIAGNOSIS — R91.8 PULMONARY NODULES: ICD-10-CM

## 2025-02-20 DIAGNOSIS — E16.2 HYPOGLYCEMIA: ICD-10-CM

## 2025-02-20 DIAGNOSIS — E11.649 HYPOGLYCEMIA ASSOCIATED WITH TYPE 2 DIABETES MELLITUS: Primary | ICD-10-CM

## 2025-02-20 DIAGNOSIS — K59.01 SLOW TRANSIT CONSTIPATION: ICD-10-CM

## 2025-02-20 DIAGNOSIS — T40.2X5A CONSTIPATION DUE TO OPIOID THERAPY: ICD-10-CM

## 2025-02-20 PROBLEM — J18.9 PNEUMONIA OF LEFT LOWER LOBE DUE TO INFECTIOUS ORGANISM: Status: RESOLVED | Noted: 2019-10-07 | Resolved: 2025-02-20

## 2025-02-20 PROBLEM — J98.01 POST-INFECTION BRONCHOSPASM: Status: RESOLVED | Noted: 2017-10-01 | Resolved: 2025-02-20

## 2025-02-20 PROBLEM — J44.9 COPD (CHRONIC OBSTRUCTIVE PULMONARY DISEASE): Status: ACTIVE | Noted: 2025-02-20

## 2025-02-20 PROBLEM — F17.200 TOBACCO DEPENDENCY: Status: ACTIVE | Noted: 2025-02-20

## 2025-02-20 LAB
ALBUMIN SERPL BCP-MCNC: 3.1 G/DL (ref 3.5–5.2)
ALP SERPL-CCNC: 127 U/L (ref 40–150)
ALT SERPL W/O P-5'-P-CCNC: 6 U/L (ref 10–44)
AMMONIA PLAS-SCNC: 41 UMOL/L (ref 10–50)
AMPHET+METHAMPHET UR QL: NEGATIVE
ANION GAP SERPL CALC-SCNC: 12 MMOL/L (ref 8–16)
AST SERPL-CCNC: 14 U/L (ref 10–40)
BARBITURATES UR QL SCN>200 NG/ML: NEGATIVE
BASOPHILS # BLD AUTO: 0.05 K/UL (ref 0–0.2)
BASOPHILS NFR BLD: 0.6 % (ref 0–1.9)
BENZODIAZ UR QL SCN>200 NG/ML: NEGATIVE
BILIRUB SERPL-MCNC: 0.1 MG/DL (ref 0.1–1)
BILIRUB UR QL STRIP: NEGATIVE
BUN SERPL-MCNC: 16 MG/DL (ref 8–23)
BZE UR QL SCN: NEGATIVE
CALCIUM SERPL-MCNC: 9 MG/DL (ref 8.7–10.5)
CANNABINOIDS UR QL SCN: NEGATIVE
CHLORIDE SERPL-SCNC: 107 MMOL/L (ref 95–110)
CLARITY UR: CLEAR
CO2 SERPL-SCNC: 21 MMOL/L (ref 23–29)
COLOR UR: YELLOW
CREAT SERPL-MCNC: 1 MG/DL (ref 0.5–1.4)
CREAT UR-MCNC: 41.2 MG/DL (ref 15–325)
DIFFERENTIAL METHOD BLD: ABNORMAL
EOSINOPHIL # BLD AUTO: 0.1 K/UL (ref 0–0.5)
EOSINOPHIL NFR BLD: 0.8 % (ref 0–8)
ERYTHROCYTE [DISTWIDTH] IN BLOOD BY AUTOMATED COUNT: 19.2 % (ref 11.5–14.5)
EST. GFR  (NO RACE VARIABLE): >60 ML/MIN/1.73 M^2
GLUCOSE SERPL-MCNC: 59 MG/DL (ref 70–110)
GLUCOSE UR QL STRIP: NEGATIVE
HCT VFR BLD AUTO: 40 % (ref 37–48.5)
HGB BLD-MCNC: 12.5 G/DL (ref 12–16)
HGB UR QL STRIP: NEGATIVE
IMM GRANULOCYTES # BLD AUTO: 0.04 K/UL (ref 0–0.04)
IMM GRANULOCYTES NFR BLD AUTO: 0.5 % (ref 0–0.5)
KETONES UR QL STRIP: NEGATIVE
LACTATE SERPL-SCNC: 1.2 MMOL/L (ref 0.5–2.2)
LEUKOCYTE ESTERASE UR QL STRIP: NEGATIVE
LIPASE SERPL-CCNC: 10 U/L (ref 4–60)
LYMPHOCYTES # BLD AUTO: 2.2 K/UL (ref 1–4.8)
LYMPHOCYTES NFR BLD: 25.9 % (ref 18–48)
MAGNESIUM SERPL-MCNC: 1.9 MG/DL (ref 1.6–2.6)
MCH RBC QN AUTO: 31.5 PG (ref 27–31)
MCHC RBC AUTO-ENTMCNC: 31.3 G/DL (ref 32–36)
MCV RBC AUTO: 101 FL (ref 82–98)
METHADONE UR QL SCN>300 NG/ML: NEGATIVE
MONOCYTES # BLD AUTO: 0.5 K/UL (ref 0.3–1)
MONOCYTES NFR BLD: 5.6 % (ref 4–15)
NEUTROPHILS # BLD AUTO: 5.6 K/UL (ref 1.8–7.7)
NEUTROPHILS NFR BLD: 66.6 % (ref 38–73)
NITRITE UR QL STRIP: NEGATIVE
NRBC BLD-RTO: 0 /100 WBC
OPIATES UR QL SCN: ABNORMAL
PCP UR QL SCN>25 NG/ML: NEGATIVE
PH UR STRIP: 6 [PH] (ref 5–8)
PLATELET # BLD AUTO: 345 K/UL (ref 150–450)
PMV BLD AUTO: 8.7 FL (ref 9.2–12.9)
POCT GLUCOSE: 133 MG/DL (ref 70–110)
POCT GLUCOSE: 47 MG/DL (ref 70–110)
POCT GLUCOSE: 50 MG/DL (ref 70–110)
POCT GLUCOSE: 54 MG/DL (ref 70–110)
POCT GLUCOSE: 58 MG/DL (ref 70–110)
POCT GLUCOSE: 66 MG/DL (ref 70–110)
POCT GLUCOSE: 68 MG/DL (ref 70–110)
POCT GLUCOSE: 75 MG/DL (ref 70–110)
POCT GLUCOSE: 76 MG/DL (ref 70–110)
POCT GLUCOSE: 81 MG/DL (ref 70–110)
POCT GLUCOSE: 89 MG/DL (ref 70–110)
POTASSIUM SERPL-SCNC: 4.8 MMOL/L (ref 3.5–5.1)
PROT SERPL-MCNC: 7.2 G/DL (ref 6–8.4)
PROT UR QL STRIP: NEGATIVE
RBC # BLD AUTO: 3.97 M/UL (ref 4–5.4)
SODIUM SERPL-SCNC: 140 MMOL/L (ref 136–145)
SP GR UR STRIP: 1.02 (ref 1–1.03)
TOXICOLOGY INFORMATION: ABNORMAL
URN SPEC COLLECT METH UR: NORMAL
UROBILINOGEN UR STRIP-ACNC: NEGATIVE EU/DL
WBC # BLD AUTO: 8.41 K/UL (ref 3.9–12.7)

## 2025-02-20 PROCEDURE — 82140 ASSAY OF AMMONIA: CPT | Performed by: EMERGENCY MEDICINE

## 2025-02-20 PROCEDURE — 96375 TX/PRO/DX INJ NEW DRUG ADDON: CPT

## 2025-02-20 PROCEDURE — 25000003 PHARM REV CODE 250: Performed by: NURSE PRACTITIONER

## 2025-02-20 PROCEDURE — 80053 COMPREHEN METABOLIC PANEL: CPT | Performed by: EMERGENCY MEDICINE

## 2025-02-20 PROCEDURE — 96366 THER/PROPH/DIAG IV INF ADDON: CPT

## 2025-02-20 PROCEDURE — 11000001 HC ACUTE MED/SURG PRIVATE ROOM

## 2025-02-20 PROCEDURE — 83690 ASSAY OF LIPASE: CPT | Performed by: EMERGENCY MEDICINE

## 2025-02-20 PROCEDURE — 99291 CRITICAL CARE FIRST HOUR: CPT

## 2025-02-20 PROCEDURE — 96376 TX/PRO/DX INJ SAME DRUG ADON: CPT

## 2025-02-20 PROCEDURE — 81003 URINALYSIS AUTO W/O SCOPE: CPT | Mod: 59 | Performed by: EMERGENCY MEDICINE

## 2025-02-20 PROCEDURE — 63600175 PHARM REV CODE 636 W HCPCS: Performed by: EMERGENCY MEDICINE

## 2025-02-20 PROCEDURE — 83605 ASSAY OF LACTIC ACID: CPT | Performed by: EMERGENCY MEDICINE

## 2025-02-20 PROCEDURE — 83735 ASSAY OF MAGNESIUM: CPT | Performed by: EMERGENCY MEDICINE

## 2025-02-20 PROCEDURE — 20000000 HC ICU ROOM

## 2025-02-20 PROCEDURE — 80307 DRUG TEST PRSMV CHEM ANLYZR: CPT | Performed by: EMERGENCY MEDICINE

## 2025-02-20 PROCEDURE — 96361 HYDRATE IV INFUSION ADD-ON: CPT

## 2025-02-20 PROCEDURE — 93005 ELECTROCARDIOGRAM TRACING: CPT

## 2025-02-20 PROCEDURE — 93010 ELECTROCARDIOGRAM REPORT: CPT | Mod: ,,, | Performed by: INTERNAL MEDICINE

## 2025-02-20 PROCEDURE — 25500020 PHARM REV CODE 255: Performed by: EMERGENCY MEDICINE

## 2025-02-20 PROCEDURE — 99285 EMERGENCY DEPT VISIT HI MDM: CPT | Mod: 25

## 2025-02-20 PROCEDURE — 96365 THER/PROPH/DIAG IV INF INIT: CPT | Mod: 59

## 2025-02-20 PROCEDURE — 82962 GLUCOSE BLOOD TEST: CPT

## 2025-02-20 PROCEDURE — 25000003 PHARM REV CODE 250: Performed by: EMERGENCY MEDICINE

## 2025-02-20 PROCEDURE — 51702 INSERT TEMP BLADDER CATH: CPT

## 2025-02-20 PROCEDURE — 85025 COMPLETE CBC W/AUTO DIFF WBC: CPT | Performed by: EMERGENCY MEDICINE

## 2025-02-20 RX ORDER — PSEUDOEPHEDRINE/ACETAMINOPHEN 30MG-500MG
100 TABLET ORAL ONCE
Status: COMPLETED | OUTPATIENT
Start: 2025-02-20 | End: 2025-02-20

## 2025-02-20 RX ORDER — HYDROCODONE BITARTRATE AND ACETAMINOPHEN 5; 325 MG/1; MG/1
1 TABLET ORAL EVERY 8 HOURS PRN
Refills: 0 | Status: DISCONTINUED | OUTPATIENT
Start: 2025-02-20 | End: 2025-02-24

## 2025-02-20 RX ORDER — SYRING-NEEDL,DISP,INSUL,0.3 ML 29 G X1/2"
296 SYRINGE, EMPTY DISPOSABLE MISCELLANEOUS ONCE
Status: COMPLETED | OUTPATIENT
Start: 2025-02-20 | End: 2025-02-20

## 2025-02-20 RX ORDER — CYPROHEPTADINE HYDROCHLORIDE 4 MG/1
TABLET ORAL
COMMUNITY

## 2025-02-20 RX ORDER — DEXTROSE 50 % IN WATER (D50W) INTRAVENOUS SYRINGE
25
Status: COMPLETED | OUTPATIENT
Start: 2025-02-20 | End: 2025-02-20

## 2025-02-20 RX ORDER — ONDANSETRON 4 MG/1
TABLET, ORALLY DISINTEGRATING ORAL
COMMUNITY
Start: 2025-02-13

## 2025-02-20 RX ORDER — IPRATROPIUM BROMIDE AND ALBUTEROL SULFATE 2.5; .5 MG/3ML; MG/3ML
3 SOLUTION RESPIRATORY (INHALATION) EVERY 4 HOURS PRN
Status: DISCONTINUED | OUTPATIENT
Start: 2025-02-20 | End: 2025-02-26 | Stop reason: HOSPADM

## 2025-02-20 RX ORDER — BUDESONIDE 0.5 MG/2ML
0.5 INHALANT ORAL EVERY 12 HOURS
Status: DISCONTINUED | OUTPATIENT
Start: 2025-02-21 | End: 2025-02-26 | Stop reason: HOSPADM

## 2025-02-20 RX ORDER — FAMOTIDINE 20 MG/1
20 TABLET, FILM COATED ORAL NIGHTLY
Status: DISCONTINUED | OUTPATIENT
Start: 2025-02-21 | End: 2025-02-23

## 2025-02-20 RX ORDER — IBUPROFEN 200 MG
24 TABLET ORAL
Status: DISCONTINUED | OUTPATIENT
Start: 2025-02-20 | End: 2025-02-22 | Stop reason: SDUPTHER

## 2025-02-20 RX ORDER — ENOXAPARIN SODIUM 100 MG/ML
40 INJECTION SUBCUTANEOUS EVERY 24 HOURS
Status: DISCONTINUED | OUTPATIENT
Start: 2025-02-21 | End: 2025-02-21

## 2025-02-20 RX ORDER — DEXTROSE MONOHYDRATE 100 MG/ML
INJECTION, SOLUTION INTRAVENOUS CONTINUOUS
Status: DISCONTINUED | OUTPATIENT
Start: 2025-02-20 | End: 2025-02-21

## 2025-02-20 RX ORDER — IBUPROFEN 200 MG
1 TABLET ORAL DAILY
Status: DISCONTINUED | OUTPATIENT
Start: 2025-02-21 | End: 2025-02-26 | Stop reason: HOSPADM

## 2025-02-20 RX ORDER — NALOXONE HCL 0.4 MG/ML
2 VIAL (ML) INJECTION
Status: COMPLETED | OUTPATIENT
Start: 2025-02-20 | End: 2025-02-20

## 2025-02-20 RX ORDER — TRIAMCINOLONE ACETONIDE 1 MG/G
CREAM TOPICAL
COMMUNITY

## 2025-02-20 RX ORDER — ONDANSETRON HYDROCHLORIDE 2 MG/ML
8 INJECTION, SOLUTION INTRAVENOUS EVERY 6 HOURS PRN
Status: DISCONTINUED | OUTPATIENT
Start: 2025-02-20 | End: 2025-02-26 | Stop reason: HOSPADM

## 2025-02-20 RX ORDER — SYRING-NEEDL,DISP,INSUL,0.3 ML 29 G X1/2"
296 SYRINGE, EMPTY DISPOSABLE MISCELLANEOUS
Status: COMPLETED | OUTPATIENT
Start: 2025-02-20 | End: 2025-02-20

## 2025-02-20 RX ORDER — GLUCAGON 1 MG
1 KIT INJECTION
Status: DISCONTINUED | OUTPATIENT
Start: 2025-02-20 | End: 2025-02-22 | Stop reason: SDUPTHER

## 2025-02-20 RX ORDER — IBUPROFEN 200 MG
16 TABLET ORAL
Status: DISCONTINUED | OUTPATIENT
Start: 2025-02-20 | End: 2025-02-22 | Stop reason: SDUPTHER

## 2025-02-20 RX ORDER — ARFORMOTEROL TARTRATE 15 UG/2ML
15 SOLUTION RESPIRATORY (INHALATION) 2 TIMES DAILY
Status: DISCONTINUED | OUTPATIENT
Start: 2025-02-21 | End: 2025-02-26 | Stop reason: HOSPADM

## 2025-02-20 RX ORDER — DEXTROSE MONOHYDRATE 100 MG/ML
INJECTION, SOLUTION INTRAVENOUS
Status: COMPLETED | OUTPATIENT
Start: 2025-02-20 | End: 2025-02-20

## 2025-02-20 RX ORDER — BENZONATATE 100 MG/1
1 CAPSULE ORAL 3 TIMES DAILY PRN
COMMUNITY
Start: 2024-10-02

## 2025-02-20 RX ORDER — DOXYCYCLINE HYCLATE 100 MG
TABLET ORAL
COMMUNITY
Start: 2025-02-13

## 2025-02-20 RX ORDER — MUPIROCIN 20 MG/G
OINTMENT TOPICAL 2 TIMES DAILY
Status: DISPENSED | OUTPATIENT
Start: 2025-02-20 | End: 2025-02-25

## 2025-02-20 RX ORDER — METOCLOPRAMIDE 5 MG/1
TABLET ORAL
COMMUNITY

## 2025-02-20 RX ORDER — TIOTROPIUM BROMIDE 18 UG/1
18 CAPSULE ORAL; RESPIRATORY (INHALATION) DAILY
COMMUNITY

## 2025-02-20 RX ADMIN — DEXTROSE MONOHYDRATE 25 G: 25 INJECTION, SOLUTION INTRAVENOUS at 01:02

## 2025-02-20 RX ADMIN — DEXTROSE MONOHYDRATE: 100 INJECTION, SOLUTION INTRAVENOUS at 08:02

## 2025-02-20 RX ADMIN — DEXTROSE MONOHYDRATE: 100 INJECTION, SOLUTION INTRAVENOUS at 04:02

## 2025-02-20 RX ADMIN — SODIUM CHLORIDE 500 ML: 0.9 INJECTION, SOLUTION INTRAVENOUS at 09:02

## 2025-02-20 RX ADMIN — IOHEXOL 100 ML: 350 INJECTION, SOLUTION INTRAVENOUS at 02:02

## 2025-02-20 RX ADMIN — MAGNESIUM CITRATE 296 ML: 1.75 LIQUID ORAL at 10:02

## 2025-02-20 RX ADMIN — PROMETHAZINE HYDROCHLORIDE 25 MG: 25 INJECTION INTRAMUSCULAR; INTRAVENOUS at 02:02

## 2025-02-20 RX ADMIN — DEXTROSE MONOHYDRATE 25 G: 25 INJECTION, SOLUTION INTRAVENOUS at 03:02

## 2025-02-20 RX ADMIN — NALOXONE HYDROCHLORIDE 2 MG: 0.4 INJECTION, SOLUTION INTRAMUSCULAR; INTRAVENOUS; SUBCUTANEOUS at 04:02

## 2025-02-20 RX ADMIN — SODIUM CHLORIDE 1000 ML: 9 INJECTION, SOLUTION INTRAVENOUS at 01:02

## 2025-02-20 RX ADMIN — MAGNESIUM CITRATE 296 ML: 1.75 LIQUID ORAL at 03:02

## 2025-02-20 RX ADMIN — Medication 100 ML: at 10:02

## 2025-02-20 NOTE — Clinical Note
Diagnosis: Hypoglycemia [569080]   Future Attending Provider: RICHARD HOFFMAN [[id number]]   Reason for IP Medical Treatment  (Clinical interventions that can only be accomplished in the IP setting? ) :: hourly glucose monitoring with continuous D10 drip

## 2025-02-20 NOTE — ED PROVIDER NOTES
SCRIBE #1 NOTE: I, Clem Espinoza & Kristyn Constantino, am scribing for, and in the presence of, Gisselle Franklin MD. I have scribed the entire note.       History     Chief Complaint   Patient presents with    Vomiting    Back Pain    Nausea     N/V, lower abdominal pain, and lower back pain for several days. Pt denies diarrhea, fever and chills. Hx of HTN and asthma     Review of patient's allergies indicates:  No Known Allergies      History of Present Illness     HPI    2/20/2025, 1:10 PM  History obtained from the patient      History of Present Illness: Genie Balderas is a 64 y.o. female patient with a PMHx of COPD, DM, GERD, mixed HLD, PAF, lymphoma, gastroparesis, asthma, and long-term anticoagulant use who presents to the Emergency Department for evaluation of vomiting and epigastric abdominal pain which onset gradually over the last 4-5 days. Pt describes pain in back pain and vomiting following onset of abdominal pain. Pt states she has been unable to keep water or her medications down due to vomiting. Pt experiences chronic pain and takes hydrocodone and nordazepam for management. Pt does not have their gallbladder. Symptoms are constant and moderate in severity. No mitigating or exacerbating factors reported. Associated sxs include constipation which onset 3-4 days ago. Patient denies any fever, chills, dysuria, frequency, blood in stool, and all other sxs at this time. Prior tx includes Zofran. No further complaints or concerns at this time.       Arrival mode: Personal vehicle     PCP: Dawit Grey MD        Past Medical History:  Past Medical History:   Diagnosis Date    Anemia of chronic disease     Anticoagulant long-term use     Asthma     COPD (chronic obstructive pulmonary disease)     Diabetes mellitus     GERD (gastroesophageal reflux disease)     Hypertension     Lymphoma     Mixed hyperlipidemia     PAF (paroxysmal atrial fibrillation)     Thrombosis of right internal  jugular vein        Past Surgical History:  Past Surgical History:   Procedure Laterality Date     SECTION      CHOLECYSTECTOMY           Family History:  Family History   Problem Relation Name Age of Onset    Diabetes Mother      Hypertension Mother      Heart disease Mother      Heart failure Mother  67    Cancer Father      Lupus Sister      Lupus Sister      Lupus Daughter         Social History:  Social History     Tobacco Use    Smoking status: Some Days     Current packs/day: 0.75     Average packs/day: 0.8 packs/day for 45.0 years (33.8 ttl pk-yrs)     Types: Cigarettes    Smokeless tobacco: Never   Substance and Sexual Activity    Alcohol use: No    Drug use: No    Sexual activity: Not Currently        Review of Systems     Review of Systems   Constitutional:  Negative for chills and fever.   HENT:  Negative for sore throat.    Respiratory:  Negative for shortness of breath.    Cardiovascular:  Negative for chest pain.   Gastrointestinal:  Positive for abdominal pain (epigastric). Negative for blood in stool and nausea.   Genitourinary:  Negative for dysuria and frequency.   Musculoskeletal:  Positive for back pain.   Skin:  Negative for rash.   Neurological:  Negative for weakness.   Hematological:  Does not bruise/bleed easily.   All other systems reviewed and are negative.       Physical Exam     Initial Vitals [25 1236]   BP Pulse Resp Temp SpO2   (!) 92/50 104 16 98.9 °F (37.2 °C) (!) 94 %      MAP       --          Physical Exam  Nursing Notes and Vital Signs Reviewed.  Constitutional: Patient is in no acute distress. Chronically ill-appearing.  Head: Atraumatic. Normocephalic.  Eyes: PERRL. EOM intact. Conjunctivae are not pale. No scleral icterus.  ENT: Mucous membranes are moist. Oropharynx is clear and symmetric.    Neck: Supple. Full ROM. No lymphadenopathy.  Cardiovascular: Regular rate. Regular rhythm. No murmurs, rubs, or gallops. Distal pulses are 2+ and  symmetric.  Pulmonary/Chest: No respiratory distress. Clear to auscultation bilaterally. No wheezing or rales.  Abdominal: Soft and non-distended.  There is no tenderness.  No rebound, guarding, or rigidity. Good bowel sounds.  Genitourinary: No CVA tenderness  Musculoskeletal: Moves all extremities. No obvious deformities. No edema. No calf tenderness.  Skin: Warm and dry.  Neurological:  Alert, awake, and appropriate.  Normal speech.  No acute focal neurological deficits are appreciated.  Psychiatric: Normal affect. Good eye contact. Appropriate in content.     ED Course   Critical Care    Date/Time: 2/20/2025 6:03 PM    Performed by: Gisselle Franklin MD  Authorized by: Gisselle Franklin MD  Direct patient critical care time: 50 minutes  Additional history critical care time: 10 minutes  Ordering / reviewing critical care time: 8 minutes  Documentation critical care time: 8 minutes  Consulting other physicians critical care time: 5 minutes  Total critical care time (exclusive of procedural time) : 81 minutes  Critical care was necessary to treat or prevent imminent or life-threatening deterioration of the following conditions: metabolic crisis, dehydration and endocrine crisis.  Critical care was time spent personally by me on the following activities: blood draw for specimens, development of treatment plan with patient or surrogate, discussions with consultants, interpretation of cardiac output measurements, evaluation of patient's response to treatment, examination of patient, obtaining history from patient or surrogate, ordering and performing treatments and interventions, ordering and review of laboratory studies, ordering and review of radiographic studies, re-evaluation of patient's condition, pulse oximetry and review of old charts.      ED Vital Signs:  Vitals:    02/20/25 1236 02/20/25 1333 02/20/25 1353 02/20/25 1400   BP: (!) 92/50 (!) 122/59  (!) 122/58   Pulse: 104 87 88 85   Resp: 16 (!) 21  20    Temp: 98.9 °F (37.2 °C)   98.9 °F (37.2 °C)   TempSrc: Oral   Oral   SpO2: (!) 94% 96%  98%    02/20/25 1502 02/20/25 1825   BP: 133/69 111/70   Pulse: 98    Resp: 12    Temp:     TempSrc:     SpO2: 99%        Abnormal Lab Results:  Labs Reviewed   COMPREHENSIVE METABOLIC PANEL - Abnormal       Result Value    Sodium 140      Potassium 4.8      Chloride 107      CO2 21 (*)     Glucose 59 (*)     BUN 16      Creatinine 1.0      Calcium 9.0      Total Protein 7.2      Albumin 3.1 (*)     Total Bilirubin 0.1      Alkaline Phosphatase 127      AST 14      ALT 6 (*)     eGFR >60      Anion Gap 12     DRUG SCREEN PANEL, URINE EMERGENCY - Abnormal    Benzodiazepines Negative      Methadone metabolites Negative      Cocaine (Metab.) Negative      Opiate Scrn, Ur Presumptive Positive (*)     Barbiturate Screen, Ur Negative      Amphetamine Screen, Ur Negative      THC Negative      Phencyclidine Negative      Creatinine, Urine 41.2      Toxicology Information SEE COMMENT      Narrative:     Specimen Source->Urine   CBC W/ AUTO DIFFERENTIAL - Abnormal    WBC 8.41      RBC 3.97 (*)     Hemoglobin 12.5      Hematocrit 40.0       (*)     MCH 31.5 (*)     MCHC 31.3 (*)     RDW 19.2 (*)     Platelets 345      MPV 8.7 (*)     Immature Granulocytes 0.5      Gran # (ANC) 5.6      Immature Grans (Abs) 0.04      Lymph # 2.2      Mono # 0.5      Eos # 0.1      Baso # 0.05      nRBC 0      Gran % 66.6      Lymph % 25.9      Mono % 5.6      Eosinophil % 0.8      Basophil % 0.6      Differential Method Automated     POCT GLUCOSE - Abnormal    POCT Glucose 47 (*)    POCT GLUCOSE - Abnormal    POCT Glucose 68 (*)    POCT GLUCOSE - Abnormal    POCT Glucose 54 (*)    POCT GLUCOSE - Abnormal    POCT Glucose 58 (*)    POCT GLUCOSE - Abnormal    POCT Glucose 50 (*)    LIPASE    Lipase 10     URINALYSIS, REFLEX TO URINE CULTURE    Specimen UA Urine, Catheterized      Color, UA Yellow      Appearance, UA Clear      pH, UA 6.0      Specific  Gravity, UA 1.020      Protein, UA Negative      Glucose, UA Negative      Ketones, UA Negative      Bilirubin (UA) Negative      Occult Blood UA Negative      Nitrite, UA Negative      Urobilinogen, UA Negative      Leukocytes, UA Negative      Narrative:     Specimen Source->Urine   MAGNESIUM    Magnesium 1.9     LACTIC ACID, PLASMA    Lactate (Lactic Acid) 1.2     AMMONIA    Ammonia 41     POCT GLUCOSE    POCT Glucose 76     POCT GLUCOSE    POCT Glucose 81     POCT GLUCOSE    POCT Glucose 75     POCT GLUCOSE    POCT Glucose 89     POCT GLUCOSE    POCT Glucose 81     POCT GLUCOSE MONITORING CONTINUOUS   POCT GLUCOSE MONITORING CONTINUOUS        All Lab Results:  Results for orders placed or performed during the hospital encounter of 02/20/25   Comp. Metabolic Panel    Collection Time: 02/20/25  1:21 PM   Result Value Ref Range    Sodium 140 136 - 145 mmol/L    Potassium 4.8 3.5 - 5.1 mmol/L    Chloride 107 95 - 110 mmol/L    CO2 21 (L) 23 - 29 mmol/L    Glucose 59 (L) 70 - 110 mg/dL    BUN 16 8 - 23 mg/dL    Creatinine 1.0 0.5 - 1.4 mg/dL    Calcium 9.0 8.7 - 10.5 mg/dL    Total Protein 7.2 6.0 - 8.4 g/dL    Albumin 3.1 (L) 3.5 - 5.2 g/dL    Total Bilirubin 0.1 0.1 - 1.0 mg/dL    Alkaline Phosphatase 127 40 - 150 U/L    AST 14 10 - 40 U/L    ALT 6 (L) 10 - 44 U/L    eGFR >60 >60 mL/min/1.73 m^2    Anion Gap 12 8 - 16 mmol/L   Lipase    Collection Time: 02/20/25  1:21 PM   Result Value Ref Range    Lipase 10 4 - 60 U/L   Magnesium    Collection Time: 02/20/25  1:21 PM   Result Value Ref Range    Magnesium 1.9 1.6 - 2.6 mg/dL   Lactic acid, plasma    Collection Time: 02/20/25  1:21 PM   Result Value Ref Range    Lactate (Lactic Acid) 1.2 0.5 - 2.2 mmol/L   POCT glucose    Collection Time: 02/20/25  2:17 PM   Result Value Ref Range    POCT Glucose 76 70 - 110 mg/dL   Urinalysis, Reflex to Urine Culture Urine, Catheterized    Collection Time: 02/20/25  3:03 PM    Specimen: Urine   Result Value Ref Range    Specimen UA  Urine, Catheterized     Color, UA Yellow Yellow, Straw, Suzanne    Appearance, UA Clear Clear    pH, UA 6.0 5.0 - 8.0    Specific Gravity, UA 1.020 1.005 - 1.030    Protein, UA Negative Negative    Glucose, UA Negative Negative    Ketones, UA Negative Negative    Bilirubin (UA) Negative Negative    Occult Blood UA Negative Negative    Nitrite, UA Negative Negative    Urobilinogen, UA Negative <2.0 EU/dL    Leukocytes, UA Negative Negative   Drug screen panel, emergency    Collection Time: 02/20/25  3:03 PM   Result Value Ref Range    Benzodiazepines Negative Negative    Methadone metabolites Negative Negative    Cocaine (Metab.) Negative Negative    Opiate Scrn, Ur Presumptive Positive (A) Negative    Barbiturate Screen, Ur Negative Negative    Amphetamine Screen, Ur Negative Negative    THC Negative Negative    Phencyclidine Negative Negative    Creatinine, Urine 41.2 15.0 - 325.0 mg/dL    Toxicology Information SEE COMMENT    EKG 12-lead    Collection Time: 02/20/25  3:07 PM   Result Value Ref Range    QRS Duration 78 ms    [facility] QTC Calculation 426 ms   POCT glucose    Collection Time: 02/20/25  3:22 PM   Result Value Ref Range    POCT Glucose 47 (LL) 70 - 110 mg/dL   CBC auto differential    Collection Time: 02/20/25  3:23 PM   Result Value Ref Range    WBC 8.41 3.90 - 12.70 K/uL    RBC 3.97 (L) 4.00 - 5.40 M/uL    Hemoglobin 12.5 12.0 - 16.0 g/dL    Hematocrit 40.0 37.0 - 48.5 %     (H) 82 - 98 fL    MCH 31.5 (H) 27.0 - 31.0 pg    MCHC 31.3 (L) 32.0 - 36.0 g/dL    RDW 19.2 (H) 11.5 - 14.5 %    Platelets 345 150 - 450 K/uL    MPV 8.7 (L) 9.2 - 12.9 fL    Immature Granulocytes 0.5 0.0 - 0.5 %    Gran # (ANC) 5.6 1.8 - 7.7 K/uL    Immature Grans (Abs) 0.04 0.00 - 0.04 K/uL    Lymph # 2.2 1.0 - 4.8 K/uL    Mono # 0.5 0.3 - 1.0 K/uL    Eos # 0.1 0.0 - 0.5 K/uL    Baso # 0.05 0.00 - 0.20 K/uL    nRBC 0 0 /100 WBC    Gran % 66.6 38.0 - 73.0 %    Lymph % 25.9 18.0 - 48.0 %    Mono % 5.6 4.0 - 15.0 %    Eosinophil %  0.8 0.0 - 8.0 %    Basophil % 0.6 0.0 - 1.9 %    Differential Method Automated    POCT glucose    Collection Time: 02/20/25  3:46 PM   Result Value Ref Range    POCT Glucose 81 70 - 110 mg/dL   POCT glucose    Collection Time: 02/20/25  4:15 PM   Result Value Ref Range    POCT Glucose 75 70 - 110 mg/dL   POCT glucose    Collection Time: 02/20/25  4:52 PM   Result Value Ref Range    POCT Glucose 68 (L) 70 - 110 mg/dL   Ammonia    Collection Time: 02/20/25  5:39 PM   Result Value Ref Range    Ammonia 41 10 - 50 umol/L   POCT glucose    Collection Time: 02/20/25  5:47 PM   Result Value Ref Range    POCT Glucose 54 (L) 70 - 110 mg/dL   POCT glucose    Collection Time: 02/20/25  6:01 PM   Result Value Ref Range    POCT Glucose 58 (L) 70 - 110 mg/dL   POCT glucose    Collection Time: 02/20/25  6:21 PM   Result Value Ref Range    POCT Glucose 89 70 - 110 mg/dL   POCT glucose    Collection Time: 02/20/25  6:54 PM   Result Value Ref Range    POCT Glucose 81 70 - 110 mg/dL   POCT glucose    Collection Time: 02/20/25  7:36 PM   Result Value Ref Range    POCT Glucose 50 (LL) 70 - 110 mg/dL         Imaging Results:  Imaging Results               CT Abdomen Pelvis With IV Contrast NO Oral Contrast (Final result)  Result time 02/20/25 14:43:16      Final result by Denys Moreno MD (02/20/25 14:43:16)                   Impression:      Moderate stool burden with some inspissated stool concerning for constipation.  Abrupt transition in the sigmoid colon to nondistention, nonspecific.  Underlying lesion such as mass or stricture difficult to exclude.  Recommend colonoscopy on an outpatient basis.    Bladder distension of unclear significance.  Correlation for retention.    Multiple pulmonary nodules and scarring.  For multiple solid nodules with any 6 mm or greater, Fleischner Society guidelines recommend follow up with non-contrast chest CT at 3-6 months and 18-24 months after discovery.    This report was flagged in Epic as  abnormal.    All CT scans at this facility are performed  using dose modulation techniques as appropriate to performed exam including the following:  automated exposure control; adjustment of mA and/or kV according to the patients size (this includes techniques or standardized protocols for targeted exams where dose is matched to indication/reason for exam: i.e. extremities or head);  iterative reconstruction technique.      Electronically signed by: Denys Moreno  Date:    02/20/2025  Time:    14:43               Narrative:    EXAMINATION:  CT ABDOMEN PELVIS WITH IV CONTRAST    CLINICAL HISTORY:  Bowel obstruction suspected;    TECHNIQUE:  Low dose axial images, sagittal and coronal reformations were obtained from the lung bases to the pubic symphysis.  Contrast was administered.  Images acquired after the administration 100 mL Omnipaque 350 IV contrast.    COMPARISON:  None    FINDINGS:  Heart: Normal in size. No pericardial effusion.    Lung Bases: Scarring and pulmonary nodules noted.  Largest pulmonary nodule at least 7 mm.  For multiple solid nodules with any 6 mm or greater, Fleischner Society guidelines recommend follow up with non-contrast chest CT at 3-6 months and 18-24 months after discovery.    Liver: Mildly heterogeneous liver without definite focal lesion.  Focal fatty infiltration.    Gallbladder: Surgically absent    Bile Ducts: No evidence of dilated ducts.    Pancreas: Moderate atrophy.    Spleen: Unremarkable.    Adrenals: Unremarkable.    Kidneys/ Ureters: Renal simple cysts which require no dedicated follow-up.    Bladder: Bladder distension.    Reproductive organs: Unremarkable.    GI Tract/Mesentery: Moderate volume of stool throughout the colon with rather abrupt transition point in the sigmoid colon.  Underlying lesion difficult to exclude, such as stricture or neoplasm.  Correlation with colonoscopy to be considered based on symptoms.    Small bowel unremarkable.    Peritoneal Space: No  ascites. No free air.    Retroperitoneum: No significant adenopathy.    Abdominal wall: Unremarkable.    Vasculature: No significant atherosclerosis or aneurysm.    Bones: No acute fracture.                                       X-Ray Abdomen Flat And Erect (Final result)  Result time 02/20/25 13:12:33      Final result by Denys Moreno MD (02/20/25 13:12:33)                   Impression:      As above.      Electronically signed by: Denys Moreno  Date:    02/20/2025  Time:    13:12               Narrative:    EXAMINATION:  XR ABDOMEN FLAT AND ERECT    CLINICAL HISTORY:  nausea vomiting;    TECHNIQUE:  Flat and erect AP views of the abdomen were performed.    COMPARISON:  None    FINDINGS:  Constipation suspected.  Impaction not excluded.  Correlation is advised.  Small bowel appears largely nondistended.  Emphysema in the lungs.                                       The EKG was ordered, reviewed, and independently interpreted by the ED provider.  Interpretation time: 15:07  Rate: 104 BPM  Rhythm: sinus tachycardia  Interpretation: Possible left atrial enlargement. Septal infarct, age undetermined. No STEMI.             The Emergency Provider reviewed the vital signs and test results, which are outlined above.     ED Discussion     5:30 PM: Patient noted to be somnolent, would wake up very briefly and answer simple questions but would fall back asleep. Patient admits to taking hydrocodone for her chronic back pain, unable to sustain being awake to tolerate po, thus Narcan ordered and after being given patient now more awake. Still will need admit and Dextrose gtt until BS stabilizes. Family at bedside discussed need for admission with patient and family.     20:12 PM - CONSULT: Dr. Franklin discussed the case with Joanna Reynoso NP with ICU. Agrees with current management. Recommends   Admitting Service: Critical Care   Admitting Physician: Dr. Denise   Admit to ICU         Medical Decision Making  DDX: 1.  Obstruction 2. Gastroparesis 3. Dehydration    ECG sinus tachycardia, no ischemic changes, wbc normal, h/h stable, kidney function normal, BS 50s, UA normal, troponin normal, hodge catheter placed for urinary retention likely from large stool burden, eventually started on D10 gtt due to persistent hypoglycemia.           65 y/o AAF with multiple medical problems, chronic back pain on hydrocodone, gastroparesis, chronic constipation, DM but not on any medication per family, B Cell Lymphoma treated years ago, tobacco abuse, atrial fib brought in by family, patient reports intractable nausea vomiting for the past week associated with constipation, no fever, no chills. No other complaints. No gross abd tenderness on exam, initial lab work otherwise normal except hypoglycemic, xray abdomen showed large amount of stool, CT also done to rule out obstruction etc which showed excessive amount of stool and distended bladder, bladder scan done over 800 noted, hodge catheter placed and is draining. Initially given juice and dextrose, along with magnesium citrate and then patient started to become difficult to arouse so given narcan 2 mg with positive response. She was able to tolerate juice but then BS started dropping again so placed on D10 at 100 ml/hr for past 2 hours, BS was stable in the 80s but then just dropped again to 50, she just drank full cup of OJ and nurse is at bedside to give a sandwich.ICU to admit due to need for D10 gtt and continuous BS monitoring    Amount and/or Complexity of Data Reviewed  Independent Historian: caregiver  External Data Reviewed: notes.     Details: Reviewed note from PCP visit 02/13/2025. Pt seen by Dr. Grey for bronchitis and started on Doxycycline  Labs: ordered. Decision-making details documented in ED Course.  Radiology: ordered. Decision-making details documented in ED Course.  ECG/medicine tests: ordered and independent interpretation performed. Decision-making details  documented in ED Course.    Risk  OTC drugs.  Prescription drug management.  Decision regarding hospitalization.  Diagnosis or treatment significantly limited by social determinants of health.                 ED Medication(s):  Medications   sodium chloride 0.9% bolus 1,000 mL 1,000 mL (0 mLs Intravenous Stopped 2/20/25 1423)   promethazine (PHENERGAN) 25 mg in 0.9% NaCl 50 mL IVPB (0 mg Intravenous Stopped 2/20/25 1438)   dextrose 50% injection 25 g (25 g Intravenous Given 2/20/25 1338)   iohexoL (OMNIPAQUE 350) injection 100 mL (100 mLs Intravenous Given 2/20/25 1407)   dextrose 50 % in water (D50W) injection 25 g (25 g Intravenous Given 2/20/25 1523)   magnesium citrate solution 296 mL (296 mLs Oral Given 2/20/25 1500)   dextrose 10 % infusion ( Intravenous New Bag 2/20/25 1645)   naloxone 0.4 mg/mL injection 2 mg (2 mg Intravenous Given 2/20/25 1645)       New Prescriptions    No medications on file               Scribe Attestation:   Scribe #1: I performed the above scribed service and the documentation accurately describes the services I performed. I attest to the accuracy of the note.     Attending:   Physician Attestation Statement for Scribe #1: I, Gisselle Franklin MD, personally performed the services described in this documentation, as scribed by Clem Espinoza & Kristyn Constantino, in my presence, and it is both accurate and complete.           Clinical Impression       ICD-10-CM ICD-9-CM   1. Hypoglycemia  E16.2 251.2   2. Nausea & vomiting  R11.2 787.01   3. Constipation due to opioid therapy  K59.03 564.09    T40.2X5A E935.2   4. Acute urinary retention  R33.8 788.29   5. Opiate dependence, continuous  F11.20 304.01   6. Chronic back pain greater than 3 months duration  M54.9 724.5    G89.29 338.29       Disposition:   Disposition: Admitted  Condition: Serious       Gisselle Franklin MD  02/20/25 2015

## 2025-02-21 LAB
ANION GAP SERPL CALC-SCNC: 10 MMOL/L (ref 8–16)
BASOPHILS # BLD AUTO: 0.04 K/UL (ref 0–0.2)
BASOPHILS NFR BLD: 0.9 % (ref 0–1.9)
BUN SERPL-MCNC: 11 MG/DL (ref 8–23)
CALCIUM SERPL-MCNC: 8.9 MG/DL (ref 8.7–10.5)
CHLORIDE SERPL-SCNC: 105 MMOL/L (ref 95–110)
CO2 SERPL-SCNC: 23 MMOL/L (ref 23–29)
CREAT SERPL-MCNC: 0.8 MG/DL (ref 0.5–1.4)
DIFFERENTIAL METHOD BLD: ABNORMAL
EOSINOPHIL # BLD AUTO: 0.1 K/UL (ref 0–0.5)
EOSINOPHIL NFR BLD: 2.3 % (ref 0–8)
ERYTHROCYTE [DISTWIDTH] IN BLOOD BY AUTOMATED COUNT: 19.8 % (ref 11.5–14.5)
EST. GFR  (NO RACE VARIABLE): >60 ML/MIN/1.73 M^2
GLUCOSE SERPL-MCNC: 91 MG/DL (ref 70–110)
HCT VFR BLD AUTO: 35.2 % (ref 37–48.5)
HGB BLD-MCNC: 11 G/DL (ref 12–16)
IMM GRANULOCYTES # BLD AUTO: 0.02 K/UL (ref 0–0.04)
IMM GRANULOCYTES NFR BLD AUTO: 0.5 % (ref 0–0.5)
LYMPHOCYTES # BLD AUTO: 1.5 K/UL (ref 1–4.8)
LYMPHOCYTES NFR BLD: 36 % (ref 18–48)
MAGNESIUM SERPL-MCNC: 1.9 MG/DL (ref 1.6–2.6)
MCH RBC QN AUTO: 31.6 PG (ref 27–31)
MCHC RBC AUTO-ENTMCNC: 31.3 G/DL (ref 32–36)
MCV RBC AUTO: 101 FL (ref 82–98)
MONOCYTES # BLD AUTO: 0.3 K/UL (ref 0.3–1)
MONOCYTES NFR BLD: 7.9 % (ref 4–15)
NEUTROPHILS # BLD AUTO: 2.2 K/UL (ref 1.8–7.7)
NEUTROPHILS NFR BLD: 52.4 % (ref 38–73)
NRBC BLD-RTO: 0 /100 WBC
OHS QRS DURATION: 78 MS
OHS QTC CALCULATION: 426 MS
PHOSPHATE SERPL-MCNC: 2 MG/DL (ref 2.7–4.5)
PLATELET # BLD AUTO: 390 K/UL (ref 150–450)
PMV BLD AUTO: 9 FL (ref 9.2–12.9)
POCT GLUCOSE: 101 MG/DL (ref 70–110)
POCT GLUCOSE: 104 MG/DL (ref 70–110)
POCT GLUCOSE: 108 MG/DL (ref 70–110)
POCT GLUCOSE: 116 MG/DL (ref 70–110)
POCT GLUCOSE: 128 MG/DL (ref 70–110)
POCT GLUCOSE: 132 MG/DL (ref 70–110)
POCT GLUCOSE: 134 MG/DL (ref 70–110)
POCT GLUCOSE: 182 MG/DL (ref 70–110)
POCT GLUCOSE: 246 MG/DL (ref 70–110)
POCT GLUCOSE: 82 MG/DL (ref 70–110)
POCT GLUCOSE: 83 MG/DL (ref 70–110)
POCT GLUCOSE: 85 MG/DL (ref 70–110)
POCT GLUCOSE: 86 MG/DL (ref 70–110)
POCT GLUCOSE: 86 MG/DL (ref 70–110)
POCT GLUCOSE: 93 MG/DL (ref 70–110)
POCT GLUCOSE: 96 MG/DL (ref 70–110)
POTASSIUM SERPL-SCNC: 4.1 MMOL/L (ref 3.5–5.1)
RBC # BLD AUTO: 3.48 M/UL (ref 4–5.4)
SODIUM SERPL-SCNC: 138 MMOL/L (ref 136–145)
WBC # BLD AUTO: 4.28 K/UL (ref 3.9–12.7)

## 2025-02-21 PROCEDURE — 25000003 PHARM REV CODE 250: Performed by: NURSE PRACTITIONER

## 2025-02-21 PROCEDURE — 85025 COMPLETE CBC W/AUTO DIFF WBC: CPT | Performed by: SPECIALIST

## 2025-02-21 PROCEDURE — 36415 COLL VENOUS BLD VENIPUNCTURE: CPT | Performed by: SPECIALIST

## 2025-02-21 PROCEDURE — C1751 CATH, INF, PER/CENT/MIDLINE: HCPCS

## 2025-02-21 PROCEDURE — 02HV33Z INSERTION OF INFUSION DEVICE INTO SUPERIOR VENA CAVA, PERCUTANEOUS APPROACH: ICD-10-PCS | Performed by: INTERNAL MEDICINE

## 2025-02-21 PROCEDURE — 94640 AIRWAY INHALATION TREATMENT: CPT

## 2025-02-21 PROCEDURE — 97530 THERAPEUTIC ACTIVITIES: CPT

## 2025-02-21 PROCEDURE — 25000242 PHARM REV CODE 250 ALT 637 W/ HCPCS: Performed by: NURSE PRACTITIONER

## 2025-02-21 PROCEDURE — 36569 INSJ PICC 5 YR+ W/O IMAGING: CPT

## 2025-02-21 PROCEDURE — 84100 ASSAY OF PHOSPHORUS: CPT | Performed by: SPECIALIST

## 2025-02-21 PROCEDURE — 97166 OT EVAL MOD COMPLEX 45 MIN: CPT

## 2025-02-21 PROCEDURE — 97162 PT EVAL MOD COMPLEX 30 MIN: CPT

## 2025-02-21 PROCEDURE — 25000003 PHARM REV CODE 250: Performed by: SPECIALIST

## 2025-02-21 PROCEDURE — S4991 NICOTINE PATCH NONLEGEND: HCPCS | Performed by: NURSE PRACTITIONER

## 2025-02-21 PROCEDURE — 94761 N-INVAS EAR/PLS OXIMETRY MLT: CPT

## 2025-02-21 PROCEDURE — 63600175 PHARM REV CODE 636 W HCPCS: Performed by: NURSE PRACTITIONER

## 2025-02-21 PROCEDURE — 63600175 PHARM REV CODE 636 W HCPCS: Performed by: SPECIALIST

## 2025-02-21 PROCEDURE — 11000001 HC ACUTE MED/SURG PRIVATE ROOM

## 2025-02-21 PROCEDURE — 83735 ASSAY OF MAGNESIUM: CPT | Performed by: SPECIALIST

## 2025-02-21 PROCEDURE — 80048 BASIC METABOLIC PNL TOTAL CA: CPT | Performed by: SPECIALIST

## 2025-02-21 RX ORDER — SODIUM CHLORIDE 0.9 % (FLUSH) 0.9 %
10 SYRINGE (ML) INJECTION EVERY 12 HOURS PRN
Status: DISCONTINUED | OUTPATIENT
Start: 2025-02-21 | End: 2025-02-26 | Stop reason: HOSPADM

## 2025-02-21 RX ORDER — LANOLIN ALCOHOL/MO/W.PET/CERES
800 CREAM (GRAM) TOPICAL
Status: DISCONTINUED | OUTPATIENT
Start: 2025-02-21 | End: 2025-02-24

## 2025-02-21 RX ORDER — SODIUM,POTASSIUM PHOSPHATES 280-250MG
2 POWDER IN PACKET (EA) ORAL
Status: DISCONTINUED | OUTPATIENT
Start: 2025-02-21 | End: 2025-02-24

## 2025-02-21 RX ORDER — ENOXAPARIN SODIUM 100 MG/ML
30 INJECTION SUBCUTANEOUS EVERY 24 HOURS
Status: DISCONTINUED | OUTPATIENT
Start: 2025-02-21 | End: 2025-02-26 | Stop reason: HOSPADM

## 2025-02-21 RX ADMIN — BUDESONIDE INHALATION 0.5 MG: 0.5 SUSPENSION RESPIRATORY (INHALATION) at 07:02

## 2025-02-21 RX ADMIN — HYDROCODONE BITARTRATE AND ACETAMINOPHEN 1 TABLET: 5; 325 TABLET ORAL at 08:02

## 2025-02-21 RX ADMIN — METHYLPREDNISOLONE SODIUM SUCCINATE 60 MG: 40 INJECTION, POWDER, FOR SOLUTION INTRAMUSCULAR; INTRAVENOUS at 05:02

## 2025-02-21 RX ADMIN — MUPIROCIN: 20 OINTMENT TOPICAL at 09:02

## 2025-02-21 RX ADMIN — POTASSIUM & SODIUM PHOSPHATES POWDER PACK 280-160-250 MG 2 PACKET: 280-160-250 PACK at 05:02

## 2025-02-21 RX ADMIN — METHYLPREDNISOLONE SODIUM SUCCINATE 60 MG: 40 INJECTION, POWDER, FOR SOLUTION INTRAMUSCULAR; INTRAVENOUS at 09:02

## 2025-02-21 RX ADMIN — DEXTROSE MONOHYDRATE: 100 INJECTION, SOLUTION INTRAVENOUS at 03:02

## 2025-02-21 RX ADMIN — ENOXAPARIN SODIUM 40 MG: 40 INJECTION SUBCUTANEOUS at 01:02

## 2025-02-21 RX ADMIN — FAMOTIDINE 20 MG: 20 TABLET ORAL at 01:02

## 2025-02-21 RX ADMIN — HYDROCODONE BITARTRATE AND ACETAMINOPHEN 1 TABLET: 5; 325 TABLET ORAL at 01:02

## 2025-02-21 RX ADMIN — ARFORMOTEROL TARTRATE 15 MCG: 15 SOLUTION RESPIRATORY (INHALATION) at 08:02

## 2025-02-21 RX ADMIN — LINACLOTIDE 145 MCG: 145 CAPSULE, GELATIN COATED ORAL at 06:02

## 2025-02-21 RX ADMIN — NICOTINE 1 PATCH: 21 PATCH, EXTENDED RELEASE TRANSDERMAL at 09:02

## 2025-02-21 RX ADMIN — POTASSIUM & SODIUM PHOSPHATES POWDER PACK 280-160-250 MG 2 PACKET: 280-160-250 PACK at 09:02

## 2025-02-21 RX ADMIN — ARFORMOTEROL TARTRATE 15 MCG: 15 SOLUTION RESPIRATORY (INHALATION) at 07:02

## 2025-02-21 RX ADMIN — FAMOTIDINE 20 MG: 20 TABLET ORAL at 08:02

## 2025-02-21 RX ADMIN — ENOXAPARIN SODIUM 30 MG: 30 INJECTION SUBCUTANEOUS at 05:02

## 2025-02-21 RX ADMIN — GLUCAGON 1 MG: 1 INJECTION, POWDER, LYOPHILIZED, FOR SOLUTION INTRAMUSCULAR; INTRAVENOUS at 09:02

## 2025-02-21 RX ADMIN — BUDESONIDE INHALATION 0.5 MG: 0.5 SUSPENSION RESPIRATORY (INHALATION) at 08:02

## 2025-02-21 RX ADMIN — DEXTROSE MONOHYDRATE: 100 INJECTION, SOLUTION INTRAVENOUS at 02:02

## 2025-02-21 RX ADMIN — POTASSIUM & SODIUM PHOSPHATES POWDER PACK 280-160-250 MG 2 PACKET: 280-160-250 PACK at 01:02

## 2025-02-21 NOTE — PLAN OF CARE
AAOx3.  Removed IV in lt arm.  PICC placed, JENNIFER.  Ambulated with PT/OT this morning with rolling walker.  BG scheduled q 4 hrs; D10 was not restarted.  Minimal food eaten during breakfast & lunch.  No supper.  Solu-medrol added q 8 hrs.  Glucagon given this morning per MD order.  Family updated at bedside.

## 2025-02-21 NOTE — HPI
64 year old female with known hypertension; COPD/asthma with prn home oxygen; gastroparesis; chronic pain on hydrocodone; everyday smoker  H/o of diabetes (off meds ~5yrs) and atrial fib previously on xarelto; she reports both resolved since chemotherapy for B cell lymphoma ~2018    Family brought to ED on 2/20 for nausea, vomiting, abdominal pain, back pain (worse than baseline), constipation  Pt is very poor historian, cannot recall last BM, not sure why her family brought her in. Unsure of current medications, does think she ran fevers at home before seeing PCP on 2/13 when she was given antibiotic for bronchitis    Evaluation revealed hypoglycemia that was refractory to IVP D50 and has not yet been stable on continuous D10 infusion  Labs show nl wbc, lactate, ceratinine, electrolytes. CT abdomen shows Moderate volume of stool throughout the colon with rather abrupt transition point in the sigmoid colon. Underlying lesion difficult to exclude, such as stricture or neoplasm   She has not vomited since zofran given on arrival but has not yet eaten the sandwich or juice at her bedside and has not drank the mag citrate also at bedside    Critical care team contacted for ICU admission with D10 infusion and hourly glucose monitoring

## 2025-02-21 NOTE — EICU
New Patient Evaluation    HPI:  64 F history of COPD/asthma on home O2, gastroparesis, chronic pain, opiate dependence, afib, B cell lymphoma s/p chemotherapy 2018, DM but has been off meds for 5 years. She was brought in by family for nausea, vomiting, constipation and abdominal pain. She was hypoglycemic requiring D10 infusion. CT abdomen with moderate volume of stool throughout the colon with rather abrupt transition point in the sigmoid colon.    Camera Assessment:  /63  HR 91 O2 94%  Seen asleep, not in acute distress    Data:  WBC 8.41, H/H 12/40, platelets 345  Na 140, K 4.8, Cl 107, CO2 21, AG 12, BUN 16, creatinine 1    Assessment and Plans:  Hypoglycemia not tolerating PO intake on D10 and q 1 CBGs  Constipation on bowel regimen. May need further workup to rule out obstruction

## 2025-02-21 NOTE — PLAN OF CARE
[facility] - Intensive Care (Hospital)  Initial Discharge Assessment       Primary Care Provider: Dawit Grey MD    Admission Diagnosis: Hypoglycemia [E16.2]  Nausea & vomiting [R11.2]  Chronic back pain greater than 3 months duration [M54.9, G89.29]  Opiate dependence, continuous [F11.20]  Acute urinary retention [R33.8]  Constipation due to opioid therapy [K59.03, T40.2X5A]    Admission Date: 2/20/2025  Expected Discharge Date:     Transition of Care Barriers: None    Payor: MEDICAID / Plan: [place] [employer]CT / Product Type: Managed Medicaid /     Extended Emergency Contact Information  Primary Emergency Contact: Niels Echeverriaa   [place]  Mobile Phone: [phone]  Relation: Daughter  Secondary Emergency Contact: Lilia Echeverria  Mobile Phone: [phone]  Relation: Daughter    Discharge Plan A: Home Health         [facility] - [place], [place] - [address] [place] [address]  [address] [address]  [address]  [place] [address]  Phone: [phone] Fax: [phone]      Initial Assessment (most recent)       Adult Discharge Assessment - 02/21/25 1326          Discharge Assessment    Assessment Type Discharge Planning Assessment     Confirmed/corrected address, phone number and insurance Yes     Confirmed Demographics Correct on Facesheet     Source of Information patient     When was your last doctors appointment? 02/13/25     Communicated LANI with patient/caregiver Date not available/Unable to determine     Reason For Admission Hypoglycemia associated with history of type 2 diabetes mellitus     People in Home child(brittany), adult;grandchild(brittany)     Facility Arrived From: home     Do you expect to return to your current living situation? Yes     Do you have help at home or someone to help you manage your care at home? Yes     Who are your caregiver(s) and their phone number(s)? edmar, Lilia and Amy     Prior to hospitilization cognitive status: Alert/Oriented      Current cognitive status: Alert/Oriented     Walking or Climbing Stairs Difficulty yes     Walking or Climbing Stairs ambulation difficulty, requires equipment     Mobility Management rollator     Dressing/Bathing Difficulty no     Home Accessibility wheelchair accessible     Home Layout Able to live on 1st floor     Equipment Currently Used at Home rollator;oxygen;shower chair     Readmission within 30 days? No     Patient currently being followed by outpatient case management? No     Do you currently have service(s) that help you manage your care at home? No     Do you take prescription medications? Yes     Do you have prescription coverage? Yes     Coverage medicaid     Do you have any problems affording any of your prescribed medications? No     Is the patient taking medications as prescribed? yes     Who is going to help you get home at discharge? sadie     How do you get to doctors appointments? family or friend will provide     Are you on dialysis? No     Do you take coumadin? No     Discharge Plan A Home Health     DME Needed Upon Discharge  walker, rolling     Discharge Plan discussed with: Patient     Transition of Care Barriers None                   Anticipated DC dispo: home health   Prior Level of Function: independent with ADLs, uses a rollator for ambulation   People in home: daughter Lilia and her 2 children      Comments:  CM met with patient at bedside to introduce role and discuss discharge planning. Daughters will be help at home and can provide transport at time of discharge. Patient has home oxygen through [employer]. Has used [facility] in the past. Confirmed demographics, insurance, and emergency contacts. CM discharge needs depends on hospital progress. CM will continue following to assist with other needs. Discharge plan has been determined by review of patient's clinical status, future medical and therapeutic needs, and coverage/benefits for post-acute care in coordination with  multidisciplinary team members.

## 2025-02-21 NOTE — SUBJECTIVE & OBJECTIVE
Past Medical History:   Diagnosis Date    Anemia of chronic disease     Anticoagulant long-term use     Asthma     COPD (chronic obstructive pulmonary disease)     Diabetes mellitus     GERD (gastroesophageal reflux disease)     Hypertension     Lymphoma     Mixed hyperlipidemia     PAF (paroxysmal atrial fibrillation)     Thrombosis of right internal jugular vein        Past Surgical History:   Procedure Laterality Date     SECTION      CHOLECYSTECTOMY         Review of patient's allergies indicates:  No Known Allergies    Family History       Problem Relation (Age of Onset)    Cancer Father    Diabetes Mother    Heart disease Mother    Heart failure Mother (67)    Hypertension Mother    Lupus Sister, Sister, Daughter          Tobacco Use    Smoking status: Some Days     Current packs/day: 0.75     Average packs/day: 0.8 packs/day for 45.0 years (33.8 ttl pk-yrs)     Types: Cigarettes    Smokeless tobacco: Never   Substance and Sexual Activity    Alcohol use: No    Drug use: No    Sexual activity: Not Currently         Review of Systems   Constitutional:  Positive for chills, fatigue and fever.   Respiratory:  Negative for cough and shortness of breath.    Cardiovascular:  Negative for chest pain and leg swelling.   Gastrointestinal:  Positive for abdominal distention, abdominal pain, constipation, nausea and vomiting.   Musculoskeletal:  Positive for back pain.     Objective:     Vital Signs (Most Recent):  Temp: 98.9 °F (37.2 °C) (25 1400)  Pulse: 98 (25)  Resp: 18 (25)  BP: 119/60 (25)  SpO2: 96 % (25) Vital Signs (24h Range):  Temp:  [98.9 °F (37.2 °C)] 98.9 °F (37.2 °C)  Pulse:  [] 98  Resp:  [12-21] 18  SpO2:  [94 %-99 %] 96 %  BP: ()/(50-70) 119/60        There is no height or weight on file to calculate BMI.      Intake/Output Summary (Last 24 hours) at 2025  Last data filed at 2025 1438  Gross per 24 hour   Intake 1049 ml    Output --   Net 1049 ml        Physical Exam  Vitals and nursing note reviewed.   Constitutional:       General: She is awake. She is not in acute distress.     Appearance: She is normal weight. She is not ill-appearing.   HENT:      Head: Atraumatic.   Eyes:      Conjunctiva/sclera: Conjunctivae normal.   Cardiovascular:      Rate and Rhythm: Normal rate and regular rhythm.      Pulses:           Radial pulses are 2+ on the right side and 2+ on the left side.        Dorsalis pedis pulses are 2+ on the right side and 2+ on the left side.   Pulmonary:      Effort: Pulmonary effort is normal.      Breath sounds: Normal breath sounds. No decreased breath sounds, wheezing, rhonchi or rales.   Abdominal:      General: Bowel sounds are decreased. There is no distension.      Palpations: Abdomen is soft.      Tenderness: There is no abdominal tenderness.   Musculoskeletal:      Right lower leg: No edema.      Left lower leg: No edema.   Skin:     General: Skin is warm and dry.      Capillary Refill: Capillary refill takes less than 2 seconds.   Neurological:      Mental Status: She is alert. Mental status is at baseline.      GCS: GCS eye subscore is 4. GCS verbal subscore is 5. GCS motor subscore is 6.      Cranial Nerves: Cranial nerves 2-12 are intact.   Psychiatric:         Mood and Affect: Mood normal.         Speech: Speech normal.         Behavior: Behavior normal. Behavior is cooperative.         Cognition and Memory: She exhibits impaired recent memory and impaired remote memory.          Vents:       Lines/Drains/Airways       Drain  Duration                  Urethral Catheter 02/20/25 1501 Latex 16 Fr. <1 day              Peripheral Intravenous Line  Duration                  Peripheral IV - Single Lumen 02/20/25 2136 24 G Anterior;Left Wrist <1 day         Peripheral IV - Single Lumen 02/20/25 2137 24 G Anterior;Left Forearm <1 day                    Significant Labs:    CBC/Anemia Profile:  Recent Labs   Lab  02/20/25  1523   WBC 8.41   HGB 12.5   HCT 40.0      *   RDW 19.2*        Chemistries:  Recent Labs   Lab 02/20/25  1321      K 4.8      CO2 21*   BUN 16   CREATININE 1.0   CALCIUM 9.0   ALBUMIN 3.1*   PROT 7.2   BILITOT 0.1   ALKPHOS 127   ALT 6*   AST 14   MG 1.9       Lactic Acid:   Recent Labs   Lab 02/20/25  1321   LACTATE 1.2     All pertinent labs within the past 24 hours have been reviewed.    Significant Imaging:   I have reviewed all pertinent imaging results/findings within the past 24 hours.

## 2025-02-21 NOTE — ASSESSMENT & PLAN NOTE
Patient's COPD is controlled currently.  Patient is currently off COPD Pathway. Continue scheduled inhalers  prn supplemental oxygen  and monitor respiratory status closely.

## 2025-02-21 NOTE — PROGRESS NOTES
Pharmacist Renal Dose Adjustment Note    Genie Balderas is a 64 y.o. female being treated with the medication famotidine.    Patient Data:    Vital Signs (Most Recent):  Temp: 98.9 °F (37.2 °C) (02/20/25 1400)  Pulse: 99 (02/20/25 2147)  Resp: 15 (02/20/25 2147)  BP: (!) 111/59 (02/20/25 2147)  SpO2: 96 % (02/20/25 2147) Vital Signs (72h Range):  Temp:  [98.9 °F (37.2 °C)]   Pulse:  []   Resp:  [12-21]   BP: ()/(50-70)   SpO2:  [94 %-99 %]      Recent Labs   Lab 02/20/25  1321   CREATININE 1.0     Serum creatinine: 1 mg/dL 02/20/25 1321  Estimated creatinine clearance: 43.4 mL/min    Famotidine 20 mg PO twice daily will be changed to famotidine 20 mg PO once daily for CrCl 30-59 ml/min.    Pharmacist's Name: Pk Westfall  Pharmacist's Extension: [phone]

## 2025-02-21 NOTE — PT/OT/SLP EVAL
"Occupational Therapy Evaluation and Treatment    Name: Genie Balderas  MRN: [id number]  Admitting Diagnosis: Hypoglycemia associated with type 2 diabetes mellitus  Recent Surgery: * No surgery found *      Recommendations:     Discharge Recommendations: Low Intensity Therapy (24/7 SPV and A)  Level of Assistance Recommended: 24 hours light assistance and 24 hours supervision  Discharge Equipment Recommendations: walker, rolling  Barriers to discharge: None    Assessment:     Genie Balderas is a 64 y.o. female with a medical diagnosis of Hypoglycemia associated with type 2 diabetes mellitus. She presents with performance deficits affecting function including weakness, impaired endurance, impaired self care skills, impaired functional mobility, impaired balance, pain, impaired cardiopulmonary response to activity, decreased safety awareness, decreased lower extremity function.    Rehab Prognosis: Fair; patient would benefit from acute OT services to address these deficits and reach maximum level of function.    Plan:     Patient to be seen 2 x/week to address the above listed problems via self-care/home management, therapeutic activities, therapeutic exercises  Plan of Care Expires: 03/07/25  Plan of Care Reviewed with: patient    Subjective     Chief Complaint: Reported "I am having abdominal cramping."  Patient Comments/Goals: increase independence  Pain/Comfort:  Pain Rating 1: 8/10  Location 1: abdomen  Pain Addressed 1:  (activity pacing)    Social History:  Living Environment: Patient lives alone in a single story home with  no steps/stairs to [place].  Family checks in frequently.  Prior Level of Function: Prior to admission, patient was modified independent with ADLs and community distance ambulation via 4WW.  Roles and Routines: Patient was not driving and not working prior to admission.  Equipment Used at Home: rollator, oxygen, shower chair  DME owned (not currently used): none  Assistance Upon Discharge: " family    Objective:     Communicated with nurse and EPIC prior to session. Patient found supine with blood pressure cuff, pulse ox (continuous), telemetry, peripheral IV, hodge catheter upon OT entry to room.    General Precautions: Standard, fall   Orthopedic Precautions: N/A   Braces: N/A    Respiratory Status: Room air    Occupational Performance    Gait belt applied - Yes    Bed Mobility:   Supine to sit from left side of bed with contact guard assistance  Sit to Supine with minimum assistance on left side of bed    Functional Mobility/Transfers:  Sit <> Stand Transfer with minimum assistance with 4 wheeled walker  Functional Mobility: Patient completed x30ft x2 trials functional mobility with 4WW and min A to increase dynamic standing balance and activity tolerance needed for ADL completion.  Transferred back to bed due to PICC line nurse present for placement  Provided with v/c for technique with transfers to increase safety and independence with completion  Rapid fatigue in standing    Activities of Daily Living:  Upper Body Dressing: minimum assistance  Lower Body Dressing: maximal assistance    Cognitive/Visual Perceptual:  Cognitive/Psychosocial Skills:    -     Oriented to: Person, Place, Time, Situation  -     Follows Commands/attention: Follows one-step commands    Physical Exam:  Balance:    -     Sitting: stand by assistance  -     Standing: minimum assistance  Upper Extremity Range of Motion:     -       Right Upper Extremity: WFL  -       Left Upper Extremity: WFL  Upper Extremity Strength:    -       Right Upper Extremity: Deficits: grossly 4/5  -       Left Upper Extremity: Deficits: grossly 4/5   Strength:    -       Right Upper Extremity: Deficits: fair  -       Left Upper Extremity: Deficits: fair  L UE with moderate edema- elevated arm on pillows at end of session to address.  Declines sensation deficits to B UE    AMPAC 6 Click ADL:  AMPAC Total Score: 19      Treatment &  Education:  Therapist provided facilitation and instruction of proper body mechanics, energy conservation, and fall prevention strategies during tasks listed above  Patient educated on role of OT, POC, and goals for therapy  Patient educated on importance of OOB activities with staff member assistance and sitting OOB majority of the day- chair setup in room to allow for transfer after PICC placement  Encouraged completion of B UE AROM therex throughout the day to increase functional strength and activity tolerance needed for ADL completion.    Patient left HOB elevated with all lines intact, call button in reach, and RN present.    GOALS:   Multidisciplinary Problems       Occupational Therapy Goals          Problem: Occupational Therapy    Goal Priority Disciplines Outcome Interventions   Occupational Therapy Goal     OT, PT/OT     Description: Goals to be met by: 3/7/25     Patient will increase functional independence with ADLs by performing:    Toileting from toilet with Supervision for hygiene and clothing management.   Toilet transfer to toilet with Supervision.  Increased functional strength in B UE grossly by 1/2 MM grade.                       DME Justifications:   Genie's mobility limitation cannot be sufficiently resolved by the use of a cane. Her functional mobility deficit can be sufficiently resolved with the use of a Rolling Walker. Patient's mobility limitation significantly impairs their ability to participate in one of more activities of daily living.  The use of a RW will significantly improve the patient's ability to participate in MRADLS and the patient will use it on regular basis in the home.    History:     Past Medical History:   Diagnosis Date    Anemia of chronic disease     Anticoagulant long-term use     Asthma     COPD (chronic obstructive pulmonary disease)     Diabetes mellitus     GERD (gastroesophageal reflux disease)     Hypertension     Lymphoma     Mixed hyperlipidemia     PAF  (paroxysmal atrial fibrillation)     Thrombosis of right internal jugular vein          Past Surgical History:   Procedure Laterality Date     SECTION      CHOLECYSTECTOMY       Time Tracking:     OT Date of Treatment: 25  OT Start Time: 905  OT Stop Time: 930  OT Total Time (min): 25 min    Billable Minutes: Evaluation 15 and Therapeutic Activity 10    Amada Alvarado, MART  2025

## 2025-02-21 NOTE — ASSESSMENT & PLAN NOTE
On chronic narcotic  With known gastroparesis  Non compliant with prescribed linzess and reports pain with reglan  -does not like the mag citrate at bedside  -will trial mag citrate enema  -resume linzess in am

## 2025-02-21 NOTE — PT/OT/SLP EVAL
Physical Therapy Evaluation and Treatment    Patient Name:  Genie Balderas   MRN:  [id number]    Recommendations:     Discharge Recommendations: Low Intensity Therapy (WITH 24 HOUR CARE)   Discharge Equipment Recommendations: walker, rolling   Barriers to discharge: would benefit from 24 hour care    Assessment:     Genie Balderas is a 64 y.o. female admitted with a medical diagnosis of Hypoglycemia associated with type 2 diabetes mellitus.  She presents with the following impairments/functional limitations: weakness, impaired endurance, impaired functional mobility, gait instability, impaired balance, pain, decreased safety awareness, decreased lower extremity function, decreased upper extremity function, decreased coordination.    Rehab Prognosis: Fair; patient would benefit from acute skilled PT services to address these deficits and reach maximum level of function.    Recent Surgery: * No surgery found *     Plan:     During this hospitalization, patient to be seen 3 x/week to address the identified rehab impairments via gait training, therapeutic activities, therapeutic exercises and progress toward the following goals:    Plan of Care Expires:  03/07/25    Subjective     Chief Complaint: C/O WEAKNESS  Patient/Family Comments/goals: AGREEABLE TO TRY  Pain/Comfort:  Pain Rating 1: 8/10  Location 1: abdomen (C/O STOMACH CRAMPING EVERY TIME AFTER SHE EATS)  Pain Addressed 1: Reposition    Patients cultural, spiritual, Religion conflicts given the current situation:      Living Environment:  PT LIVES ALONE BUT HAS FAMILY ASSIST FROM DAUGHTER AND GRAND KIDS, DAUGHTER STAYS WITH HER 3 DAYS A WEEK, 1 STORY HOUSE NO STEPS TO ENTER, PT AMB WITH ROLLATOR WALKER HOUSE AND COMMUNITY DISTANCES, DOES NOT DRIVE, DAUGHTER DRIVES FOR HER, DAUGHTER ASSISTS WITH ADL'S  Prior to admission, patients level of function was EBONY WITH ROLLATOR.  Equipment used at home: rollator, shower chair, grab bar.  DME owned (not currently used): none.   Upon discharge, patient will have assistance from FAMILY.    Objective:     Communicated with NURSE DEBBI prior to session.  Patient found supine with telemetry, peripheral IV  upon PT entry to room.    General Precautions: Standard, fall  Orthopedic Precautions:N/A   Braces: N/A  Respiratory Status: Room air    Exams:  Cognitive Exam:  Patient is oriented to Person, Place, Time, and Situation  Postural Exam:  Patient presented with the following abnormalities:    -       Rounded shoulders  Sensation:    -       Intact  RLE ROM: WFL  RLE Strength: GROSSLY 3+/5  LLE ROM: WFL  LLE Strength: GROSSLY 3+/5    Functional Mobility:  Bed Mobility:     Rolling Left:  contact guard assistance  Rolling Right: contact guard assistance  Scooting: contact guard assistance-SEATED FWD/BWD SCOOT IN BED  Bridging: maximal assistance and of 2 persons-SUPINE SCOOT TOWARD HOB  Supine to Sit: contact guard assistance  Sit to Supine: minimum assistance  Transfers:     Sit to Stand:  minimum assistance with 4 wheeled walker  Bed to Chair: UNABLE, RETURN TO BED PER NURSE REQUEST DUE TO PT ABOUT TO HAVE PICC LINE PLACED  Gait: PT AMB 30' X 2 TRIALS WITH RW AND MARIANA, SLOW PACE, QUICK TO FATIGUE, CUES FOR UPRIGHT POSTURE AND RW SAFETY, CUES FOR B FOOT CLEARANCE, NO LOB OR SOB ON ROOM AIR  Balance: FAIR SITTING BALANCE, POOR+ DYNAMIC BALANCE DURING GAIT  PT EDUCATED IN AND PERFORMED SEATED BLE THEREX X 5 REPS AROM WITH REST AS NEEDED: HIP FLEX/EXT, LAQ, QUAD SET, HEEL SLIDES, AP'S  PT TOLERATED UN-ASSISTED SITTING AT EOB FOR APPROXIMATELY 5 MINUTES FOCUSING ON TOLERANCE TO UPRIGHT POSITION    AM-PAC 6 CLICK MOBILITY  Total Score:18     Treatment & Education:  PT EDUCATION:  - ROLE OF P.T. AND POC IN ACUTE CARE HOSPITAL SETTING  - RW USE AND SAFETY DURING TF'S AND GAIT-ENCOURAGED RW USE INSTEAD OF ROLLATOR TO INCREASE STABILITY WITH AD  - ENCOURAGED TO INCREASE TIME OOB IN CHAIR TO TOLERANCE   - TO CONTINUE THERAPUETIC EXERCISES THROUGHOUT THE  "DAY TO INCREASE ACTIVITY TOLERANCE AND DECREASE RISK FOR PNEUMONIA AND BLOOD CLOTS: HIP FLEX/EXT, HIP ABD/ADD, QUAD SET, HEEL SLIDE, AP  - RISK FOR FALLS DUE TO GENERALIZED WEAKNESS, EDUCATED ON "CALL DON'T FALL", ENCOURAGED TO CALL FOR ASSISTANCE WITH ALL NEEDS SUCH AS BED<>CHAIR TRANSFERS OR TRIPS TO BATHROOM, PT AGREEABLE TO SAFETY PRECAUTIONS    Patient left HOB elevated with all lines intact, call button in reach, NURSE notified, and NURSE present.    GOALS:   Multidisciplinary Problems       Physical Therapy Goals          Problem: Physical Therapy    Goal Priority Disciplines Outcome Interventions   Physical Therapy Goal     PT, PT/OT     Description: LTG'S TO BE MET IN 14 DAYS (3-7-25)  PT WILL BE EBONY FOR BED MOBILITY  PT WILL REQUIRE SPV FOR BED<>CHAIR TF'S  PT WILL  FEET WITH RW AND SPV  PT WILL INC AMPAC SCORE BY 2 POINTS TO PROGRESS GROSS FUNC MOBILITY                         DME Justifications:   Genie's mobility limitation cannot be sufficiently resolved by the use of a cane. Her functional mobility deficit can be sufficiently resolved with the use of a Walker. Patient's mobility limitation significantly impairs their ability to participate in one of more activities of daily living.  The use of a Walker will significantly improve the patient's ability to participate in MRADLS and the patient will use it on regular basis in the home.    History:     Past Medical History:   Diagnosis Date    Anemia of chronic disease     Anticoagulant long-term use     Asthma     COPD (chronic obstructive pulmonary disease)     Diabetes mellitus     GERD (gastroesophageal reflux disease)     Hypertension     Lymphoma     Mixed hyperlipidemia     PAF (paroxysmal atrial fibrillation)     Thrombosis of right internal jugular vein        Past Surgical History:   Procedure Laterality Date     SECTION      CHOLECYSTECTOMY         Time Tracking:     PT Received On: 25  PT Start Time: 855     PT Stop Time: " 0925  PT Total Time (min): 30 min     Billable Minutes: Evaluation 15 and Therapeutic Activity 15    02/21/2025

## 2025-02-21 NOTE — ASSESSMENT & PLAN NOTE
Dangers of cigarette smoking were reviewed with patient in detail.   Patient was Counseled for 3-10 minutes. Nicotine replacement options were discussed. Nicotine replacement was discussed- prescribed

## 2025-02-21 NOTE — PLAN OF CARE
OT edson completed. Sup>sit CGA, sit>stand min A, functional mobility x30ft x2 trials with 4WW and min A, sit>sup with min A. Recommending low intensity intervention with 24/7 SPV and A at d/c.

## 2025-02-21 NOTE — PLAN OF CARE
P.T. EVAL COMPLETE.  PT CURRENTLY REQUIRES SBA FOR BED MOBILITY, MARIANA FOR SIT<>STAND TF, MARIANA FOR GAIT WITH RW.  P.T. RECOMMENDS LOW INTENSITY THERAPY UPON DISCHARGE WITH 24 HOUR CARE

## 2025-02-21 NOTE — H&P
[facility] - Emergency Dept.  Critical Care Medicine  History & Physical    Patient Name: Genie Balderas  MRN: [id number]  Admission Date: 2/20/2025  Hospital Length of Stay: 0 days  Code Status: Full Code  Attending Physician: Marin Denise MD   Primary Care Provider: Dawit Grey MD   Principal Problem: Hypoglycemia associated with type 2 diabetes mellitus    Subjective:     HPI:  64 year old female with known hypertension; COPD/asthma with prn home oxygen; gastroparesis; chronic pain on hydrocodone; everyday smoker  H/o of diabetes (off meds ~5yrs) and atrial fib previously on xarelto; she reports both resolved since chemotherapy for B cell lymphoma ~2018    Family brought to ED on 2/20 for nausea, vomiting, abdominal pain, back pain (worse than baseline), constipation  Pt is very poor historian, cannot recall last BM, not sure why her family brought her in. Unsure of current medications, does think she ran fevers at home before seeing PCP on 2/13 when she was given antibiotic for bronchitis    Evaluation revealed hypoglycemia that was refractory to IVP D50 and has not yet been stable on continuous D10 infusion  Labs show nl wbc, lactate, ceratinine, electrolytes. CT abdomen shows Moderate volume of stool throughout the colon with rather abrupt transition point in the sigmoid colon. Underlying lesion difficult to exclude, such as stricture or neoplasm   She has not vomited since zofran given on arrival but has not yet eaten the sandwich or juice at her bedside and has not drank the mag citrate also at bedside    Critical care team contacted for ICU admission with D10 infusion and hourly glucose monitoring    Hospital/ICU Course:  No notes on file     Past Medical History:   Diagnosis Date    Anemia of chronic disease     Anticoagulant long-term use     Asthma     COPD (chronic obstructive pulmonary disease)     Diabetes mellitus     GERD (gastroesophageal reflux disease)     Hypertension     Lymphoma      Mixed hyperlipidemia     PAF (paroxysmal atrial fibrillation)     Thrombosis of right internal jugular vein        Past Surgical History:   Procedure Laterality Date     SECTION      CHOLECYSTECTOMY         Review of patient's allergies indicates:  No Known Allergies    Family History       Problem Relation (Age of Onset)    Cancer Father    Diabetes Mother    Heart disease Mother    Heart failure Mother (67)    Hypertension Mother    Lupus Sister, Sister, Daughter          Tobacco Use    Smoking status: Some Days     Current packs/day: 0.75     Average packs/day: 0.8 packs/day for 45.0 years (33.8 ttl pk-yrs)     Types: Cigarettes    Smokeless tobacco: Never   Substance and Sexual Activity    Alcohol use: No    Drug use: No    Sexual activity: Not Currently         Review of Systems   Constitutional:  Positive for chills, fatigue and fever.   Respiratory:  Negative for cough and shortness of breath.    Cardiovascular:  Negative for chest pain and leg swelling.   Gastrointestinal:  Positive for abdominal distention, abdominal pain, constipation, nausea and vomiting.   Musculoskeletal:  Positive for back pain.     Objective:     Vital Signs (Most Recent):  Temp: 98.9 °F (37.2 °C) (25 1400)  Pulse: 98 (25)  Resp: 18 (25)  BP: 119/60 (25)  SpO2: 96 % (25) Vital Signs (24h Range):  Temp:  [98.9 °F (37.2 °C)] 98.9 °F (37.2 °C)  Pulse:  [] 98  Resp:  [12-21] 18  SpO2:  [94 %-99 %] 96 %  BP: ()/(50-70) 119/60        There is no height or weight on file to calculate BMI.      Intake/Output Summary (Last 24 hours) at 2025  Last data filed at 2025 1438  Gross per 24 hour   Intake 1049 ml   Output --   Net 1049 ml        Physical Exam  Vitals and nursing note reviewed.   Constitutional:       General: She is awake. She is not in acute distress.     Appearance: She is normal weight. She is not ill-appearing.   HENT:      Head: Atraumatic.    Eyes:      Conjunctiva/sclera: Conjunctivae normal.   Cardiovascular:      Rate and Rhythm: Normal rate and regular rhythm.      Pulses:           Radial pulses are 2+ on the right side and 2+ on the left side.        Dorsalis pedis pulses are 2+ on the right side and 2+ on the left side.   Pulmonary:      Effort: Pulmonary effort is normal.      Breath sounds: Normal breath sounds. No decreased breath sounds, wheezing, rhonchi or rales.   Abdominal:      General: Bowel sounds are decreased. There is no distension.      Palpations: Abdomen is soft.      Tenderness: There is no abdominal tenderness.   Musculoskeletal:      Right lower leg: No edema.      Left lower leg: No edema.   Skin:     General: Skin is warm and dry.      Capillary Refill: Capillary refill takes less than 2 seconds.   Neurological:      Mental Status: She is alert. Mental status is at baseline.      GCS: GCS eye subscore is 4. GCS verbal subscore is 5. GCS motor subscore is 6.      Cranial Nerves: Cranial nerves 2-12 are intact.   Psychiatric:         Mood and Affect: Mood normal.         Speech: Speech normal.         Behavior: Behavior normal. Behavior is cooperative.         Cognition and Memory: She exhibits impaired recent memory and impaired remote memory.          Vents:       Lines/Drains/Airways       Drain  Duration                  Urethral Catheter 02/20/25 1501 Latex 16 Fr. <1 day              Peripheral Intravenous Line  Duration                  Peripheral IV - Single Lumen 02/20/25 2136 24 G Anterior;Left Wrist <1 day         Peripheral IV - Single Lumen 02/20/25 2137 24 G Anterior;Left Forearm <1 day                    Significant Labs:    CBC/Anemia Profile:  Recent Labs   Lab 02/20/25  1523   WBC 8.41   HGB 12.5   HCT 40.0      *   RDW 19.2*        Chemistries:  Recent Labs   Lab 02/20/25  1321      K 4.8      CO2 21*   BUN 16   CREATININE 1.0   CALCIUM 9.0   ALBUMIN 3.1*   PROT 7.2   BILITOT 0.1    ALKPHOS 127   ALT 6*   AST 14   MG 1.9       Lactic Acid:   Recent Labs   Lab 02/20/25  1321   LACTATE 1.2     All pertinent labs within the past 24 hours have been reviewed.    Significant Imaging:   I have reviewed all pertinent imaging results/findings within the past 24 hours.  Assessment/Plan:     Pulmonary  COPD (chronic obstructive pulmonary disease)  Patient's COPD is controlled currently.  Patient is currently off COPD Pathway. Continue scheduled inhalers  prn supplemental oxygen  and monitor respiratory status closely.     Endocrine  * Hypoglycemia associated with history of type 2 diabetes mellitus  D10 infusion, rate increased  Hourly glucose monitoring  Encourage po intake    GI  Slow transit constipation  On chronic narcotic  With known gastroparesis  Non compliant with prescribed linzess and reports pain with reglan  -does not like the mag citrate at bedside  -will trial mag citrate enema  -resume linzess in am    Other  Tobacco dependency  Dangers of cigarette smoking were reviewed with patient in detail.   Patient was Counseled for 3-10 minutes. Nicotine replacement options were discussed. Nicotine replacement was discussed- prescribed        Critical Care Daily Checklist:    A: Awake: RASS Goal/Actual Goal:    Actual:     B: Spontaneous Breathing Trial Performed?     C: SAT & SBT Coordinated?  N/a                      D: Delirium: CAM-ICU     E: Early Mobility Performed? Yes   F: Feeding Goal:    Status:     Current Diet Order   Procedures    Diet Adult Regular Standard Tray     Tray type::   Standard Tray      AS: Analgesia/Sedation Home dose narcotic   T: Thromboembolic Prophylaxis lovenox   H: HOB > 300 Yes   U: Stress Ulcer Prophylaxis (if needed) pepcid   G: Glucose Control As above   B: Bowel Function     I: Indwelling Catheter (Lines & Wei) Necessity reviewed   D: De-escalation of Antimicrobials/Pharmacotherapies reviewed    Plan for the day/ETD As above    Code Status:  Family/Goals of  Care: Full Code  Home on discharge     Critical Care Time: 50 minutes  Critical secondary to life threatening refractory hypoglycemia  Critical care was time spent personally by me on the following activities: development of treatment plan with patient or surrogate and bedside caregivers, discussions with consultants, evaluation of patient's response to treatment, examination of patient, ordering and performing treatments and interventions, ordering and review of laboratory studies, ordering and review of radiographic studies, pulse oximetry, re-evaluation of patient's condition. This critical care time did not overlap with that of any other provider or involve time for any procedures.     NATACHA Clement-BC  Critical Care Medicine  [facility] - Emergency Dept.

## 2025-02-21 NOTE — PROCEDURES
"Genie Balderas is a 64 y.o. female patient.    Temp: 98.6 °F (37 °C) (02/21/25 0715)  Pulse: 91 (02/21/25 0900)  Resp: 16 (02/21/25 0900)  BP: (!) 110/57 (02/21/25 0900)  SpO2: 96 % (02/21/25 0900)  Weight: 48.4 kg (106 lb 11.2 oz) (02/21/25 0008)  Height: 5' 2" (157.5 cm) (02/21/25 0008)    PICC  Date/Time: 2/21/2025 9:28 AM  Performed by: Fili Aguirre RN  Consent Done: Yes  Time out: Immediately prior to procedure a time out was called to verify the correct patient, procedure, equipment, support staff and site/side marked as required  Indications: med administration and vascular access  Anesthesia: local infiltration  Local anesthetic: lidocaine 1% without epinephrine  Anesthetic Total (mL): 3  Preparation: skin prepped with ChloraPrep  Skin prep agent dried: skin prep agent completely dried prior to procedure  Sterile barriers: all five maximum sterile barriers used - cap, mask, sterile gown, sterile gloves, and large sterile sheet  Hand hygiene: hand hygiene performed prior to central venous catheter insertion  Location details: right basilic  Catheter type: double lumen  Catheter size: 4 Fr  Catheter Length: 36cm    Ultrasound guidance: yes  Vessel Caliber: medium and patent, compressibility normal  Vascular Doppler: not done  Needle advanced into vessel with real time Ultrasound guidance.  Guidewire confirmed in vessel.  Sterile sheath used.  no esophageal manometryNumber of attempts: 1  Post-procedure: blood return through all ports, chlorhexidine patch and sterile dressing applied  Estimated blood loss (mL): 0  Specimens: No  Implants: No  Assessment: placement verified by x-ray (see rad. report)  Complications: none          Name Fili Aguirre RN  2/21/2025    "

## 2025-02-22 PROBLEM — R53.81 DEBILITY: Status: ACTIVE | Noted: 2025-02-22

## 2025-02-22 LAB
ANION GAP SERPL CALC-SCNC: 12 MMOL/L (ref 8–16)
BASOPHILS # BLD AUTO: 0 K/UL (ref 0–0.2)
BASOPHILS NFR BLD: 0 % (ref 0–1.9)
BUN SERPL-MCNC: 13 MG/DL (ref 8–23)
CALCIUM SERPL-MCNC: 8.6 MG/DL (ref 8.7–10.5)
CHLORIDE SERPL-SCNC: 101 MMOL/L (ref 95–110)
CO2 SERPL-SCNC: 25 MMOL/L (ref 23–29)
CREAT SERPL-MCNC: 0.9 MG/DL (ref 0.5–1.4)
DIFFERENTIAL METHOD BLD: ABNORMAL
EOSINOPHIL # BLD AUTO: 0 K/UL (ref 0–0.5)
EOSINOPHIL NFR BLD: 0 % (ref 0–8)
ERYTHROCYTE [DISTWIDTH] IN BLOOD BY AUTOMATED COUNT: 19 % (ref 11.5–14.5)
EST. GFR  (NO RACE VARIABLE): >60 ML/MIN/1.73 M^2
ESTIMATED AVG GLUCOSE: 82 MG/DL (ref 68–131)
GLUCOSE SERPL-MCNC: 188 MG/DL (ref 70–110)
HBA1C MFR BLD: 4.5 % (ref 4–5.6)
HCT VFR BLD AUTO: 31.3 % (ref 37–48.5)
HGB BLD-MCNC: 9.9 G/DL (ref 12–16)
IMM GRANULOCYTES # BLD AUTO: 0.04 K/UL (ref 0–0.04)
IMM GRANULOCYTES NFR BLD AUTO: 0.8 % (ref 0–0.5)
LYMPHOCYTES # BLD AUTO: 0.7 K/UL (ref 1–4.8)
LYMPHOCYTES NFR BLD: 14 % (ref 18–48)
MCH RBC QN AUTO: 31.3 PG (ref 27–31)
MCHC RBC AUTO-ENTMCNC: 31.6 G/DL (ref 32–36)
MCV RBC AUTO: 99 FL (ref 82–98)
MONOCYTES # BLD AUTO: 0 K/UL (ref 0.3–1)
MONOCYTES NFR BLD: 0.6 % (ref 4–15)
NEUTROPHILS # BLD AUTO: 4.1 K/UL (ref 1.8–7.7)
NEUTROPHILS NFR BLD: 84.6 % (ref 38–73)
NRBC BLD-RTO: 0 /100 WBC
PLATELET # BLD AUTO: 342 K/UL (ref 150–450)
PMV BLD AUTO: 8.8 FL (ref 9.2–12.9)
POCT GLUCOSE: 155 MG/DL (ref 70–110)
POCT GLUCOSE: 158 MG/DL (ref 70–110)
POCT GLUCOSE: 168 MG/DL (ref 70–110)
POCT GLUCOSE: 187 MG/DL (ref 70–110)
POCT GLUCOSE: 207 MG/DL (ref 70–110)
POCT GLUCOSE: 240 MG/DL (ref 70–110)
POTASSIUM SERPL-SCNC: 4.1 MMOL/L (ref 3.5–5.1)
RBC # BLD AUTO: 3.16 M/UL (ref 4–5.4)
SODIUM SERPL-SCNC: 138 MMOL/L (ref 136–145)
WBC # BLD AUTO: 4.85 K/UL (ref 3.9–12.7)

## 2025-02-22 PROCEDURE — 83036 HEMOGLOBIN GLYCOSYLATED A1C: CPT | Performed by: HOSPITALIST

## 2025-02-22 PROCEDURE — 97116 GAIT TRAINING THERAPY: CPT | Mod: CQ

## 2025-02-22 PROCEDURE — 25000242 PHARM REV CODE 250 ALT 637 W/ HCPCS: Performed by: NURSE PRACTITIONER

## 2025-02-22 PROCEDURE — 63600175 PHARM REV CODE 636 W HCPCS: Mod: JZ,TB | Performed by: SPECIALIST

## 2025-02-22 PROCEDURE — 25000003 PHARM REV CODE 250: Performed by: NURSE PRACTITIONER

## 2025-02-22 PROCEDURE — 94761 N-INVAS EAR/PLS OXIMETRY MLT: CPT

## 2025-02-22 PROCEDURE — 97110 THERAPEUTIC EXERCISES: CPT | Mod: CQ

## 2025-02-22 PROCEDURE — 80048 BASIC METABOLIC PNL TOTAL CA: CPT | Performed by: SPECIALIST

## 2025-02-22 PROCEDURE — 63600175 PHARM REV CODE 636 W HCPCS: Performed by: HOSPITALIST

## 2025-02-22 PROCEDURE — 94640 AIRWAY INHALATION TREATMENT: CPT

## 2025-02-22 PROCEDURE — 25000003 PHARM REV CODE 250: Performed by: SPECIALIST

## 2025-02-22 PROCEDURE — 63600175 PHARM REV CODE 636 W HCPCS: Performed by: SPECIALIST

## 2025-02-22 PROCEDURE — 85025 COMPLETE CBC W/AUTO DIFF WBC: CPT | Performed by: SPECIALIST

## 2025-02-22 PROCEDURE — S4991 NICOTINE PATCH NONLEGEND: HCPCS | Performed by: NURSE PRACTITIONER

## 2025-02-22 PROCEDURE — 21400001 HC TELEMETRY ROOM

## 2025-02-22 RX ORDER — INSULIN ASPART 100 [IU]/ML
0-5 INJECTION, SOLUTION INTRAVENOUS; SUBCUTANEOUS
Status: DISCONTINUED | OUTPATIENT
Start: 2025-02-22 | End: 2025-02-26 | Stop reason: HOSPADM

## 2025-02-22 RX ORDER — IBUPROFEN 200 MG
16 TABLET ORAL
Status: DISCONTINUED | OUTPATIENT
Start: 2025-02-22 | End: 2025-02-26 | Stop reason: HOSPADM

## 2025-02-22 RX ORDER — IBUPROFEN 200 MG
24 TABLET ORAL
Status: DISCONTINUED | OUTPATIENT
Start: 2025-02-22 | End: 2025-02-26 | Stop reason: HOSPADM

## 2025-02-22 RX ORDER — GLUCAGON 1 MG
1 KIT INJECTION
Status: DISCONTINUED | OUTPATIENT
Start: 2025-02-22 | End: 2025-02-26 | Stop reason: HOSPADM

## 2025-02-22 RX ADMIN — BUDESONIDE INHALATION 0.5 MG: 0.5 SUSPENSION RESPIRATORY (INHALATION) at 08:02

## 2025-02-22 RX ADMIN — MUPIROCIN: 20 OINTMENT TOPICAL at 08:02

## 2025-02-22 RX ADMIN — ARFORMOTEROL TARTRATE 15 MCG: 15 SOLUTION RESPIRATORY (INHALATION) at 08:02

## 2025-02-22 RX ADMIN — INSULIN ASPART 2 UNITS: 100 INJECTION, SOLUTION INTRAVENOUS; SUBCUTANEOUS at 10:02

## 2025-02-22 RX ADMIN — HYDROCODONE BITARTRATE AND ACETAMINOPHEN 1 TABLET: 5; 325 TABLET ORAL at 08:02

## 2025-02-22 RX ADMIN — BUDESONIDE INHALATION 0.5 MG: 0.5 SUSPENSION RESPIRATORY (INHALATION) at 07:02

## 2025-02-22 RX ADMIN — HYDROCODONE BITARTRATE AND ACETAMINOPHEN 1 TABLET: 5; 325 TABLET ORAL at 12:02

## 2025-02-22 RX ADMIN — METHYLPREDNISOLONE SODIUM SUCCINATE 60 MG: 40 INJECTION, POWDER, FOR SOLUTION INTRAMUSCULAR; INTRAVENOUS at 02:02

## 2025-02-22 RX ADMIN — HYDROCODONE BITARTRATE AND ACETAMINOPHEN 1 TABLET: 5; 325 TABLET ORAL at 03:02

## 2025-02-22 RX ADMIN — NICOTINE 1 PATCH: 21 PATCH, EXTENDED RELEASE TRANSDERMAL at 08:02

## 2025-02-22 RX ADMIN — ENOXAPARIN SODIUM 30 MG: 30 INJECTION SUBCUTANEOUS at 04:02

## 2025-02-22 RX ADMIN — FAMOTIDINE 20 MG: 20 TABLET ORAL at 08:02

## 2025-02-22 RX ADMIN — ARFORMOTEROL TARTRATE 15 MCG: 15 SOLUTION RESPIRATORY (INHALATION) at 07:02

## 2025-02-22 RX ADMIN — LINACLOTIDE 145 MCG: 145 CAPSULE, GELATIN COATED ORAL at 06:02

## 2025-02-22 NOTE — SUBJECTIVE & OBJECTIVE
Review of Systems   All other systems reviewed and are negative.    Objective:     Vital Signs (Most Recent):  Temp: 98 °F (36.7 °C) (02/22/25 0715)  Pulse: 92 (02/22/25 0804)  Resp: 16 (02/22/25 0804)  BP: (!) 151/70 (02/22/25 0800)  SpO2: 97 % (02/22/25 0804) Vital Signs (24h Range):  Temp:  [97.9 °F (36.6 °C)-98.8 °F (37.1 °C)] 98 °F (36.7 °C)  Pulse:  [85-95] 92  Resp:  [12-23] 16  SpO2:  [95 %-100 %] 97 %  BP: (103-151)/(56-71) 151/70     Weight: 48.4 kg (106 lb 11.2 oz)  Body mass index is 19.52 kg/m².    Intake/Output Summary (Last 24 hours) at 2/22/2025 1011  Last data filed at 2/21/2025 2100  Gross per 24 hour   Intake --   Output 670 ml   Net -670 ml         Physical Exam  Vitals and nursing note reviewed.   Constitutional:       General: She is not in acute distress.     Appearance: Normal appearance. She is normal weight.   Cardiovascular:      Rate and Rhythm: Regular rhythm. Tachycardia present.      Heart sounds: No murmur heard.  Pulmonary:      Effort: Pulmonary effort is normal. No respiratory distress.      Breath sounds: No wheezing.   Genitourinary:     Comments: Wei in place  Neurological:      General: No focal deficit present.      Mental Status: She is alert and oriented to person, place, and time.   Psychiatric:         Mood and Affect: Mood normal.         Behavior: Behavior normal.             Significant Labs: All pertinent labs within the past 24 hours have been reviewed.  Recent Lab Results  (Last 5 results in the past 24 hours)        02/22/25  0747   02/22/25  0356   02/22/25  0353   02/22/25  0044   02/21/25 2004        Anion Gap   12             Baso #   0.00             Basophil %   0.0             BUN   13             Calcium   8.6             Chloride   101             CO2   25             Creatinine   0.9             Differential Method   Automated             eGFR   >60             Eos #   0.0             Eos %   0.0             Glucose   188             Gran # (ANC)    4.1             Gran %   84.6             Hematocrit   31.3             Hemoglobin   9.9             Immature Grans (Abs)   0.04  Comment: Mild elevation in immature granulocytes is non specific and   can be seen in a variety of conditions including stress response,   acute inflammation, trauma and pregnancy. Correlation with other   laboratory and clinical findings is essential.               Immature Granulocytes   0.8             Lymph #   0.7             Lymph %   14.0             MCH   31.3             MCHC   31.6             MCV   99             Mono #   0.0             Mono %   0.6             MPV   8.8             nRBC   0             Platelet Count   342             POCT Glucose 158     207   187   246       Potassium   4.1             RBC   3.16             RDW   19.0             Sodium   138             WBC   4.85                                    Significant Imaging: I have reviewed all pertinent imaging results/findings within the past 24 hours.    X-Ray Chest AP Portable   Final Result      As above.         Electronically signed by: Denys Moreno   Date:    02/21/2025   Time:    10:32      CT Abdomen Pelvis With IV Contrast NO Oral Contrast   Final Result   Abnormal      Moderate stool burden with some inspissated stool concerning for constipation.  Abrupt transition in the sigmoid colon to nondistention, nonspecific.  Underlying lesion such as mass or stricture difficult to exclude.  Recommend colonoscopy on an outpatient basis.      Bladder distension of unclear significance.  Correlation for retention.      Multiple pulmonary nodules and scarring.  For multiple solid nodules with any 6 mm or greater, Fleischner Society guidelines recommend follow up with non-contrast chest CT at 3-6 months and 18-24 months after discovery.      This report was flagged in Epic as abnormal.      All CT scans at this facility are performed  using dose modulation techniques as appropriate to performed exam including  the following:  automated exposure control; adjustment of mA and/or kV according to the patients size (this includes techniques or standardized protocols for targeted exams where dose is matched to indication/reason for exam: i.e. extremities or head);  iterative reconstruction technique.         Electronically signed by: Denys Moreno   Date:    02/20/2025   Time:    14:43      X-Ray Abdomen Flat And Erect   Final Result      As above.         Electronically signed by: Denys Moreno   Date:    02/20/2025   Time:    13:12      Anesthesia US Guide Vascular Access    (Results Pending)

## 2025-02-22 NOTE — PLAN OF CARE
Problem: Infection  Goal: Absence of Infection Signs and Symptoms  Outcome: Progressing     Problem: Adult Inpatient Plan of Care  Goal: Plan of Care Review  Outcome: Progressing     Problem: Adult Inpatient Plan of Care  Goal: Absence of Hospital-Acquired Illness or Injury  Outcome: Progressing     Problem: Diabetes Comorbidity  Goal: Blood Glucose Level Within Targeted Range  Outcome: Progressing     Problem: Skin Injury Risk Increased  Goal: Skin Health and Integrity  Outcome: Progressing

## 2025-02-22 NOTE — ASSESSMENT & PLAN NOTE
Patient's blood pressure range in the last 24 hours was: BP  Min: 103/57  Max: 150/66.The patient's inpatient anti-hypertensive regimen is listed below:  Current Antihypertensives       Plan  - BP is controlled, no changes needed to their regimen

## 2025-02-22 NOTE — ASSESSMENT & PLAN NOTE
10 infusion, rate increased  Hourly glucose monitoring  Encourage po intake    2/22/25  Blood glucose level improved  Stop IV steroids today  Encourage PO intake

## 2025-02-22 NOTE — NURSING
Telephoned report to Carrie Riggins RN.  Patient on the family with her son and notified him of the new room number.  Dc'd hodge catheter and informed RN that pt will be DTV.  Transferred pt via wheelchair to room 242 in no distress.

## 2025-02-22 NOTE — CONSULTS
[facility] - Intensive Care ([facility]  [facility] Medicine  Consult Note    Patient Name: Genie Balderas  MRN: [id number]  Admission Date: 2/20/2025  Hospital Length of Stay: 2 days  Attending Physician: Wilfrido Joshi MD   Primary Care Provider: Dawit Grey MD           Patient information was obtained from patient, past medical records, and ER records.     Consults  Subjective:     Principal Problem: Hypoglycemia associated with type 2 diabetes mellitus    Chief Complaint:   Chief Complaint   Patient presents with    Vomiting    Back Pain    Nausea     N/V, lower abdominal pain, and lower back pain for several days. Pt denies diarrhea, fever and chills. Hx of HTN and asthma        HPI: 64 year old female with known hypertension; COPD/asthma with prn home oxygen; gastroparesis; chronic pain on hydrocodone; everyday smoker  H/o of diabetes (off meds ~5yrs) and atrial fib previously on xarelto; she reports both resolved since chemotherapy for B cell lymphoma ~2018     Family brought to ED on 2/20 for nausea, vomiting, abdominal pain, back pain (worse than baseline), constipation  Pt is very poor historian, cannot recall last BM, not sure why her family brought her in. Unsure of current medications, does think she ran fevers at home before seeing PCP on 2/13 when she was given antibiotic for bronchitis     Evaluation revealed hypoglycemia that was refractory to IVP D50 and has not yet been stable on continuous D10 infusion  Labs show nl wbc, lactate, ceratinine, electrolytes. CT abdomen shows Moderate volume of stool throughout the colon with rather abrupt transition point in the sigmoid colon. Underlying lesion difficult to exclude, such as stricture or neoplasm   She has not vomited since zofran given on arrival but has not yet eaten the sandwich or juice at her bedside and has not drank the mag citrate also at bedside     Critical care team contacted for ICU admission with D10 infusion and hourly glucose  monitoring  ---  25  NINI  Patient awake, alert, oriented  Family at bedside  Blood glucose levels improved  Dextrose infusion stopped  Continue IV steroids today  Reports decresed PO intake for past several weeks  Stable for floor transfer    Past Medical History:   Diagnosis Date    Anemia of chronic disease     Anticoagulant long-term use     Asthma     COPD (chronic obstructive pulmonary disease)     Diabetes mellitus     GERD (gastroesophageal reflux disease)     Hypertension     Lymphoma     Mixed hyperlipidemia     PAF (paroxysmal atrial fibrillation)     Thrombosis of right internal jugular vein        Past Surgical History:   Procedure Laterality Date     SECTION      CHOLECYSTECTOMY         Review of patient's allergies indicates:  No Known Allergies    No current facility-administered medications on file prior to encounter.     Current Outpatient Medications on File Prior to Encounter   Medication Sig    benzonatate (TESSALON) 100 MG capsule Take 1 capsule by mouth 3 times daily as needed.    cyproheptadine (PERIACTIN) 4 mg tablet Take 1 tablet twice a day by oral route.    doxycycline (VIBRA-TABS) 100 MG tablet Take 1 tablet twice a day by oral route for 5 days.    LORazepam (ATIVAN) 0.5 MG tablet Take 0.5 mg by mouth every evening.    metoclopramide HCl (REGLAN) 5 MG tablet Take 1 tablet 3 times a day by oral route before meal(s).    metoprolol succinate (TOPROL-XL) 50 MG 24 hr tablet Take 1 tablet (50 mg total) by mouth 2 (two) times daily.    montelukast (SINGULAIR) 10 mg tablet Take 10 mg by mouth every evening.    ondansetron (ZOFRAN-ODT) 4 MG TbDL Place 4 mg every 8 hours by translingual route as needed.    tiotropium (SPIRIVA) 18 mcg inhalation capsule Inhale 18 mcg into the lungs once daily. Controller    triamcinolone acetonide 0.1% (KENALOG) 0.1 % cream APPLY A THIN LAYER TO THE AFFECTED AREA(S) BY TOPICAL ROUTE 2 TIMES PER DAY    blood sugar diagnostic Strp QAC and QHS     budesonide-formoterol 160-4.5 mcg (SYMBICORT) 160-4.5 mcg/actuation HFAA Inhale 2 puffs into the lungs every 12 (twelve) hours. Controller    HYDROcodone-acetaminophen (NORCO) 7.5-325 mg per tablet Take 1 tablet by mouth every 4 to 6 hours as needed for Pain.    ipratropium (ATROVENT) 0.02 % nebulizer solution Take 2.5 mLs (500 mcg total) by nebulization every 6 (six) hours. Rescue    levalbuterol (XOPENEX) 0.63 mg/3 mL nebulizer solution Take 3 mLs (0.63 mg total) by nebulization every 6 (six) hours. Rescue    rivaroxaban (XARELTO) 20 mg Tab Take 20 mg by mouth daily with dinner or evening meal.     Family History       Problem Relation (Age of Onset)    Cancer Father    Diabetes Mother    Heart disease Mother    Heart failure Mother (67)    Hypertension Mother    Lupus Sister, Sister, Daughter          Tobacco Use    Smoking status: Some Days     Current packs/day: 0.75     Average packs/day: 0.8 packs/day for 45.0 years (33.8 ttl pk-yrs)     Types: Cigarettes    Smokeless tobacco: Never   Substance and Sexual Activity    Alcohol use: No    Drug use: No    Sexual activity: Not Currently     Review of Systems   All other systems reviewed and are negative.    Objective:     Vital Signs (Most Recent):  Temp: 98.8 °F (37.1 °C) (02/22/25 0305)  Pulse: 92 (02/22/25 0804)  Resp: 16 (02/22/25 0804)  BP: (!) 142/71 (02/22/25 0700)  SpO2: 97 % (02/22/25 0804) Vital Signs (24h Range):  Temp:  [97.9 °F (36.6 °C)-98.8 °F (37.1 °C)] 98.8 °F (37.1 °C)  Pulse:  [85-95] 92  Resp:  [12-23] 16  SpO2:  [95 %-100 %] 97 %  BP: (103-150)/(56-71) 142/71     Weight: 48.4 kg (106 lb 11.2 oz)  Body mass index is 19.52 kg/m².     Physical Exam  Vitals and nursing note reviewed.          Significant Labs: All pertinent labs within the past 24 hours have been reviewed.  Recent Lab Results  (Last 5 results in the past 24 hours)        02/22/25  0747   02/22/25  0356   02/22/25  0353   02/22/25  0044   02/21/25 2004        Anion Gap   12              Baso #   0.00             Basophil %   0.0             BUN   13             Calcium   8.6             Chloride   101             CO2   25             Creatinine   0.9             Differential Method   Automated             eGFR   >60             Eos #   0.0             Eos %   0.0             Glucose   188             Gran # (ANC)   4.1             Gran %   84.6             Hematocrit   31.3             Hemoglobin   9.9             Immature Grans (Abs)   0.04  Comment: Mild elevation in immature granulocytes is non specific and   can be seen in a variety of conditions including stress response,   acute inflammation, trauma and pregnancy. Correlation with other   laboratory and clinical findings is essential.               Immature Granulocytes   0.8             Lymph #   0.7             Lymph %   14.0             MCH   31.3             MCHC   31.6             MCV   99             Mono #   0.0             Mono %   0.6             MPV   8.8             nRBC   0             Platelet Count   342             POCT Glucose 158     207   187   246       Potassium   4.1             RBC   3.16             RDW   19.0             Sodium   138             WBC   4.85                                    Significant Imaging: I have reviewed all pertinent imaging results/findings within the past 24 hours.    X-Ray Chest AP Portable   Final Result      As above.         Electronically signed by: Denys Moreno   Date:    02/21/2025   Time:    10:32      CT Abdomen Pelvis With IV Contrast NO Oral Contrast   Final Result   Abnormal      Moderate stool burden with some inspissated stool concerning for constipation.  Abrupt transition in the sigmoid colon to nondistention, nonspecific.  Underlying lesion such as mass or stricture difficult to exclude.  Recommend colonoscopy on an outpatient basis.      Bladder distension of unclear significance.  Correlation for retention.      Multiple pulmonary nodules and scarring.  For multiple  solid nodules with any 6 mm or greater, Fleischner Society guidelines recommend follow up with non-contrast chest CT at 3-6 months and 18-24 months after discovery.      This report was flagged in Epic as abnormal.      All CT scans at this facility are performed  using dose modulation techniques as appropriate to performed exam including the following:  automated exposure control; adjustment of mA and/or kV according to the patients size (this includes techniques or standardized protocols for targeted exams where dose is matched to indication/reason for exam: i.e. extremities or head);  iterative reconstruction technique.         Electronically signed by: Denys Moreno   Date:    02/20/2025   Time:    14:43      X-Ray Abdomen Flat And Erect   Final Result      As above.         Electronically signed by: Denys Moreno   Date:    02/20/2025   Time:    13:12      Anesthesia US Guide Vascular Access    (Results Pending)       Assessment/Plan:     * Hypoglycemia associated with history of type 2 diabetes mellitus  10 infusion, rate increased  Hourly glucose monitoring  Encourage po intake      COPD (chronic obstructive pulmonary disease)  Patient's COPD is controlled currently.  Patient is currently off COPD Pathway. Continue scheduled inhalers and PRN Supplemental oxygen and monitor respiratory status closely.     Primary hypertension  Patient's blood pressure range in the last 24 hours was: BP  Min: 103/57  Max: 150/66.The patient's inpatient anti-hypertensive regimen is listed below:  Current Antihypertensives       Plan  - BP is controlled, no changes needed to their regimen      VTE Risk Mitigation (From admission, onward)           Ordered     enoxaparin injection 30 mg  Daily         02/21/25 1016                    Critical care time spent on the evaluation and treatment of severe organ dysfunction, review of pertinent labs and imaging studies, discussions with consulting providers and discussions with  patient/family: 35 minutes.    Thank you for your consult. I will follow-up with patient. Please contact us if you have any additional questions.    Wilfrido Joshi MD  Department of Hospital Medicine   [facility] - Intensive Care ([facility])

## 2025-02-22 NOTE — PROGRESS NOTES
[facility] - Intensive Care ([facility])  [facility] Medicine  Progress Note    Patient Name: Genie Balderas  MRN: [id number]  Patient Class: IP- Inpatient   Admission Date: 2/20/2025  Length of Stay: 2 days  Attending Physician: Wilfrido Joshi MD  Primary Care Provider: Dawit Grey MD        Subjective     Principal Problem:Hypoglycemia associated with type 2 diabetes mellitus        HPI:  64 year old female with known hypertension; COPD/asthma with prn home oxygen; gastroparesis; chronic pain on hydrocodone; everyday smoker  H/o of diabetes (off meds ~5yrs) and atrial fib previously on xarelto; she reports both resolved since chemotherapy for B cell lymphoma ~2018     Family brought to ED on 2/20 for nausea, vomiting, abdominal pain, back pain (worse than baseline), constipation  Pt is very poor historian, cannot recall last BM, not sure why her family brought her in. Unsure of current medications, does think she ran fevers at home before seeing PCP on 2/13 when she was given antibiotic for bronchitis     Evaluation revealed hypoglycemia that was refractory to IVP D50 and has not yet been stable on continuous D10 infusion  Labs show nl wbc, lactate, ceratinine, electrolytes. CT abdomen shows Moderate volume of stool throughout the colon with rather abrupt transition point in the sigmoid colon. Underlying lesion difficult to exclude, such as stricture or neoplasm   She has not vomited since zofran given on arrival but has not yet eaten the sandwich or juice at her bedside and has not drank the mag citrate also at bedside     Critical care team contacted for ICU admission with D10 infusion and hourly glucose monitoring    Overview/Hospital Course:    2/21/25  NAEON  Patient awake, alert, oriented  Family at bedside  Blood glucose levels improved  Dextrose infusion stopped  Continue IV steroids today  Reports decresed PO intake for past several weeks  Stable for floor transfer    2/22/25  NAEON, patient sitting in  chair, reports vj, unable to care for herself at home  Wei in place, remove today for voiding trial  PT/OT with reccs for LIT with 24 hours supervision  Discussed with patient's son over phone  Consult SW for SNF placement      Review of Systems   All other systems reviewed and are negative.    Objective:     Vital Signs (Most Recent):  Temp: 98 °F (36.7 °C) (02/22/25 0715)  Pulse: 92 (02/22/25 0804)  Resp: 16 (02/22/25 0804)  BP: (!) 151/70 (02/22/25 0800)  SpO2: 97 % (02/22/25 0804) Vital Signs (24h Range):  Temp:  [97.9 °F (36.6 °C)-98.8 °F (37.1 °C)] 98 °F (36.7 °C)  Pulse:  [85-95] 92  Resp:  [12-23] 16  SpO2:  [95 %-100 %] 97 %  BP: (103-151)/(56-71) 151/70     Weight: 48.4 kg (106 lb 11.2 oz)  Body mass index is 19.52 kg/m².    Intake/Output Summary (Last 24 hours) at 2/22/2025 1011  Last data filed at 2/21/2025 2100  Gross per 24 hour   Intake --   Output 670 ml   Net -670 ml         Physical Exam  Vitals and nursing note reviewed.   Constitutional:       General: She is not in acute distress.     Appearance: Normal appearance. She is normal weight.   Cardiovascular:      Rate and Rhythm: Regular rhythm. Tachycardia present.      Heart sounds: No murmur heard.  Pulmonary:      Effort: Pulmonary effort is normal. No respiratory distress.      Breath sounds: No wheezing.   Genitourinary:     Comments: Wei in place  Neurological:      General: No focal deficit present.      Mental Status: She is alert and oriented to person, place, and time.   Psychiatric:         Mood and Affect: Mood normal.         Behavior: Behavior normal.             Significant Labs: All pertinent labs within the past 24 hours have been reviewed.  Recent Lab Results  (Last 5 results in the past 24 hours)        02/22/25  0747   02/22/25  0356   02/22/25  0353   02/22/25  0044   02/21/25 2004        Anion Gap   12             Baso #   0.00             Basophil %   0.0             BUN   13             Calcium   8.6              Chloride   101             CO2   25             Creatinine   0.9             Differential Method   Automated             eGFR   >60             Eos #   0.0             Eos %   0.0             Glucose   188             Gran # (ANC)   4.1             Gran %   84.6             Hematocrit   31.3             Hemoglobin   9.9             Immature Grans (Abs)   0.04  Comment: Mild elevation in immature granulocytes is non specific and   can be seen in a variety of conditions including stress response,   acute inflammation, trauma and pregnancy. Correlation with other   laboratory and clinical findings is essential.               Immature Granulocytes   0.8             Lymph #   0.7             Lymph %   14.0             MCH   31.3             MCHC   31.6             MCV   99             Mono #   0.0             Mono %   0.6             MPV   8.8             nRBC   0             Platelet Count   342             POCT Glucose 158     207   187   246       Potassium   4.1             RBC   3.16             RDW   19.0             Sodium   138             WBC   4.85                                    Significant Imaging: I have reviewed all pertinent imaging results/findings within the past 24 hours.    X-Ray Chest AP Portable   Final Result      As above.         Electronically signed by: Denys Moreno   Date:    02/21/2025   Time:    10:32      CT Abdomen Pelvis With IV Contrast NO Oral Contrast   Final Result   Abnormal      Moderate stool burden with some inspissated stool concerning for constipation.  Abrupt transition in the sigmoid colon to nondistention, nonspecific.  Underlying lesion such as mass or stricture difficult to exclude.  Recommend colonoscopy on an outpatient basis.      Bladder distension of unclear significance.  Correlation for retention.      Multiple pulmonary nodules and scarring.  For multiple solid nodules with any 6 mm or greater, Fleischner Society guidelines recommend follow up with non-contrast  chest CT at 3-6 months and 18-24 months after discovery.      This report was flagged in Epic as abnormal.      All CT scans at this facility are performed  using dose modulation techniques as appropriate to performed exam including the following:  automated exposure control; adjustment of mA and/or kV according to the patients size (this includes techniques or standardized protocols for targeted exams where dose is matched to indication/reason for exam: i.e. extremities or head);  iterative reconstruction technique.         Electronically signed by: Denys Moreno   Date:    02/20/2025   Time:    14:43      X-Ray Abdomen Flat And Erect   Final Result      As above.         Electronically signed by: Denys Moreno   Date:    02/20/2025   Time:    13:12      Anesthesia US Guide Vascular Access    (Results Pending)         Assessment and Plan     * Hypoglycemia associated with history of type 2 diabetes mellitus  10 infusion, rate increased  Hourly glucose monitoring  Encourage po intake    2/22/25  Blood glucose level improved  Stop IV steroids today  Encourage PO intake      COPD (chronic obstructive pulmonary disease)  Patient's COPD is controlled currently.  Patient is currently off COPD Pathway. Continue scheduled inhalers and PRN Supplemental oxygen and monitor respiratory status closely.     Primary hypertension  Patient's blood pressure range in the last 24 hours was: BP  Min: 103/57  Max: 151/70.The patient's inpatient anti-hypertensive regimen is listed below:  Current Antihypertensives       Plan  - BP is controlled, no changes needed to their regimen    Debility  Patient with Acute on chronic debility due to bed confinement status, chronic unspecified fatigue, and age-related physical debility. The patient's latest AMPAC (Activity Measure for Post Acute Care) Score is listed below.    AM-PAC Score - How much help does the patient need for each activity listed  Basic Mobility Total Score: 18  Turning over in  bed (including adjusting bedclothes, sheets and blankets)?: None  Sitting down on and standing up from a chair with arms (e.g., wheelchair, bedside commode, etc.): A little  Moving from lying on back to sitting on the side of the bed?: None  Moving to and from a bed to a chair (including a wheelchair)?: A little  Need to walk in hospital room?: A little  Climbing 3-5 steps with a railing?: Unable    Plan  - PT/OT consulted  - Consult  for SNF placement            VTE Risk Mitigation (From admission, onward)           Ordered     enoxaparin injection 30 mg  Daily         02/21/25 1246                    Discharge Planning   LANI:      Code Status: Full Code   Medical Readiness for Discharge Date:   Discharge Plan A: Home Health            Wilfrido Joshi MD  Department of Hospital Medicine   [facility] - Intensive Care ([facility]

## 2025-02-22 NOTE — PT/OT/SLP PROGRESS
Physical Therapy  Treatment    Genie Balderas   MRN: [id number]   Admitting Diagnosis: Hypoglycemia associated with type 2 diabetes mellitus    PT Received On: 02/22/25  PT Start Time: 0932     PT Stop Time: 0957    PT Total Time (min): 25 min       Billable Minutes:  Gait Training 10 and Therapeutic Exercise 15    Treatment Type: Treatment  PT/PTA: PTA     Number of PTA visits since last PT visit: 1       General Precautions: Standard, fall  Orthopedic Precautions: N/A  Braces: N/A  Respiratory Status: Room air         Subjective:  Communicated with nurse Nixon and completed Epic chart review prior to session. Pt agreed to session.         Objective:   Patient found with: blood pressure cuff, hodge catheter, peripheral IV, pulse ox (continuous), telemetry    Rolling Left: CGA  Supine > Sit: CGA  Scoot towards EOB: CGA  STS from EOB: Min A with rollator, pt c/o dizziness    Pt ambulated 75' with rollator with Min A. Cues for rollator management    Stand Pivot to BSC: Min A with rollator    Performed BLE AROM TE x10 ea: LAQ, HIP FLEX, HIP ABD/ADD    Treatment and Education:  Educated pt on benefits of increasing time spent OOB and direct impact on CV endurance. Encouraged pt to sit up in BSC for all meals and for a min 2hr 3x/day. Reviewed HEP and encouraged pt to perform throughout the day to maintain/regain strength. Reviewed call don't fall policy and to call for assistance with all OOB needs. Pt agreed to safety request.      AM-PAC 6 CLICK MOBILITY  How much help from another person does this patient currently need?   1 = Unable, Total/Dependent Assistance  2 = A lot, Maximum/Moderate Assistance  3 = A little, Minimum/Contact Guard/Supervision  4 = None, Modified [place]/Independent    Turning over in bed (including adjusting bedclothes, sheets and blankets)?: 4  Sitting down on and standing up from a chair with arms (e.g., wheelchair, bedside commode, etc.): 3  Moving from lying on back to sitting on the side of  the bed?: 4  Moving to and from a bed to a chair (including a wheelchair)?: 3  Need to walk in hospital room?: 3  Climbing 3-5 steps with a railing?: 1  Basic Mobility Total Score: 18    AM-PAC Raw Score CMS G-Code Modifier Level of Impairment Assistance   6 % Total / Unable   7 - 9 CM 80 - 100% Maximal Assist   10 - 14 CL 60 - 80% Moderate Assist   15 - 19 CK 40 - 60% Moderate Assist   20 - 22 CJ 20 - 40% Minimal Assist   23 CI 1-20% SBA / CGA   24 CH 0% Independent/ Mod I     Patient left up in chair with all lines intact, call button in reach, and nurse notified.    Assessment:  Genie Balderas is a 64 y.o. female with a medical diagnosis of Hypoglycemia associated with type 2 diabetes mellitus and presents with the following functional limitations. Pt will continue to benefit from skilled PT in order to address the below deficits to reach maximum level of function.    Rehab identified problem list/impairments: weakness, impaired endurance, impaired functional mobility, gait instability, impaired balance, pain, decreased safety awareness, decreased lower extremity function, decreased upper extremity function, decreased coordination    Rehab potential is good.    Activity tolerance: Good    Discharge recommendations: Low Intensity Therapy (with 24/7 SPV and A)      Barriers to discharge:      Equipment recommendations: walker, rolling     GOALS:   Multidisciplinary Problems       Physical Therapy Goals          Problem: Physical Therapy    Goal Priority Disciplines Outcome Interventions   Physical Therapy Goal     PT, PT/OT     Description: LTG'S TO BE MET IN 14 DAYS (3-7-25)  PT WILL BE EBONY FOR BED MOBILITY  PT WILL REQUIRE SPV FOR BED<>CHAIR TF'S  PT WILL  FEET WITH RW AND SPV  PT WILL INC AMPAC SCORE BY 2 POINTS TO PROGRESS GROSS FUNC MOBILITY                         DME Justifications:   Genie's mobility limitation cannot be sufficiently resolved by the use of a cane. Her functional mobility  deficit can be sufficiently resolved with the use of a Rolling Walker. Patient's mobility limitation significantly impairs their ability to participate in one of more activities of daily living.  The use of a RW will significantly improve the patient's ability to participate in MRADLS and the patient will use it on regular basis in the home.    PLAN:    Patient to be seen 3 x/week to address the above listed problems via gait training, therapeutic activities, therapeutic exercises  Plan of Care expires: 03/07/25  Plan of Care reviewed with: patient         02/22/2025

## 2025-02-22 NOTE — ASSESSMENT & PLAN NOTE
Patient with Acute on chronic debility due to bed confinement status, chronic unspecified fatigue, and age-related physical debility. The patient's latest AMPAC (Activity Measure for Post Acute Care) Score is listed below.    AM-PAC Score - How much help does the patient need for each activity listed  Basic Mobility Total Score: 18  Turning over in bed (including adjusting bedclothes, sheets and blankets)?: None  Sitting down on and standing up from a chair with arms (e.g., wheelchair, bedside commode, etc.): A little  Moving from lying on back to sitting on the side of the bed?: None  Moving to and from a bed to a chair (including a wheelchair)?: A little  Need to walk in hospital room?: A little  Climbing 3-5 steps with a railing?: Unable    Plan  - PT/OT consulted  - Consult SW for SNF placement

## 2025-02-22 NOTE — ASSESSMENT & PLAN NOTE
Patient's blood pressure range in the last 24 hours was: BP  Min: 103/57  Max: 151/70.The patient's inpatient anti-hypertensive regimen is listed below:  Current Antihypertensives       Plan  - BP is controlled, no changes needed to their regimen

## 2025-02-22 NOTE — SUBJECTIVE & OBJECTIVE
Past Medical History:   Diagnosis Date    Anemia of chronic disease     Anticoagulant long-term use     Asthma     COPD (chronic obstructive pulmonary disease)     Diabetes mellitus     GERD (gastroesophageal reflux disease)     Hypertension     Lymphoma     Mixed hyperlipidemia     PAF (paroxysmal atrial fibrillation)     Thrombosis of right internal jugular vein        Past Surgical History:   Procedure Laterality Date     SECTION      CHOLECYSTECTOMY         Review of patient's allergies indicates:  No Known Allergies    No current facility-administered medications on file prior to encounter.     Current Outpatient Medications on File Prior to Encounter   Medication Sig    benzonatate (TESSALON) 100 MG capsule Take 1 capsule by mouth 3 times daily as needed.    cyproheptadine (PERIACTIN) 4 mg tablet Take 1 tablet twice a day by oral route.    doxycycline (VIBRA-TABS) 100 MG tablet Take 1 tablet twice a day by oral route for 5 days.    LORazepam (ATIVAN) 0.5 MG tablet Take 0.5 mg by mouth every evening.    metoclopramide HCl (REGLAN) 5 MG tablet Take 1 tablet 3 times a day by oral route before meal(s).    metoprolol succinate (TOPROL-XL) 50 MG 24 hr tablet Take 1 tablet (50 mg total) by mouth 2 (two) times daily.    montelukast (SINGULAIR) 10 mg tablet Take 10 mg by mouth every evening.    ondansetron (ZOFRAN-ODT) 4 MG TbDL Place 4 mg every 8 hours by translingual route as needed.    tiotropium (SPIRIVA) 18 mcg inhalation capsule Inhale 18 mcg into the lungs once daily. Controller    triamcinolone acetonide 0.1% (KENALOG) 0.1 % cream APPLY A THIN LAYER TO THE AFFECTED AREA(S) BY TOPICAL ROUTE 2 TIMES PER DAY    blood sugar diagnostic Strp QAC and QHS    budesonide-formoterol 160-4.5 mcg (SYMBICORT) 160-4.5 mcg/actuation HFAA Inhale 2 puffs into the lungs every 12 (twelve) hours. Controller    HYDROcodone-acetaminophen (NORCO) 7.5-325 mg per tablet Take 1 tablet by mouth every 4 to 6 hours as needed for  Pain.    ipratropium (ATROVENT) 0.02 % nebulizer solution Take 2.5 mLs (500 mcg total) by nebulization every 6 (six) hours. Rescue    levalbuterol (XOPENEX) 0.63 mg/3 mL nebulizer solution Take 3 mLs (0.63 mg total) by nebulization every 6 (six) hours. Rescue    rivaroxaban (XARELTO) 20 mg Tab Take 20 mg by mouth daily with dinner or evening meal.     Family History       Problem Relation (Age of Onset)    Cancer Father    Diabetes Mother    Heart disease Mother    Heart failure Mother (67)    Hypertension Mother    Lupus Sister, Sister, Daughter          Tobacco Use    Smoking status: Some Days     Current packs/day: 0.75     Average packs/day: 0.8 packs/day for 45.0 years (33.8 ttl pk-yrs)     Types: Cigarettes    Smokeless tobacco: Never   Substance and Sexual Activity    Alcohol use: No    Drug use: No    Sexual activity: Not Currently     Review of Systems   All other systems reviewed and are negative.    Objective:     Vital Signs (Most Recent):  Temp: 98.8 °F (37.1 °C) (02/22/25 0305)  Pulse: 92 (02/22/25 0804)  Resp: 16 (02/22/25 0804)  BP: (!) 142/71 (02/22/25 0700)  SpO2: 97 % (02/22/25 0804) Vital Signs (24h Range):  Temp:  [97.9 °F (36.6 °C)-98.8 °F (37.1 °C)] 98.8 °F (37.1 °C)  Pulse:  [85-95] 92  Resp:  [12-23] 16  SpO2:  [95 %-100 %] 97 %  BP: (103-150)/(56-71) 142/71     Weight: 48.4 kg (106 lb 11.2 oz)  Body mass index is 19.52 kg/m².     Physical Exam  Vitals and nursing note reviewed.          Significant Labs: All pertinent labs within the past 24 hours have been reviewed.  Recent Lab Results  (Last 5 results in the past 24 hours)        02/22/25  0747   02/22/25  0356   02/22/25  0353   02/22/25  0044   02/21/25 2004        Anion Gap   12             Baso #   0.00             Basophil %   0.0             BUN   13             Calcium   8.6             Chloride   101             CO2   25             Creatinine   0.9             Differential Method   Automated             eGFR   >60              Eos #   0.0             Eos %   0.0             Glucose   188             Gran # (ANC)   4.1             Gran %   84.6             Hematocrit   31.3             Hemoglobin   9.9             Immature Grans (Abs)   0.04  Comment: Mild elevation in immature granulocytes is non specific and   can be seen in a variety of conditions including stress response,   acute inflammation, trauma and pregnancy. Correlation with other   laboratory and clinical findings is essential.               Immature Granulocytes   0.8             Lymph #   0.7             Lymph %   14.0             MCH   31.3             MCHC   31.6             MCV   99             Mono #   0.0             Mono %   0.6             MPV   8.8             nRBC   0             Platelet Count   342             POCT Glucose 158     207   187   246       Potassium   4.1             RBC   3.16             RDW   19.0             Sodium   138             WBC   4.85                                    Significant Imaging: I have reviewed all pertinent imaging results/findings within the past 24 hours.    X-Ray Chest AP Portable   Final Result      As above.         Electronically signed by: Denys Moreno   Date:    02/21/2025   Time:    10:32      CT Abdomen Pelvis With IV Contrast NO Oral Contrast   Final Result   Abnormal      Moderate stool burden with some inspissated stool concerning for constipation.  Abrupt transition in the sigmoid colon to nondistention, nonspecific.  Underlying lesion such as mass or stricture difficult to exclude.  Recommend colonoscopy on an outpatient basis.      Bladder distension of unclear significance.  Correlation for retention.      Multiple pulmonary nodules and scarring.  For multiple solid nodules with any 6 mm or greater, Fleischner Society guidelines recommend follow up with non-contrast chest CT at 3-6 months and 18-24 months after discovery.      This report was flagged in Epic as abnormal.      All CT scans at this facility are  performed  using dose modulation techniques as appropriate to performed exam including the following:  automated exposure control; adjustment of mA and/or kV according to the patients size (this includes techniques or standardized protocols for targeted exams where dose is matched to indication/reason for exam: i.e. extremities or head);  iterative reconstruction technique.         Electronically signed by: Denys Moreno   Date:    02/20/2025   Time:    14:43      X-Ray Abdomen Flat And Erect   Final Result      As above.         Electronically signed by: Denys Moreno   Date:    02/20/2025   Time:    13:12      Anesthesia US Guide Vascular Access    (Results Pending)

## 2025-02-22 NOTE — ASSESSMENT & PLAN NOTE
Patient's COPD is controlled currently.  Patient is currently off COPD Pathway. Continue scheduled inhalers and PRN Supplemental oxygen and monitor respiratory status closely.

## 2025-02-22 NOTE — NURSING
Pt oriented x4.  VSS.  Pt remained afebrile throughout this shift.   All meds administered per order.   Pt remained free of falls this shift.   Plan of care reviewed. Patient verbalizes understanding.   Pt moving/turning with use of walker/rollaid and standby assist.   Bed low, side rails up x 2, wheels locked, call light in reach.   Patient instructed to call for assistance.  Patient education provided.

## 2025-02-22 NOTE — HOSPITAL COURSE
2/21/25  NINI  Patient awake, alert, oriented  Family at bedside  Blood glucose levels improved  Dextrose infusion stopped  Continue IV steroids today  Reports decresed PO intake for past several weeks  Stable for floor transfer    2/22/25  NINI, patient sitting in chair, reports faitgue, unable to care for herself at home  Wei in place, remove today for voiding trial  PT/OT with reccs for LIT with 24 hours supervision  Discussed with patient's son over phone  Consult SW for SNF placement    2/23/25  NINI, patient resting in bed, reports fatigue  Family at bedside, updated  SW consulted for SNF placement    02/25/2025  [facility], [facility], [facility] and [facility] are accepting patient. CM awaiting family decision.     02/26/2025  Patient has been approved for placement at [facility] for SNF placement. She is discharged there with resumption of her home medications. Rx for lorazepam 0.5 mg tablets, take 1 table PO every evening and Norco 7.5-325 mg tablets, take 1 tablet PO Q4 -6 hours PRN for pain per request of SNF. Follow up recommendations with PCP, Pulmonology, and Gastrology are included in the discharge documents. Referrals for Pulmonology and Gastrology also included. Both follow-up and referrals for after discharge from SNF. I have seen and evaluated this patient and she is stable for discharge.

## 2025-02-23 LAB
ANION GAP SERPL CALC-SCNC: 10 MMOL/L (ref 8–16)
BASOPHILS # BLD AUTO: 0.01 K/UL (ref 0–0.2)
BASOPHILS NFR BLD: 0.1 % (ref 0–1.9)
BUN SERPL-MCNC: 14 MG/DL (ref 8–23)
CALCIUM SERPL-MCNC: 8.8 MG/DL (ref 8.7–10.5)
CHLORIDE SERPL-SCNC: 101 MMOL/L (ref 95–110)
CO2 SERPL-SCNC: 28 MMOL/L (ref 23–29)
CREAT SERPL-MCNC: 0.7 MG/DL (ref 0.5–1.4)
DIFFERENTIAL METHOD BLD: ABNORMAL
EOSINOPHIL # BLD AUTO: 0 K/UL (ref 0–0.5)
EOSINOPHIL NFR BLD: 0 % (ref 0–8)
ERYTHROCYTE [DISTWIDTH] IN BLOOD BY AUTOMATED COUNT: 18.9 % (ref 11.5–14.5)
EST. GFR  (NO RACE VARIABLE): >60 ML/MIN/1.73 M^2
GLUCOSE SERPL-MCNC: 92 MG/DL (ref 70–110)
HCT VFR BLD AUTO: 31.8 % (ref 37–48.5)
HGB BLD-MCNC: 10 G/DL (ref 12–16)
IMM GRANULOCYTES # BLD AUTO: 0.07 K/UL (ref 0–0.04)
IMM GRANULOCYTES NFR BLD AUTO: 0.7 % (ref 0–0.5)
LYMPHOCYTES # BLD AUTO: 1.8 K/UL (ref 1–4.8)
LYMPHOCYTES NFR BLD: 19.3 % (ref 18–48)
MCH RBC QN AUTO: 31.5 PG (ref 27–31)
MCHC RBC AUTO-ENTMCNC: 31.4 G/DL (ref 32–36)
MCV RBC AUTO: 100 FL (ref 82–98)
MONOCYTES # BLD AUTO: 0.5 K/UL (ref 0.3–1)
MONOCYTES NFR BLD: 4.9 % (ref 4–15)
NEUTROPHILS # BLD AUTO: 7 K/UL (ref 1.8–7.7)
NEUTROPHILS NFR BLD: 75 % (ref 38–73)
NRBC BLD-RTO: 0 /100 WBC
PLATELET # BLD AUTO: 350 K/UL (ref 150–450)
PMV BLD AUTO: 8.8 FL (ref 9.2–12.9)
POCT GLUCOSE: 103 MG/DL (ref 70–110)
POCT GLUCOSE: 126 MG/DL (ref 70–110)
POCT GLUCOSE: 94 MG/DL (ref 70–110)
POCT GLUCOSE: 96 MG/DL (ref 70–110)
POTASSIUM SERPL-SCNC: 4.1 MMOL/L (ref 3.5–5.1)
RBC # BLD AUTO: 3.17 M/UL (ref 4–5.4)
SODIUM SERPL-SCNC: 139 MMOL/L (ref 136–145)
WBC # BLD AUTO: 9.35 K/UL (ref 3.9–12.7)

## 2025-02-23 PROCEDURE — 21400001 HC TELEMETRY ROOM

## 2025-02-23 PROCEDURE — 99900035 HC TECH TIME PER 15 MIN (STAT)

## 2025-02-23 PROCEDURE — 63600175 PHARM REV CODE 636 W HCPCS: Performed by: SPECIALIST

## 2025-02-23 PROCEDURE — 85025 COMPLETE CBC W/AUTO DIFF WBC: CPT | Performed by: SPECIALIST

## 2025-02-23 PROCEDURE — 25000242 PHARM REV CODE 250 ALT 637 W/ HCPCS: Performed by: NURSE PRACTITIONER

## 2025-02-23 PROCEDURE — 94761 N-INVAS EAR/PLS OXIMETRY MLT: CPT

## 2025-02-23 PROCEDURE — S4991 NICOTINE PATCH NONLEGEND: HCPCS | Performed by: NURSE PRACTITIONER

## 2025-02-23 PROCEDURE — 25000003 PHARM REV CODE 250: Performed by: HOSPITALIST

## 2025-02-23 PROCEDURE — 25000003 PHARM REV CODE 250: Performed by: NURSE PRACTITIONER

## 2025-02-23 PROCEDURE — 94640 AIRWAY INHALATION TREATMENT: CPT

## 2025-02-23 PROCEDURE — 25000003 PHARM REV CODE 250: Performed by: SPECIALIST

## 2025-02-23 PROCEDURE — 80048 BASIC METABOLIC PNL TOTAL CA: CPT | Performed by: SPECIALIST

## 2025-02-23 RX ORDER — FAMOTIDINE 20 MG/1
20 TABLET, FILM COATED ORAL 2 TIMES DAILY
Status: DISCONTINUED | OUTPATIENT
Start: 2025-02-23 | End: 2025-02-26 | Stop reason: HOSPADM

## 2025-02-23 RX ADMIN — NICOTINE 1 PATCH: 21 PATCH, EXTENDED RELEASE TRANSDERMAL at 08:02

## 2025-02-23 RX ADMIN — HYDROCODONE BITARTRATE AND ACETAMINOPHEN 1 TABLET: 5; 325 TABLET ORAL at 10:02

## 2025-02-23 RX ADMIN — LINACLOTIDE 145 MCG: 145 CAPSULE, GELATIN COATED ORAL at 05:02

## 2025-02-23 RX ADMIN — HYDROCODONE BITARTRATE AND ACETAMINOPHEN 1 TABLET: 5; 325 TABLET ORAL at 02:02

## 2025-02-23 RX ADMIN — HYDROCODONE BITARTRATE AND ACETAMINOPHEN 1 TABLET: 5; 325 TABLET ORAL at 05:02

## 2025-02-23 RX ADMIN — FAMOTIDINE 20 MG: 20 TABLET, FILM COATED ORAL at 08:02

## 2025-02-23 RX ADMIN — BUDESONIDE INHALATION 0.5 MG: 0.5 SUSPENSION RESPIRATORY (INHALATION) at 07:02

## 2025-02-23 RX ADMIN — ARFORMOTEROL TARTRATE 15 MCG: 15 SOLUTION RESPIRATORY (INHALATION) at 07:02

## 2025-02-23 RX ADMIN — MUPIROCIN: 20 OINTMENT TOPICAL at 08:02

## 2025-02-23 RX ADMIN — ENOXAPARIN SODIUM 30 MG: 30 INJECTION SUBCUTANEOUS at 04:02

## 2025-02-23 NOTE — PROGRESS NOTES
[facility] - Telemetry ([facility] Medicine  Progress Note    Patient Name: Genie Balderas  MRN: [id number]  Patient Class: IP- Inpatient   Admission Date: 2/20/2025  Length of Stay: 3 days  Attending Physician: Wilfrido Joshi MD  Primary Care Provider: Dawit Grey MD        Subjective     Principal Problem:Hypoglycemia associated with type 2 diabetes mellitus        HPI:  64 year old female with known hypertension; COPD/asthma with prn home oxygen; gastroparesis; chronic pain on hydrocodone; everyday smoker  H/o of diabetes (off meds ~5yrs) and atrial fib previously on xarelto; she reports both resolved since chemotherapy for B cell lymphoma ~2018     Family brought to ED on 2/20 for nausea, vomiting, abdominal pain, back pain (worse than baseline), constipation  Pt is very poor historian, cannot recall last BM, not sure why her family brought her in. Unsure of current medications, does think she ran fevers at home before seeing PCP on 2/13 when she was given antibiotic for bronchitis     Evaluation revealed hypoglycemia that was refractory to IVP D50 and has not yet been stable on continuous D10 infusion  Labs show nl wbc, lactate, ceratinine, electrolytes. CT abdomen shows Moderate volume of stool throughout the colon with rather abrupt transition point in the sigmoid colon. Underlying lesion difficult to exclude, such as stricture or neoplasm   She has not vomited since zofran given on arrival but has not yet eaten the sandwich or juice at her bedside and has not drank the mag citrate also at bedside     Critical care team contacted for ICU admission with D10 infusion and hourly glucose monitoring    Overview/Hospital Course:    2/21/25  NAEON  Patient awake, alert, oriented  Family at bedside  Blood glucose levels improved  Dextrose infusion stopped  Continue IV steroids today  Reports decresed PO intake for past several weeks  Stable for floor transfer    2/22/25  NAEON, patient sitting in chair,  reports vj, unable to care for herself at home  Wei in place, remove today for voiding trial  PT/OT with reccs for LIT with 24 hours supervision  Discussed with patient's son over phone  Consult SW for SNF placement    2/23/25  NINI, patient resting in bed, reports fatigue  Family at bedside, updated  SW consulted for SNF placement      Review of Systems   All other systems reviewed and are negative.    Objective:     Vital Signs (Most Recent):  Temp: 98.3 °F (36.8 °C) (02/23/25 1500)  Pulse: 98 (02/23/25 1500)  Resp: 17 (02/23/25 1500)  BP: 139/74 (02/23/25 1500)  SpO2: 96 % (02/23/25 1500) Vital Signs (24h Range):  Temp:  [97.3 °F (36.3 °C)-98.9 °F (37.2 °C)] 98.3 °F (36.8 °C)  Pulse:  [83-98] 98  Resp:  [16-20] 17  SpO2:  [93 %-97 %] 96 %  BP: (115-165)/(58-81) 139/74     Weight: 48.4 kg (106 lb 11.2 oz)  Body mass index is 19.52 kg/m².  No intake or output data in the 24 hours ending 02/23/25 1537        Physical Exam  Vitals and nursing note reviewed.   Constitutional:       General: She is not in acute distress.     Appearance: Normal appearance. She is normal weight.   Cardiovascular:      Rate and Rhythm: Regular rhythm. Tachycardia present.      Heart sounds: No murmur heard.  Pulmonary:      Effort: Pulmonary effort is normal. No respiratory distress.      Breath sounds: No wheezing.   Neurological:      General: No focal deficit present.      Mental Status: She is alert and oriented to person, place, and time.   Psychiatric:         Mood and Affect: Mood normal.         Behavior: Behavior normal.             Significant Labs: All pertinent labs within the past 24 hours have been reviewed.  Recent Lab Results  (Last 5 results in the past 24 hours)        02/23/25  1112   02/23/25  0512   02/23/25  0512   02/22/25 2022 02/22/25  1615        Anion Gap     10           Baso #     0.01           Basophil %     0.1           BUN     14           Calcium     8.8           Chloride     101            CO2     28           Creatinine     0.7           Differential Method     Automated           eGFR     >60           Eos #     0.0           Eos %     0.0           Glucose     92           Gran # (ANC)     7.0           Gran %     75.0           Hematocrit     31.8           Hemoglobin     10.0           Immature Grans (Abs)     0.07  Comment: Mild elevation in immature granulocytes is non specific and   can be seen in a variety of conditions including stress response,   acute inflammation, trauma and pregnancy. Correlation with other   laboratory and clinical findings is essential.             Immature Granulocytes     0.7           Lymph #     1.8           Lymph %     19.3           MCH     31.5           MCHC     31.4           MCV     100           Mono #     0.5           Mono %     4.9           MPV     8.8           nRBC     0           Platelet Count     350           POCT Glucose 126   103     155   168       Potassium     4.1           RBC     3.17           RDW     18.9           Sodium     139           WBC     9.35                                  Significant Imaging: I have reviewed all pertinent imaging results/findings within the past 24 hours.    X-Ray Chest AP Portable   Final Result      As above.         Electronically signed by: Denys Moreno   Date:    02/21/2025   Time:    10:32      CT Abdomen Pelvis With IV Contrast NO Oral Contrast   Final Result   Abnormal      Moderate stool burden with some inspissated stool concerning for constipation.  Abrupt transition in the sigmoid colon to nondistention, nonspecific.  Underlying lesion such as mass or stricture difficult to exclude.  Recommend colonoscopy on an outpatient basis.      Bladder distension of unclear significance.  Correlation for retention.      Multiple pulmonary nodules and scarring.  For multiple solid nodules with any 6 mm or greater, Fleischner Society guidelines recommend follow up with non-contrast chest CT at 3-6 months and  18-24 months after discovery.      This report was flagged in Epic as abnormal.      All CT scans at this facility are performed  using dose modulation techniques as appropriate to performed exam including the following:  automated exposure control; adjustment of mA and/or kV according to the patients size (this includes techniques or standardized protocols for targeted exams where dose is matched to indication/reason for exam: i.e. extremities or head);  iterative reconstruction technique.         Electronically signed by: Denys Moreno   Date:    02/20/2025   Time:    14:43      X-Ray Abdomen Flat And Erect   Final Result      As above.         Electronically signed by: Denys Moreno   Date:    02/20/2025   Time:    13:12      Anesthesia US Guide Vascular Access    (Results Pending)         Assessment and Plan     * Hypoglycemia associated with history of type 2 diabetes mellitus  10 infusion, rate increased  Hourly glucose monitoring  Encourage po intake    2/22/25  Blood glucose level improved  Stop IV steroids today  Encourage PO intake      COPD (chronic obstructive pulmonary disease)  Patient's COPD is controlled currently.  Patient is currently off COPD Pathway. Continue scheduled inhalers and PRN Supplemental oxygen and monitor respiratory status closely.     Primary hypertension  Patient's blood pressure range in the last 24 hours was: BP  Min: 115/58  Max: 165/81.The patient's inpatient anti-hypertensive regimen is listed below:  Current Antihypertensives       Plan  - BP is controlled, no changes needed to their regimen    Debility  Patient with Acute on chronic debility due to bed confinement status, chronic unspecified fatigue, and age-related physical debility. The patient's latest AMPAC (Activity Measure for Post Acute Care) Score is listed below.    AM-PAC Score - How much help does the patient need for each activity listed  Basic Mobility Total Score: 18  Turning over in bed (including adjusting  bedclothes, sheets and blankets)?: None  Sitting down on and standing up from a chair with arms (e.g., wheelchair, bedside commode, etc.): A little  Moving from lying on back to sitting on the side of the bed?: None  Moving to and from a bed to a chair (including a wheelchair)?: A little  Need to walk in hospital room?: A little  Climbing 3-5 steps with a railing?: Unable    Plan  - PT/OT consulted  - Consult  for SNF placement            VTE Risk Mitigation (From admission, onward)           Ordered     enoxaparin injection 30 mg  Daily         02/21/25 1246                    Discharge Planning   LANI:      Code Status: Full Code   Medical Readiness for Discharge Date:   Discharge Plan A: Home Health                Please place Justification for MICHAEL Joshi MD  Department of Hospital Medicine   [facility] - Telemetry ([facility])

## 2025-02-23 NOTE — ASSESSMENT & PLAN NOTE
Patient's blood pressure range in the last 24 hours was: BP  Min: 115/58  Max: 165/81.The patient's inpatient anti-hypertensive regimen is listed below:  Current Antihypertensives       Plan  - BP is controlled, no changes needed to their regimen

## 2025-02-23 NOTE — SUBJECTIVE & OBJECTIVE
Review of Systems   All other systems reviewed and are negative.    Objective:     Vital Signs (Most Recent):  Temp: 98.3 °F (36.8 °C) (02/23/25 1500)  Pulse: 98 (02/23/25 1500)  Resp: 17 (02/23/25 1500)  BP: 139/74 (02/23/25 1500)  SpO2: 96 % (02/23/25 1500) Vital Signs (24h Range):  Temp:  [97.3 °F (36.3 °C)-98.9 °F (37.2 °C)] 98.3 °F (36.8 °C)  Pulse:  [83-98] 98  Resp:  [16-20] 17  SpO2:  [93 %-97 %] 96 %  BP: (115-165)/(58-81) 139/74     Weight: 48.4 kg (106 lb 11.2 oz)  Body mass index is 19.52 kg/m².  No intake or output data in the 24 hours ending 02/23/25 1537        Physical Exam  Vitals and nursing note reviewed.   Constitutional:       General: She is not in acute distress.     Appearance: Normal appearance. She is normal weight.   Cardiovascular:      Rate and Rhythm: Regular rhythm. Tachycardia present.      Heart sounds: No murmur heard.  Pulmonary:      Effort: Pulmonary effort is normal. No respiratory distress.      Breath sounds: No wheezing.   Neurological:      General: No focal deficit present.      Mental Status: She is alert and oriented to person, place, and time.   Psychiatric:         Mood and Affect: Mood normal.         Behavior: Behavior normal.             Significant Labs: All pertinent labs within the past 24 hours have been reviewed.  Recent Lab Results  (Last 5 results in the past 24 hours)        02/23/25  1112   02/23/25  0512   02/23/25  0512   02/22/25  2022   02/22/25  1615        Anion Gap     10           Baso #     0.01           Basophil %     0.1           BUN     14           Calcium     8.8           Chloride     101           CO2     28           Creatinine     0.7           Differential Method     Automated           eGFR     >60           Eos #     0.0           Eos %     0.0           Glucose     92           Gran # (ANC)     7.0           Gran %     75.0           Hematocrit     31.8           Hemoglobin     10.0           Immature Grans (Abs)     0.07  Comment:  Mild elevation in immature granulocytes is non specific and   can be seen in a variety of conditions including stress response,   acute inflammation, trauma and pregnancy. Correlation with other   laboratory and clinical findings is essential.             Immature Granulocytes     0.7           Lymph #     1.8           Lymph %     19.3           MCH     31.5           MCHC     31.4           MCV     100           Mono #     0.5           Mono %     4.9           MPV     8.8           nRBC     0           Platelet Count     350           POCT Glucose 126   103     155   168       Potassium     4.1           RBC     3.17           RDW     18.9           Sodium     139           WBC     9.35                                  Significant Imaging: I have reviewed all pertinent imaging results/findings within the past 24 hours.    X-Ray Chest AP Portable   Final Result      As above.         Electronically signed by: Denys Moreno   Date:    02/21/2025   Time:    10:32      CT Abdomen Pelvis With IV Contrast NO Oral Contrast   Final Result   Abnormal      Moderate stool burden with some inspissated stool concerning for constipation.  Abrupt transition in the sigmoid colon to nondistention, nonspecific.  Underlying lesion such as mass or stricture difficult to exclude.  Recommend colonoscopy on an outpatient basis.      Bladder distension of unclear significance.  Correlation for retention.      Multiple pulmonary nodules and scarring.  For multiple solid nodules with any 6 mm or greater, Fleischner Society guidelines recommend follow up with non-contrast chest CT at 3-6 months and 18-24 months after discovery.      This report was flagged in Epic as abnormal.      All CT scans at this facility are performed  using dose modulation techniques as appropriate to performed exam including the following:  automated exposure control; adjustment of mA and/or kV according to the patients size (this includes techniques or  standardized protocols for targeted exams where dose is matched to indication/reason for exam: i.e. extremities or head);  iterative reconstruction technique.         Electronically signed by: Denys Moreno   Date:    02/20/2025   Time:    14:43      X-Ray Abdomen Flat And Erect   Final Result      As above.         Electronically signed by: Denys Moreno   Date:    02/20/2025   Time:    13:12      Anesthesia US Guide Vascular Access    (Results Pending)

## 2025-02-23 NOTE — NURSING
Pt oriented x4.  VSS.  Pt remained afebrile throughout this shift.   Pain managed throughout shift. See MAR.  All meds administered per order. See MAR.  Pt remained free of falls this shift.   Plan of care reviewed. Patient verbalizes understanding.   Pt moving/turning with use of walker/rollaid and standby assist. Bed low, side rails up x 2, wheels locked, call light in reach. Bed alarm set.  Patient instructed to call for assistance.  Patient education provided.

## 2025-02-23 NOTE — PLAN OF CARE
Problem: Infection  Goal: Absence of Infection Signs and Symptoms  Outcome: Progressing     Problem: Adult Inpatient Plan of Care  Goal: Plan of Care Review  Outcome: Progressing  Goal: Patient-Specific Goal (Individualized)  Outcome: Progressing  Goal: Absence of Hospital-Acquired Illness or Injury  Outcome: Progressing  Goal: Optimal Comfort and Wellbeing  Outcome: Progressing  Goal: Readiness for Transition of Care  Outcome: Progressing     Problem: Diabetes Comorbidity  Goal: Blood Glucose Level Within Targeted Range  Outcome: Progressing     Problem: Skin Injury Risk Increased  Goal: Skin Health and Integrity  Outcome: Progressing     Problem: Wound  Goal: Optimal Coping  Outcome: Progressing  Goal: Optimal Functional Ability  Outcome: Progressing  Goal: Absence of Infection Signs and Symptoms  Outcome: Progressing  Goal: Improved Oral Intake  Outcome: Progressing  Goal: Optimal Pain Control and Function  Outcome: Progressing  Goal: Skin Health and Integrity  Outcome: Progressing  Goal: Optimal Wound Healing  Outcome: Progressing

## 2025-02-23 NOTE — PROGRESS NOTES
Pharmacist Renal Dose Adjustment Note    Genie Balderas is a 64 y.o. female being treated with the medication famotidine    Patient Data:    Vital Signs (Most Recent):  Temp: 98.7 °F (37.1 °C) (02/23/25 0750)  Pulse: 83 (02/23/25 0750)  Resp: 20 (02/23/25 0750)  BP: (!) 115/58 (02/23/25 0750)  SpO2: 97 % (02/23/25 0750) Vital Signs (72h Range):  Temp:  [97.9 °F (36.6 °C)-98.9 °F (37.2 °C)]   Pulse:  []   Resp:  [9-23]   BP: ()/(50-81)   SpO2:  [94 %-100 %]      Recent Labs   Lab 02/21/25  1003 02/22/25  0356 02/23/25  0512   CREATININE 0.8 0.9 0.7     Serum creatinine: 0.7 mg/dL 02/23/25 0512  Estimated creatinine clearance: 62 mL/min    Medication:famotidine 20mg daily will be changed to famotidine 20mg BID for crcl > 60ml/min    Pharmacist's Name: Tonia Camacho  Pharmacist's Extension: [phone]

## 2025-02-24 PROBLEM — R91.8 PULMONARY NODULES: Status: ACTIVE | Noted: 2025-02-24

## 2025-02-24 LAB
ANION GAP SERPL CALC-SCNC: 8 MMOL/L (ref 8–16)
BASOPHILS # BLD AUTO: 0.02 K/UL (ref 0–0.2)
BASOPHILS NFR BLD: 0.3 % (ref 0–1.9)
BUN SERPL-MCNC: 13 MG/DL (ref 8–23)
CALCIUM SERPL-MCNC: 8.7 MG/DL (ref 8.7–10.5)
CHLORIDE SERPL-SCNC: 98 MMOL/L (ref 95–110)
CO2 SERPL-SCNC: 33 MMOL/L (ref 23–29)
CREAT SERPL-MCNC: 0.7 MG/DL (ref 0.5–1.4)
DIFFERENTIAL METHOD BLD: ABNORMAL
EOSINOPHIL # BLD AUTO: 0.1 K/UL (ref 0–0.5)
EOSINOPHIL NFR BLD: 0.8 % (ref 0–8)
ERYTHROCYTE [DISTWIDTH] IN BLOOD BY AUTOMATED COUNT: 18.1 % (ref 11.5–14.5)
EST. GFR  (NO RACE VARIABLE): >60 ML/MIN/1.73 M^2
GLUCOSE SERPL-MCNC: 67 MG/DL (ref 70–110)
HCT VFR BLD AUTO: 31.3 % (ref 37–48.5)
HGB BLD-MCNC: 9.8 G/DL (ref 12–16)
IMM GRANULOCYTES # BLD AUTO: 0.07 K/UL (ref 0–0.04)
IMM GRANULOCYTES NFR BLD AUTO: 1.2 % (ref 0–0.5)
LYMPHOCYTES # BLD AUTO: 2.3 K/UL (ref 1–4.8)
LYMPHOCYTES NFR BLD: 38.7 % (ref 18–48)
MCH RBC QN AUTO: 30.9 PG (ref 27–31)
MCHC RBC AUTO-ENTMCNC: 31.3 G/DL (ref 32–36)
MCV RBC AUTO: 99 FL (ref 82–98)
MONOCYTES # BLD AUTO: 0.4 K/UL (ref 0.3–1)
MONOCYTES NFR BLD: 6.7 % (ref 4–15)
NEUTROPHILS # BLD AUTO: 3.1 K/UL (ref 1.8–7.7)
NEUTROPHILS NFR BLD: 52.3 % (ref 38–73)
NRBC BLD-RTO: 0 /100 WBC
PLATELET # BLD AUTO: 332 K/UL (ref 150–450)
PMV BLD AUTO: 9 FL (ref 9.2–12.9)
POCT GLUCOSE: 105 MG/DL (ref 70–110)
POCT GLUCOSE: 109 MG/DL (ref 70–110)
POCT GLUCOSE: 134 MG/DL (ref 70–110)
POCT GLUCOSE: 80 MG/DL (ref 70–110)
POTASSIUM SERPL-SCNC: 4.1 MMOL/L (ref 3.5–5.1)
RBC # BLD AUTO: 3.17 M/UL (ref 4–5.4)
SODIUM SERPL-SCNC: 139 MMOL/L (ref 136–145)
WBC # BLD AUTO: 5.99 K/UL (ref 3.9–12.7)

## 2025-02-24 PROCEDURE — 25000242 PHARM REV CODE 250 ALT 637 W/ HCPCS: Performed by: NURSE PRACTITIONER

## 2025-02-24 PROCEDURE — 97116 GAIT TRAINING THERAPY: CPT

## 2025-02-24 PROCEDURE — 94761 N-INVAS EAR/PLS OXIMETRY MLT: CPT

## 2025-02-24 PROCEDURE — 25000003 PHARM REV CODE 250: Performed by: HOSPITALIST

## 2025-02-24 PROCEDURE — 94640 AIRWAY INHALATION TREATMENT: CPT

## 2025-02-24 PROCEDURE — 25000003 PHARM REV CODE 250: Performed by: SPECIALIST

## 2025-02-24 PROCEDURE — 97530 THERAPEUTIC ACTIVITIES: CPT

## 2025-02-24 PROCEDURE — 25000003 PHARM REV CODE 250: Performed by: NURSE PRACTITIONER

## 2025-02-24 PROCEDURE — 21400001 HC TELEMETRY ROOM

## 2025-02-24 PROCEDURE — 80048 BASIC METABOLIC PNL TOTAL CA: CPT | Performed by: SPECIALIST

## 2025-02-24 PROCEDURE — 25000003 PHARM REV CODE 250

## 2025-02-24 PROCEDURE — 92610 EVALUATE SWALLOWING FUNCTION: CPT

## 2025-02-24 PROCEDURE — 63600175 PHARM REV CODE 636 W HCPCS: Performed by: SPECIALIST

## 2025-02-24 PROCEDURE — S4991 NICOTINE PATCH NONLEGEND: HCPCS | Performed by: NURSE PRACTITIONER

## 2025-02-24 PROCEDURE — 85025 COMPLETE CBC W/AUTO DIFF WBC: CPT | Performed by: SPECIALIST

## 2025-02-24 RX ORDER — ACETAMINOPHEN 325 MG/1
650 TABLET ORAL EVERY 6 HOURS PRN
Status: DISCONTINUED | OUTPATIENT
Start: 2025-02-24 | End: 2025-02-26 | Stop reason: HOSPADM

## 2025-02-24 RX ORDER — HYDROCODONE BITARTRATE AND ACETAMINOPHEN 5; 325 MG/1; MG/1
1 TABLET ORAL EVERY 6 HOURS PRN
Refills: 0 | Status: DISCONTINUED | OUTPATIENT
Start: 2025-02-24 | End: 2025-02-26 | Stop reason: HOSPADM

## 2025-02-24 RX ADMIN — BUDESONIDE INHALATION 0.5 MG: 0.5 SUSPENSION RESPIRATORY (INHALATION) at 08:02

## 2025-02-24 RX ADMIN — FAMOTIDINE 20 MG: 20 TABLET, FILM COATED ORAL at 08:02

## 2025-02-24 RX ADMIN — MUPIROCIN: 20 OINTMENT TOPICAL at 08:02

## 2025-02-24 RX ADMIN — HYDROCODONE BITARTRATE AND ACETAMINOPHEN 1 TABLET: 5; 325 TABLET ORAL at 07:02

## 2025-02-24 RX ADMIN — ARFORMOTEROL TARTRATE 15 MCG: 15 SOLUTION RESPIRATORY (INHALATION) at 08:02

## 2025-02-24 RX ADMIN — ENOXAPARIN SODIUM 30 MG: 30 INJECTION SUBCUTANEOUS at 05:02

## 2025-02-24 RX ADMIN — IPRATROPIUM BROMIDE AND ALBUTEROL SULFATE 3 ML: 2.5; .5 SOLUTION RESPIRATORY (INHALATION) at 08:02

## 2025-02-24 RX ADMIN — HYDROCODONE BITARTRATE AND ACETAMINOPHEN 1 TABLET: 5; 325 TABLET ORAL at 03:02

## 2025-02-24 RX ADMIN — ACETAMINOPHEN 650 MG: 325 TABLET ORAL at 10:02

## 2025-02-24 RX ADMIN — LINACLOTIDE 145 MCG: 145 CAPSULE, GELATIN COATED ORAL at 05:02

## 2025-02-24 RX ADMIN — NICOTINE 1 PATCH: 21 PATCH, EXTENDED RELEASE TRANSDERMAL at 08:02

## 2025-02-24 RX ADMIN — HYDROCODONE BITARTRATE AND ACETAMINOPHEN 1 TABLET: 5; 325 TABLET ORAL at 08:02

## 2025-02-24 RX ADMIN — ACETAMINOPHEN 650 MG: 325 TABLET ORAL at 04:02

## 2025-02-24 NOTE — CONSULTS
02/24/25 1324   Post-Acute Status   Post-Acute Authorization Placement   Post-Acute Placement Status Referrals Sent   Discharge Delays None known at this time   Discharge Plan   Discharge Plan A Skilled Nursing Facility     SW met with patient, daughters: Catia, at bedside regarding SNF consult. SW provided family with in-network SNF options and SNF list from Epic with CMS ratings. Family agreeable to referrals to check availability. Referrals sent in Epic; no accepting options currently. Family preference obtained for [facility] ([facility])  LEROY contacted Gifty, [facility] rep, regarding availability. Gifty to review pt's referral and f/u with decision.      [facility] called; pending 142.   LEROY to follow up.

## 2025-02-24 NOTE — PT/OT/SLP EVAL
Speech Language Pathology Evaluation  Bedside Swallow    Patient Name:  Genie Balderas   MRN:  [id number]  Admitting Diagnosis: Hypoglycemia associated with type 2 diabetes mellitus    Recommendations:                 General Recommendations:   F/u w outpatient GI; Acute follow-up not indicated  Diet recommendations:  Soft & Bite Sized Diet - IDDSI Level 6, Thin liquids - IDDSI Level 0   Aspiration Precautions: Frequent oral care, HOB to 90 degrees, behavioral reflux precautions, and Standard aspiration precautions   General Precautions: Standard, aspiration  Communication strategies:  none    Assessment:     Genie Balderas is a 64 y.o. female with a PMHx of COPD/asthma w home oxygen, gastroparesis, chronic ain on hydrocodone, and everyday smoker who presented to the ED s/t N/V/D. At bedside pt appreciated w functional OM and cognitivge-lingisitic ability functional to meet acute needs. Pt w c/o intermittent globus sensation in throat/chest w burning/pain after the swallow. CSE significant for no overt s/s of aspiration appreciated throughout. Pt's complaints of globus sensation, intermittent odynophagia, and belching likely GI related given hx of gastritis, gastroparesis, and hiatal hernia. ST recommends pt to f/u w outpatient GI. Pt is recommended to continue diet of soft and bite sized solids (IDDSI 6) and thin liquids (IDDSI 0) w meds taken whole in thin liquid. No further ST indicated at this time. MD to re-consult if medically indicated.     History:     Past Medical History:   Diagnosis Date    Anemia of chronic disease     Anticoagulant long-term use     Asthma     COPD (chronic obstructive pulmonary disease)     Diabetes mellitus     GERD (gastroesophageal reflux disease)     Hypertension     Lymphoma     Mixed hyperlipidemia     PAF (paroxysmal atrial fibrillation)     Thrombosis of right internal jugular vein        Past Surgical History:   Procedure Laterality Date     SECTION      CHOLECYSTECTOMY          Social History: Patient lives in [place] children/grandchildren in home.    Prior Intubation HX:  NA this admission    Pharyngogram on 07/16/2019:    Clinical history:  Dysphagia    Fluoroscopic evaluation of the pharynx was performed by the speech therapist Florida Fitzgerald,  with the assistance of Char Armijo PA-C, and Dr. Ethan Mooney as the attending physician. Fluoroscopic evaluation is available for review on video recording.    The patient was examined in the AP projection. The patient was given trials of thin, pudding, and cracker..    Thin: Consistent penetration with no aspiration noted  Pudding: No penetration or aspiration noted  Cracker: No penetration or aspiration noted    Please see speech pathology report to follow for further recommendations.    Fluoro time for this procedure is 1.3 minutes. Video recording and 0 images obtained.    Impression:  1.  Consistent penetration of thin      Thank you for the opportunity to assist in the care of RONALD MARTINEZ.    IEthan MD, have reviewed the images and report and concur with these findings.  Exam End: 07/16/19 14:37       CT ABDOMEN PELVIS WITH IV CONTRAST on 02/20/2025:    FINDINGS:  Heart: Normal in size. No pericardial effusion.     Lung Bases: Scarring and pulmonary nodules noted.  Largest pulmonary nodule at least 7 mm.  For multiple solid nodules with any 6 mm or greater, Fleischner Society guidelines recommend follow up with non-contrast chest CT at 3-6 months and 18-24 months after discovery.     Liver: Mildly heterogeneous liver without definite focal lesion.  Focal fatty infiltration.     Gallbladder: Surgically absent     Bile Ducts: No evidence of dilated ducts.     Pancreas: Moderate atrophy.     Spleen: Unremarkable.     Adrenals: Unremarkable.     Kidneys/ Ureters: Renal simple cysts which require no dedicated follow-up.     Bladder: Bladder distension.     Reproductive organs: Unremarkable.     GI  Tract/Mesentery: Moderate volume of stool throughout the colon with rather abrupt transition point in the sigmoid colon.  Underlying lesion difficult to exclude, such as stricture or neoplasm.  Correlation with colonoscopy to be considered based on symptoms.     Small bowel unremarkable.     Peritoneal Space: No ascites. No free air.     Retroperitoneum: No significant adenopathy.     Abdominal wall: Unremarkable.     Vasculature: No significant atherosclerosis or aneurysm.     Bones: No acute fracture.     Impression:  Moderate stool burden with some inspissated stool concerning for constipation.  Abrupt transition in the sigmoid colon to nondistention, nonspecific.  Underlying lesion such as mass or stricture difficult to exclude.  Recommend colonoscopy on an outpatient basis.     Bladder distension of unclear significance.  Correlation for retention.     Multiple pulmonary nodules and scarring.  For multiple solid nodules with any 6 mm or greater, Fleischner Society guidelines recommend follow up with non-contrast chest CT at 3-6 months and 18-24 months after discovery.     Electronically signed by:Denys Moreno  Date:                                            02/20/2025  Time:                                           14:43    Prior diet: regular diet.    Subjective     Pt seen bedside for ST eval. No family present.  Patient goals: to improve stomach pain     Respiratory Status:  see chart     Objective:     Oral Musculature Evaluation  Oral Musculature: WFL  Dentition: upper and lower dentures, uses dentures to eat  Secretion Management: adequate  Mucosal Quality: good  Mandibular Strength and Mobility: WFL  Oral Labial Strength and Mobility: WFL  Lingual Strength and Mobility: WFL  Velar Elevation: WFL  Buccal Strength and Mobility: WFL  Volitional Cough: present  Volitional Swallow: appreciated  Voice Prior to PO Intake: hoarse    Bedside Swallow Eval:     Clinical Swallow Examination:   Of note, patient  self-fed/ throughout evaluation. Patient presented with:     CONSISTENCY  NOTES   THIN (IDDSI 0) Cup/straw sips    No overt s/s of aspiration. Denied odynophagia and globus sensation      PUREE (IDDSI 4/Extremely Thick)   TSP/TBSP bites of applesauce  No overt s/s of aspiration appreciated, reported transient globus sensation that cleared shortly.    SOLID (IDDSI 7/Regular) Bite of vanessa cracker cookie   Timely/efficient mastication, c/o globus sensation. No overt s/s of aspiration appreciated.        Thickened liquids were not used in this assessment. Alcon (2018) reported that thickened liquids have no sound evidence at reducing the risk of pneumonia in patients with dysphagia and can cause harm by increasing their risk of dehydration. It also presents an increased risk of UTI, electrolyte imbalance, constipation, fecal impaction, cognitive impairment, functional decline and even death (Lakeshia, 2002; Dalia, 2016).  Thickened liquids are associated with risks including dehydration, increased pharyngeal residue, potential interference with medication absorption, and decreased quality of life (Karina, 2013). Thickened liquids are also more likely to be silently aspirated than thin liquids (Clem et al., 2018). This supports the assertion that we should confirm a patient requires thickened liquids with an instrumental swallow study prior to recommending them.    References:   Karina GOODWIN (2013). Thickening agents used for dysphagia management: Effect on bioavailability of water, medication and feelings of satiety. Nutrition Journal, 12, 54. https://doi.org/10.1186/8566-1966-60-54    BUDDY Nj, TRISHA White, SERGE Monteiro, & BUDDY Eugene (2018). Cough response to aspiration in thin and thick fluids during FEES in hospitalized inpatients. International journal of language & communication disorders, 53(5), 909-918. https://doi.org/10.1111/4959-2550.92167    INTERPRETATION AND RISK ASSESSMENT:  Clinical swallow  evaluation (CSE) revealed oral phase characterized by lingual, labial, and buccal strength and range of motion adequate for lip closure, bolus preparation and propulsion. The patient had no anterior loss of the bolus with complete closure of the lips around the utensils. No residue remained in the oral cavity following the swallow. Patient without overt clinical signs/symptoms of aspiration on any PO trials given. Patient reported globus sensation on trials of solid and intermittently on puree. Contributing risk factors for dysphagia include cognitive deficits. Patient with increased risk for silent aspiration given potential sensory deficits related to COPD. Pt's complaints of globus sensation, intermittent odynophagia, and belching likely GI related given hx of gastritis, gastroparesis, and hiatal hernia. ST recommends pt to f/u w outpatient GI.     Goals:   Multidisciplinary Problems       SLP Goals       Not on file                    Plan:     Patient to be seen:      Plan of Care expires:     Plan of Care reviewed with:  patient   SLP Follow-Up:  No       Discharge recommendations:  No Therapy Indicated   Barriers to Discharge:  None    Time Tracking:     SLP Treatment Date:   02/24/25  Speech Start Time:  1015  Speech Stop Time:  1035     Speech Total Time (min):  20 min    Billable Minutes: Eval Swallow and Oral Function 20    Julisa Antonietta Zabala MA, PL-SLP, CCC-SLP  Speech Language Pathologist  02/24/2025

## 2025-02-24 NOTE — PT/OT/SLP PROGRESS
Physical Therapy Treatment    Patient Name:  Genie Balderas   MRN:  [id number]    Recommendations:     Discharge Recommendations: Low Intensity Therapy (WITH 24 HOUR CARE)  Discharge Equipment Recommendations: walker, rolling  Barriers to discharge: None    Assessment:     Genie Balderas is a 64 y.o. female admitted with a medical diagnosis of Hypoglycemia associated with type 2 diabetes mellitus.  She presents with the following impairments/functional limitations: weakness, impaired endurance, impaired functional mobility, gait instability, impaired balance, pain, decreased safety awareness, decreased lower extremity function, decreased coordination.    Rehab Prognosis: Fair; patient would benefit from acute skilled PT services to address these deficits and reach maximum level of function.    Recent Surgery: * No surgery found *     Plan:     During this hospitalization, patient to be seen 3 x/week to address the identified rehab impairments via gait training, therapeutic activities, therapeutic exercises and progress toward the following goals:    Plan of Care Expires:  03/07/25    Subjective     Chief Complaint: C/O ABD. DISCOMFORT BUT WILLING TO PARTICIPATE  Patient/Family Comments/goals: OK FOR OOB MOBILITY  Pain/Comfort:  Pain Rating 1: 8/10  Location 1: abdomen (AND BACK)  Pain Addressed 1: Reposition      Objective:     Communicated with NURSE SRAA prior to session.  Patient found supine with blood pressure cuff, pulse ox (continuous), telemetry, peripheral IV, PICC line, bed alarm (KWAME SYS) upon PT entry to room.     General Precautions: Standard, fall  Orthopedic Precautions: N/A  Braces: N/A  Respiratory Status: Room air     Functional Mobility:  Bed Mobility:     Rolling Left:  stand by assistance  Scooting: stand by assistance  Supine to Sit: stand by assistance  Transfers:     Sit to Stand:  stand by assistance with rolling walker  Bed to Chair: contact guard assistance with  rolling walker  using  Step  "Transfer  Gait: PT ' WITH RW AND CG/SBA, NO LOB OR SOB ON ROOM AIR, 2 SMALL STANDING REST BREAKS  Balance: GOOD SITTING BALANCE, FAIR DYNAMIC BALANCE DURING GAIT  PT EDUCATED IN AND PERFORMED SEATED BLE THEREX X 10 REPS AROM WITH REST AS NEEDED: HIP FLEX/EXT, LAQ, QUAD SET, HEEL SLIDES, AP'S    AM-PAC 6 CLICK MOBILITY  Turning over in bed (including adjusting bedclothes, sheets and blankets)?: 4  Sitting down on and standing up from a chair with arms (e.g., wheelchair, bedside commode, etc.): 3  Moving from lying on back to sitting on the side of the bed?: 4  Moving to and from a bed to a chair (including a wheelchair)?: 3  Need to walk in hospital room?: 3  Climbing 3-5 steps with a railing?: 1  Basic Mobility Total Score: 18     Treatment & Education:  PT EDUCATION:  - ROLE OF P.T. AND POC IN ACUTE CARE HOSPITAL SETTING  - RW USE AND SAFETY DURING TF'S AND GAIT  - ENCOURAGED TO INCREASE TIME OOB IN CHAIR TO TOLERANCE   - TO CONTINUE THERAPUETIC EXERCISES THROUGHOUT THE DAY TO INCREASE ACTIVITY TOLERANCE AND DECREASE RISK FOR PNEUMONIA AND BLOOD CLOTS: HIP FLEX/EXT, HIP ABD/ADD, QUAD SET, HEEL SLIDE, AP  - RISK FOR FALLS DUE TO GENERALIZED WEAKNESS, EDUCATED ON "CALL DON'T FALL", ENCOURAGED TO CALL FOR ASSISTANCE WITH ALL NEEDS SUCH AS BED<>CHAIR TRANSFERS OR TRIPS TO BATHROOM, PT AGREEABLE TO SAFETY PRECAUTIONS    Patient left up in chair with all lines intact, call button in reach, chair alarm on, and NURSE notified..    GOALS:   Multidisciplinary Problems       Physical Therapy Goals          Problem: Physical Therapy    Goal Priority Disciplines Outcome Interventions   Physical Therapy Goal     PT, PT/OT Progressing    Description: LTG'S TO BE MET IN 14 DAYS (3-7-25)  PT WILL BE EBONY FOR BED MOBILITY  PT WILL REQUIRE SPV FOR BED<>CHAIR TF'S  PT WILL  FEET WITH RW AND SPV  PT WILL INC AMPAC SCORE BY 2 POINTS TO PROGRESS GROSS FUNC MOBILITY                         DME Justifications:   Genie's " mobility limitation cannot be sufficiently resolved by the use of a cane. Her functional mobility deficit can be sufficiently resolved with the use of a Rolling Walker. Patient's mobility limitation significantly impairs their ability to participate in one of more activities of daily living.  The use of a RW will significantly improve the patient's ability to participate in MRADLS and the patient will use it on regular basis in the home.    Time Tracking:     PT Received On: 02/21/25  PT Start Time: 0920     PT Stop Time: 0950  PT Total Time (min): 30 min     Billable Minutes: Gait Training 15 and Therapeutic Activity 15    Treatment Type: Treatment  PT/PTA: PT     Number of PTA visits since last PT visit: 0     02/24/2025

## 2025-02-24 NOTE — ASSESSMENT & PLAN NOTE
CT Chest/Abd/Pelvis 02/20/2025: Moderate stool burden with some inspissated stool concerning for constipation.  Abrupt transition in the sigmoid colon to nondistention, nonspecific.  Underlying lesion such as mass or stricture difficult to exclude.  Recommend colonoscopy on an outpatient basis.     Patient reports 2 BM's overnight 02/24/2025

## 2025-02-24 NOTE — SUBJECTIVE & OBJECTIVE
Interval History: Sitting in bed, pleasant. Has had 2 BM's since last night. Denies chest pain or SOB. No N/v. No acute events overnight.    Review of Systems   Constitutional:  Negative for chills, fatigue and fever.   HENT:  Negative for congestion and rhinorrhea.    Respiratory:  Positive for wheezing. Negative for cough and shortness of breath.    Cardiovascular:  Negative for chest pain and leg swelling.   Gastrointestinal:  Negative for abdominal pain, constipation, diarrhea, nausea and vomiting.   Genitourinary:  Negative for difficulty urinating and dysuria.   Musculoskeletal:  Negative for arthralgias and back pain.   Skin:  Negative for rash and wound.   Neurological:  Negative for dizziness and headaches.     Objective:     Vital Signs (Most Recent):  Temp: 98.6 °F (37 °C) (02/24/25 0742)  Pulse: 92 (02/24/25 0836)  Resp: 15 (02/24/25 0836)  BP: 135/80 (02/24/25 0742)  SpO2: 95 % (02/24/25 0836) Vital Signs (24h Range):  Temp:  [97.3 °F (36.3 °C)-98.9 °F (37.2 °C)] 98.6 °F (37 °C)  Pulse:  [] 92  Resp:  [15-20] 15  SpO2:  [92 %-97 %] 95 %  BP: (133-147)/(68-81) 135/80     Weight: 48.4 kg (106 lb 11.2 oz)  Body mass index is 19.52 kg/m².  No intake or output data in the 24 hours ending 02/24/25 1158      Physical Exam  Vitals and nursing note reviewed.   Constitutional:       General: She is not in acute distress.     Appearance: She is underweight. She is not ill-appearing, toxic-appearing or diaphoretic.   HENT:      Head: Normocephalic and atraumatic.      Mouth/Throat:      Mouth: Mucous membranes are moist.   Eyes:      General: No scleral icterus.        Right eye: No discharge.         Left eye: No discharge.      Pupils: Pupils are equal, round, and reactive to light.   Cardiovascular:      Rate and Rhythm: Normal rate and regular rhythm.      Heart sounds: Normal heart sounds.   Pulmonary:      Effort: Pulmonary effort is normal. No respiratory distress.      Breath sounds: Normal breath  sounds.   Abdominal:      General: Bowel sounds are normal.      Palpations: Abdomen is soft.      Tenderness: There is no abdominal tenderness.   Musculoskeletal:      Cervical back: No rigidity.      Right lower leg: No edema.      Left lower leg: No edema.   Skin:     General: Skin is warm and dry.      Coloration: Skin is not jaundiced.   Neurological:      Mental Status: She is oriented to person, place, and time. Mental status is at baseline.   Psychiatric:         Mood and Affect: Mood normal.         Behavior: Behavior normal.             Significant Labs: All pertinent labs within the past 24 hours have been reviewed.  BMP:   Recent Labs   Lab 02/24/25  0519   GLU 67*      K 4.1   CL 98   CO2 33*   BUN 13   CREATININE 0.7   CALCIUM 8.7     CBC:   Recent Labs   Lab 02/23/25  0512 02/24/25  0519   WBC 9.35 5.99   HGB 10.0* 9.8*   HCT 31.8* 31.3*    332       Significant Imaging: I have reviewed all pertinent imaging results/findings within the past 24 hours.

## 2025-02-24 NOTE — PLAN OF CARE
SW notified pt/daughter, Amy, that [facility] and [facility] are accepting. Family to make decision and f/u with CM.   142 attached

## 2025-02-24 NOTE — ASSESSMENT & PLAN NOTE
Patient's blood pressure range in the last 24 hours was: BP  Min: 133/81  Max: 147/70.The patient's inpatient anti-hypertensive regimen is listed below:  Current Antihypertensives       Plan  - BP is controlled, no changes needed to their regimen

## 2025-02-24 NOTE — PROGRESS NOTES
[facility] - Telemetry ([facility] Medicine  Progress Note    Patient Name: Genie Balderas  MRN: [id number]  Patient Class: IP- Inpatient   Admission Date: 2/20/2025  Length of Stay: 4 days  Attending Physician: Lilian Walker MD  Primary Care Provider: Dawit Grey MD        Subjective     Principal Problem:Hypoglycemia associated with type 2 diabetes mellitus        HPI:  64 year old female with known hypertension; COPD/asthma with prn home oxygen; gastroparesis; chronic pain on hydrocodone; everyday smoker  H/o of diabetes (off meds ~5yrs) and atrial fib previously on xarelto; she reports both resolved since chemotherapy for B cell lymphoma ~2018     Family brought to ED on 2/20 for nausea, vomiting, abdominal pain, back pain (worse than baseline), constipation  Pt is very poor historian, cannot recall last BM, not sure why her family brought her in. Unsure of current medications, does think she ran fevers at home before seeing PCP on 2/13 when she was given antibiotic for bronchitis     Evaluation revealed hypoglycemia that was refractory to IVP D50 and has not yet been stable on continuous D10 infusion  Labs show nl wbc, lactate, ceratinine, electrolytes. CT abdomen shows Moderate volume of stool throughout the colon with rather abrupt transition point in the sigmoid colon. Underlying lesion difficult to exclude, such as stricture or neoplasm   She has not vomited since zofran given on arrival but has not yet eaten the sandwich or juice at her bedside and has not drank the mag citrate also at bedside     Critical care team contacted for ICU admission with D10 infusion and hourly glucose monitoring    Overview/Hospital Course:    2/21/25  NAEON  Patient awake, alert, oriented  Family at bedside  Blood glucose levels improved  Dextrose infusion stopped  Continue IV steroids today  Reports decresed PO intake for past several weeks  Stable for floor transfer    2/22/25  NAEON, patient sitting in  chair, reports vj, unable to care for herself at home  Wei in place, remove today for voiding trial  PT/OT with reccs for LIT with 24 hours supervision  Discussed with patient's son over phone  Consult SW for SNF placement    2/23/25  NINI, patient resting in bed, reports fatigue  Family at bedside, updated  SW consulted for SNF placement    02/24/2025  CT on admission: noted scarring and pulmonary nodules. Largest at least 7 mm. For multiple solid nodules with any 6 mm or greater, Fleischner Society guidelines recommend follow up with non-contrast chest CT at 3-6 months and 18-24 months after discovery. This information was discussed with patient and family.    Interval History: Sitting in bed, pleasant. Has had 2 BM's since last night. Denies chest pain or SOB. No N/v. No acute events overnight.    Review of Systems   Constitutional:  Negative for chills, fatigue and fever.   HENT:  Negative for congestion and rhinorrhea.    Respiratory:  Positive for wheezing. Negative for cough and shortness of breath.    Cardiovascular:  Negative for chest pain and leg swelling.   Gastrointestinal:  Negative for abdominal pain, constipation, diarrhea, nausea and vomiting.   Genitourinary:  Negative for difficulty urinating and dysuria.   Musculoskeletal:  Negative for arthralgias and back pain.   Skin:  Negative for rash and wound.   Neurological:  Negative for dizziness and headaches.     Objective:     Vital Signs (Most Recent):  Temp: 98.6 °F (37 °C) (02/24/25 0742)  Pulse: 92 (02/24/25 0836)  Resp: 15 (02/24/25 0836)  BP: 135/80 (02/24/25 0742)  SpO2: 95 % (02/24/25 0836) Vital Signs (24h Range):  Temp:  [97.3 °F (36.3 °C)-98.9 °F (37.2 °C)] 98.6 °F (37 °C)  Pulse:  [] 92  Resp:  [15-20] 15  SpO2:  [92 %-97 %] 95 %  BP: (133-147)/(68-81) 135/80     Weight: 48.4 kg (106 lb 11.2 oz)  Body mass index is 19.52 kg/m².  No intake or output data in the 24 hours ending 02/24/25 1158      Physical Exam  Vitals and  nursing note reviewed.   Constitutional:       General: She is not in acute distress.     Appearance: She is underweight. She is not ill-appearing, toxic-appearing or diaphoretic.   HENT:      Head: Normocephalic and atraumatic.      Mouth/Throat:      Mouth: Mucous membranes are moist.   Eyes:      General: No scleral icterus.        Right eye: No discharge.         Left eye: No discharge.      Pupils: Pupils are equal, round, and reactive to light.   Cardiovascular:      Rate and Rhythm: Normal rate and regular rhythm.      Heart sounds: Normal heart sounds.   Pulmonary:      Effort: Pulmonary effort is normal. No respiratory distress.      Breath sounds: Normal breath sounds.   Abdominal:      General: Bowel sounds are normal.      Palpations: Abdomen is soft.      Tenderness: There is no abdominal tenderness.   Musculoskeletal:      Cervical back: No rigidity.      Right lower leg: No edema.      Left lower leg: No edema.   Skin:     General: Skin is warm and dry.      Coloration: Skin is not jaundiced.   Neurological:      Mental Status: She is oriented to person, place, and time. Mental status is at baseline.   Psychiatric:         Mood and Affect: Mood normal.         Behavior: Behavior normal.             Significant Labs: All pertinent labs within the past 24 hours have been reviewed.  BMP:   Recent Labs   Lab 02/24/25  0519   GLU 67*      K 4.1   CL 98   CO2 33*   BUN 13   CREATININE 0.7   CALCIUM 8.7     CBC:   Recent Labs   Lab 02/23/25  0512 02/24/25  0519   WBC 9.35 5.99   HGB 10.0* 9.8*   HCT 31.8* 31.3*    332       Significant Imaging: I have reviewed all pertinent imaging results/findings within the past 24 hours.    Assessment and Plan     * Hypoglycemia associated with history of type 2 diabetes mellitus    2/22/25  Blood glucose level improved  Stop IV steroids today  Encourage PO intake      Debility  Patient with Acute on chronic debility due to bed confinement status, chronic  unspecified fatigue, and age-related physical debility. The patient's latest AMPAC (Activity Measure for Post Acute Care) Score is listed below.    AM-PAC Score - How much help does the patient need for each activity listed  Basic Mobility Total Score: 18  Turning over in bed (including adjusting bedclothes, sheets and blankets)?: None  Sitting down on and standing up from a chair with arms (e.g., wheelchair, bedside commode, etc.): A little  Moving from lying on back to sitting on the side of the bed?: None  Moving to and from a bed to a chair (including a wheelchair)?: A little  Need to walk in hospital room?: A little  Climbing 3-5 steps with a railing?: Unable    Plan  - PT/OT consulted  - Consult SW for SNF placement          Slow transit constipation  Patient reports 2 BM's overnight.      COPD (chronic obstructive pulmonary disease)  Patient's COPD is controlled currently.  Patient is currently off COPD Pathway. Continue scheduled inhalers and PRN Supplemental oxygen and monitor respiratory status closely.     Tobacco dependency  Dangers of cigarette smoking were reviewed with patient in detail. Patient was Counseled for 3-10 minutes. Nicotine replacement options were discussed. Nicotine replacement was discussed- prescribed    Primary hypertension  Patient's blood pressure range in the last 24 hours was: BP  Min: 133/81  Max: 147/70.The patient's inpatient anti-hypertensive regimen is listed below:  Current Antihypertensives       Plan  - BP is controlled, no changes needed to their regimen      VTE Risk Mitigation (From admission, onward)           Ordered     enoxaparin injection 30 mg  Daily         02/21/25 1246                    Discharge Planning   LANI:      Code Status: Full Code   Medical Readiness for Discharge Date:   Discharge Plan A: Home Health                Please place Justification for DME        DANIELA FrancoC  Department of Hospital Medicine   [facility] - Telemetry ([facility])

## 2025-02-24 NOTE — PT/OT/SLP PROGRESS
"Occupational Therapy   Treatment    Name: Genie Balderas  MRN: [id number]  Admitting Diagnosis:  Hypoglycemia associated with type 2 diabetes mellitus       Recommendations:     Discharge Recommendations: Low Intensity Therapy (24/7 SPV and A)  Discharge Equipment Recommendations:  walker, rolling  Barriers to discharge:  None    Assessment:     Genie Balderas is a 64 y.o. female with a medical diagnosis of Hypoglycemia associated with type 2 diabetes mellitus.  She presents with the following performance deficits affecting function are weakness, impaired endurance, impaired self care skills, impaired functional mobility, impaired balance, impaired cardiopulmonary response to activity, decreased safety awareness.     Rehab Prognosis:  Fair; patient would benefit from acute skilled OT services to address these deficits and reach maximum level of function.       Plan:     Patient to be seen 2 x/week to address the above listed problems via self-care/home management, therapeutic activities, therapeutic exercises  Plan of Care Expires: 03/07/25  Plan of Care Reviewed with: patient    Subjective     Chief Complaint: Reported "I am doing better."  Patient/Family Comments/goals: increase independence  Pain/Comfort:  Pain Rating 1: 8/10  Location 1: abdomen  Pain Addressed 1:  (activity pacing)    Objective:     Communicated with: NurseAravind, prior to session.  Patient found supine with telemetry, PICC line upon OT entry to room.    General Precautions: Standard, fall    Orthopedic Precautions:N/A  Braces: N/A  Respiratory Status: Room air     Occupational Performance:     Bed Mobility:    Patient completed Supine to Sit with stand by assistance     Functional Mobility/Transfers:  Patient completed Sit <> Stand Transfer with contact guard assistance  with  rolling walker   Patient completed Bed <> Chair Transfer using Step Transfer technique with contact guard assistance with rolling walker  Functional Mobility: Patient " completed x70ft x2 trials functional mobility with RW and CGA to increase dynamic standing balance and activity tolerance needed for ADL completion.  Provided with v/c for technique with transfers to increase safety and independence with completion    Activities of Daily Living:  Grooming: setup simple grooming from bedside chair/tray    [facility] 6 Click ADL: 20    Treatment & Education:  Encouraged completion of B UE AROM therex throughout the day to increase functional strength and activity tolerance needed for ADL completion. Educated on appropriate technique/pacing with completion. Educated on benefits of OOB activity and importance of calling for A to transfer back to bed. Patient stated understanding and in agreement with POC.    Patient left up in chair with all lines intact, call button in reach, and chair alarm on    GOALS:   Multidisciplinary Problems       Occupational Therapy Goals          Problem: Occupational Therapy    Goal Priority Disciplines Outcome Interventions   Occupational Therapy Goal     OT, PT/OT Progressing    Description: Goals to be met by: 3/7/25     Patient will increase functional independence with ADLs by performing:    Toileting from toilet with Supervision for hygiene and clothing management.   Toilet transfer to toilet with Supervision.  Increased functional strength in B UE grossly by 1/2 MM grade.                       DME Justifications:   Genie's mobility limitation cannot be sufficiently resolved by the use of a cane. Her functional mobility deficit can be sufficiently resolved with the use of a Rolling Walker. Patient's mobility limitation significantly impairs their ability to participate in one of more activities of daily living.  The use of a RW will significantly improve the patient's ability to participate in MRADLS and the patient will use it on regular basis in the home.    Time Tracking:     OT Date of Treatment: 02/24/25  OT Start Time: 0920  OT Stop Time: 0945  OT  Total Time (min): 25 min    Billable Minutes:Therapeutic Activity 25    OT/GIULIA: OT     Number of GIULIA visits since last OT visit: 0    Amada Alvarado OT  2/24/2025

## 2025-02-24 NOTE — PLAN OF CARE
Pt/daughter, Amy, notified that [facility] and [facility] declining referral d/t payor. Family still reviewing SNF list; no accepting options at this time. SW to contact liaison(s) for remaining pending facilities.

## 2025-02-24 NOTE — ASSESSMENT & PLAN NOTE
CT results 02/20/2025: Scarring and pulmonary nodules noted.  Largest pulmonary nodule at least 7 mm.  For multiple solid nodules with any 6 mm or greater, Fleischner Society guidelines recommend follow up with non-contrast chest CT at 3-6 months and 18-24 months after discovery.

## 2025-02-25 LAB
ANION GAP SERPL CALC-SCNC: 6 MMOL/L (ref 8–16)
BASOPHILS # BLD AUTO: 0.04 K/UL (ref 0–0.2)
BASOPHILS NFR BLD: 0.6 % (ref 0–1.9)
BUN SERPL-MCNC: 11 MG/DL (ref 8–23)
CALCIUM SERPL-MCNC: 8.7 MG/DL (ref 8.7–10.5)
CHLORIDE SERPL-SCNC: 99 MMOL/L (ref 95–110)
CO2 SERPL-SCNC: 33 MMOL/L (ref 23–29)
CREAT SERPL-MCNC: 0.7 MG/DL (ref 0.5–1.4)
DIFFERENTIAL METHOD BLD: ABNORMAL
EOSINOPHIL # BLD AUTO: 0.1 K/UL (ref 0–0.5)
EOSINOPHIL NFR BLD: 1.6 % (ref 0–8)
ERYTHROCYTE [DISTWIDTH] IN BLOOD BY AUTOMATED COUNT: 18.3 % (ref 11.5–14.5)
EST. GFR  (NO RACE VARIABLE): >60 ML/MIN/1.73 M^2
GLUCOSE SERPL-MCNC: 74 MG/DL (ref 70–110)
HCT VFR BLD AUTO: 31.3 % (ref 37–48.5)
HGB BLD-MCNC: 9.8 G/DL (ref 12–16)
IMM GRANULOCYTES # BLD AUTO: 0.09 K/UL (ref 0–0.04)
IMM GRANULOCYTES NFR BLD AUTO: 1.4 % (ref 0–0.5)
LYMPHOCYTES # BLD AUTO: 2.3 K/UL (ref 1–4.8)
LYMPHOCYTES NFR BLD: 35.3 % (ref 18–48)
MCH RBC QN AUTO: 31.2 PG (ref 27–31)
MCHC RBC AUTO-ENTMCNC: 31.3 G/DL (ref 32–36)
MCV RBC AUTO: 100 FL (ref 82–98)
MONOCYTES # BLD AUTO: 0.4 K/UL (ref 0.3–1)
MONOCYTES NFR BLD: 6.7 % (ref 4–15)
NEUTROPHILS # BLD AUTO: 3.5 K/UL (ref 1.8–7.7)
NEUTROPHILS NFR BLD: 54.4 % (ref 38–73)
NRBC BLD-RTO: 0 /100 WBC
PLATELET # BLD AUTO: 322 K/UL (ref 150–450)
PMV BLD AUTO: 8.6 FL (ref 9.2–12.9)
POCT GLUCOSE: 103 MG/DL (ref 70–110)
POCT GLUCOSE: 106 MG/DL (ref 70–110)
POCT GLUCOSE: 83 MG/DL (ref 70–110)
POCT GLUCOSE: 88 MG/DL (ref 70–110)
POTASSIUM SERPL-SCNC: 4 MMOL/L (ref 3.5–5.1)
RBC # BLD AUTO: 3.14 M/UL (ref 4–5.4)
SODIUM SERPL-SCNC: 138 MMOL/L (ref 136–145)
WBC # BLD AUTO: 6.4 K/UL (ref 3.9–12.7)

## 2025-02-25 PROCEDURE — 97530 THERAPEUTIC ACTIVITIES: CPT

## 2025-02-25 PROCEDURE — 63600175 PHARM REV CODE 636 W HCPCS: Performed by: STUDENT IN AN ORGANIZED HEALTH CARE EDUCATION/TRAINING PROGRAM

## 2025-02-25 PROCEDURE — 25000003 PHARM REV CODE 250: Performed by: NURSE PRACTITIONER

## 2025-02-25 PROCEDURE — S4991 NICOTINE PATCH NONLEGEND: HCPCS | Performed by: NURSE PRACTITIONER

## 2025-02-25 PROCEDURE — 25000003 PHARM REV CODE 250: Performed by: HOSPITALIST

## 2025-02-25 PROCEDURE — 63600175 PHARM REV CODE 636 W HCPCS: Performed by: SPECIALIST

## 2025-02-25 PROCEDURE — 94640 AIRWAY INHALATION TREATMENT: CPT

## 2025-02-25 PROCEDURE — 80048 BASIC METABOLIC PNL TOTAL CA: CPT | Performed by: SPECIALIST

## 2025-02-25 PROCEDURE — 25000242 PHARM REV CODE 250 ALT 637 W/ HCPCS: Performed by: NURSE PRACTITIONER

## 2025-02-25 PROCEDURE — 99900035 HC TECH TIME PER 15 MIN (STAT)

## 2025-02-25 PROCEDURE — 21400001 HC TELEMETRY ROOM

## 2025-02-25 PROCEDURE — 97530 THERAPEUTIC ACTIVITIES: CPT | Mod: CQ

## 2025-02-25 PROCEDURE — 25000003 PHARM REV CODE 250

## 2025-02-25 PROCEDURE — 85025 COMPLETE CBC W/AUTO DIFF WBC: CPT | Performed by: SPECIALIST

## 2025-02-25 RX ORDER — MORPHINE SULFATE 4 MG/ML
4 INJECTION, SOLUTION INTRAMUSCULAR; INTRAVENOUS EVERY 4 HOURS PRN
Status: DISCONTINUED | OUTPATIENT
Start: 2025-02-25 | End: 2025-02-26 | Stop reason: HOSPADM

## 2025-02-25 RX ADMIN — ARFORMOTEROL TARTRATE 15 MCG: 15 SOLUTION RESPIRATORY (INHALATION) at 08:02

## 2025-02-25 RX ADMIN — ACETAMINOPHEN 650 MG: 325 TABLET ORAL at 10:02

## 2025-02-25 RX ADMIN — FAMOTIDINE 20 MG: 20 TABLET, FILM COATED ORAL at 09:02

## 2025-02-25 RX ADMIN — HYDROCODONE BITARTRATE AND ACETAMINOPHEN 1 TABLET: 5; 325 TABLET ORAL at 06:02

## 2025-02-25 RX ADMIN — LINACLOTIDE 145 MCG: 145 CAPSULE, GELATIN COATED ORAL at 06:02

## 2025-02-25 RX ADMIN — BUDESONIDE INHALATION 0.5 MG: 0.5 SUSPENSION RESPIRATORY (INHALATION) at 08:02

## 2025-02-25 RX ADMIN — MUPIROCIN: 20 OINTMENT TOPICAL at 09:02

## 2025-02-25 RX ADMIN — NICOTINE 1 PATCH: 21 PATCH, EXTENDED RELEASE TRANSDERMAL at 09:02

## 2025-02-25 RX ADMIN — FAMOTIDINE 20 MG: 20 TABLET, FILM COATED ORAL at 10:02

## 2025-02-25 RX ADMIN — MORPHINE SULFATE 4 MG: 4 INJECTION INTRAVENOUS at 11:02

## 2025-02-25 RX ADMIN — HYDROCODONE BITARTRATE AND ACETAMINOPHEN 1 TABLET: 5; 325 TABLET ORAL at 09:02

## 2025-02-25 RX ADMIN — HYDROCODONE BITARTRATE AND ACETAMINOPHEN 1 TABLET: 5; 325 TABLET ORAL at 02:02

## 2025-02-25 RX ADMIN — ENOXAPARIN SODIUM 30 MG: 30 INJECTION SUBCUTANEOUS at 04:02

## 2025-02-25 NOTE — PT/OT/SLP PROGRESS
Occupational Therapy   Treatment    Name: Genie Balderas  MRN: [id number]  Admitting Diagnosis:  Hypoglycemia associated with type 2 diabetes mellitus       Recommendations:     Discharge Recommendations: Low Intensity Therapy (with SPV and A)  Discharge Equipment Recommendations:  walker, rolling  Barriers to discharge:  None    Assessment:     Genie Balderas is a 64 y.o. female with a medical diagnosis of Hypoglycemia associated with type 2 diabetes mellitus.  She presents with the following performance deficits affecting function are weakness, impaired endurance, impaired self care skills, impaired functional mobility, gait instability, impaired balance, impaired cardiopulmonary response to activity, decreased safety awareness.     Rehab Prognosis:  Good; patient would benefit from acute skilled OT services to address these deficits and reach maximum level of function.       Plan:     Patient to be seen 2 x/week to address the above listed problems via self-care/home management, therapeutic activities, therapeutic exercises  Plan of Care Expires: 03/07/25  Plan of Care Reviewed with: patient    Subjective     Chief Complaint: I'm so tired I can't get up for you right now  Patient/Family Comments/goals: to get stronger and go home  Pain/Comfort:  Pain Rating 1: 0/10    Objective:     Communicated with: nursecarina, prior to session.  Patient found supine with bed alarm, telemetry, PICC line (avasys) upon OT entry to room.    General Precautions: Standard, fall    Orthopedic Precautions:N/A  Braces: N/A  Respiratory Status: Room air     Occupational Performance:         AMPAC 6 Click ADL: 20    Treatment & Education:  Pt refused any OOB ax this date including supine therex. Pt encouraged to get up and walk with mobility techs at a later time if therapy unable to come back. Pt ed on benefits of sitting upright and OOB to increase ax tolerance. TE review in all planes and encouraged completion of B UE AROM therex  throughout the day to increase functional strength and activity tolerance needed for ADL completion.  OT role, plan of care, progression of goals, importance of continued OOB activity, ADL/functional transfer and mobility retraining, call don't fall, safety precautions, fall prevention.        Patient left supine with all lines intact, call button in reach, and bed alarm on    GOALS:   Multidisciplinary Problems       Occupational Therapy Goals          Problem: Occupational Therapy    Goal Priority Disciplines Outcome Interventions   Occupational Therapy Goal     OT, PT/OT Progressing    Description: Goals to be met by: 3/7/25     Patient will increase functional independence with ADLs by performing:    Toileting from toilet with Supervision for hygiene and clothing management.   Toilet transfer to toilet with Supervision.  Increased functional strength in B UE grossly by 1/2 MM grade.                         DME Justifications:   Genie's mobility limitation cannot be sufficiently resolved by the use of a cane. Her functional mobility deficit can be sufficiently resolved with the use of a Rolling Walker. Patient's mobility limitation significantly impairs their ability to participate in one of more activities of daily living.  The use of a RW will significantly improve the patient's ability to participate in MRADLS and the patient will use it on regular basis in the home.    Time Tracking:     OT Date of Treatment: 02/25/25  OT Start Time: 1020  OT Stop Time: 1030  OT Total Time (min): 10 min    Billable Minutes:Therapeutic Activity 10  MICHEAL Montilla  A client care conference was performed between the OTR/L and BURTON, prior to treatment by BURTON, to discuss the patient's status, treatment plan and established goals.      OT/GIULIA: GIULIA     Number of GIULAI visits since last OT visit: 1 2/25/2025

## 2025-02-25 NOTE — ASSESSMENT & PLAN NOTE
Patient with Acute on chronic debility due to bed confinement status, chronic unspecified fatigue, and age-related physical debility. The patient's latest AMPAC (Activity Measure for Post Acute Care) Score is listed below.    AM-PAC Score - How much help does the patient need for each activity listed  Basic Mobility Total Score: 6  Turning over in bed (including adjusting bedclothes, sheets and blankets)?: Unable (REF)  Sitting down on and standing up from a chair with arms (e.g., wheelchair, bedside commode, etc.): Unable (REF)  Moving from lying on back to sitting on the side of the bed?: Unable (REF)  Moving to and from a bed to a chair (including a wheelchair)?: Unable (REF)  Need to walk in hospital room?: Unable (REF)  Climbing 3-5 steps with a railing?: Unable (NT)    Plan  - PT/OT consulted  - Consult SW for SNF placement

## 2025-02-25 NOTE — PT/OT/SLP PROGRESS
"Physical Therapy  Treatment    Genie Balderas   MRN: [id number]   Admitting Diagnosis: Hypoglycemia associated with type 2 diabetes mellitus    PT Received On: 02/25/25  PT Start Time: 1020     PT Stop Time: 1030    PT Total Time (min): 10 min       Billable Minutes:  Therapeutic Activity 10    Treatment Type: Treatment  PT/PTA: PTA     Number of PTA visits since last PT visit: 1       General Precautions: Standard, fall  Orthopedic Precautions: N/A  Braces: N/A  Respiratory Status: Room air         Subjective:  Communicated with patient's nurse, Aravind, and completed Epic chart review prior to session.  Patient declined to participate in PT session at this time due to fatigue.   "I just really need to sleep."    Pain/Comfort  Pain Rating 1: 0/10    Objective:   Patient found with: telemetry, PICC line, bed alarm, Other (comments) (AVASYS)    Educated patient on importance of full and active participation in functional mobility training to prevent further loss of ROM and strength.   Encouraged patient to request assistance from nursing staff to get OOB at least 3x/day with all meals in addition to ambulating to/from the bathroom to promote recovery of CV endurance. Also encouraged patient to perform AROM TE to BLE throughout the day within all available planes of motion. Re enforced importance of utilizing call light to meet needs in room and not attempt to get up without staff assistance. Patient verbalized understanding of all education given and agreed to comply.  Encouraged patient to request mobility tech or nursing staff assistance in order to ambulate 2-3 times throughout the day as tolerated.      AM-PAC 6 CLICK MOBILITY  How much help from another person does this patient currently need?   1 = Unable, Total/Dependent Assistance  2 = A lot, Maximum/Moderate Assistance  3 = A little, Minimum/Contact Guard/Supervision  4 = None, Modified Rawlings/Independent    Turning over in bed (including adjusting " bedclothes, sheets and blankets)?: 1 (REF)  Sitting down on and standing up from a chair with arms (e.g., wheelchair, bedside commode, etc.): 1 (REF)  Moving from lying on back to sitting on the side of the bed?: 1 (REF)  Moving to and from a bed to a chair (including a wheelchair)?: 1 (REF)  Need to walk in hospital room?: 1 (REF)  Climbing 3-5 steps with a railing?: 1 (NT)  Basic Mobility Total Score: 6    AM-PAC Raw Score CMS G-Code Modifier Level of Impairment Assistance   6 % Total / Unable   7 - 9 CM 80 - 100% Maximal Assist   10 - 14 CL 60 - 80% Moderate Assist   15 - 19 CK 40 - 60% Moderate Assist   20 - 22 CJ 20 - 40% Minimal Assist   23 CI 1-20% SBA / CGA   24 CH 0% Independent/ Mod I     Patient left HOB elevated with call button in reach, bed alarm on, and AVASYS present.    Assessment:  Genie Balderas is a 64 y.o. female with a medical diagnosis of Hypoglycemia associated with type 2 diabetes mellitus and presents with overall decline in functional mobility. Patient would continue to benefit from skilled PT to address functional limitations listed below in order to return to PLOF/decrease caregiver burden.     Rehab identified problem list/impairments: weakness, impaired endurance, impaired functional mobility, gait instability, impaired balance, pain, decreased safety awareness, decreased lower extremity function, decreased coordination    Rehab potential is fair.    Activity tolerance: unable to determine    Discharge recommendations: Low Intensity Therapy (WITH 24/7 SPV & A)      Barriers to discharge:      Equipment recommendations: walker, rolling     GOALS:   Multidisciplinary Problems       Physical Therapy Goals          Problem: Physical Therapy    Goal Priority Disciplines Outcome Interventions   Physical Therapy Goal     PT, PT/OT Progressing    Description: LTG'S TO BE MET IN 14 DAYS (3-7-25)  PT WILL BE EBONY FOR BED MOBILITY  PT WILL REQUIRE SPV FOR BED<>CHAIR TF'S  PT WILL   FEET WITH RW AND SPV  [facility] BY 2 POINTS TO PROGRESS GROSS FUNC MOBILITY                         DME Justifications:   Genie's mobility limitation cannot be sufficiently resolved by the use of a cane. Her functional mobility deficit can be sufficiently resolved with the use of a Rolling Walker. Patient's mobility limitation significantly impairs their ability to participate in one of more activities of daily living.  The use of a RW will significantly improve the patient's ability to participate in MRADLS and the patient will use it on regular basis in the home.    PLAN:    Patient to be seen 3 x/week to address the above listed problems via gait training, therapeutic activities, therapeutic exercises  Plan of Care expires: 03/07/25  Plan of Care reviewed with: patient         02/25/2025

## 2025-02-25 NOTE — SUBJECTIVE & OBJECTIVE
Interval History: No acute events overnight.    Review of Systems   Constitutional:  Negative for chills, fatigue and fever.   HENT:  Negative for congestion and rhinorrhea.    Respiratory:  Negative for cough and shortness of breath.    Cardiovascular:  Negative for chest pain and leg swelling.   Gastrointestinal:  Negative for abdominal pain, constipation, diarrhea, nausea and vomiting.   Genitourinary:  Negative for difficulty urinating and dysuria.   Musculoskeletal:  Negative for arthralgias and back pain.   Skin:  Negative for rash and wound.   Neurological:  Negative for dizziness and headaches.     Objective:     Vital Signs (Most Recent):  Temp: 98.8 °F (37.1 °C) (02/25/25 1149)  Pulse: 91 (02/25/25 1149)  Resp: 18 (02/25/25 1149)  BP: (!) 151/77 (02/25/25 1149)  SpO2: 98 % (02/25/25 1149) Vital Signs (24h Range):  Temp:  [98.4 °F (36.9 °C)-99.2 °F (37.3 °C)] 98.8 °F (37.1 °C)  Pulse:  [] 91  Resp:  [18-20] 18  SpO2:  [94 %-98 %] 98 %  BP: (117-151)/(57-77) 151/77     Weight: 48.4 kg (106 lb 11.2 oz)  Body mass index is 19.52 kg/m².  No intake or output data in the 24 hours ending 02/25/25 1354      Physical Exam  Vitals and nursing note reviewed.   Constitutional:       General: She is not in acute distress.     Appearance: She is underweight. She is not ill-appearing, toxic-appearing or diaphoretic.   HENT:      Head: Normocephalic and atraumatic.      Mouth/Throat:      Mouth: Mucous membranes are moist.   Eyes:      General: No scleral icterus.        Right eye: No discharge.         Left eye: No discharge.      Pupils: Pupils are equal, round, and reactive to light.   Cardiovascular:      Rate and Rhythm: Normal rate and regular rhythm.      Heart sounds: Normal heart sounds.   Pulmonary:      Effort: Pulmonary effort is normal. No respiratory distress.      Breath sounds: Normal breath sounds.   Abdominal:      General: Bowel sounds are normal.      Palpations: Abdomen is soft.      Tenderness:  There is no abdominal tenderness.   Musculoskeletal:      Cervical back: No rigidity.      Right lower leg: No edema.      Left lower leg: No edema.   Skin:     General: Skin is warm and dry.      Coloration: Skin is not jaundiced.   Neurological:      Mental Status: She is oriented to person, place, and time. Mental status is at baseline.   Psychiatric:         Mood and Affect: Mood normal.         Behavior: Behavior normal.             Significant Labs: All pertinent labs within the past 24 hours have been reviewed.  BMP:   Recent Labs   Lab 02/25/25  0615   GLU 74      K 4.0   CL 99   CO2 33*   BUN 11   CREATININE 0.7   CALCIUM 8.7     CBC:   Recent Labs   Lab 02/24/25  0519 02/25/25  0615   WBC 5.99 6.40   HGB 9.8* 9.8*   HCT 31.3* 31.3*    322       Significant Imaging: I have reviewed all pertinent imaging results/findings within the past 24 hours.

## 2025-02-25 NOTE — ASSESSMENT & PLAN NOTE
Patient's blood pressure range in the last 24 hours was: BP  Min: 117/64  Max: 151/77.The patient's inpatient anti-hypertensive regimen is listed below:  Current Antihypertensives       Plan  - BP is controlled, no changes needed to their regimen

## 2025-02-25 NOTE — PLAN OF CARE
CM called and spoke to daughter, Amy, regarding SNF choice. Amy verbalized choice for [facility]. CM requested liaison (Thanh) have facility submit auth.

## 2025-02-25 NOTE — PLAN OF CARE
Discussed poc with pt, pt verbalized understanding    Purposeful rounding every 2hours    VS wnl  Blood glucose monitoring   Fall precautions in place, remains injury free with tele sitter in room  Pt denies c/o pain at this time  Pain under control with PRN meds    Bed locked at lowest position  Call light within reach  Assist to restroom    Chart check complete  Will cont with POC

## 2025-02-25 NOTE — PROGRESS NOTES
[facility] - Telemetry ([facility] Medicine  Progress Note    Patient Name: Genie Balderas  MRN: [id number]  Patient Class: IP- Inpatient   Admission Date: 2/20/2025  Length of Stay: 5 days  Attending Physician: Lilian Walker MD  Primary Care Provider: Dawit Grey MD        Subjective     Principal Problem:Hypoglycemia associated with type 2 diabetes mellitus        HPI:  64 year old female with known hypertension; COPD/asthma with prn home oxygen; gastroparesis; chronic pain on hydrocodone; everyday smoker  H/o of diabetes (off meds ~5yrs) and atrial fib previously on xarelto; she reports both resolved since chemotherapy for B cell lymphoma ~2018     Family brought to ED on 2/20 for nausea, vomiting, abdominal pain, back pain (worse than baseline), constipation  Pt is very poor historian, cannot recall last BM, not sure why her family brought her in. Unsure of current medications, does think she ran fevers at home before seeing PCP on 2/13 when she was given antibiotic for bronchitis     Evaluation revealed hypoglycemia that was refractory to IVP D50 and has not yet been stable on continuous D10 infusion  Labs show nl wbc, lactate, ceratinine, electrolytes. CT abdomen shows Moderate volume of stool throughout the colon with rather abrupt transition point in the sigmoid colon. Underlying lesion difficult to exclude, such as stricture or neoplasm   She has not vomited since zofran given on arrival but has not yet eaten the sandwich or juice at her bedside and has not drank the mag citrate also at bedside     Critical care team contacted for ICU admission with D10 infusion and hourly glucose monitoring    Overview/Hospital Course:    2/21/25  NAEON  Patient awake, alert, oriented  Family at bedside  Blood glucose levels improved  Dextrose infusion stopped  Continue IV steroids today  Reports decresed PO intake for past several weeks  Stable for floor transfer    2/22/25  NAEON, patient sitting in  chair, reports vj, unable to care for herself at home  Wei in place, remove today for voiding trial  PT/OT with reccs for LIT with 24 hours supervision  Discussed with patient's son over phone  Consult SW for SNF placement    2/23/25  NINI, patient resting in bed, reports fatigue  Family at bedside, updated  SW consulted for SNF placement    02/25/2025  [facility], [facility], [facility] and [facility] are accepting patient. CM awaiting family decision.     Interval History: No acute events overnight.    Review of Systems   Constitutional:  Negative for chills, fatigue and fever.   HENT:  Negative for congestion and rhinorrhea.    Respiratory:  Negative for cough and shortness of breath.    Cardiovascular:  Negative for chest pain and leg swelling.   Gastrointestinal:  Negative for abdominal pain, constipation, diarrhea, nausea and vomiting.   Genitourinary:  Negative for difficulty urinating and dysuria.   Musculoskeletal:  Negative for arthralgias and back pain.   Skin:  Negative for rash and wound.   Neurological:  Negative for dizziness and headaches.     Objective:     Vital Signs (Most Recent):  Temp: 98.8 °F (37.1 °C) (02/25/25 1149)  Pulse: 91 (02/25/25 1149)  Resp: 18 (02/25/25 1149)  BP: (!) 151/77 (02/25/25 1149)  SpO2: 98 % (02/25/25 1149) Vital Signs (24h Range):  Temp:  [98.4 °F (36.9 °C)-99.2 °F (37.3 °C)] 98.8 °F (37.1 °C)  Pulse:  [] 91  Resp:  [18-20] 18  SpO2:  [94 %-98 %] 98 %  BP: (117-151)/(57-77) 151/77     Weight: 48.4 kg (106 lb 11.2 oz)  Body mass index is 19.52 kg/m².  No intake or output data in the 24 hours ending 02/25/25 1354      Physical Exam  Vitals and nursing note reviewed.   Constitutional:       General: She is not in acute distress.     Appearance: She is underweight. She is not ill-appearing, toxic-appearing or diaphoretic.   HENT:      Head: Normocephalic and atraumatic.      Mouth/Throat:      Mouth: Mucous membranes are moist.   Eyes:      General: No scleral  icterus.        Right eye: No discharge.         Left eye: No discharge.      Pupils: Pupils are equal, round, and reactive to light.   Cardiovascular:      Rate and Rhythm: Normal rate and regular rhythm.      Heart sounds: Normal heart sounds.   Pulmonary:      Effort: Pulmonary effort is normal. No respiratory distress.      Breath sounds: Normal breath sounds.   Abdominal:      General: Bowel sounds are normal.      Palpations: Abdomen is soft.      Tenderness: There is no abdominal tenderness.   Musculoskeletal:      Cervical back: No rigidity.      Right lower leg: No edema.      Left lower leg: No edema.   Skin:     General: Skin is warm and dry.      Coloration: Skin is not jaundiced.   Neurological:      Mental Status: She is oriented to person, place, and time. Mental status is at baseline.   Psychiatric:         Mood and Affect: Mood normal.         Behavior: Behavior normal.             Significant Labs: All pertinent labs within the past 24 hours have been reviewed.  BMP:   Recent Labs   Lab 02/25/25  0615   GLU 74      K 4.0   CL 99   CO2 33*   BUN 11   CREATININE 0.7   CALCIUM 8.7     CBC:   Recent Labs   Lab 02/24/25  0519 02/25/25  0615   WBC 5.99 6.40   HGB 9.8* 9.8*   HCT 31.3* 31.3*    322       Significant Imaging: I have reviewed all pertinent imaging results/findings within the past 24 hours.    Assessment and Plan     * Hypoglycemia associated with history of type 2 diabetes mellitus    2/22/25  Blood glucose level improved  Stop IV steroids today  Encourage PO intake      Pulmonary nodules  CT results 02/20/2025: Scarring and pulmonary nodules noted.  Largest pulmonary nodule at least 7 mm.  For multiple solid nodules with any 6 mm or greater, Fleischner Society guidelines recommend follow up with non-contrast chest CT at 3-6 months and 18-24 months after discovery.     Debility  Patient with Acute on chronic debility due to bed confinement status, chronic unspecified fatigue,  and age-related physical debility. The patient's latest AMPAC (Activity Measure for Post Acute Care) Score is listed below.    AM-PAC Score - How much help does the patient need for each activity listed  Basic Mobility Total Score: 6  Turning over in bed (including adjusting bedclothes, sheets and blankets)?: Unable (REF)  Sitting down on and standing up from a chair with arms (e.g., wheelchair, bedside commode, etc.): Unable (REF)  Moving from lying on back to sitting on the side of the bed?: Unable (REF)  Moving to and from a bed to a chair (including a wheelchair)?: Unable (REF)  Need to walk in hospital room?: Unable (REF)  Climbing 3-5 steps with a railing?: Unable (NT)    Plan  - PT/OT consulted  - Consult SW for SNF placement          Slow transit constipation  CT Chest/Abd/Pelvis 02/20/2025: Moderate stool burden with some inspissated stool concerning for constipation.  Abrupt transition in the sigmoid colon to nondistention, nonspecific.  Underlying lesion such as mass or stricture difficult to exclude.  Recommend colonoscopy on an outpatient basis.     Patient reports 2 BM's overnight 02/24/2025        COPD (chronic obstructive pulmonary disease)  Patient's COPD is controlled currently.  Patient is currently off COPD Pathway. Continue scheduled inhalers and PRN Supplemental oxygen and monitor respiratory status closely.     Tobacco dependency  Dangers of cigarette smoking were reviewed with patient in detail. Patient was Counseled for 3-10 minutes. Nicotine replacement options were discussed. Nicotine replacement was discussed- prescribed    Primary hypertension  Patient's blood pressure range in the last 24 hours was: BP  Min: 117/64  Max: 151/77.The patient's inpatient anti-hypertensive regimen is listed below:  Current Antihypertensives       Plan  - BP is controlled, no changes needed to their regimen      VTE Risk Mitigation (From admission, onward)           Ordered     enoxaparin injection 30 mg   Daily         02/21/25 1246                    Discharge Planning   LANI:      Code Status: Full Code   Medical Readiness for Discharge Date:   Discharge Plan A: Skilled Nursing Facility   Discharge Delays: None known at this time            Please place Justification for DME        BEKA Franco  Department of Hospital Medicine   [facility] - Telemetry ([facility])

## 2025-02-26 VITALS
TEMPERATURE: 98 F | HEIGHT: 62 IN | WEIGHT: 150.38 LBS | HEART RATE: 105 BPM | BODY MASS INDEX: 27.67 KG/M2 | OXYGEN SATURATION: 95 % | RESPIRATION RATE: 20 BRPM | SYSTOLIC BLOOD PRESSURE: 112 MMHG | DIASTOLIC BLOOD PRESSURE: 60 MMHG

## 2025-02-26 LAB
ANION GAP SERPL CALC-SCNC: 7 MMOL/L (ref 8–16)
BASOPHILS # BLD AUTO: 0.05 K/UL (ref 0–0.2)
BASOPHILS NFR BLD: 0.8 % (ref 0–1.9)
BUN SERPL-MCNC: 8 MG/DL (ref 8–23)
CALCIUM SERPL-MCNC: 7.1 MG/DL (ref 8.7–10.5)
CHLORIDE SERPL-SCNC: 106 MMOL/L (ref 95–110)
CO2 SERPL-SCNC: 27 MMOL/L (ref 23–29)
CREAT SERPL-MCNC: 0.6 MG/DL (ref 0.5–1.4)
DIFFERENTIAL METHOD BLD: ABNORMAL
EOSINOPHIL # BLD AUTO: 0.2 K/UL (ref 0–0.5)
EOSINOPHIL NFR BLD: 2.3 % (ref 0–8)
ERYTHROCYTE [DISTWIDTH] IN BLOOD BY AUTOMATED COUNT: 18.1 % (ref 11.5–14.5)
EST. GFR  (NO RACE VARIABLE): >60 ML/MIN/1.73 M^2
GLUCOSE SERPL-MCNC: 55 MG/DL (ref 70–110)
HCT VFR BLD AUTO: 31.8 % (ref 37–48.5)
HGB BLD-MCNC: 9.8 G/DL (ref 12–16)
IMM GRANULOCYTES # BLD AUTO: 0.11 K/UL (ref 0–0.04)
IMM GRANULOCYTES NFR BLD AUTO: 1.7 % (ref 0–0.5)
LYMPHOCYTES # BLD AUTO: 2.6 K/UL (ref 1–4.8)
LYMPHOCYTES NFR BLD: 39.6 % (ref 18–48)
MCH RBC QN AUTO: 30.7 PG (ref 27–31)
MCHC RBC AUTO-ENTMCNC: 30.8 G/DL (ref 32–36)
MCV RBC AUTO: 100 FL (ref 82–98)
MONOCYTES # BLD AUTO: 0.4 K/UL (ref 0.3–1)
MONOCYTES NFR BLD: 6.1 % (ref 4–15)
NEUTROPHILS # BLD AUTO: 3.2 K/UL (ref 1.8–7.7)
NEUTROPHILS NFR BLD: 49.5 % (ref 38–73)
NRBC BLD-RTO: 0 /100 WBC
PLATELET # BLD AUTO: 337 K/UL (ref 150–450)
PMV BLD AUTO: 9 FL (ref 9.2–12.9)
POCT GLUCOSE: 116 MG/DL (ref 70–110)
POCT GLUCOSE: 117 MG/DL (ref 70–110)
POCT GLUCOSE: 96 MG/DL (ref 70–110)
POTASSIUM SERPL-SCNC: 3.7 MMOL/L (ref 3.5–5.1)
RBC # BLD AUTO: 3.19 M/UL (ref 4–5.4)
SODIUM SERPL-SCNC: 140 MMOL/L (ref 136–145)
WBC # BLD AUTO: 6.44 K/UL (ref 3.9–12.7)

## 2025-02-26 PROCEDURE — 80048 BASIC METABOLIC PNL TOTAL CA: CPT | Performed by: SPECIALIST

## 2025-02-26 PROCEDURE — 97530 THERAPEUTIC ACTIVITIES: CPT

## 2025-02-26 PROCEDURE — 94640 AIRWAY INHALATION TREATMENT: CPT

## 2025-02-26 PROCEDURE — 25000242 PHARM REV CODE 250 ALT 637 W/ HCPCS: Performed by: NURSE PRACTITIONER

## 2025-02-26 PROCEDURE — S4991 NICOTINE PATCH NONLEGEND: HCPCS | Performed by: NURSE PRACTITIONER

## 2025-02-26 PROCEDURE — 97116 GAIT TRAINING THERAPY: CPT

## 2025-02-26 PROCEDURE — 25000003 PHARM REV CODE 250

## 2025-02-26 PROCEDURE — 94761 N-INVAS EAR/PLS OXIMETRY MLT: CPT

## 2025-02-26 PROCEDURE — 36415 COLL VENOUS BLD VENIPUNCTURE: CPT | Performed by: SPECIALIST

## 2025-02-26 PROCEDURE — 25000003 PHARM REV CODE 250: Performed by: NURSE PRACTITIONER

## 2025-02-26 PROCEDURE — 85025 COMPLETE CBC W/AUTO DIFF WBC: CPT | Performed by: SPECIALIST

## 2025-02-26 PROCEDURE — 25000003 PHARM REV CODE 250: Performed by: HOSPITALIST

## 2025-02-26 PROCEDURE — 63600175 PHARM REV CODE 636 W HCPCS: Performed by: STUDENT IN AN ORGANIZED HEALTH CARE EDUCATION/TRAINING PROGRAM

## 2025-02-26 RX ORDER — POTASSIUM CHLORIDE 750 MG/1
30 TABLET, EXTENDED RELEASE ORAL ONCE
Status: COMPLETED | OUTPATIENT
Start: 2025-02-26 | End: 2025-02-26

## 2025-02-26 RX ORDER — HYDROCODONE BITARTRATE AND ACETAMINOPHEN 7.5; 325 MG/1; MG/1
1 TABLET ORAL
Qty: 5 TABLET | Refills: 0 | Status: SHIPPED | OUTPATIENT
Start: 2025-02-26

## 2025-02-26 RX ORDER — LORAZEPAM 0.5 MG/1
0.5 TABLET ORAL NIGHTLY
Qty: 5 TABLET | Refills: 0 | Status: SHIPPED | OUTPATIENT
Start: 2025-02-26

## 2025-02-26 RX ORDER — IBUPROFEN 200 MG
1 TABLET ORAL DAILY
Start: 2025-02-26 | End: 2025-03-19

## 2025-02-26 RX ADMIN — MORPHINE SULFATE 4 MG: 4 INJECTION INTRAVENOUS at 01:02

## 2025-02-26 RX ADMIN — HYDROCODONE BITARTRATE AND ACETAMINOPHEN 1 TABLET: 5; 325 TABLET ORAL at 12:02

## 2025-02-26 RX ADMIN — POTASSIUM CHLORIDE 30 MEQ: 750 TABLET, EXTENDED RELEASE ORAL at 08:02

## 2025-02-26 RX ADMIN — MORPHINE SULFATE 4 MG: 4 INJECTION INTRAVENOUS at 08:02

## 2025-02-26 RX ADMIN — ARFORMOTEROL TARTRATE 15 MCG: 15 SOLUTION RESPIRATORY (INHALATION) at 09:02

## 2025-02-26 RX ADMIN — LINACLOTIDE 145 MCG: 145 CAPSULE, GELATIN COATED ORAL at 06:02

## 2025-02-26 RX ADMIN — BUDESONIDE INHALATION 0.5 MG: 0.5 SUSPENSION RESPIRATORY (INHALATION) at 09:02

## 2025-02-26 RX ADMIN — NICOTINE 1 PATCH: 21 PATCH, EXTENDED RELEASE TRANSDERMAL at 08:02

## 2025-02-26 RX ADMIN — HYDROCODONE BITARTRATE AND ACETAMINOPHEN 1 TABLET: 5; 325 TABLET ORAL at 04:02

## 2025-02-26 RX ADMIN — FAMOTIDINE 20 MG: 20 TABLET, FILM COATED ORAL at 08:02

## 2025-02-26 NOTE — PT/OT/SLP PROGRESS
"Physical Therapy Treatment    Patient Name:  Genie Balderas   MRN:  [id number]    Recommendations:     Discharge Recommendations: Low Intensity Therapy (WITH FAMILY ASSIST AS NEEDED)  Discharge Equipment Recommendations: walker, rolling  Barriers to discharge: None-LIVES WITH DAUGHTER    Assessment:     Genie Balderas is a 64 y.o. female admitted with a medical diagnosis of Hypoglycemia associated with type 2 diabetes mellitus.  She presents with the following impairments/functional limitations: weakness, impaired endurance, impaired functional mobility, gait instability, impaired balance, decreased coordination.    Rehab Prognosis: Good; patient would benefit from acute skilled PT services to address these deficits and reach maximum level of function.    Recent Surgery: * No surgery found *     Plan:     During this hospitalization, patient to be seen 3 x/week to address the identified rehab impairments via gait training, therapeutic activities, therapeutic exercises and progress toward the following goals:    Plan of Care Expires:  03/07/25    Subjective     Chief Complaint: "I'M SO GLAD YA'LL CAME BACK TODAY, I REALLY ENJOYED TALKING TO YA'LL"  Patient/Family Comments/goals: EAGER TO PARTICIPATE  Pain/Comfort:  Pain Rating 1: 0/10      Objective:     Communicated with NURSE BRICE prior to session.  Patient found supine with telemetry, bed alarm, PICC line (KWAME SYS) upon PT entry to room.     General Precautions: Standard, fall  Orthopedic Precautions: N/A  Braces: N/A  Respiratory Status: Room air     Functional Mobility:  Bed Mobility:   SLOW MOVING WITH ALL MOBILITY BUT GOOD ACTIVE PARTICIPATION  Rolling Left:  supervision  Scooting: supervision  Supine to Sit: supervision  Transfers:     Sit to Stand:  stand by assistance with rolling walker  Bed to Chair: stand by assistance with  rolling walker  using  Step Transfer  Gait: PT ' WITH RW AND SBA, SLOW STEADY PACE, NO LOB OR SOB ON ROOM AIR  Balance: GOOD " "SITTING BALANCE, FAIR+ DYNAMIC BALANCE DURING GAIT  PT EDUCATED IN AND PERFORMED SEATED BLE THEREX X 10 REPS AROM WITH REST AS NEEDED: HIP FLEX/EXT, LAQ, QUAD SET, HEEL SLIDES, AP'S  PT TOLERATED UN-ASSISTED SITTING AT EOB FOR APPROXIMATELY 15 MINUTES FOCUSING ON TOLERANCE TO UPRIGHT POSITION, INCREASED CONVERSATION TO BOOST MOOD, SPV FOR SITTING EOB    AM-PAC 6 CLICK MOBILITY  Turning over in bed (including adjusting bedclothes, sheets and blankets)?: 4  Sitting down on and standing up from a chair with arms (e.g., wheelchair, bedside commode, etc.): 4  Moving from lying on back to sitting on the side of the bed?: 4  Moving to and from a bed to a chair (including a wheelchair)?: 4  Need to walk in hospital room?: 4  Climbing 3-5 steps with a railing?: 1  Basic Mobility Total Score: 21     Treatment & Education:  PT EDUCATION:  - ROLE OF P.T. AND POC IN ACUTE CARE HOSPITAL SETTING  - RW USE AND SAFETY DURING TF'S AND GAIT  - ENCOURAGED TO INCREASE TIME OOB IN CHAIR TO TOLERANCE   - TO CONTINUE THERAPUETIC EXERCISES THROUGHOUT THE DAY TO INCREASE ACTIVITY TOLERANCE AND DECREASE RISK FOR PNEUMONIA AND BLOOD CLOTS: HIP FLEX/EXT, HIP ABD/ADD, QUAD SET, HEEL SLIDE, AP  - RISK FOR FALLS DUE TO GENERALIZED WEAKNESS, EDUCATED ON "CALL DON'T FALL", ENCOURAGED TO CALL FOR ASSISTANCE WITH ALL NEEDS SUCH AS BED<>CHAIR TRANSFERS OR TRIPS TO BATHROOM, PT AGREEABLE TO SAFETY PRECAUTIONS    Patient left up in chair with all lines intact, call button in reach, chair alarm on, and NURSE notified..    GOALS:   Multidisciplinary Problems       Physical Therapy Goals          Problem: Physical Therapy    Goal Priority Disciplines Outcome Interventions   Physical Therapy Goal     PT, PT/OT Progressing    Description: LTG'S TO BE MET IN 14 DAYS (3-7-25)  PT WILL BE EBONY FOR BED MOBILITY  PT WILL REQUIRE SPV FOR BED<>CHAIR TF'S  PT WILL  FEET WITH RW AND SPV  PT WILL INC AMPAC SCORE BY 2 POINTS TO PROGRESS GROSS FUNC MOBILITY      "                    DME Justifications:   Genie's mobility limitation cannot be sufficiently resolved by the use of a cane. Her functional mobility deficit can be sufficiently resolved with the use of a Rolling Walker. Patient's mobility limitation significantly impairs their ability to participate in one of more activities of daily living.  The use of a RW will significantly improve the patient's ability to participate in MRADLS and the patient will use it on regular basis in the home.    Time Tracking:     PT Received On: 02/26/25  PT Start Time: 0840     PT Stop Time: 0920  PT Total Time (min): 40 min     Billable Minutes: Gait Training 10 and Therapeutic Activity 30    Treatment Type: Treatment  PT/PTA: PT     Number of PTA visits since last PT visit: 0     02/26/2025

## 2025-02-26 NOTE — PLAN OF CARE
02/26/25 0945   Post-Acute Status   Post-Acute Authorization Placement   Post-Acute Placement Status Set-up Complete/Auth obtained   Discharge Delays None known at this time   Discharge Plan   Discharge Plan A Skilled Nursing Facility     SNF auth received for [facility]. Pt/daughter, Amy montemayor.  MD notified.  SW to coordinate DC with bedside nurse once finalized

## 2025-02-26 NOTE — DISCHARGE SUMMARY
[facility] - Telemetry ([facility] Medicine  Discharge Summary      Patient Name: Genie Balderas  MRN: [id number]  PRANEETH: [id number]  Patient Class: IP- Inpatient  Admission Date: 2/20/2025  Hospital Length of Stay: 6 days  Discharge Date and Time:  02/26/2025 11:00 AM  Attending Physician: Lilian Walker MD   Discharging Provider: BEKA Franco  Primary Care Provider: Dawit Grey MD    Primary Care Team: [facility] MEDICINE B    HPI:   64 year old female with known hypertension; COPD/asthma with prn home oxygen; gastroparesis; chronic pain on hydrocodone; everyday smoker  H/o of diabetes (off meds ~5yrs) and atrial fib previously on xarelto; she reports both resolved since chemotherapy for B cell lymphoma ~2018     Family brought to ED on 2/20 for nausea, vomiting, abdominal pain, back pain (worse than baseline), constipation  Pt is very poor historian, cannot recall last BM, not sure why her family brought her in. Unsure of current medications, does think she ran fevers at home before seeing PCP on 2/13 when she was given antibiotic for bronchitis     Evaluation revealed hypoglycemia that was refractory to IVP D50 and has not yet been stable on continuous D10 infusion  Labs show nl wbc, lactate, ceratinine, electrolytes. CT abdomen shows Moderate volume of stool throughout the colon with rather abrupt transition point in the sigmoid colon. Underlying lesion difficult to exclude, such as stricture or neoplasm   She has not vomited since zofran given on arrival but has not yet eaten the sandwich or juice at her bedside and has not drank the mag citrate also at bedside     Critical care team contacted for ICU admission with D10 infusion and hourly glucose monitoring    * No surgery found *      Hospital Course:     2/21/25  NAEON  Patient awake, alert, oriented  Family at bedside  Blood glucose levels improved  Dextrose infusion stopped  Continue IV steroids today  Reports decresed PO intake for  past several weeks  Stable for floor transfer    2/22/25  NINI, patient sitting in chair, reports faitgue, unable to care for herself at home  Wei in place, remove today for voiding trial  PT/OT with reccs for LIT with 24 hours supervision  Discussed with patient's son over phone  Consult SW for SNF placement    2/23/25  NINI, patient resting in bed, reports fatigue  Family at bedside, updated  SW consulted for SNF placement    02/25/2025  [facility], [facility], [place] and [facility] are accepting patient. CM awaiting family decision.     02/26/2025  Patient has been approved for placement at [facility] for SNF placement. She is discharged there with resumption of her home medications. Rx for lorazepam 0.5 mg tablets, take 1 table PO every evening and Norco 7.5-325 mg tablets, take 1 tablet PO Q4 -6 hours PRN for pain per request of SNF. Follow up recommendations and referrals with PCP, Pulmonology, and Gastroenterology are included in the discharge documents. Referrals for Pulmonology and Gastroenterology referrals are for abnormal imaging on CT chest/abd/pelvis (02/20/2025) which indicated pulmonary nodules and an abrupt transition point in the sigmoid colon where underlying lesion difficult to exclude such as stricture or neoplasm. There was no evidence of intestinal blockage during hospitalization, patient having regular BM's through discharge. Both follow-up and referrals to occur after discharge from SNF. I have seen and evaluated this patient and she is stable for discharge.      Goals of Care Treatment Preferences:  Code Status: Full Code       Consults:   Consults (From admission, onward)          Status Ordering Provider     Inpatient consult to Social Work  Once        Provider:  (Not yet assigned)    Completed YASMIN JOSHI     Inpatient consult to Hospitalist  Once        Provider:  Yasmin Joshi MD    Acknowledged LETICIA HSU     Inpatient consult to PICC team ([facility])  Once         Provider:  (Not yet assigned)    Acknowledged RICHARD HOFFMAN          Final Active Diagnoses:    Diagnosis Date Noted POA    PRINCIPAL PROBLEM:  Hypoglycemia associated with history of type 2 diabetes mellitus [E11.649] 02/20/2025 Yes    Pulmonary nodules [R91.8] 02/24/2025 Yes    Debility [R53.81] 02/22/2025 Yes    Tobacco dependency [F17.200] 02/20/2025 Yes    COPD (chronic obstructive pulmonary disease) [J44.9] 02/20/2025 Yes    Slow transit constipation [K59.01] 02/20/2025 Yes    Primary hypertension [I10] 09/25/2017 Yes     Chronic      Problems Resolved During this Admission:       Discharged Condition: stable    Disposition:     Follow Up:   Contact information for follow-up providers       Dawit Grey MD. Schedule an appointment as soon as possible for a visit.    Specialty: Family Medicine  Contact information:  [address] [address] [phone]               [facility] GASTROENTEROLOGY. Schedule an appointment as soon as possible for a visit.    Specialty: Gastroenterology  Contact information:  [address] [facility] [address] [facility] [address] [phone]                     Contact information for after-discharge care       Destination       [facility] .    Service: Skilled Nursing  Contact information:  [address] [address] [phone]                                 Patient Instructions:      Ambulatory referral/consult to Pulmonology   Standing Status: Future   Referral Priority: Routine Referral Type: Consultation   Referral Reason: Specialty Services Required   Requested Specialty: Pulmonary Disease     Ambulatory referral/consult to Gastroenterology   Referral Priority: Routine Referral Type: Consultation   Referral Reason: Specialty Services Required   Requested Specialty: Gastroenterology   Number of Visits Requested: 1       Significant Diagnostic Studies: Labs: BMP:   Recent Labs   Lab  02/25/25  0615 02/26/25  0437   GLU 74 55*    140   K 4.0 3.7   CL 99 106   CO2 33* 27   BUN 11 8   CREATININE 0.7 0.6   CALCIUM 8.7 7.1*   , CMP   Recent Labs   Lab 02/25/25  0615 02/26/25  0437    140   K 4.0 3.7   CL 99 106   CO2 33* 27   GLU 74 55*   BUN 11 8   CREATININE 0.7 0.6   CALCIUM 8.7 7.1*   ANIONGAP 6* 7*   , and CBC   Recent Labs   Lab 02/25/25  0615 02/26/25 0437   WBC 6.40 6.44   HGB 9.8* 9.8*   HCT 31.3* 31.8*    337     X-Ray Chest AP Portable   Final Result      As above.         Electronically signed by: Denys Moreno   Date:    02/21/2025   Time:    10:32      CT Abdomen Pelvis With IV Contrast NO Oral Contrast   Final Result   Abnormal      Moderate stool burden with some inspissated stool concerning for constipation.  Abrupt transition in the sigmoid colon to nondistention, nonspecific.  Underlying lesion such as mass or stricture difficult to exclude.  Recommend colonoscopy on an outpatient basis.      Bladder distension of unclear significance.  Correlation for retention.      Multiple pulmonary nodules and scarring.  For multiple solid nodules with any 6 mm or greater, Fleischner Society guidelines recommend follow up with non-contrast chest CT at 3-6 months and 18-24 months after discovery.      This report was flagged in Epic as abnormal.      All CT scans at this facility are performed  using dose modulation techniques as appropriate to performed exam including the following:  automated exposure control; adjustment of mA and/or kV according to the patients size (this includes techniques or standardized protocols for targeted exams where dose is matched to indication/reason for exam: i.e. extremities or head);  iterative reconstruction technique.         Electronically signed by: Denys Moreno   Date:    02/20/2025   Time:    14:43      X-Ray Abdomen Flat And Erect   Final Result      As above.         Electronically signed by: Denys Moreno   Date:    02/20/2025    Time:    13:12      Anesthesia US Guide Vascular Access    (Results Pending)        Pending Diagnostic Studies:       None           Medications:  Reconciled Home Medications:      Medication List        CONTINUE taking these medications      benzonatate 100 MG capsule  Commonly known as: TESSALON  Take 1 capsule by mouth 3 times daily as needed.     blood sugar diagnostic Strp  QAC and QHS     budesonide-formoterol 160-4.5 mcg 160-4.5 mcg/actuation Hfaa  Commonly known as: SYMBICORT  Inhale 2 puffs into the lungs every 12 (twelve) hours. Controller     cyproheptadine 4 mg tablet  Commonly known as: PERIACTIN  Take 1 tablet twice a day by oral route.     doxycycline 100 MG tablet  Commonly known as: VIBRA-TABS  Take 1 tablet twice a day by oral route for 5 days.     HYDROcodone-acetaminophen 7.5-325 mg per tablet  Commonly known as: NORCO  Take 1 tablet by mouth every 4 to 6 hours as needed for Pain.     ipratropium 0.02 % nebulizer solution  Commonly known as: ATROVENT  Take 2.5 mLs (500 mcg total) by nebulization every 6 (six) hours. Rescue     levalbuterol 0.63 mg/3 mL nebulizer solution  Commonly known as: XOPENEX  Take 3 mLs (0.63 mg total) by nebulization every 6 (six) hours. Rescue     LORazepam 0.5 MG tablet  Commonly known as: ATIVAN  Take 1 tablet (0.5 mg total) by mouth every evening.     metoclopramide HCl 5 MG tablet  Commonly known as: REGLAN  Take 1 tablet 3 times a day by oral route before meal(s).     metoprolol succinate 50 MG 24 hr tablet  Commonly known as: TOPROL-XL  Take 1 tablet (50 mg total) by mouth 2 (two) times daily.     montelukast 10 mg tablet  Commonly known as: SINGULAIR  Take 10 mg by mouth every evening.     ondansetron 4 MG Tbdl  Commonly known as: ZOFRAN-ODT  Place 4 mg every 8 hours by translingual route as needed.     rivaroxaban 20 mg Tab  Commonly known as: XARELTO  Take 20 mg by mouth daily with dinner or evening meal.     tiotropium 18 mcg inhalation capsule  Commonly  known as: SPIRIVA  Inhale 18 mcg into the lungs once daily. Controller     triamcinolone acetonide 0.1% 0.1 % cream  Commonly known as: KENALOG  APPLY A THIN LAYER TO THE AFFECTED AREA(S) BY TOPICAL ROUTE 2 TIMES PER DAY              Indwelling Lines/Drains at time of discharge:   Lines/Drains/Airways       Peripherally Inserted Central Catheter Line  Duration             PICC Double Lumen 02/21/25 0928 right basilic 5 days                    Time spent on the discharge of patient: 31 minutes         LAMBERTO FrancoP-C  Department of Hospital Medicine  [facility] - Telemetry ([facility])

## 2025-02-26 NOTE — DISCHARGE INSTRUCTIONS
Make sure to follow up on referrals to Pulmonology for lung nodules and Gastroenterology for abnormal CT-abd/pelvis after discharge from SNF.

## 2025-02-26 NOTE — PT/OT/SLP PROGRESS
"Occupational Therapy   Treatment    Name: Genie Balderas  MRN: [id number]  Admitting Diagnosis:  Hypoglycemia associated with type 2 diabetes mellitus       Recommendations:     Discharge Recommendations: Low Intensity Therapy (24/7 SPV and A)  Discharge Equipment Recommendations:  walker, rolling  Barriers to discharge:  None    Assessment:     Genie Balderas is a 64 y.o. female with a medical diagnosis of Hypoglycemia associated with type 2 diabetes mellitus.  She presents with the following performance deficits affecting function are weakness, impaired endurance, impaired self care skills, impaired functional mobility, impaired balance, impaired cardiopulmonary response to activity.     Rehab Prognosis:  Fair; patient would benefit from acute skilled OT services to address these deficits and reach maximum level of function.       Plan:     Patient to be seen 2 x/week to address the above listed problems via self-care/home management, therapeutic activities, therapeutic exercises  Plan of Care Expires: 03/07/25  Plan of Care Reviewed with: patient    Subjective     Chief Complaint: Reported "I am doing ok, it's good to see you!"  Patient/Family Comments/goals: increase independence  Pain/Comfort:  Pain Rating 1: 0/10    Objective:     Communicated with: Nurse and EPIC prior to session.  Patient found supine with telemetry, bed alarm, PICC line (kecia sys) upon OT entry to room.    General Precautions: Standard, fall    Orthopedic Precautions:N/A  Braces: N/A  Respiratory Status: Room air     Occupational Performance:     Bed Mobility:    Patient completed Supine to Sit with supervision     Functional Mobility/Transfers:  Patient completed Sit <> Stand Transfer with stand by assistance  with  rolling walker   Patient completed Bed <> Chair Transfer using Step Transfer technique with stand by assistance with rolling walker  Functional Mobility: Patient completed x220ft functional mobility with RW and SBA to increase dynamic " standing balance and activity tolerance needed for ADL completion.  Provided with v/c for technique with transfers to increase safety and independence with completion  Excellent pacing throughout    Activities of Daily Living:  Grooming: setup simple grooming from bedside chair/tray  Upper Body Dressing: setup donning rajate while seated EOB    [facility] 6 Click ADL: 21    Treatment & Education:  Encouraged completion of B UE AROM therex throughout the day to increase functional strength and activity tolerance needed for ADL completion. Educated on benefits of OOB activity and importance of calling for A to transfer back to bed. Patient provided with increased time throughout session to allow for conversation and rapport building. Patient stated understanding and in agreement with POC.    Patient left up in chair with all lines intact, call button in reach, and chair alarm on    GOALS:   Multidisciplinary Problems       Occupational Therapy Goals          Problem: Occupational Therapy    Goal Priority Disciplines Outcome Interventions   Occupational Therapy Goal     OT, PT/OT Progressing    Description: Goals to be met by: 3/7/25     Patient will increase functional independence with ADLs by performing:    Toileting from toilet with Supervision for hygiene and clothing management.   Toilet transfer to toilet with Supervision.  Increased functional strength in B UE grossly by 1/2 MM grade.                       DME Justifications:  No DME recommended requiring DME justifications    Time Tracking:     OT Date of Treatment: 02/26/25  OT Start Time: 0835  OT Stop Time: 0915  OT Total Time (min): 40 min    Billable Minutes:Therapeutic Activity 40    OT/GIULIA: OT     Number of GIULIA visits since last OT visit: 0    Amada Alvarado OT  2/26/2025

## 2025-02-26 NOTE — PLAN OF CARE
[facility] - Telemetry (Hospital)  Discharge Final Note    Primary Care Provider: Dawit Grey MD    Expected Discharge Date: 2/26/2025    Final Discharge Note (most recent)       Final Note - 02/26/25 1601          Final Note    Assessment Type Final Discharge Note     Anticipated Discharge Disposition Skilled Nursing Facility     Hospital Resources/Appts/Education Provided Post-Acute resouces added to AVS        Post-Acute Status    Post-Acute Authorization Placement     Post-Acute Placement Status Set-up Complete/Auth obtained     Discharge Delays None known at this time                   Pt to discharge to [facility] today. Discharge clinicals provided via Epic. Nursing staff provided with number for report and facility transport ETA.     Important Message from Medicare              Follow-up providers       Dawit Grey MD   Specialty: Family Medicine   Relationship: PCP - General    [address] [facility] [address]   Phone: [phone]       Next Steps: Schedule an appointment as soon as possible for a visit    Shilpa Velasco PA-C   Specialty: Pulmonary Disease    [address] [facility] [address]   Phone: [phone]       Next Steps: Follow up    Instructions: followup lung nodules    Elayne Richter MD   Specialty: Gastroenterology    [address] [facility] [address]   Phone: [phone]       Next Steps: Follow up    Instructions: make appointment to schedule colonoscopy              After-discharge care                Destination       [facility]   Service: Skilled Nursing    [address] [facility] [address]   Phone: [phone]